# Patient Record
Sex: MALE | Race: BLACK OR AFRICAN AMERICAN | NOT HISPANIC OR LATINO | Employment: UNEMPLOYED | URBAN - METROPOLITAN AREA
[De-identification: names, ages, dates, MRNs, and addresses within clinical notes are randomized per-mention and may not be internally consistent; named-entity substitution may affect disease eponyms.]

---

## 2017-01-07 ENCOUNTER — HOSPITAL ENCOUNTER (EMERGENCY)
Age: 62
Discharge: HOME | End: 2017-01-07

## 2017-01-07 VITALS
TEMPERATURE: 97 F | DIASTOLIC BLOOD PRESSURE: 77 MMHG | OXYGEN SATURATION: 95 % | HEART RATE: 61 BPM | SYSTOLIC BLOOD PRESSURE: 144 MMHG | RESPIRATION RATE: 18 BRPM

## 2017-01-07 DIAGNOSIS — H61.21 IMPACTED CERUMEN OF RIGHT EAR: Primary | ICD-10-CM

## 2017-01-07 DIAGNOSIS — H92.01 RIGHT EAR PAIN: ICD-10-CM

## 2017-01-07 PROCEDURE — 99282 EMERGENCY DEPT VISIT SF MDM: CPT | Performed by: PHYSICIAN ASSISTANT

## 2017-01-07 PROCEDURE — 69210 REMOVE IMPACTED EAR WAX UNI: CPT | Performed by: PHYSICIAN ASSISTANT

## 2017-01-07 PROCEDURE — 99283 EMERGENCY DEPT VISIT LOW MDM: CPT

## 2017-01-07 PROCEDURE — 10004651 HB RX, NO CHARGE ITEM: Performed by: PHYSICIAN ASSISTANT

## 2017-01-07 PROCEDURE — 69209 REMOVE IMPACTED EAR WAX UNI: CPT

## 2017-01-07 RX ORDER — ACETAMINOPHEN 325 MG/1
650 TABLET ORAL ONCE
Status: COMPLETED | OUTPATIENT
Start: 2017-01-07 | End: 2017-01-07

## 2017-01-07 RX ADMIN — ACETAMINOPHEN 650 MG: 325 TABLET ORAL at 10:33

## 2017-01-07 ASSESSMENT — ENCOUNTER SYMPTOMS
SHORTNESS OF BREATH: 0
APPETITE CHANGE: 0
COLOR CHANGE: 0
FEVER: 0
DIARRHEA: 0
ACTIVITY CHANGE: 0
CHILLS: 0
VOMITING: 0
COUGH: 0
RHINORRHEA: 0

## 2017-01-07 ASSESSMENT — PAIN SCALES - GENERAL
PAINLEVEL_OUTOF10: 3
PAINLEVEL_OUTOF10: 6
PAIN_LEVEL_AT_REST: 3

## 2017-01-10 ENCOUNTER — OFFICE VISIT (OUTPATIENT)
Dept: OTOLARYNGOLOGY | Age: 62
End: 2017-01-10

## 2017-01-10 VITALS
SYSTOLIC BLOOD PRESSURE: 134 MMHG | HEART RATE: 62 BPM | DIASTOLIC BLOOD PRESSURE: 79 MMHG | RESPIRATION RATE: 16 BRPM | TEMPERATURE: 98.1 F

## 2017-01-10 DIAGNOSIS — H92.01 OTALGIA, RIGHT: Primary | ICD-10-CM

## 2017-01-10 DIAGNOSIS — H61.21 IMPACTED CERUMEN OF RIGHT EAR: ICD-10-CM

## 2017-01-10 PROCEDURE — G8419 CALC BMI OUT NRM PARAM NOF/U: HCPCS | Performed by: OTOLARYNGOLOGY

## 2017-01-10 PROCEDURE — 1036F TOBACCO NON-USER: CPT | Performed by: OTOLARYNGOLOGY

## 2017-01-10 PROCEDURE — 3017F COLORECTAL CA SCREEN DOC REV: CPT | Performed by: OTOLARYNGOLOGY

## 2017-01-10 PROCEDURE — G8732 NO DOC OF PAIN: HCPCS | Performed by: OTOLARYNGOLOGY

## 2017-01-10 PROCEDURE — G8427 DOCREV CUR MEDS BY ELIG CLIN: HCPCS | Performed by: OTOLARYNGOLOGY

## 2017-01-10 PROCEDURE — 69210 REMOVE IMPACTED EAR WAX UNI: CPT | Performed by: OTOLARYNGOLOGY

## 2017-01-10 PROCEDURE — 99201 OFFICE/OUTPT VISIT,NEW,LEVL I: CPT | Performed by: OTOLARYNGOLOGY

## 2017-03-17 ENCOUNTER — OFFICE VISIT (OUTPATIENT)
Dept: INTERNAL MEDICINE | Age: 62
End: 2017-03-17

## 2017-03-17 DIAGNOSIS — K21.9 GASTROESOPHAGEAL REFLUX DISEASE, ESOPHAGITIS PRESENCE NOT SPECIFIED: ICD-10-CM

## 2017-03-17 DIAGNOSIS — E66.9 OBESITY (BMI 30-39.9): Primary | ICD-10-CM

## 2017-03-17 PROCEDURE — 99213 OFFICE O/P EST LOW 20 MIN: CPT | Performed by: INTERNAL MEDICINE

## 2017-03-17 RX ORDER — ORLISTAT 120 MG/1
120 CAPSULE ORAL
Qty: 90 CAPSULE | Refills: 0 | Status: SHIPPED | OUTPATIENT
Start: 2017-03-17 | End: 2020-11-04 | Stop reason: ALTCHOICE

## 2017-03-17 RX ORDER — OMEPRAZOLE 40 MG/1
40 CAPSULE, DELAYED RELEASE ORAL DAILY
Qty: 30 CAPSULE | Refills: 6 | Status: SHIPPED | OUTPATIENT
Start: 2017-03-17 | End: 2018-03-02 | Stop reason: SDUPTHER

## 2017-03-17 ASSESSMENT — PAIN SCALES - GENERAL: PAINLEVEL: 0

## 2017-04-06 ENCOUNTER — TRANSCRIBE ORDERS (OUTPATIENT)
Dept: ADMINISTRATIVE | Facility: HOSPITAL | Age: 62
End: 2017-04-06

## 2017-04-06 ENCOUNTER — LAB (OUTPATIENT)
Dept: LAB | Facility: HOSPITAL | Age: 62
End: 2017-04-06

## 2017-04-06 DIAGNOSIS — R73.09 OTHER ABNORMAL GLUCOSE: ICD-10-CM

## 2017-04-06 DIAGNOSIS — R73.09 OTHER ABNORMAL GLUCOSE: Primary | ICD-10-CM

## 2017-04-06 LAB — HBA1C MFR BLD: 6.18 % (ref 4.8–5.6)

## 2017-04-06 PROCEDURE — 36415 COLL VENOUS BLD VENIPUNCTURE: CPT

## 2017-04-06 PROCEDURE — 83036 HEMOGLOBIN GLYCOSYLATED A1C: CPT | Performed by: INTERNAL MEDICINE

## 2017-05-04 ENCOUNTER — TELEPHONE (OUTPATIENT)
Dept: INTERNAL MEDICINE | Age: 62
End: 2017-05-04

## 2017-06-02 ENCOUNTER — OFFICE VISIT (OUTPATIENT)
Dept: INTERNAL MEDICINE | Age: 62
End: 2017-06-02

## 2017-06-02 VITALS
DIASTOLIC BLOOD PRESSURE: 80 MMHG | HEIGHT: 64 IN | SYSTOLIC BLOOD PRESSURE: 142 MMHG | BODY MASS INDEX: 37.63 KG/M2 | HEART RATE: 79 BPM | WEIGHT: 220.4 LBS

## 2017-06-02 DIAGNOSIS — G47.26 SHIFT WORK SLEEP DISORDER: ICD-10-CM

## 2017-06-02 DIAGNOSIS — R03.0 ELEVATED BLOOD PRESSURE READING: Primary | ICD-10-CM

## 2017-06-02 PROCEDURE — G8732 NO DOC OF PAIN: HCPCS | Performed by: INTERNAL MEDICINE

## 2017-06-02 PROCEDURE — G8427 DOCREV CUR MEDS BY ELIG CLIN: HCPCS | Performed by: INTERNAL MEDICINE

## 2017-06-02 PROCEDURE — 1036F TOBACCO NON-USER: CPT | Performed by: INTERNAL MEDICINE

## 2017-06-02 PROCEDURE — 3017F COLORECTAL CA SCREEN DOC REV: CPT | Performed by: INTERNAL MEDICINE

## 2017-06-02 PROCEDURE — 99213 OFFICE O/P EST LOW 20 MIN: CPT | Performed by: INTERNAL MEDICINE

## 2017-06-02 PROCEDURE — G8419 CALC BMI OUT NRM PARAM NOF/U: HCPCS | Performed by: INTERNAL MEDICINE

## 2017-06-02 ASSESSMENT — PATIENT HEALTH QUESTIONNAIRE - PHQ9
1. LITTLE INTEREST OR PLEASURE IN DOING THINGS: NO
2. FEELING DOWN, DEPRESSED OR HOPELESS: NO

## 2017-06-12 PROBLEM — R03.0 ELEVATED BLOOD PRESSURE READING: Status: ACTIVE | Noted: 2017-06-12

## 2017-06-12 PROBLEM — G47.26 SHIFT WORK SLEEP DISORDER: Status: ACTIVE | Noted: 2017-06-12

## 2017-06-12 PROBLEM — F51.01 PRIMARY INSOMNIA: Status: ACTIVE | Noted: 2017-06-12

## 2017-06-16 ENCOUNTER — OFFICE VISIT (OUTPATIENT)
Dept: INTERNAL MEDICINE | Age: 62
End: 2017-06-16

## 2017-06-16 VITALS — SYSTOLIC BLOOD PRESSURE: 142 MMHG | DIASTOLIC BLOOD PRESSURE: 75 MMHG

## 2017-09-28 ENCOUNTER — TRANSCRIBE ORDERS (OUTPATIENT)
Dept: ADMINISTRATIVE | Facility: HOSPITAL | Age: 62
End: 2017-09-28

## 2017-09-28 ENCOUNTER — LAB (OUTPATIENT)
Dept: LAB | Facility: HOSPITAL | Age: 62
End: 2017-09-28

## 2017-09-28 DIAGNOSIS — E78.5 HYPERLIPIDEMIA, UNSPECIFIED HYPERLIPIDEMIA TYPE: ICD-10-CM

## 2017-09-28 DIAGNOSIS — I10 ESSENTIAL HYPERTENSION, MALIGNANT: ICD-10-CM

## 2017-09-28 DIAGNOSIS — E34.9 TESTOSTERONE DEFICIENCY: ICD-10-CM

## 2017-09-28 DIAGNOSIS — Z00.00 ROUTINE GENERAL MEDICAL EXAMINATION AT A HEALTH CARE FACILITY: ICD-10-CM

## 2017-09-28 DIAGNOSIS — Z12.5 SPECIAL SCREENING FOR MALIGNANT NEOPLASM OF PROSTATE: ICD-10-CM

## 2017-09-28 DIAGNOSIS — Z00.00 ROUTINE GENERAL MEDICAL EXAMINATION AT A HEALTH CARE FACILITY: Primary | ICD-10-CM

## 2017-09-28 LAB
ALBUMIN SERPL-MCNC: 4.4 G/DL (ref 3.5–5.2)
ALBUMIN/GLOB SERPL: 1.5 G/DL
ALP SERPL-CCNC: 143 U/L (ref 39–117)
ALT SERPL W P-5'-P-CCNC: 35 U/L (ref 1–41)
ANION GAP SERPL CALCULATED.3IONS-SCNC: 15.7 MMOL/L
AST SERPL-CCNC: 20 U/L (ref 1–40)
BASOPHILS # BLD AUTO: 0.03 10*3/MM3 (ref 0–0.2)
BASOPHILS NFR BLD AUTO: 0.2 % (ref 0–1.5)
BILIRUB SERPL-MCNC: 0.3 MG/DL (ref 0.1–1.2)
BILIRUB UR QL STRIP: NEGATIVE
BUN BLD-MCNC: 17 MG/DL (ref 8–23)
BUN/CREAT SERPL: 17.7 (ref 7–25)
CALCIUM SPEC-SCNC: 9.3 MG/DL (ref 8.6–10.5)
CHLORIDE SERPL-SCNC: 104 MMOL/L (ref 98–107)
CHOLEST SERPL-MCNC: 182 MG/DL (ref 0–200)
CLARITY UR: CLEAR
CO2 SERPL-SCNC: 23.3 MMOL/L (ref 22–29)
COLOR UR: YELLOW
CREAT BLD-MCNC: 0.96 MG/DL (ref 0.76–1.27)
DEPRECATED RDW RBC AUTO: 48.5 FL (ref 37–54)
EOSINOPHIL # BLD AUTO: 0.17 10*3/MM3 (ref 0–0.7)
EOSINOPHIL NFR BLD AUTO: 1.2 % (ref 0.3–6.2)
ERYTHROCYTE [DISTWIDTH] IN BLOOD BY AUTOMATED COUNT: 14.3 % (ref 11.5–14.5)
FERRITIN SERPL-MCNC: 270.3 NG/ML (ref 30–400)
FOLATE SERPL-MCNC: 14.16 NG/ML (ref 4.78–24.2)
GFR SERPL CREATININE-BSD FRML MDRD: 79 ML/MIN/1.73
GLOBULIN UR ELPH-MCNC: 2.9 GM/DL
GLUCOSE BLD-MCNC: 103 MG/DL (ref 65–99)
GLUCOSE UR STRIP-MCNC: NEGATIVE MG/DL
HCT VFR BLD AUTO: 43 % (ref 40.4–52.2)
HDLC SERPL-MCNC: 51 MG/DL (ref 40–60)
HGB BLD-MCNC: 14.3 G/DL (ref 13.7–17.6)
HGB UR QL STRIP.AUTO: NEGATIVE
IMM GRANULOCYTES # BLD: 0.04 10*3/MM3 (ref 0–0.03)
IMM GRANULOCYTES NFR BLD: 0.3 % (ref 0–0.5)
IRON 24H UR-MRATE: 60 MCG/DL (ref 59–158)
KETONES UR QL STRIP: NEGATIVE
LDLC SERPL CALC-MCNC: 113 MG/DL (ref 0–100)
LDLC/HDLC SERPL: 2.22 {RATIO}
LEUKOCYTE ESTERASE UR QL STRIP.AUTO: NEGATIVE
LH SERPL-ACNC: 5.23 MIU/ML
LYMPHOCYTES # BLD AUTO: 4.46 10*3/MM3 (ref 0.9–4.8)
LYMPHOCYTES NFR BLD AUTO: 32.6 % (ref 19.6–45.3)
MCH RBC QN AUTO: 31.4 PG (ref 27–32.7)
MCHC RBC AUTO-ENTMCNC: 33.3 G/DL (ref 32.6–36.4)
MCV RBC AUTO: 94.5 FL (ref 79.8–96.2)
MONOCYTES # BLD AUTO: 0.79 10*3/MM3 (ref 0.2–1.2)
MONOCYTES NFR BLD AUTO: 5.8 % (ref 5–12)
NEUTROPHILS # BLD AUTO: 8.19 10*3/MM3 (ref 1.9–8.1)
NEUTROPHILS NFR BLD AUTO: 59.9 % (ref 42.7–76)
NITRITE UR QL STRIP: NEGATIVE
PH UR STRIP.AUTO: 7 [PH] (ref 5–8)
PLATELET # BLD AUTO: 324 10*3/MM3 (ref 140–500)
PMV BLD AUTO: 10.1 FL (ref 6–12)
POTASSIUM BLD-SCNC: 4.4 MMOL/L (ref 3.5–5.2)
PROT SERPL-MCNC: 7.3 G/DL (ref 6–8.5)
PROT UR QL STRIP: NEGATIVE
PSA SERPL-MCNC: 0.62 NG/ML (ref 0–4)
RBC # BLD AUTO: 4.55 10*6/MM3 (ref 4.6–6)
SODIUM BLD-SCNC: 143 MMOL/L (ref 136–145)
SP GR UR STRIP: 1.02 (ref 1–1.03)
T3 SERPL-MCNC: 105.8 NG/DL (ref 80–200)
T4 FREE SERPL-MCNC: 1.01 NG/DL (ref 0.93–1.7)
TESTOST SERPL-MCNC: 649 NG/DL (ref 193–740)
TRIGL SERPL-MCNC: 90 MG/DL (ref 0–150)
TSH SERPL DL<=0.05 MIU/L-ACNC: 0.64 MIU/ML (ref 0.27–4.2)
UROBILINOGEN UR QL STRIP: NORMAL
VIT B12 BLD-MCNC: 415 PG/ML (ref 211–946)
VLDLC SERPL-MCNC: 18 MG/DL (ref 5–40)
WBC NRBC COR # BLD: 13.68 10*3/MM3 (ref 4.5–10.7)

## 2017-09-28 PROCEDURE — 82746 ASSAY OF FOLIC ACID SERUM: CPT | Performed by: INTERNAL MEDICINE

## 2017-09-28 PROCEDURE — 84443 ASSAY THYROID STIM HORMONE: CPT | Performed by: INTERNAL MEDICINE

## 2017-09-28 PROCEDURE — 84403 ASSAY OF TOTAL TESTOSTERONE: CPT | Performed by: INTERNAL MEDICINE

## 2017-09-28 PROCEDURE — 81003 URINALYSIS AUTO W/O SCOPE: CPT | Performed by: INTERNAL MEDICINE

## 2017-09-28 PROCEDURE — 83540 ASSAY OF IRON: CPT | Performed by: INTERNAL MEDICINE

## 2017-09-28 PROCEDURE — 80053 COMPREHEN METABOLIC PANEL: CPT | Performed by: INTERNAL MEDICINE

## 2017-09-28 PROCEDURE — 36415 COLL VENOUS BLD VENIPUNCTURE: CPT

## 2017-09-28 PROCEDURE — 82626 DEHYDROEPIANDROSTERONE: CPT | Performed by: INTERNAL MEDICINE

## 2017-09-28 PROCEDURE — 84480 ASSAY TRIIODOTHYRONINE (T3): CPT | Performed by: INTERNAL MEDICINE

## 2017-09-28 PROCEDURE — 80061 LIPID PANEL: CPT | Performed by: INTERNAL MEDICINE

## 2017-09-28 PROCEDURE — 84439 ASSAY OF FREE THYROXINE: CPT | Performed by: INTERNAL MEDICINE

## 2017-09-28 PROCEDURE — 83002 ASSAY OF GONADOTROPIN (LH): CPT | Performed by: INTERNAL MEDICINE

## 2017-09-28 PROCEDURE — 85025 COMPLETE CBC W/AUTO DIFF WBC: CPT | Performed by: INTERNAL MEDICINE

## 2017-09-28 PROCEDURE — 82607 VITAMIN B-12: CPT | Performed by: INTERNAL MEDICINE

## 2017-09-28 PROCEDURE — 82728 ASSAY OF FERRITIN: CPT | Performed by: INTERNAL MEDICINE

## 2017-09-28 PROCEDURE — G0103 PSA SCREENING: HCPCS | Performed by: INTERNAL MEDICINE

## 2017-10-03 LAB — DHEA SERPL-MCNC: 207 NG/DL (ref 31–701)

## 2017-11-02 ENCOUNTER — OFFICE (AMBULATORY)
Dept: URBAN - METROPOLITAN AREA CLINIC 75 | Facility: CLINIC | Age: 62
End: 2017-11-02

## 2017-11-02 VITALS
HEIGHT: 69 IN | HEART RATE: 85 BPM | DIASTOLIC BLOOD PRESSURE: 78 MMHG | WEIGHT: 161 LBS | SYSTOLIC BLOOD PRESSURE: 140 MMHG

## 2017-11-02 DIAGNOSIS — R14.0 ABDOMINAL DISTENSION (GASEOUS): ICD-10-CM

## 2017-11-02 DIAGNOSIS — K52.9 NONINFECTIVE GASTROENTERITIS AND COLITIS, UNSPECIFIED: ICD-10-CM

## 2017-11-02 DIAGNOSIS — R11.0 NAUSEA: ICD-10-CM

## 2017-11-02 DIAGNOSIS — R10.84 GENERALIZED ABDOMINAL PAIN: ICD-10-CM

## 2017-11-02 DIAGNOSIS — R19.07 GENERALIZED INTRA-ABDOMINAL AND PELVIC SWELLING, MASS AND LU: ICD-10-CM

## 2017-11-02 PROCEDURE — 99204 OFFICE O/P NEW MOD 45 MIN: CPT | Performed by: INTERNAL MEDICINE

## 2017-11-02 NOTE — SERVICEHPINOTES
Thank you very much for allowing me to evaluate Mr. Cherry. As you know he is a pleasant 62-year-old gentleman who has a long-standing history of generalized crampy abdominal pain. He says the symptoms seem a little better on probiotics. He says that he avoids soda and coffee and that helps as well. He again reports bloating and cramping. He also says when he goes to the bathroom his stools are loose but he only goes every 3 or 4 days. He cannpot remember the last time he had a formed stool. He will also have urgency at times. He had a CT scan done in 2015 which showed diverticular disease. There is no blood in the bowels. There is no fevers or chills. He denies weight loss. There is no travel or well water use. Family history is negative for polyps, colitis or colon cancer. He has never had a colonoscopy. He apparently was supposed to have a colonoscopy 10 years ago but never got scheduled. He also reports a lot of anxiety.From an upper GI standpoint he has occasional nausea. He denies reflux. There is no history of peptic ulcer disease. There is no history of emesis. There is no melena or hematemesis. He's had no dysphagia, odynophagia or chest pain. Lab work was unremarkable other than a slight leukocytosis and slight elevation of alkaline phosphatase. TSH is normal. He does not smoke, he has not had anything to drink for over a year, he does attend AA. He does smoke a pack a day. He is in no distress, he does not look acutely ill.

## 2017-11-02 NOTE — SERVICENOTES
records reviewed.  As above CT scan from 8 2015 negative other than diverticular disease.  TSH within normal limits.  PSA 0.6 0.6.  Urinalysis negative.  B12 folate negative.  White count 13.68, remainder CBC within normal limits.  Alkaline phosphatase 143, remainder of hepatic panel normal.  DHEA 207.

## 2017-11-13 ENCOUNTER — HOSPITAL ENCOUNTER (EMERGENCY)
Age: 62
Discharge: HOME | End: 2017-11-13

## 2017-11-13 VITALS
TEMPERATURE: 97.7 F | WEIGHT: 201 LBS | BODY MASS INDEX: 34.31 KG/M2 | SYSTOLIC BLOOD PRESSURE: 127 MMHG | RESPIRATION RATE: 16 BRPM | HEART RATE: 68 BPM | OXYGEN SATURATION: 97 % | HEIGHT: 64 IN | DIASTOLIC BLOOD PRESSURE: 87 MMHG

## 2017-11-13 DIAGNOSIS — S60.559A FOREIGN BODY IN HAND, UNSPECIFIED LATERALITY, INITIAL ENCOUNTER: Primary | ICD-10-CM

## 2017-11-13 PROCEDURE — 99283 EMERGENCY DEPT VISIT LOW MDM: CPT

## 2017-11-13 PROCEDURE — 99282 EMERGENCY DEPT VISIT SF MDM: CPT | Performed by: EMERGENCY MEDICINE

## 2017-11-13 ASSESSMENT — PAIN SCALES - GENERAL: PAINLEVEL_OUTOF10: 6

## 2017-11-28 VITALS
DIASTOLIC BLOOD PRESSURE: 78 MMHG | SYSTOLIC BLOOD PRESSURE: 118 MMHG | DIASTOLIC BLOOD PRESSURE: 51 MMHG | SYSTOLIC BLOOD PRESSURE: 127 MMHG | SYSTOLIC BLOOD PRESSURE: 134 MMHG | RESPIRATION RATE: 15 BRPM | DIASTOLIC BLOOD PRESSURE: 79 MMHG | SYSTOLIC BLOOD PRESSURE: 133 MMHG | HEART RATE: 72 BPM | DIASTOLIC BLOOD PRESSURE: 52 MMHG | DIASTOLIC BLOOD PRESSURE: 81 MMHG | OXYGEN SATURATION: 99 % | DIASTOLIC BLOOD PRESSURE: 71 MMHG | SYSTOLIC BLOOD PRESSURE: 124 MMHG | DIASTOLIC BLOOD PRESSURE: 85 MMHG | DIASTOLIC BLOOD PRESSURE: 80 MMHG | DIASTOLIC BLOOD PRESSURE: 70 MMHG | RESPIRATION RATE: 17 BRPM | OXYGEN SATURATION: 100 % | SYSTOLIC BLOOD PRESSURE: 117 MMHG | RESPIRATION RATE: 16 BRPM | HEART RATE: 81 BPM | SYSTOLIC BLOOD PRESSURE: 136 MMHG | RESPIRATION RATE: 20 BRPM | HEART RATE: 83 BPM | DIASTOLIC BLOOD PRESSURE: 69 MMHG | TEMPERATURE: 98 F | SYSTOLIC BLOOD PRESSURE: 111 MMHG | SYSTOLIC BLOOD PRESSURE: 123 MMHG | HEART RATE: 79 BPM | TEMPERATURE: 97.7 F | HEIGHT: 69 IN | SYSTOLIC BLOOD PRESSURE: 108 MMHG | OXYGEN SATURATION: 98 % | RESPIRATION RATE: 18 BRPM | HEART RATE: 76 BPM | HEART RATE: 82 BPM | WEIGHT: 161 LBS | SYSTOLIC BLOOD PRESSURE: 119 MMHG | HEART RATE: 80 BPM | OXYGEN SATURATION: 92 %

## 2017-11-29 ENCOUNTER — OFFICE (AMBULATORY)
Dept: URBAN - METROPOLITAN AREA CLINIC 64 | Facility: CLINIC | Age: 62
End: 2017-11-29

## 2017-11-29 ENCOUNTER — AMBULATORY SURGICAL CENTER (AMBULATORY)
Dept: URBAN - METROPOLITAN AREA SURGERY 17 | Facility: SURGERY | Age: 62
End: 2017-11-29
Payer: COMMERCIAL

## 2017-11-29 DIAGNOSIS — D12.2 BENIGN NEOPLASM OF ASCENDING COLON: ICD-10-CM

## 2017-11-29 DIAGNOSIS — D12.3 BENIGN NEOPLASM OF TRANSVERSE COLON: ICD-10-CM

## 2017-11-29 DIAGNOSIS — D12.5 BENIGN NEOPLASM OF SIGMOID COLON: ICD-10-CM

## 2017-11-29 DIAGNOSIS — K63.5 POLYP OF COLON: ICD-10-CM

## 2017-11-29 DIAGNOSIS — K64.8 OTHER HEMORRHOIDS: ICD-10-CM

## 2017-11-29 DIAGNOSIS — K57.30 DIVERTICULOSIS OF LARGE INTESTINE WITHOUT PERFORATION OR ABS: ICD-10-CM

## 2017-11-29 DIAGNOSIS — Z12.11 ENCOUNTER FOR SCREENING FOR MALIGNANT NEOPLASM OF COLON: ICD-10-CM

## 2017-11-29 LAB
GI HISTOLOGY: A. UNSPECIFIED: (no result)
GI HISTOLOGY: B. UNSPECIFIED: (no result)
GI HISTOLOGY: C. UNSPECIFIED: (no result)
GI HISTOLOGY: D. SELECT: (no result)
GI HISTOLOGY: E. SELECT: (no result)
GI HISTOLOGY: F. UNSPECIFIED: (no result)
GI HISTOLOGY: PDF REPORT: (no result)

## 2017-11-29 PROCEDURE — 88305 TISSUE EXAM BY PATHOLOGIST: CPT | Performed by: INTERNAL MEDICINE

## 2017-11-29 PROCEDURE — 45385 COLONOSCOPY W/LESION REMOVAL: CPT | Mod: 33 | Performed by: INTERNAL MEDICINE

## 2017-11-29 PROCEDURE — 45380 COLONOSCOPY AND BIOPSY: CPT | Mod: 59,33 | Performed by: INTERNAL MEDICINE

## 2017-11-29 PROCEDURE — 45380 COLONOSCOPY AND BIOPSY: CPT | Mod: 33,59 | Performed by: INTERNAL MEDICINE

## 2017-11-29 RX ADMIN — PROPOFOL 100 MG: 10 INJECTION, EMULSION INTRAVENOUS at 13:47

## 2017-11-29 RX ADMIN — PROPOFOL 50 MG: 10 INJECTION, EMULSION INTRAVENOUS at 13:52

## 2017-11-29 RX ADMIN — PROPOFOL 50 MG: 10 INJECTION, EMULSION INTRAVENOUS at 14:02

## 2017-11-29 RX ADMIN — PROPOFOL 50 MG: 10 INJECTION, EMULSION INTRAVENOUS at 13:58

## 2017-11-29 RX ADMIN — PROPOFOL 50 MG: 10 INJECTION, EMULSION INTRAVENOUS at 13:49

## 2017-11-29 RX ADMIN — LIDOCAINE HYDROCHLORIDE 25 MG: 10 INJECTION, SOLUTION EPIDURAL; INFILTRATION; INTRACAUDAL; PERINEURAL at 13:47

## 2018-03-02 ENCOUNTER — OFFICE VISIT (OUTPATIENT)
Dept: INTERNAL MEDICINE | Age: 63
End: 2018-03-02

## 2018-03-02 VITALS
TEMPERATURE: 96.4 F | OXYGEN SATURATION: 98 % | HEART RATE: 89 BPM | DIASTOLIC BLOOD PRESSURE: 68 MMHG | BODY MASS INDEX: 37.61 KG/M2 | HEIGHT: 64 IN | SYSTOLIC BLOOD PRESSURE: 132 MMHG | WEIGHT: 220.3 LBS

## 2018-03-02 DIAGNOSIS — K21.9 GASTROESOPHAGEAL REFLUX DISEASE, ESOPHAGITIS PRESENCE NOT SPECIFIED: Primary | ICD-10-CM

## 2018-03-02 DIAGNOSIS — Z12.11 COLON CANCER SCREENING: ICD-10-CM

## 2018-03-02 PROCEDURE — 99213 OFFICE O/P EST LOW 20 MIN: CPT | Performed by: STUDENT IN AN ORGANIZED HEALTH CARE EDUCATION/TRAINING PROGRAM

## 2018-03-02 RX ORDER — OMEPRAZOLE 40 MG/1
40 CAPSULE, DELAYED RELEASE ORAL DAILY
Qty: 30 CAPSULE | Refills: 6 | Status: SHIPPED | OUTPATIENT
Start: 2018-03-02 | End: 2020-06-01 | Stop reason: ALTCHOICE

## 2018-03-02 ASSESSMENT — PATIENT HEALTH QUESTIONNAIRE - PHQ9
CLINICAL INTERPRETATION OF PHQ2 SCORE: 0
SUM OF ALL RESPONSES TO PHQ9 QUESTIONS 1 AND 2: 0

## 2018-03-06 ENCOUNTER — PREP FOR CASE (OUTPATIENT)
Dept: GASTROENTEROLOGY | Age: 63
End: 2018-03-06

## 2018-03-06 DIAGNOSIS — Z12.11 SPECIAL SCREENING FOR MALIGNANT NEOPLASMS, COLON: Primary | ICD-10-CM

## 2018-03-12 ENCOUNTER — HOSPITAL ENCOUNTER (OUTPATIENT)
Age: 63
End: 2018-03-12
Attending: INTERNAL MEDICINE | Admitting: INTERNAL MEDICINE

## 2018-03-12 RX ORDER — POLYETHYLENE GLYCOL 3350, SODIUM CHLORIDE, SODIUM BICARBONATE, POTASSIUM CHLORIDE 420; 11.2; 5.72; 1.48 G/4L; G/4L; G/4L; G/4L
4000 POWDER, FOR SOLUTION ORAL ONCE
Qty: 4000 ML | Refills: 0 | Status: SHIPPED | OUTPATIENT
Start: 2018-03-12 | End: 2018-04-03 | Stop reason: SDUPTHER

## 2018-03-29 ENCOUNTER — HOSPITAL ENCOUNTER (EMERGENCY)
Facility: HOSPITAL | Age: 63
End: 2018-03-29
Attending: EMERGENCY MEDICINE | Admitting: EMERGENCY MEDICINE

## 2018-03-29 ENCOUNTER — HOSPITAL ENCOUNTER (INPATIENT)
Facility: HOSPITAL | Age: 63
LOS: 7 days | Discharge: HOME | End: 2018-04-05
Attending: SPECIALIST | Admitting: SPECIALIST

## 2018-03-29 ENCOUNTER — APPOINTMENT (OUTPATIENT)
Dept: CT IMAGING | Facility: HOSPITAL | Age: 63
End: 2018-03-29

## 2018-03-29 VITALS
RESPIRATION RATE: 12 BRPM | TEMPERATURE: 97.5 F | SYSTOLIC BLOOD PRESSURE: 120 MMHG | OXYGEN SATURATION: 95 % | HEIGHT: 69 IN | BODY MASS INDEX: 22.96 KG/M2 | HEART RATE: 107 BPM | DIASTOLIC BLOOD PRESSURE: 69 MMHG | WEIGHT: 155 LBS

## 2018-03-29 DIAGNOSIS — F41.9 ANXIETY: Primary | ICD-10-CM

## 2018-03-29 PROBLEM — F29 PSYCHOSIS: Status: ACTIVE | Noted: 2018-03-29

## 2018-03-29 LAB
ALBUMIN SERPL-MCNC: 4.5 G/DL (ref 3.5–5.2)
ALBUMIN/GLOB SERPL: 1.5 G/DL
ALP SERPL-CCNC: 149 U/L (ref 39–117)
ALT SERPL W P-5'-P-CCNC: 27 U/L (ref 1–41)
AMPHET+METHAMPHET UR QL: NEGATIVE
ANION GAP SERPL CALCULATED.3IONS-SCNC: 17.3 MMOL/L
AST SERPL-CCNC: 18 U/L (ref 1–40)
BACTERIA UR QL AUTO: ABNORMAL /HPF
BARBITURATES UR QL SCN: NEGATIVE
BASOPHILS # BLD AUTO: 0.03 10*3/MM3 (ref 0–0.2)
BASOPHILS NFR BLD AUTO: 0.3 % (ref 0–1.5)
BENZODIAZ UR QL SCN: NEGATIVE
BILIRUB SERPL-MCNC: 0.7 MG/DL (ref 0.1–1.2)
BILIRUB UR QL STRIP: NEGATIVE
BUN BLD-MCNC: 20 MG/DL (ref 8–23)
BUN/CREAT SERPL: 18.9 (ref 7–25)
CALCIUM SPEC-SCNC: 9.8 MG/DL (ref 8.6–10.5)
CANNABINOIDS SERPL QL: NEGATIVE
CHLORIDE SERPL-SCNC: 102 MMOL/L (ref 98–107)
CLARITY UR: CLEAR
CO2 SERPL-SCNC: 20.7 MMOL/L (ref 22–29)
COCAINE UR QL: NEGATIVE
COLOR UR: ABNORMAL
CREAT BLD-MCNC: 1.06 MG/DL (ref 0.76–1.27)
DEPRECATED RDW RBC AUTO: 44.3 FL (ref 37–54)
EOSINOPHIL # BLD AUTO: 0.01 10*3/MM3 (ref 0–0.7)
EOSINOPHIL NFR BLD AUTO: 0.1 % (ref 0.3–6.2)
ERYTHROCYTE [DISTWIDTH] IN BLOOD BY AUTOMATED COUNT: 13.6 % (ref 11.5–14.5)
ETHANOL BLD-MCNC: <10 MG/DL (ref 0–10)
ETHANOL UR QL: <0.01 %
GFR SERPL CREATININE-BSD FRML MDRD: 71 ML/MIN/1.73
GLOBULIN UR ELPH-MCNC: 3.1 GM/DL
GLUCOSE BLD-MCNC: 130 MG/DL (ref 65–99)
GLUCOSE UR STRIP-MCNC: NEGATIVE MG/DL
HCT VFR BLD AUTO: 43.3 % (ref 40.4–52.2)
HGB BLD-MCNC: 15.1 G/DL (ref 13.7–17.6)
HGB UR QL STRIP.AUTO: NEGATIVE
HOLD SPECIMEN: NORMAL
HOLD SPECIMEN: NORMAL
HYALINE CASTS UR QL AUTO: ABNORMAL /LPF
IMM GRANULOCYTES # BLD: 0.03 10*3/MM3 (ref 0–0.03)
IMM GRANULOCYTES NFR BLD: 0.3 % (ref 0–0.5)
KETONES UR QL STRIP: ABNORMAL
LEUKOCYTE ESTERASE UR QL STRIP.AUTO: NEGATIVE
LYMPHOCYTES # BLD AUTO: 3.41 10*3/MM3 (ref 0.9–4.8)
LYMPHOCYTES NFR BLD AUTO: 30 % (ref 19.6–45.3)
MCH RBC QN AUTO: 31.3 PG (ref 27–32.7)
MCHC RBC AUTO-ENTMCNC: 34.9 G/DL (ref 32.6–36.4)
MCV RBC AUTO: 89.6 FL (ref 79.8–96.2)
METHADONE UR QL SCN: NEGATIVE
MONOCYTES # BLD AUTO: 0.89 10*3/MM3 (ref 0.2–1.2)
MONOCYTES NFR BLD AUTO: 7.8 % (ref 5–12)
NEUTROPHILS # BLD AUTO: 6.98 10*3/MM3 (ref 1.9–8.1)
NEUTROPHILS NFR BLD AUTO: 61.5 % (ref 42.7–76)
NITRITE UR QL STRIP: NEGATIVE
OPIATES UR QL: NEGATIVE
OXYCODONE UR QL SCN: NEGATIVE
PH UR STRIP.AUTO: 5.5 [PH] (ref 5–8)
PLATELET # BLD AUTO: 392 10*3/MM3 (ref 140–500)
PMV BLD AUTO: 9.6 FL (ref 6–12)
POTASSIUM BLD-SCNC: 3.7 MMOL/L (ref 3.5–5.2)
PROT SERPL-MCNC: 7.6 G/DL (ref 6–8.5)
PROT UR QL STRIP: ABNORMAL
RBC # BLD AUTO: 4.83 10*6/MM3 (ref 4.6–6)
RBC # UR: ABNORMAL /HPF
REF LAB TEST METHOD: ABNORMAL
SODIUM BLD-SCNC: 140 MMOL/L (ref 136–145)
SP GR UR STRIP: 1.03 (ref 1–1.03)
SQUAMOUS #/AREA URNS HPF: ABNORMAL /HPF
T4 FREE SERPL-MCNC: 1.6 NG/DL (ref 0.93–1.7)
TSH SERPL DL<=0.05 MIU/L-ACNC: 1.32 MIU/ML (ref 0.27–4.2)
UROBILINOGEN UR QL STRIP: ABNORMAL
WBC NRBC COR # BLD: 11.35 10*3/MM3 (ref 4.5–10.7)
WBC UR QL AUTO: ABNORMAL /HPF
WHOLE BLOOD HOLD SPECIMEN: NORMAL
WHOLE BLOOD HOLD SPECIMEN: NORMAL

## 2018-03-29 PROCEDURE — 80307 DRUG TEST PRSMV CHEM ANLYZR: CPT | Performed by: PHYSICIAN ASSISTANT

## 2018-03-29 PROCEDURE — 85025 COMPLETE CBC W/AUTO DIFF WBC: CPT | Performed by: PHYSICIAN ASSISTANT

## 2018-03-29 PROCEDURE — 90791 PSYCH DIAGNOSTIC EVALUATION: CPT | Performed by: SOCIAL WORKER

## 2018-03-29 PROCEDURE — 84443 ASSAY THYROID STIM HORMONE: CPT | Performed by: SPECIALIST

## 2018-03-29 PROCEDURE — 99285 EMERGENCY DEPT VISIT HI MDM: CPT

## 2018-03-29 PROCEDURE — 81001 URINALYSIS AUTO W/SCOPE: CPT | Performed by: PHYSICIAN ASSISTANT

## 2018-03-29 PROCEDURE — 84439 ASSAY OF FREE THYROXINE: CPT | Performed by: SPECIALIST

## 2018-03-29 PROCEDURE — 70450 CT HEAD/BRAIN W/O DYE: CPT

## 2018-03-29 PROCEDURE — 80053 COMPREHEN METABOLIC PANEL: CPT | Performed by: PHYSICIAN ASSISTANT

## 2018-03-29 RX ORDER — LOSARTAN POTASSIUM 50 MG/1
100 TABLET ORAL DAILY
Status: ON HOLD | COMMUNITY
End: 2018-03-30

## 2018-03-29 RX ORDER — SODIUM CHLORIDE 0.9 % (FLUSH) 0.9 %
10 SYRINGE (ML) INJECTION AS NEEDED
Status: DISCONTINUED | OUTPATIENT
Start: 2018-03-29 | End: 2018-03-29 | Stop reason: HOSPADM

## 2018-03-29 RX ORDER — ACETAMINOPHEN 325 MG/1
650 TABLET ORAL EVERY 4 HOURS PRN
Status: DISCONTINUED | OUTPATIENT
Start: 2018-03-29 | End: 2018-04-05 | Stop reason: HOSPADM

## 2018-03-29 RX ORDER — BUPROPION HYDROCHLORIDE 150 MG/1
300 TABLET ORAL NIGHTLY
Status: ON HOLD | COMMUNITY
End: 2019-01-11

## 2018-03-29 RX ORDER — ALUMINA, MAGNESIA, AND SIMETHICONE 2400; 2400; 240 MG/30ML; MG/30ML; MG/30ML
15 SUSPENSION ORAL EVERY 6 HOURS PRN
Status: DISCONTINUED | OUTPATIENT
Start: 2018-03-29 | End: 2018-04-05 | Stop reason: HOSPADM

## 2018-03-29 RX ORDER — OLANZAPINE 5 MG/1
10 TABLET, ORALLY DISINTEGRATING ORAL EVERY 8 HOURS PRN
Status: DISCONTINUED | OUTPATIENT
Start: 2018-03-29 | End: 2018-04-05 | Stop reason: HOSPADM

## 2018-03-29 RX ORDER — GABAPENTIN 300 MG/1
300 CAPSULE ORAL 3 TIMES DAILY
Status: ON HOLD | COMMUNITY
End: 2019-01-11

## 2018-03-29 RX ORDER — NICOTINE 21 MG/24HR
1 PATCH, TRANSDERMAL 24 HOURS TRANSDERMAL EVERY 24 HOURS
Status: DISCONTINUED | OUTPATIENT
Start: 2018-03-29 | End: 2018-04-05 | Stop reason: HOSPADM

## 2018-03-29 RX ORDER — BUSPIRONE HYDROCHLORIDE 15 MG/1
30 TABLET ORAL 2 TIMES DAILY
Status: ON HOLD | COMMUNITY
End: 2019-01-11

## 2018-03-29 RX ORDER — LOSARTAN POTASSIUM 100 MG/1
100 TABLET ORAL
Status: COMPLETED | OUTPATIENT
Start: 2018-03-29 | End: 2018-03-29

## 2018-03-29 RX ORDER — OLANZAPINE 5 MG/1
5 TABLET, ORALLY DISINTEGRATING ORAL ONCE
Status: COMPLETED | OUTPATIENT
Start: 2018-03-29 | End: 2018-03-29

## 2018-03-29 RX ORDER — DISULFIRAM 250 MG/1
500 TABLET ORAL DAILY
Status: ON HOLD | COMMUNITY
End: 2019-01-11

## 2018-03-29 RX ADMIN — LOSARTAN POTASSIUM 100 MG: 100 TABLET ORAL at 17:34

## 2018-03-29 RX ADMIN — OLANZAPINE 5 MG: 5 TABLET, ORALLY DISINTEGRATING ORAL at 15:00

## 2018-03-29 RX ADMIN — NICOTINE 1 PATCH: 21 PATCH, EXTENDED RELEASE TRANSDERMAL at 18:14

## 2018-03-30 PROBLEM — I10 HYPERTENSION: Status: ACTIVE | Noted: 2018-03-30

## 2018-03-30 PROBLEM — J44.9 COPD (CHRONIC OBSTRUCTIVE PULMONARY DISEASE): Status: ACTIVE | Noted: 2018-03-30

## 2018-03-30 LAB
CHOLEST SERPL-MCNC: 203 MG/DL (ref 0–200)
HDLC SERPL-MCNC: 39 MG/DL (ref 40–60)
LDLC SERPL CALC-MCNC: 148 MG/DL (ref 0–100)
LDLC/HDLC SERPL: 3.79 {RATIO}
TRIGL SERPL-MCNC: 80 MG/DL (ref 0–150)
VLDLC SERPL-MCNC: 16 MG/DL (ref 5–40)

## 2018-03-30 PROCEDURE — 80061 LIPID PANEL: CPT | Performed by: SPECIALIST

## 2018-03-30 RX ORDER — LOSARTAN POTASSIUM 100 MG/1
100 TABLET ORAL
Status: DISCONTINUED | OUTPATIENT
Start: 2018-03-30 | End: 2018-04-05 | Stop reason: HOSPADM

## 2018-03-30 RX ORDER — LOSARTAN POTASSIUM 100 MG/1
100 TABLET ORAL DAILY
Status: DISCONTINUED | OUTPATIENT
Start: 2018-03-30 | End: 2018-03-31

## 2018-03-30 RX ORDER — GABAPENTIN 300 MG/1
300 CAPSULE ORAL 3 TIMES DAILY
Status: DISCONTINUED | OUTPATIENT
Start: 2018-03-30 | End: 2018-04-05 | Stop reason: HOSPADM

## 2018-03-30 RX ORDER — LOSARTAN POTASSIUM 100 MG/1
100 TABLET ORAL DAILY
COMMUNITY
End: 2019-02-19

## 2018-03-30 RX ORDER — BUSPIRONE HYDROCHLORIDE 15 MG/1
30 TABLET ORAL 2 TIMES DAILY
Status: DISCONTINUED | OUTPATIENT
Start: 2018-03-30 | End: 2018-04-05 | Stop reason: HOSPADM

## 2018-03-30 RX ORDER — DISULFIRAM 250 MG/1
500 TABLET ORAL DAILY
Status: DISCONTINUED | OUTPATIENT
Start: 2018-03-30 | End: 2018-04-05 | Stop reason: HOSPADM

## 2018-03-30 RX ORDER — QUETIAPINE FUMARATE 50 MG/1
50 TABLET, FILM COATED ORAL NIGHTLY
Status: DISCONTINUED | OUTPATIENT
Start: 2018-03-30 | End: 2018-03-31

## 2018-03-30 RX ADMIN — LOSARTAN POTASSIUM 100 MG: 100 TABLET, FILM COATED ORAL at 11:28

## 2018-03-30 RX ADMIN — NICOTINE 1 PATCH: 21 PATCH, EXTENDED RELEASE TRANSDERMAL at 15:55

## 2018-03-30 RX ADMIN — DISULFIRAM 500 MG: 250 TABLET ORAL at 11:27

## 2018-03-30 RX ADMIN — GABAPENTIN 300 MG: 300 CAPSULE ORAL at 11:28

## 2018-03-30 RX ADMIN — BUPROPION HYDROCHLORIDE 450 MG: 300 TABLET, EXTENDED RELEASE ORAL at 11:28

## 2018-03-30 RX ADMIN — GABAPENTIN 300 MG: 300 CAPSULE ORAL at 15:52

## 2018-03-30 RX ADMIN — BUSPIRONE HYDROCHLORIDE 30 MG: 15 TABLET ORAL at 21:43

## 2018-03-30 RX ADMIN — QUETIAPINE FUMARATE 50 MG: 50 TABLET, FILM COATED ORAL at 21:43

## 2018-03-30 RX ADMIN — BUSPIRONE HYDROCHLORIDE 30 MG: 15 TABLET ORAL at 11:28

## 2018-03-30 RX ADMIN — GABAPENTIN 300 MG: 300 CAPSULE ORAL at 21:43

## 2018-03-31 RX ORDER — QUETIAPINE FUMARATE 25 MG/1
25 TABLET, FILM COATED ORAL NIGHTLY
Status: DISCONTINUED | OUTPATIENT
Start: 2018-03-31 | End: 2018-04-05 | Stop reason: HOSPADM

## 2018-03-31 RX ADMIN — BUPROPION HYDROCHLORIDE 450 MG: 300 TABLET, EXTENDED RELEASE ORAL at 09:30

## 2018-03-31 RX ADMIN — BUSPIRONE HYDROCHLORIDE 30 MG: 15 TABLET ORAL at 09:30

## 2018-03-31 RX ADMIN — DISULFIRAM 500 MG: 250 TABLET ORAL at 09:31

## 2018-03-31 RX ADMIN — GABAPENTIN 300 MG: 300 CAPSULE ORAL at 09:30

## 2018-03-31 RX ADMIN — LOSARTAN POTASSIUM 100 MG: 100 TABLET, FILM COATED ORAL at 09:30

## 2018-03-31 RX ADMIN — QUETIAPINE FUMARATE 25 MG: 50 TABLET, FILM COATED ORAL at 22:42

## 2018-03-31 RX ADMIN — GABAPENTIN 300 MG: 300 CAPSULE ORAL at 22:42

## 2018-03-31 RX ADMIN — BUSPIRONE HYDROCHLORIDE 30 MG: 15 TABLET ORAL at 22:43

## 2018-03-31 RX ADMIN — NICOTINE 1 PATCH: 21 PATCH, EXTENDED RELEASE TRANSDERMAL at 12:48

## 2018-03-31 RX ADMIN — GABAPENTIN 300 MG: 300 CAPSULE ORAL at 19:43

## 2018-04-01 RX ADMIN — DISULFIRAM 500 MG: 250 TABLET ORAL at 09:07

## 2018-04-01 RX ADMIN — BUSPIRONE HYDROCHLORIDE 30 MG: 15 TABLET ORAL at 09:07

## 2018-04-01 RX ADMIN — QUETIAPINE FUMARATE 25 MG: 50 TABLET, FILM COATED ORAL at 21:02

## 2018-04-01 RX ADMIN — LOSARTAN POTASSIUM 100 MG: 100 TABLET, FILM COATED ORAL at 09:07

## 2018-04-01 RX ADMIN — GABAPENTIN 300 MG: 300 CAPSULE ORAL at 09:06

## 2018-04-01 RX ADMIN — BUSPIRONE HYDROCHLORIDE 30 MG: 15 TABLET ORAL at 21:02

## 2018-04-01 RX ADMIN — GABAPENTIN 300 MG: 300 CAPSULE ORAL at 21:02

## 2018-04-01 RX ADMIN — NICOTINE 1 PATCH: 21 PATCH, EXTENDED RELEASE TRANSDERMAL at 16:24

## 2018-04-01 RX ADMIN — GABAPENTIN 300 MG: 300 CAPSULE ORAL at 16:24

## 2018-04-01 RX ADMIN — BUPROPION HYDROCHLORIDE 450 MG: 300 TABLET, EXTENDED RELEASE ORAL at 09:07

## 2018-04-01 NOTE — PLAN OF CARE
Problem: Patient Care Overview  Goal: Plan of Care Review  Outcome: Ongoing (interventions implemented as appropriate)   04/01/18 0014 04/01/18 0442   Coping/Psychosocial   Plan of Care Reviewed With patient --    Coping/Psychosocial   Patient Agreement with Plan of Care agrees --    Plan of Care Review   Progress --  improving   OTHER   Outcome Summary --  Pt has been compliant with medication this shift. He rrates anxiety 8/10,denies depression, pain, SI, HI and hallucinations. W   04/01/18 0014 04/01/18 0442   Coping/Psychosocial   Plan of Care Reviewed With patient --    Coping/Psychosocial   Patient Agreement with Plan of Care agrees --    Plan of Care Review   Progress --  improving   OTHER   Outcome Summary --  Pt has been compliant with medication this shift. He rrates anxiety 8/10,denies depression, pain, SI, HI and hallucinations. Will continue to monitor.   ill continue to monitor.       Problem: Overarching Goals (Adult)  Goal: Adheres to Safety Considerations for Self and Others  Outcome: Ongoing (interventions implemented as appropriate)    Goal: Optimized Coping Skills in Response to Life Stressors  Outcome: Ongoing (interventions implemented as appropriate)    Goal: Develops/Participates in Therapeutic [place] to Support Successful Transition  Outcome: Ongoing (interventions implemented as appropriate)      Problem: Sleep Impairment (Depressive Signs/Symptoms) (Adult)  Goal: Improved Sleep Hygiene (Depressive Signs/Symptoms)  Outcome: Ongoing (interventions implemented as appropriate)      Problem: Social/Occupational/Functional Impairment (Depressive Signs/Symptoms) (Adult)  Goal: Improved Social/Occupational/Functional Skills (Depressive Signs/Symptoms)  Outcome: Ongoing (interventions implemented as appropriate)      Problem: Impaired Control (Excessive Substance Use) (Adult)  Goal: Participates in Recovery Program (Excessive Substance Use)  Outcome: Ongoing (interventions implemented as  appropriate)      Problem: Social/Occupational/Functional Impairment (Excessive Substance Use) (Adult)  Goal: Improved Social/Occupational/Functional Skills (Excessive Substance Use)  Outcome: Ongoing (interventions implemented as appropriate)

## 2018-04-01 NOTE — PROGRESS NOTES
The patient remains somewhat difficult in his interactions with staff.  He continues to complain of his confinement to the [facility] and has been complaining about the care provided to another patient in a way which is ill informed at best.  He reports that he has had no visits from his wife and has not been in contact with her.  He was less sedated with reduced dose of Seroquel last evening.  We await results of neuropsychological evaluation at this point.

## 2018-04-02 RX ADMIN — NICOTINE 1 PATCH: 21 PATCH, EXTENDED RELEASE TRANSDERMAL at 17:08

## 2018-04-02 RX ADMIN — GABAPENTIN 300 MG: 300 CAPSULE ORAL at 08:31

## 2018-04-02 RX ADMIN — BUSPIRONE HYDROCHLORIDE 30 MG: 15 TABLET ORAL at 08:31

## 2018-04-02 RX ADMIN — BUPROPION HYDROCHLORIDE 450 MG: 300 TABLET, EXTENDED RELEASE ORAL at 08:31

## 2018-04-02 RX ADMIN — GABAPENTIN 300 MG: 300 CAPSULE ORAL at 21:40

## 2018-04-02 RX ADMIN — DISULFIRAM 500 MG: 250 TABLET ORAL at 09:55

## 2018-04-02 RX ADMIN — BUSPIRONE HYDROCHLORIDE 30 MG: 15 TABLET ORAL at 21:40

## 2018-04-02 RX ADMIN — LOSARTAN POTASSIUM 100 MG: 100 TABLET, FILM COATED ORAL at 08:31

## 2018-04-02 RX ADMIN — GABAPENTIN 300 MG: 300 CAPSULE ORAL at 17:07

## 2018-04-02 RX ADMIN — QUETIAPINE FUMARATE 25 MG: 50 TABLET, FILM COATED ORAL at 21:40

## 2018-04-02 NOTE — PROGRESS NOTES
Consult received this morning.  Chart reviewed.  Will contact unit to determine whether patient is agreeable to testing.

## 2018-04-02 NOTE — PLAN OF CARE
Problem: Patient Care Overview  Goal: Plan of Care Review  Outcome: Ongoing (interventions implemented as appropriate)   04/02/18 1455   Coping/Psychosocial   Plan of Care Reviewed With patient   Coping/Psychosocial   Patient Agreement with Plan of Care agrees   Plan of Care Review   Progress no change   OTHER   Outcome Summary Patient has been pleasent and cooperative with medication and programming. He rated his anxiety and depression a 4. He denied SI/HI and hallucinations and contracted to safety. He stated he slept well. He is forgetful and confused to the date. We are awaiting neuropsych eval. Will continue to monitor and provide support.      Goal: Individualization and Mutuality  Outcome: Ongoing (interventions implemented as appropriate)    Goal: Discharge Needs Assessment  Outcome: Ongoing (interventions implemented as appropriate)    Goal: Interprofessional Rounds/Family Conf  Outcome: Ongoing (interventions implemented as appropriate)      Problem: Overarching Goals (Adult)  Goal: Adheres to Safety Considerations for Self and Others  Outcome: Ongoing (interventions implemented as appropriate)   04/02/18 1455   Overarching Goals (Adult)   Adheres to Safety Considerations for Self and Others making progress toward outcome     Intervention: Develop and Maintain Individualized Safety Plan   03/30/18 0912 04/01/18 2345 04/02/18 0830   Violence Risk   Feels Like Hurting Others --  --  no  (contracts to safety )   Previous Attempt to Harm Others --  no --    Violence Threats in Past 6 Months (NONE) --  --    Current Violence Plan or Thoughts NONE --  --    C-SSRS (Recent)   Wish to be Dead --  --  no  (contracts to safety )   Suicidal Thoughts --  --  no   Suicide Behavior --  --  no   Develop and Maintain Individualized Safety Plan   Safety Measures --  safety rounds completed;suicide assessment completed --        Goal: Optimized Coping Skills in Response to Life Stressors  Outcome: Ongoing (interventions  implemented as appropriate)   04/02/18 1455   Overarching Goals (Adult)   Optimized Coping Skills in Response to Life Stressors making progress toward outcome     Intervention: Promote Effective Coping Strategies   04/02/18 0830   Coping/Psychosocial Interventions   Supportive Measures active listening utilized;positive reinforcement provided;verbalization of feelings encouraged       Goal: Develops/Participates in Therapeutic [place] to Support Successful Transition  Outcome: Ongoing (interventions implemented as appropriate)   04/02/18 1455   Overarching Goals (Adult)   Develops/Participates in Therapeutic [place] to Support Successful Transition making progress toward outcome     Intervention: Foster Therapeutic [place]   04/02/18 0830   Interventions   Trust Relationship/Rapport care explained;choices provided;emotional support provided;empathic listening provided;questions encouraged;questions answered;reassurance provided;thoughts/feelings acknowledged     Intervention: Mutually Develop Transition Plan   04/02/18 0830   Mutually Develop Transition Plan   Transition Support crisis management plan promoted         Problem: Mood Impairment (Depressive Signs/Symptoms) (Adult)  Intervention: Promote Mood Improvement   04/02/18 1455   Promote Mood Improvement   Mutually Determined Action Steps (Promote Mood Improvement) identifies personal treatment goal;verbalizes increased insight         Problem: Sleep Impairment (Depressive Signs/Symptoms) (Adult)  Intervention: Promote Healthy Sleep Hygiene   04/02/18 1455   Promote Healthy Sleep Hygiene   Mutually Determined Action Steps (Promote Healthy Sleep Hygiene) sleeps 4-6 hours at night;remains out of bed during day       Goal: Improved Sleep Hygiene (Depressive Signs/Symptoms)  Outcome: Ongoing (interventions implemented as appropriate)   04/02/18 1455   Improved Sleep Hygiene (Depressive Signs/Symptoms)   Improved Sleep Hygiene Action Step/Short Term Goal (STG)  Established 03/30/18   Improved Sleep Hygiene Time Frame for Action Step (STG) 4 days   Improved Sleep Hygiene Action Step (STG) Outcome making progress toward outcome       Problem: Social/Occupational/Functional Impairment (Depressive Signs/Symptoms) (Adult)  Intervention: Promote Social, Occupational and Functional Ability   04/02/18 0830 04/02/18 1455   Promote Social, Occupational and Functional Ability   Mutually Determined Action Steps (Promote Social/Occupational/Functional Ability) --  identifies support resources   Interventions   Trust Relationship/Rapport care explained;choices provided;emotional support provided;empathic listening provided;questions encouraged;questions answered;reassurance provided;thoughts/feelings acknowledged --        Goal: Improved Social/Occupational/Functional Skills (Depressive Signs/Symptoms)  Outcome: Ongoing (interventions implemented as appropriate)   04/02/18 1455   Improved Social/Occupational/Functional Skills (Depressive Signs/Symptoms)   Improved Social/Occupational/Functional Skills Action Step/Short Term Goal (STG) Established 03/29/18   Improved Social/Occupational/Functional Skills Time Frame for Action Step (STG) 4 days   Improved Social/Occupational/Functional Skills Action Step (STG) Outcome making progress toward outcome       Problem: Impaired Control (Excessive Substance Use) (Adult)  Intervention: Promote Optimal Psychological Functioning   04/02/18 0830 04/02/18 1455   Promote Optimal Psychological Functioning   Mutually Determined Action Steps (Promote Optimal Psychological Functioning) --  identifies support resources   Coping Strategies   Complementary Therapy --  other (see comments)  (multiple types of group therapy )   Interventions   Trust Relationship/Rapport care explained;choices provided;emotional support provided;empathic listening provided;questions encouraged;questions answered;reassurance provided;thoughts/feelings acknowledged --         Goal: Participates in Recovery Program (Excessive Substance Use)  Outcome: Ongoing (interventions implemented as appropriate)   03/30/18 0821 04/02/18 1455   Participates in Recovery Program (Excessive Substance Use)   Participates in Recovery Program Action Step/Short Term Goal (STG) Established --  03/29/18   Participates in Recovery Program Time Frame for Action Step (STG) 4 days --    Participates in Recovery Program Action Step (STG) Outcome --  making progress toward outcome       Problem: Social/Occupational/Functional Impairment (Excessive Substance Use) (Adult)  Intervention: Promote Social, Occupational and Functional Ability   04/02/18 0830 04/02/18 1455   Promote Social, Occupational and Functional Ability   Mutually Determined Action Steps (Promote Social/Occupational/Functional Ability) --  identifies support resources   Interventions   Trust Relationship/Rapport care explained;choices provided;emotional support provided;empathic listening provided;questions encouraged;questions answered;reassurance provided;thoughts/feelings acknowledged --        Goal: Improved Social/Occupational/Functional Skills (Excessive Substance Use)  Outcome: Ongoing (interventions implemented as appropriate)   04/02/18 1455   Improved Social/Occupational/Functional Skills (Excessive Substance Use)   Improved Social/Occupational/Functional Skills Action Step/Short Term Goal (STG) Established 03/29/18   Improved Social/Occupational/Functional Skills Time Frame for Action Step (STG) 4 days   Improved Social/Occupational/Functional Skills Action Step (STG) Outcome making progress toward outcome

## 2018-04-02 NOTE — PLAN OF CARE
Problem: Patient Care Overview  Goal: Plan of Care Review   04/02/18 1984   OTHER   Outcome Summary Pt has been compliant with mediciation this shift. He rates anxiety 3/10, denies depression, pain, SI, HI and hallaucinations. Will continue to monitor.

## 2018-04-02 NOTE — PROGRESS NOTES
"The patient remains irritable with multiple complaints about childers routine, staff etc.  He does report that he had a positive visit with his wife last evening however.  This morning, he is complaining that he is having \"tics\".  We await results of neuropsychological testing, which should take place in the next couple of days.  "

## 2018-04-02 NOTE — PROGRESS NOTES
Neuropsych testing completed, full report to follow.  Patient alert and oriented.  Presentation was rambling, impulsive and disinhibited, though patient was capable of being redirected.  Subject denied SI/HI and  AH/VH.  Reported level of depression at 6/10, primarily due to conflict with spouse and concern around episode of confusion.  Anxiety reported at 4/10.  Testing shows significant deficits in verbal and visual memory, as well as executive functions (confrontational naming, attention, verbal fluency, and processing speed).  Etiology is most likely sequelae of chronic alcohol abuse and warrants a diagnosis of Dementia, secondary to alcohol abuse.

## 2018-04-02 NOTE — PLAN OF CARE
Problem: Patient Care Overview  Goal: Plan of Care Review  Outcome: Ongoing (interventions implemented as appropriate)   04/01/18 2016   Coping/Psychosocial   Plan of Care Reviewed With patient   Coping/Psychosocial   Patient Agreement with Plan of Care agrees   Plan of Care Review   Progress improving   OTHER   Outcome Summary PT rates anxiety 0/10, depression 3/10. Denies SI/HI, hallucinations nor pain. Pt appears irritable and frequently complains about [facility] rules. Rude to staff members. Very focused on peer member Stu. PT was informed that Stu was not being neglected and  [facility] staff was in charge and responsible for Stu's care. Compliant with medications. Will continue to monitor behavior and provide support.      Goal: Individualization and Mutuality  Outcome: Ongoing (interventions implemented as appropriate)   04/01/18 2016   Personal Strengths/Vulnerabilities   Patient Personal Strengths expressive of needs;expressive of emotions;medication/treatment adherence     Goal: Discharge Needs Assessment  Outcome: Ongoing (interventions implemented as appropriate)    Goal: Interprofessional Rounds/Family Conf  Outcome: Ongoing (interventions implemented as appropriate)      Problem: Overarching Goals (Adult)  Goal: Adheres to Safety Considerations for Self and Others  Outcome: Ongoing (interventions implemented as appropriate)   04/01/18 2016   Overarching Goals (Adult)   Adheres to Safety Considerations for Self and Others making progress toward outcome     Goal: Optimized Coping Skills in Response to Life Stressors  Outcome: Ongoing (interventions implemented as appropriate)   04/01/18 2016   Overarching Goals (Adult)   Optimized Coping Skills in Response to Life Stressors making progress toward outcome     Intervention: Promote Effective Coping Strategies   04/01/18 1255   Coping/Psychosocial Interventions   Supportive Measures active listening utilized       Goal: Develops/Participates in Therapeutic [place]  to Support Successful Transition  Outcome: Ongoing (interventions implemented as appropriate)

## 2018-04-02 NOTE — PLAN OF CARE
Problem: Patient Care Overview  Goal: Plan of Care Review  Outcome: Ongoing (interventions implemented as appropriate)      Problem: Overarching Goals (Adult)  Goal: Adheres to Safety Considerations for Self and Others  Outcome: Ongoing (interventions implemented as appropriate)    Goal: Optimized Coping Skills in Response to Life Stressors  Outcome: Ongoing (interventions implemented as appropriate)    Goal: Develops/Participates in Therapeutic [place] to Support Successful Transition  Outcome: Ongoing (interventions implemented as appropriate)      Problem: Sleep Impairment (Depressive Signs/Symptoms) (Adult)  Goal: Improved Sleep Hygiene (Depressive Signs/Symptoms)  Outcome: Ongoing (interventions implemented as appropriate)      Problem: Social/Occupational/Functional Impairment (Depressive Signs/Symptoms) (Adult)  Goal: Improved Social/Occupational/Functional Skills (Depressive Signs/Symptoms)  Outcome: Ongoing (interventions implemented as appropriate)      Problem: Impaired Control (Excessive Substance Use) (Adult)  Goal: Participates in Recovery Program (Excessive Substance Use)  Outcome: Ongoing (interventions implemented as appropriate)      Problem: Social/Occupational/Functional Impairment (Excessive Substance Use) (Adult)  Goal: Improved Social/Occupational/Functional Skills (Excessive Substance Use)  Outcome: Ongoing (interventions implemented as appropriate)

## 2018-04-03 RX ORDER — POLYETHYLENE GLYCOL 3350, SODIUM CHLORIDE, SODIUM BICARBONATE, POTASSIUM CHLORIDE 420; 11.2; 5.72; 1.48 G/4L; G/4L; G/4L; G/4L
4000 POWDER, FOR SOLUTION ORAL ONCE
Qty: 4000 ML | Refills: 0 | Status: SHIPPED | OUTPATIENT
Start: 2018-04-03 | End: 2018-04-03

## 2018-04-03 RX ADMIN — NICOTINE 1 PATCH: 21 PATCH, EXTENDED RELEASE TRANSDERMAL at 17:18

## 2018-04-03 RX ADMIN — GABAPENTIN 300 MG: 300 CAPSULE ORAL at 16:28

## 2018-04-03 RX ADMIN — BUSPIRONE HYDROCHLORIDE 30 MG: 15 TABLET ORAL at 09:35

## 2018-04-03 RX ADMIN — BUPROPION HYDROCHLORIDE 450 MG: 300 TABLET, EXTENDED RELEASE ORAL at 09:36

## 2018-04-03 RX ADMIN — DISULFIRAM 500 MG: 250 TABLET ORAL at 09:35

## 2018-04-03 RX ADMIN — GABAPENTIN 300 MG: 300 CAPSULE ORAL at 20:31

## 2018-04-03 RX ADMIN — BUSPIRONE HYDROCHLORIDE 30 MG: 15 TABLET ORAL at 20:31

## 2018-04-03 RX ADMIN — GABAPENTIN 300 MG: 300 CAPSULE ORAL at 09:35

## 2018-04-03 RX ADMIN — LOSARTAN POTASSIUM 100 MG: 100 TABLET, FILM COATED ORAL at 09:36

## 2018-04-03 RX ADMIN — QUETIAPINE FUMARATE 25 MG: 50 TABLET, FILM COATED ORAL at 20:31

## 2018-04-03 NOTE — PLAN OF CARE
Problem: Patient Care Overview  Goal: Plan of Care Review  Outcome: Ongoing (interventions implemented as appropriate)      Problem: Overarching Goals (Adult)  Goal: Adheres to Safety Considerations for Self and Others  Outcome: Ongoing (interventions implemented as appropriate)   04/03/18 1722   Overarching Goals (Adult)   Adheres to Safety Considerations for Self and Others making progress toward outcome     Intervention: Develop and Maintain Individualized Safety Plan   04/03/18 0936 04/03/18 1722   Develop and Maintain Individualized Safety Plan   Safety Measures --  safety rounds completed   Violence Risk   Feels Like Hurting Others no --    Previous Attempt to Harm Others no --    C-SSRS (Recent)   Wish to be Dead no --    Suicidal Thoughts no --    Suicide Behavior no --        Goal: Optimized Coping Skills in Response to Life Stressors  Outcome: Ongoing (interventions implemented as appropriate)   04/03/18 1722   Overarching Goals (Adult)   Optimized Coping Skills in Response to Life Stressors making progress toward outcome     Intervention: Promote Effective Coping Strategies   04/03/18 0936   Coping/Psychosocial Interventions   Supportive Measures active listening utilized;verbalization of feelings encouraged       Goal: Develops/Participates in Therapeutic [place] to Support Successful Transition  Outcome: Ongoing (interventions implemented as appropriate)   04/03/18 1722   Overarching Goals (Adult)   Develops/Participates in Therapeutic [place] to Support Successful Transition making progress toward outcome     Intervention: Foster Therapeutic [place]   04/03/18 0936   Interventions   Trust Relationship/Rapport care explained;reassurance provided;thoughts/feelings acknowledged;choices provided;questions answered     Intervention: Mutually Develop Transition Plan   04/03/18 0936   Mutually Develop Transition Plan   Transition Support crisis management plan promoted         Problem: Sleep Impairment  (Depressive Signs/Symptoms) (Adult)  Goal: Improved Sleep Hygiene (Depressive Signs/Symptoms)  Outcome: Ongoing (interventions implemented as appropriate)   04/03/18 1109 04/03/18 1722   Improved Sleep Hygiene (Depressive Signs/Symptoms)   Improved Sleep Hygiene Action Step/Short Term Goal (STG) Established 04/03/18 --    Improved Sleep Hygiene Time Frame for Action Step (STG) --  4 days   Improved Sleep Hygiene Action Step (STG) Outcome --  making progress toward outcome       Problem: Social/Occupational/Functional Impairment (Depressive Signs/Symptoms) (Adult)  Intervention: Promote Social, Occupational and Functional Ability   04/03/18 0936   Interventions   Trust Relationship/Rapport care explained;reassurance provided;thoughts/feelings acknowledged;choices provided;questions answered       Goal: Improved Social/Occupational/Functional Skills (Depressive Signs/Symptoms)  Outcome: Ongoing (interventions implemented as appropriate)   04/03/18 1109 04/03/18 1722   Improved Social/Occupational/Functional Skills (Depressive Signs/Symptoms)   Improved Social/Occupational/Functional Skills Action Step/Short Term Goal (STG) Established 04/03/18 --    Improved Social/Occupational/Functional Skills Time Frame for Action Step (STG) --  4 days   Improved Social/Occupational/Functional Skills Action Step (STG) Outcome --  making progress toward outcome       Problem: Impaired Control (Excessive Substance Use) (Adult)  Intervention: Promote Optimal Psychological Functioning   04/03/18 0936   Interventions   Trust Relationship/Rapport care explained;reassurance provided;thoughts/feelings acknowledged;choices provided;questions answered       Goal: Participates in Recovery Program (Excessive Substance Use)  Outcome: Ongoing (interventions implemented as appropriate)   04/03/18 1109 04/03/18 1722   Participates in Recovery Program (Excessive Substance Use)   Participates in Recovery Program Action Step/Short Term Goal (STG)  Established 04/03/18 --    Participates in Recovery Program Time Frame for Action Step (STG) --  4 days   Participates in Recovery Program Action Step (STG) Outcome --  making progress toward outcome       Problem: Social/Occupational/Functional Impairment (Excessive Substance Use) (Adult)  Intervention: Promote Social, Occupational and Functional Ability   04/03/18 0936   Interventions   Trust Relationship/Rapport care explained;reassurance provided;thoughts/feelings acknowledged;choices provided;questions answered       Goal: Improved Social/Occupational/Functional Skills (Excessive Substance Use)  Outcome: Ongoing (interventions implemented as appropriate)   04/03/18 1109 04/03/18 1722   Improved Social/Occupational/Functional Skills (Excessive Substance Use)   Improved Social/Occupational/Functional Skills Action Step/Short Term Goal (STG) Established 04/03/18 --    Improved Social/Occupational/Functional Skills Time Frame for Action Step (STG) --  4 days   Improved Social/Occupational/Functional Skills Action Step (STG) Outcome --  making progress toward outcome       Problem: Fall Risk (Adult)  Intervention: Monitor/Assist with Self Care   04/03/18 0936   Activity   Activity Assistance Provided independent   Daily Care Interventions   Self-Care Promotion independence encouraged;BADL personal objects within reach     Intervention: Reduce Risk/Promote Restraint Free Environment   04/03/18 1722   Safety Management   Environmental Safety Modification assistive device/personal items within reach;clutter free environment maintained   Safety Promotion/Fall Prevention safety round/check completed     Intervention: Review Medications/Identify Contributors to Fall Risk   04/03/18 0936   Safety Management   Medication Review/Management medications reviewed

## 2018-04-03 NOTE — PROGRESS NOTES
Continued Stay Note  [facility]     Patient Name: Stanislav Choi  MRN: [id number]  Today's Date: 4/3/2018    Admit Date: 3/29/2018          Discharge Plan     Row Name 04/03/18 1101       Plan    Plan Comments RADHIKA spoke with pt's spouse, Elysia Choi [phone], to review discharge planning. Pt's spouse stated that she is waiting to hear the neuropsych testing results for the pt. RADHIKA explained that during treatment team today, SW will request for Dr. George to review results with her. She also inquired about whether dementia is irreversible, if pt is diagnosed with this disease. SW briefly explained that dementia is progressive and it is necessary for the pt to have a medication regime to maintain the pt's stability as best as possible. Pt's spouse stated that pt has effective outpatient providers for psych and therapy and also attends Gnosticism and AA meetings on a weekly basis, which has been successful in maintaining the pt's sobriety. SW also briefly reviewed pt's current medication regime with pt's spouse. SW and pt's spouse agreed that this current system will remain following pt's DC from [facility].               Discharge Codes    No documentation.           Gay Cantrell LCSW
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
## 2018-04-03 NOTE — PLAN OF CARE
Problem: Patient Care Overview  Goal: Plan of Care Review  Outcome: Ongoing (interventions implemented as appropriate)   04/03/18 0638   Coping/Psychosocial   Plan of Care Reviewed With patient   Coping/Psychosocial   Patient Agreement with Plan of Care agrees   Plan of Care Review   Progress improving   OTHER   Outcome Summary PT social in milieu. He is cooperative. He reported moderate anxiety, but did not rate. Reported a decrease after talking w/ family by phone. Denies SI, HI, hallucinations. Appeared to sleep/rest well.      Goal: Individualization and Mutuality  Outcome: Ongoing (interventions implemented as appropriate)   04/03/18 0638   Personal Strengths/Vulnerabilities   Patient Personal Strengths humor;family/social support;motivated for treatment;motivated for recovery;medication/treatment adherence       Problem: Overarching Goals (Adult)  Goal: Adheres to Safety Considerations for Self and Others  Outcome: Ongoing (interventions implemented as appropriate)   04/03/18 0638   Overarching Goals (Adult)   Adheres to Safety Considerations for Self and Others making progress toward outcome     Goal: Optimized Coping Skills in Response to Life Stressors  Outcome: Ongoing (interventions implemented as appropriate)   04/03/18 0638   Overarching Goals (Adult)   Optimized Coping Skills in Response to Life Stressors making progress toward outcome     Goal: Develops/Participates in Therapeutic [place] to Support Successful Transition  Outcome: Ongoing (interventions implemented as appropriate)      Problem: Sleep Impairment (Depressive Signs/Symptoms) (Adult)  Goal: Improved Sleep Hygiene (Depressive Signs/Symptoms)  Outcome: Ongoing (interventions implemented as appropriate)   04/03/18 0638   Improved Sleep Hygiene (Depressive Signs/Symptoms)   Improved Sleep Hygiene Time Frame for Action Step (STG) 4 days   Improved Sleep Hygiene Action Step (STG) Outcome making progress toward outcome       Problem:  Social/Occupational/Functional Impairment (Depressive Signs/Symptoms) (Adult)  Goal: Improved Social/Occupational/Functional Skills (Depressive Signs/Symptoms)  Outcome: Ongoing (interventions implemented as appropriate)   04/03/18 0638   Improved Social/Occupational/Functional Skills (Depressive Signs/Symptoms)   Improved Social/Occupational/Functional Skills Time Frame for Action Step (STG) 4 days   Improved Social/Occupational/Functional Skills Action Step (STG) Outcome making progress toward outcome       Problem: Impaired Control (Excessive Substance Use) (Adult)  Goal: Participates in Recovery Program (Excessive Substance Use)  Outcome: Ongoing (interventions implemented as appropriate)   04/03/18 0638   Participates in Recovery Program (Excessive Substance Use)   Participates in Recovery Program Time Frame for Action Step (STG) 4 days   Participates in Recovery Program Action Step (STG) Outcome making progress toward outcome       Problem: Social/Occupational/Functional Impairment (Excessive Substance Use) (Adult)  Goal: Improved Social/Occupational/Functional Skills (Excessive Substance Use)  Outcome: Ongoing (interventions implemented as appropriate)   04/03/18 0638   Improved Social/Occupational/Functional Skills (Excessive Substance Use)   Improved Social/Occupational/Functional Skills Time Frame for Action Step (STG) 4 days   Improved Social/Occupational/Functional Skills Action Step (STG) Outcome making progress toward outcome       Problem: Fall Risk (Adult)  Goal: Identify Related Risk Factors and Signs and Symptoms  Outcome: Outcome(s) achieved Date Met: 04/03/18    Goal: Absence of Fall  Outcome: Outcome(s) achieved Date Met: 04/03/18 04/03/18 0638   Fall Risk (Adult)   Absence of Fall achieves outcome

## 2018-04-03 NOTE — PLAN OF CARE
"Problem: Patient Care Overview  Goal: Plan of Care Review  Outcome: Ongoing (interventions implemented as appropriate)   04/03/18 0936 04/03/18 1722   Coping/Psychosocial   Plan of Care Reviewed With patient --    Coping/Psychosocial   Patient Agreement with Plan of Care agrees --    OTHER   Outcome Summary --  Pt alert/O x 3 voicing agitation this AM, stating \"never works out, always something wrong\" when asked how he was doing today. Pt rated anxiety, 7/10 and depression, 0/10. Denied SI/HI and A/V hallucinations. Pt cooperative, med compliant and sociable this shift with peers. Pt attended all group this shift. WIll continue to monitor and provide safe environment.          "

## 2018-04-04 RX ORDER — CHOLECALCIFEROL (VITAMIN D3) 1250 MCG
50000 CAPSULE ORAL
Status: DISCONTINUED | OUTPATIENT
Start: 2018-04-04 | End: 2018-04-05 | Stop reason: HOSPADM

## 2018-04-04 RX ADMIN — QUETIAPINE FUMARATE 25 MG: 50 TABLET, FILM COATED ORAL at 21:02

## 2018-04-04 RX ADMIN — BUPROPION HYDROCHLORIDE 450 MG: 300 TABLET, EXTENDED RELEASE ORAL at 08:37

## 2018-04-04 RX ADMIN — NICOTINE 1 PATCH: 21 PATCH, EXTENDED RELEASE TRANSDERMAL at 16:48

## 2018-04-04 RX ADMIN — GABAPENTIN 300 MG: 300 CAPSULE ORAL at 21:02

## 2018-04-04 RX ADMIN — BUSPIRONE HYDROCHLORIDE 30 MG: 15 TABLET ORAL at 21:02

## 2018-04-04 RX ADMIN — GABAPENTIN 300 MG: 300 CAPSULE ORAL at 08:37

## 2018-04-04 RX ADMIN — LOSARTAN POTASSIUM 100 MG: 100 TABLET, FILM COATED ORAL at 08:37

## 2018-04-04 RX ADMIN — GABAPENTIN 300 MG: 300 CAPSULE ORAL at 16:49

## 2018-04-04 RX ADMIN — BUSPIRONE HYDROCHLORIDE 30 MG: 15 TABLET ORAL at 08:37

## 2018-04-04 RX ADMIN — DISULFIRAM 500 MG: 250 TABLET ORAL at 08:36

## 2018-04-04 NOTE — PLAN OF CARE
Problem: Patient Care Overview  Goal: Plan of Care Review  Outcome: Ongoing (interventions implemented as appropriate)   04/03/18 2015 04/04/18 0512   Coping/Psychosocial   Plan of Care Reviewed With patient --    Coping/Psychosocial   Patient Agreement with Plan of Care agrees --    Plan of Care Review   Progress --  improving   OTHER   Outcome Summary --  Denied anxiety, depression, SI/HI, and hallucinations. Cooperative and med compliant. Will continue to monitor and assess.        Problem: Overarching Goals (Adult)  Goal: Adheres to Safety Considerations for Self and Others  Outcome: Ongoing (interventions implemented as appropriate)    Goal: Optimized Coping Skills in Response to Life Stressors  Outcome: Ongoing (interventions implemented as appropriate)    Goal: Develops/Participates in Therapeutic [place] to Support Successful Transition  Outcome: Ongoing (interventions implemented as appropriate)      Problem: Sleep Impairment (Depressive Signs/Symptoms) (Adult)  Goal: Improved Sleep Hygiene (Depressive Signs/Symptoms)  Outcome: Ongoing (interventions implemented as appropriate)      Problem: Social/Occupational/Functional Impairment (Depressive Signs/Symptoms) (Adult)  Goal: Improved Social/Occupational/Functional Skills (Depressive Signs/Symptoms)  Outcome: Ongoing (interventions implemented as appropriate)      Problem: Impaired Control (Excessive Substance Use) (Adult)  Goal: Participates in Recovery Program (Excessive Substance Use)  Outcome: Ongoing (interventions implemented as appropriate)      Problem: Social/Occupational/Functional Impairment (Excessive Substance Use) (Adult)  Goal: Improved Social/Occupational/Functional Skills (Excessive Substance Use)  Outcome: Ongoing (interventions implemented as appropriate)

## 2018-04-04 NOTE — PLAN OF CARE
Problem: Patient Care Overview  Goal: Plan of Care Review  Outcome: Ongoing (interventions implemented as appropriate)   04/04/18 0850 04/04/18 1837   Coping/Psychosocial   Plan of Care Reviewed With patient --    Coping/Psychosocial   Patient Agreement with Plan of Care agrees --    Plan of Care Review   Progress --  improving   OTHER   Outcome Summary --  Pt continues anxiety, 7/10. Denies depression, anxiety, and SI/HI. Pt cooperative and med compliant. No c/o at this time. Will continue to monitor and provide safe environment.        Problem: Overarching Goals (Adult)  Goal: Adheres to Safety Considerations for Self and Others  Outcome: Ongoing (interventions implemented as appropriate)   04/04/18 1837   Overarching Goals (Adult)   Adheres to Safety Considerations for Self and Others making progress toward outcome     Intervention: Develop and Maintain Individualized Safety Plan   04/04/18 0850 04/04/18 1600   Develop and Maintain Individualized Safety Plan   Safety Measures --  safety rounds completed   Violence Risk   Feels Like Hurting Others no --    Previous Attempt to Harm Others no --    C-SSRS (Recent)   Wish to be Dead no --    Suicidal Thoughts no --    Suicide Behavior no --        Goal: Optimized Coping Skills in Response to Life Stressors  Outcome: Ongoing (interventions implemented as appropriate)   04/04/18 1837   Overarching Goals (Adult)   Optimized Coping Skills in Response to Life Stressors making progress toward outcome     Intervention: Promote Effective Coping Strategies   04/04/18 0850   Coping/Psychosocial Interventions   Supportive Measures active listening utilized;verbalization of feelings encouraged       Goal: Develops/Participates in Therapeutic [place] to Support Successful Transition  Outcome: Ongoing (interventions implemented as appropriate)   04/04/18 1837   Overarching Goals (Adult)   Develops/Participates in Therapeutic [place] to Support Successful Transition making  progress toward outcome     Intervention: Foster Therapeutic [place]   04/04/18 0850   Interventions   Trust Relationship/Rapport care explained;choices provided;thoughts/feelings acknowledged;questions answered     Intervention: Mutually Develop Transition Plan   04/04/18 0850   Mutually Develop Transition Plan   Transition Support crisis management plan promoted         Problem: Sleep Impairment (Depressive Signs/Symptoms) (Adult)  Goal: Improved Sleep Hygiene (Depressive Signs/Symptoms)  Outcome: Ongoing (interventions implemented as appropriate)   04/03/18 1109 04/04/18 1837   Improved Sleep Hygiene (Depressive Signs/Symptoms)   Improved Sleep Hygiene Action Step/Short Term Goal (STG) Established 04/03/18 --    Improved Sleep Hygiene Time Frame for Action Step (STG) --  3 days   Improved Sleep Hygiene Action Step (STG) Outcome --  making progress toward outcome       Problem: Social/Occupational/Functional Impairment (Depressive Signs/Symptoms) (Adult)  Goal: Improved Social/Occupational/Functional Skills (Depressive Signs/Symptoms)  Outcome: Ongoing (interventions implemented as appropriate)   04/03/18 1109 04/04/18 1837   Improved Social/Occupational/Functional Skills (Depressive Signs/Symptoms)   Improved Social/Occupational/Functional Skills Action Step/Short Term Goal (STG) Established 04/03/18 --    Improved Social/Occupational/Functional Skills Time Frame for Action Step (STG) --  3 days   Improved Social/Occupational/Functional Skills Action Step (STG) Outcome --  making progress toward outcome       Problem: Impaired Control (Excessive Substance Use) (Adult)  Goal: Participates in Recovery Program (Excessive Substance Use)  Outcome: Ongoing (interventions implemented as appropriate)   04/03/18 1109 04/04/18 1837   Participates in Recovery Program (Excessive Substance Use)   Participates in Recovery Program Action Step/Short Term Goal (STG) Established 04/03/18 --    Participates in Recovery Program  Time Frame for Action Step (STG) --  3 days       Problem: Social/Occupational/Functional Impairment (Excessive Substance Use) (Adult)  Goal: Improved Social/Occupational/Functional Skills (Excessive Substance Use)  Outcome: Ongoing (interventions implemented as appropriate)   04/03/18 1109 04/04/18 1837   Improved Social/Occupational/Functional Skills (Excessive Substance Use)   Improved Social/Occupational/Functional Skills Action Step/Short Term Goal (STG) Established 04/03/18 --    Improved Social/Occupational/Functional Skills Time Frame for Action Step (STG) --  4 days   Improved Social/Occupational/Functional Skills Action Step (STG) Outcome --  making progress toward outcome

## 2018-04-04 NOTE — PROGRESS NOTES
Patient is seen, evaluated, and chart reviewed. Discussed with staff.  Patient is seen for Dr. Alvarado.    Staff reports that patient has been compliant with treatment plan. No behavioral problems.    On examination, patient is found in the millieu.  Patient slept poorly last night.  Appearance is appropriate.  He is alert and oriented x 4.   Mood is “better”.  Affect congruent.  No SI/HI/AVH. Thought processes are circumstantial.  Judgement and insight is okay.  Memory is poor.  Neuropsych testing and [facility] notes reviewed.  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  I suspect his most recent episode of acute confusion was due to a recent head injury.    No medication side effects.  Will resume B 12 and D 3. Patient is provided with supportive therapy.  Recommend family session with wife.  Continue current medications, therapy, and inpatient treatment plan for safety and stabilization.  Possible discharge Thur or Friday.

## 2018-04-05 VITALS
DIASTOLIC BLOOD PRESSURE: 81 MMHG | BODY MASS INDEX: 21.86 KG/M2 | OXYGEN SATURATION: 97 % | HEART RATE: 79 BPM | RESPIRATION RATE: 16 BRPM | TEMPERATURE: 98.1 F | WEIGHT: 148 LBS | SYSTOLIC BLOOD PRESSURE: 145 MMHG

## 2018-04-05 PROBLEM — F29 PSYCHOSIS (HCC): Status: RESOLVED | Noted: 2018-03-29 | Resolved: 2018-04-05

## 2018-04-05 RX ORDER — CHOLECALCIFEROL (VITAMIN D3) 1250 MCG
50000 CAPSULE ORAL
Qty: 4 CAPSULE | Refills: 2 | Status: ON HOLD | OUTPATIENT
Start: 2018-04-11 | End: 2019-01-11

## 2018-04-05 RX ORDER — NICOTINE 21 MG/24HR
1 PATCH, TRANSDERMAL 24 HOURS TRANSDERMAL EVERY 24 HOURS
Qty: 30 PATCH | Refills: 1 | Status: ON HOLD | OUTPATIENT
Start: 2018-04-05 | End: 2019-01-11

## 2018-04-05 RX ORDER — QUETIAPINE FUMARATE 25 MG/1
25 TABLET, FILM COATED ORAL NIGHTLY
Qty: 30 TABLET | Refills: 1 | Status: ON HOLD | OUTPATIENT
Start: 2018-04-05 | End: 2019-01-11

## 2018-04-05 RX ADMIN — NICOTINE 1 PATCH: 21 PATCH, EXTENDED RELEASE TRANSDERMAL at 16:52

## 2018-04-05 RX ADMIN — GABAPENTIN 300 MG: 300 CAPSULE ORAL at 16:50

## 2018-04-05 RX ADMIN — BUPROPION HYDROCHLORIDE 450 MG: 300 TABLET, EXTENDED RELEASE ORAL at 08:00

## 2018-04-05 RX ADMIN — BUSPIRONE HYDROCHLORIDE 30 MG: 15 TABLET ORAL at 08:00

## 2018-04-05 RX ADMIN — DISULFIRAM 500 MG: 250 TABLET ORAL at 08:01

## 2018-04-05 RX ADMIN — GABAPENTIN 300 MG: 300 CAPSULE ORAL at 08:01

## 2018-04-05 RX ADMIN — LOSARTAN POTASSIUM 100 MG: 100 TABLET, FILM COATED ORAL at 08:00

## 2018-04-05 NOTE — PLAN OF CARE
Problem: Patient Care Overview  Goal: Plan of Care Review  Outcome: Ongoing (interventions implemented as appropriate)      Problem: Overarching Goals (Adult)  Goal: Adheres to Safety Considerations for Self and Others  Outcome: Ongoing (interventions implemented as appropriate)   04/05/18 0408   Overarching Goals (Adult)   Adheres to Safety Considerations for Self and Others making progress toward outcome     Goal: Optimized Coping Skills in Response to Life Stressors  Outcome: Ongoing (interventions implemented as appropriate)   04/05/18 0408   Overarching Goals (Adult)   Optimized Coping Skills in Response to Life Stressors making progress toward outcome     Goal: Develops/Participates in Therapeutic [place] to Support Successful Transition  Outcome: Ongoing (interventions implemented as appropriate)   04/05/18 0408   Overarching Goals (Adult)   Develops/Participates in Therapeutic [place] to Support Successful Transition making progress toward outcome       Problem: Sleep Impairment (Depressive Signs/Symptoms) (Adult)  Goal: Improved Sleep Hygiene (Depressive Signs/Symptoms)  Outcome: Ongoing (interventions implemented as appropriate)   04/05/18 0408   Improved Sleep Hygiene (Depressive Signs/Symptoms)   Improved Sleep Hygiene Time Frame for Action Step (STG) 2 days   Improved Sleep Hygiene Action Step (STG) Outcome making progress toward outcome       Problem: Social/Occupational/Functional Impairment (Depressive Signs/Symptoms) (Adult)  Goal: Improved Social/Occupational/Functional Skills (Depressive Signs/Symptoms)  Outcome: Ongoing (interventions implemented as appropriate)   04/05/18 0408   Improved Social/Occupational/Functional Skills (Depressive Signs/Symptoms)   Improved Social/Occupational/Functional Skills Time Frame for Action Step (STG) 2 days   Improved Social/Occupational/Functional Skills Action Step (STG) Outcome making progress toward outcome       Problem: Impaired Control (Excessive  Substance Use) (Adult)  Goal: Participates in Recovery Program (Excessive Substance Use)  Outcome: Ongoing (interventions implemented as appropriate)   04/05/18 0408   Participates in Recovery Program (Excessive Substance Use)   Participates in Recovery Program Time Frame for Action Step (STG) 2 days   Participates in Recovery Program Action Step (STG) Outcome making progress toward outcome       Problem: Social/Occupational/Functional Impairment (Excessive Substance Use) (Adult)  Goal: Improved Social/Occupational/Functional Skills (Excessive Substance Use)  Outcome: Ongoing (interventions implemented as appropriate)   04/05/18 0408   Improved Social/Occupational/Functional Skills (Excessive Substance Use)   Improved Social/Occupational/Functional Skills Time Frame for Action Step (STG) 3 days   Improved Social/Occupational/Functional Skills Action Step (STG) Outcome making progress toward outcome

## 2018-04-05 NOTE — PROGRESS NOTES
Continued Stay Note  [facility]     Patient Name: Stanislav Choi  MRN: [id number]  Today's Date: 4/5/2018    Admit Date: 3/29/2018          Discharge Plan     Row Name 04/05/18 1246       Plan    Final Discharge Disposition Code 01 - home or self-care    Final Note Pt will be discharged today to home. Pt's spouse, Elysia Coon, will be providing transportation at discharge. SW met with pt and pt agreed to follow-up with current outpatient providers for mental health. Pt will meet with his outpatient psychiatrist, Dr. Chadwick George MD at [facility] [phone] on 04/11 at 11:45a. In addition, pt will be meeting with outpatient therapist, Brien Blankenship [phone] on 04/12 at 8:00a. Pt also stated that he will resume with attending weekly AA meetings every Thursday at 7:30p. Pt stated that he was excited about returning home.               Discharge Codes    No documentation.           Gay Cantrell LCSW

## 2018-04-05 NOTE — PROGRESS NOTES
Patient is seen, evaluated, and chart reviewed. Discussed with staff.  Patient is seen for Dr. Alvarado.    Staff reports that patient has been pleasant and appropriate.  He has had much less confusion.  I spoke with the patient's wife yesterday and reviewed neuropsychological testing as well as overall treatment plan.    On examination, patient is found in the milieu.  Gait is normal.  Patient slept fair last night.  He is alert and oriented ×4.  Mood is less depressed and anxious.  Affect congruent.  No SI/HI/AVH. Thought processes are circumstantial and somewhat slowed.  Judgement and insight is fair.  Memory is somewhat poor.    No medication side effects.  Patient is provided with supportive therapy.  She will discharge today and follow up with this physician at [facility] and his outpatient therapist.

## 2018-04-06 ENCOUNTER — TELEPHONE (OUTPATIENT)
Dept: PSYCHIATRY | Facility: HOSPITAL | Age: 63
End: 2018-04-06

## 2018-04-09 ENCOUNTER — SURGERY (OUTPATIENT)
Age: 63
End: 2018-04-09

## 2018-04-09 ENCOUNTER — OFF PREMISE CHARGES (OUTPATIENT)
Dept: GASTROENTEROLOGY | Age: 63
End: 2018-04-09

## 2018-04-09 ENCOUNTER — TELEPHONE (OUTPATIENT)
Dept: PSYCHIATRY | Facility: HOSPITAL | Age: 63
End: 2018-04-09

## 2018-04-09 ENCOUNTER — HOSPITAL ENCOUNTER (OUTPATIENT)
Age: 63
Discharge: HOME | End: 2018-04-09
Attending: INTERNAL MEDICINE | Admitting: INTERNAL MEDICINE

## 2018-04-09 VITALS
RESPIRATION RATE: 22 BRPM | DIASTOLIC BLOOD PRESSURE: 68 MMHG | HEART RATE: 72 BPM | HEIGHT: 63 IN | WEIGHT: 214.73 LBS | SYSTOLIC BLOOD PRESSURE: 128 MMHG | TEMPERATURE: 98.1 F | BODY MASS INDEX: 38.05 KG/M2 | OXYGEN SATURATION: 94 %

## 2018-04-09 DIAGNOSIS — D12.2 ADENOMATOUS POLYP OF ASCENDING COLON: ICD-10-CM

## 2018-04-09 DIAGNOSIS — Z12.11 COLON CANCER SCREENING: Primary | ICD-10-CM

## 2018-04-09 PROCEDURE — 10002800 HB RX 250 W HCPCS: Performed by: INTERNAL MEDICINE

## 2018-04-09 PROCEDURE — 10003428 HB COLONOSCOPY: Performed by: INTERNAL MEDICINE

## 2018-04-09 PROCEDURE — 10002807 HB RX 258: Performed by: INTERNAL MEDICINE

## 2018-04-09 PROCEDURE — 10004185 HB COUNTER-VISIT  CENSUS

## 2018-04-09 PROCEDURE — G0500 MOD SEDAT ENDO SERVICE >5YRS: HCPCS | Performed by: INTERNAL MEDICINE

## 2018-04-09 PROCEDURE — 88305 TISSUE EXAM BY PATHOLOGIST: CPT

## 2018-04-09 PROCEDURE — 45380 COLONOSCOPY AND BIOPSY: CPT | Performed by: INTERNAL MEDICINE

## 2018-04-09 RX ORDER — 0.9 % SODIUM CHLORIDE 0.9 %
2 VIAL (ML) INJECTION EVERY 12 HOURS SCHEDULED
Status: DISCONTINUED | OUTPATIENT
Start: 2018-04-09 | End: 2018-04-09 | Stop reason: HOSPADM

## 2018-04-09 RX ORDER — MIDAZOLAM HYDROCHLORIDE 1 MG/ML
INJECTION, SOLUTION INTRAMUSCULAR; INTRAVENOUS PRN
Status: DISCONTINUED | OUTPATIENT
Start: 2018-04-09 | End: 2018-04-09 | Stop reason: HOSPADM

## 2018-04-09 RX ORDER — 0.9 % SODIUM CHLORIDE 0.9 %
2 VIAL (ML) INJECTION PRN
Status: DISCONTINUED | OUTPATIENT
Start: 2018-04-09 | End: 2018-04-09 | Stop reason: HOSPADM

## 2018-04-09 RX ORDER — SODIUM CHLORIDE 9 MG/ML
INJECTION, SOLUTION INTRAVENOUS CONTINUOUS
Status: DISCONTINUED | OUTPATIENT
Start: 2018-04-09 | End: 2018-04-09 | Stop reason: HOSPADM

## 2018-04-09 RX ADMIN — MIDAZOLAM HYDROCHLORIDE 1 MG: 1 INJECTION, SOLUTION INTRAMUSCULAR; INTRAVENOUS at 11:28

## 2018-04-09 RX ADMIN — MIDAZOLAM HYDROCHLORIDE 2 MG: 1 INJECTION, SOLUTION INTRAMUSCULAR; INTRAVENOUS at 11:22

## 2018-04-09 RX ADMIN — FENTANYL CITRATE 25 MCG: 50 INJECTION INTRAMUSCULAR; INTRAVENOUS at 11:29

## 2018-04-09 RX ADMIN — SODIUM CHLORIDE: 9 INJECTION, SOLUTION INTRAVENOUS at 10:38

## 2018-04-09 RX ADMIN — FENTANYL CITRATE 50 MCG: 50 INJECTION INTRAMUSCULAR; INTRAVENOUS at 11:24

## 2018-04-09 RX ADMIN — MIDAZOLAM HYDROCHLORIDE 1 MG: 1 INJECTION, SOLUTION INTRAMUSCULAR; INTRAVENOUS at 11:27

## 2018-04-09 ASSESSMENT — ACTIVITIES OF DAILY LIVING (ADL)
ADL_SHORT_OF_BREATH: NO
NEEDS_ASSIST: NO
HISTORY OF FALLING IN THE LAST YEAR (PRIOR TO ADMISSION): NO
ADL_BEFORE_ADMISSION: INDEPENDENT
SENSORY_SUPPORT_DEVICES: EYEGLASSES;DENTURES
RECENT_DECLINE_ADL: NO
CHRONIC_PAIN_PRESENT: NO
ADL_SCORE: 12

## 2018-04-09 ASSESSMENT — PAIN SCALES - GENERAL
PAIN_LEVEL_AT_REST: 0

## 2018-04-09 ASSESSMENT — COGNITIVE AND FUNCTIONAL STATUS - GENERAL: ARE YOU DEAF OR DO YOU HAVE SERIOUS DIFFICULTY  HEARING: NO

## 2018-04-09 ASSESSMENT — LIFESTYLE VARIABLES: SMOKING_HISTORY: NO

## 2018-04-09 NOTE — TELEPHONE ENCOUNTER
"Patient states that he does not have his medications because the physician did not write a script for them.  This clinician instructed patient to phone the physician's office to have the medication phoned to his pharmacy.  Patient stated that he has already phoned Dr. George office and spoken to Antoinette.  This clinician encouraged patient to follow up with the office and patient stated, \"I might as well be pissing in the wind.\"  Clinician redirected patient and phone call ended.  "

## 2018-04-10 LAB — PATHOLOGIST NAME: NORMAL

## 2018-04-20 ENCOUNTER — TELEPHONE (OUTPATIENT)
Dept: GASTROENTEROLOGY | Age: 63
End: 2018-04-20

## 2018-09-18 ENCOUNTER — APPOINTMENT (OUTPATIENT)
Dept: GENERAL RADIOLOGY | Age: 63
End: 2018-09-18
Attending: PHYSICIAN ASSISTANT

## 2018-09-18 ENCOUNTER — HOSPITAL ENCOUNTER (EMERGENCY)
Age: 63
Discharge: HOME | End: 2018-09-18

## 2018-09-18 VITALS
HEART RATE: 76 BPM | SYSTOLIC BLOOD PRESSURE: 154 MMHG | TEMPERATURE: 98.5 F | WEIGHT: 213.85 LBS | DIASTOLIC BLOOD PRESSURE: 92 MMHG | HEIGHT: 63 IN | BODY MASS INDEX: 37.89 KG/M2 | OXYGEN SATURATION: 96 % | RESPIRATION RATE: 15 BRPM

## 2018-09-18 DIAGNOSIS — J18.9 COMMUNITY ACQUIRED PNEUMONIA OF RIGHT MIDDLE LOBE OF LUNG: Primary | ICD-10-CM

## 2018-09-18 LAB
ALBUMIN SERPL-MCNC: 3.6 G/DL (ref 3.6–5.1)
ALBUMIN/GLOB SERPL: 0.7 {RATIO} (ref 1–2.4)
ALP SERPL-CCNC: 106 UNITS/L (ref 45–117)
ALT SERPL-CCNC: 46 UNITS/L
ANION GAP SERPL CALC-SCNC: 14 MMOL/L (ref 10–20)
APPEARANCE UR: CLEAR
AST SERPL-CCNC: 35 UNITS/L
BASOPHILS # BLD AUTO: 0 K/MCL (ref 0–0.3)
BASOPHILS NFR BLD AUTO: 0 %
BILIRUB SERPL-MCNC: 0.9 MG/DL (ref 0.2–1)
BILIRUB UR QL STRIP: ABNORMAL
BUN SERPL-MCNC: 14 MG/DL (ref 6–20)
BUN/CREAT SERPL: 15 (ref 7–25)
CALCIUM SERPL-MCNC: 8.8 MG/DL (ref 8.4–10.2)
CHLORIDE SERPL-SCNC: 98 MMOL/L (ref 98–107)
CO2 SERPL-SCNC: 24 MMOL/L (ref 21–32)
COLOR UR: ABNORMAL
CREAT SERPL-MCNC: 0.93 MG/DL (ref 0.67–1.17)
DIFFERENTIAL METHOD BLD: ABNORMAL
EOSINOPHIL # BLD AUTO: 0 K/MCL (ref 0.1–0.5)
EOSINOPHIL NFR SPEC: 1 %
ERYTHROCYTE [DISTWIDTH] IN BLOOD: 12.4 % (ref 11–15)
GLOBULIN SER-MCNC: 4.9 G/DL (ref 2–4)
GLUCOSE SERPL-MCNC: 111 MG/DL (ref 65–99)
GLUCOSE UR STRIP-MCNC: NEGATIVE MG/DL
HCT VFR BLD CALC: 44.9 % (ref 39–51)
HGB BLD-MCNC: 16 G/DL (ref 13–17)
HGB UR QL STRIP: NEGATIVE
IMM GRANULOCYTES # BLD AUTO: ABNORMAL K/MCL (ref 0–0.2)
IMM GRANULOCYTES NFR BLD: ABNORMAL %
INTERNAL PROCEDURAL CONTROLS ACCEPTABLE: YES
KETONES UR STRIP-MCNC: ABNORMAL MG/DL
LEUKOCYTE ESTERASE UR QL STRIP: NEGATIVE
LYMPHOCYTES # BLD MANUAL: 1.9 K/MCL (ref 1–4)
LYMPHOCYTES NFR BLD MANUAL: 30 %
MCH RBC QN AUTO: 31 PG (ref 26–34)
MCHC RBC AUTO-ENTMCNC: 35.6 G/DL (ref 32–36.5)
MCV RBC AUTO: 87 FL (ref 78–100)
MONOCYTES # BLD MANUAL: 1.4 K/MCL (ref 0.3–0.9)
MONOCYTES NFR BLD MANUAL: 22 %
NEUTROPHILS # BLD: 3 K/MCL (ref 1.8–7.7)
NEUTROPHILS NFR BLD AUTO: 47 %
NITRITE UR QL STRIP: NEGATIVE
PH UR STRIP: 6 UNITS (ref 5–7)
PLATELET # BLD: 172 K/MCL (ref 140–450)
POTASSIUM SERPL-SCNC: 3.7 MMOL/L (ref 3.4–5.1)
PROT SERPL-MCNC: 8.5 G/DL (ref 6.4–8.2)
PROT UR STRIP-MCNC: 100 MG/DL
RBC # BLD: 5.16 MIL/MCL (ref 4.5–5.9)
S PYO AG THROAT QL IA.RAPID: NEGATIVE
SODIUM SERPL-SCNC: 132 MMOL/L (ref 135–145)
SP GR UR STRIP: >1.03 (ref 1–1.03)
UROBILINOGEN UR STRIP-MCNC: 2 MG/DL (ref 0–1)
WBC # BLD: 6.3 K/MCL (ref 4.2–11)

## 2018-09-18 PROCEDURE — 71046 X-RAY EXAM CHEST 2 VIEWS: CPT

## 2018-09-18 PROCEDURE — 36415 COLL VENOUS BLD VENIPUNCTURE: CPT

## 2018-09-18 PROCEDURE — 87880 STREP A ASSAY W/OPTIC: CPT | Performed by: PHYSICIAN ASSISTANT

## 2018-09-18 PROCEDURE — 99284 EMERGENCY DEPT VISIT MOD MDM: CPT

## 2018-09-18 PROCEDURE — 71046 X-RAY EXAM CHEST 2 VIEWS: CPT | Performed by: RADIOLOGY

## 2018-09-18 PROCEDURE — 81003 URINALYSIS AUTO W/O SCOPE: CPT

## 2018-09-18 PROCEDURE — 80053 COMPREHEN METABOLIC PANEL: CPT

## 2018-09-18 PROCEDURE — 10004651 HB RX, NO CHARGE ITEM: Performed by: PHYSICIAN ASSISTANT

## 2018-09-18 PROCEDURE — 85025 COMPLETE CBC W/AUTO DIFF WBC: CPT

## 2018-09-18 PROCEDURE — 99284 EMERGENCY DEPT VISIT MOD MDM: CPT | Performed by: PHYSICIAN ASSISTANT

## 2018-09-18 RX ORDER — DOXYCYCLINE 100 MG/1
100 TABLET ORAL 2 TIMES DAILY
Qty: 14 TABLET | Refills: 0 | Status: SHIPPED | OUTPATIENT
Start: 2018-09-18 | End: 2018-09-25

## 2018-09-18 RX ORDER — ACETAMINOPHEN 325 MG/1
650 TABLET ORAL ONCE
Status: COMPLETED | OUTPATIENT
Start: 2018-09-18 | End: 2018-09-18

## 2018-09-18 RX ADMIN — ACETAMINOPHEN 650 MG: 325 TABLET ORAL at 18:50

## 2018-09-18 ASSESSMENT — CURB65 SCORE
AGE IS GREATER THAN OR EQUAL TO 65: NO
RESPIRATORY RATE GREATER THAN OR EQUAL TO 30: NO
BUN IS GREATER THAN 19 MG/DL: NO
SBP LESS THAN 90 OR DBNP LESS THAN 60: NO
HAS CONFUSION: NO
CURB65 SCORE: 0

## 2018-09-18 ASSESSMENT — PAIN SCALES - GENERAL
PAINLEVEL_OUTOF10: 8
PAINLEVEL_OUTOF10: 8
PAINLEVEL_OUTOF10: 5

## 2018-09-28 ENCOUNTER — OFFICE (AMBULATORY)
Dept: URBAN - METROPOLITAN AREA CLINIC 75 | Facility: CLINIC | Age: 63
End: 2018-09-28
Payer: COMMERCIAL

## 2018-09-28 VITALS
SYSTOLIC BLOOD PRESSURE: 130 MMHG | WEIGHT: 160 LBS | HEART RATE: 84 BPM | DIASTOLIC BLOOD PRESSURE: 76 MMHG | HEIGHT: 69 IN

## 2018-09-28 DIAGNOSIS — R11.0 NAUSEA: ICD-10-CM

## 2018-09-28 DIAGNOSIS — R14.0 ABDOMINAL DISTENSION (GASEOUS): ICD-10-CM

## 2018-09-28 DIAGNOSIS — D12.5 BENIGN NEOPLASM OF SIGMOID COLON: ICD-10-CM

## 2018-09-28 DIAGNOSIS — R10.84 GENERALIZED ABDOMINAL PAIN: ICD-10-CM

## 2018-09-28 DIAGNOSIS — D12.2 BENIGN NEOPLASM OF ASCENDING COLON: ICD-10-CM

## 2018-09-28 DIAGNOSIS — R19.07 GENERALIZED INTRA-ABDOMINAL AND PELVIC SWELLING, MASS AND LU: ICD-10-CM

## 2018-09-28 DIAGNOSIS — K57.30 DIVERTICULOSIS OF LARGE INTESTINE WITHOUT PERFORATION OR ABS: ICD-10-CM

## 2018-09-28 DIAGNOSIS — K64.8 OTHER HEMORRHOIDS: ICD-10-CM

## 2018-09-28 DIAGNOSIS — Z12.11 ENCOUNTER FOR SCREENING FOR MALIGNANT NEOPLASM OF COLON: ICD-10-CM

## 2018-09-28 DIAGNOSIS — D12.3 BENIGN NEOPLASM OF TRANSVERSE COLON: ICD-10-CM

## 2018-09-28 DIAGNOSIS — K52.9 NONINFECTIVE GASTROENTERITIS AND COLITIS, UNSPECIFIED: ICD-10-CM

## 2018-09-28 PROCEDURE — 99214 OFFICE O/P EST MOD 30 MIN: CPT | Performed by: INTERNAL MEDICINE

## 2018-09-28 RX ORDER — METRONIDAZOLE 500 MG/1
1500 TABLET, FILM COATED ORAL
Qty: 42 | Refills: 1 | Status: ACTIVE
Start: 2018-09-28

## 2018-09-28 NOTE — SERVICEHPINOTES
Mr. Cherry continues to have diarrhea. He was worked in today. He says he can have up to 6 bowel movements a day. He also is frustrated because he says he has urgency and has to no were biopsied her Ms. He says Tuesday ate a salad and chart were thereafter went to the bathroom and he says when he ate was already coming through. There is no bleeding, in spite of his complaints he is gaining weight. There is no fevers or chills he rarely has nocturnal symptoms. He uses Imodium about every 3 days. He is also now on a probiotic. I did a colonoscopy and biopsy on him in November of last year. Biopsies were negative for colitis or Crohn's. He had hyperplastic polyps.From an upper GI standpoint he is not having any reflux, there is no dysphagia, odynophagia nausea or vomiting. He does however report bad breath. His wife says he has bad breath and he says he saw his dentist. His dentist told him it was "coming from his gut". Otherwise there is no change in his past medical or past surgical history. He is not a drinker, unfortunately he is a smoker. He is in no distress. He does not look acutely ill.

## 2018-09-28 NOTE — SERVICENOTES
sed rate CRP normal.  White count 12.6, CBC otherwise within normal limits.  Glucose 100.  Comprehensive panel otherwise within normal limits.  Stool studies pending.

## 2018-10-19 ENCOUNTER — OFFICE VISIT (OUTPATIENT)
Dept: INTERNAL MEDICINE | Age: 63
End: 2018-10-19

## 2018-10-19 ENCOUNTER — LAB SERVICES (OUTPATIENT)
Dept: LAB | Age: 63
End: 2018-10-19

## 2018-10-19 VITALS
WEIGHT: 220 LBS | TEMPERATURE: 97.6 F | DIASTOLIC BLOOD PRESSURE: 76 MMHG | HEART RATE: 72 BPM | HEIGHT: 63 IN | BODY MASS INDEX: 38.98 KG/M2 | SYSTOLIC BLOOD PRESSURE: 142 MMHG

## 2018-10-19 DIAGNOSIS — R76.8 HEPATITIS C ANTIBODY TEST POSITIVE: ICD-10-CM

## 2018-10-19 DIAGNOSIS — Z23 NEED FOR VACCINATION: ICD-10-CM

## 2018-10-19 DIAGNOSIS — K21.9 GASTROESOPHAGEAL REFLUX DISEASE, ESOPHAGITIS PRESENCE NOT SPECIFIED: ICD-10-CM

## 2018-10-19 DIAGNOSIS — I10 ESSENTIAL HYPERTENSION: Primary | ICD-10-CM

## 2018-10-19 PROCEDURE — G0008 ADMIN INFLUENZA VIRUS VAC: HCPCS | Performed by: STUDENT IN AN ORGANIZED HEALTH CARE EDUCATION/TRAINING PROGRAM

## 2018-10-19 PROCEDURE — 99213 OFFICE O/P EST LOW 20 MIN: CPT | Performed by: STUDENT IN AN ORGANIZED HEALTH CARE EDUCATION/TRAINING PROGRAM

## 2018-10-19 PROCEDURE — 36415 COLL VENOUS BLD VENIPUNCTURE: CPT | Performed by: INTERNAL MEDICINE

## 2018-10-19 PROCEDURE — 90686 IIV4 VACC NO PRSV 0.5 ML IM: CPT | Performed by: STUDENT IN AN ORGANIZED HEALTH CARE EDUCATION/TRAINING PROGRAM

## 2018-10-19 RX ORDER — CHLORTHALIDONE 25 MG/1
25 TABLET ORAL DAILY
Qty: 30 TABLET | Refills: 1 | Status: SHIPPED | OUTPATIENT
Start: 2018-10-19 | End: 2019-11-12 | Stop reason: ALTCHOICE

## 2018-10-19 RX ORDER — FAMOTIDINE 20 MG/1
20 TABLET, FILM COATED ORAL ONCE
Status: DISCONTINUED | OUTPATIENT
Start: 2018-10-19 | End: 2018-10-23

## 2018-10-19 ASSESSMENT — PATIENT HEALTH QUESTIONNAIRE - PHQ9
SUM OF ALL RESPONSES TO PHQ9 QUESTIONS 1 AND 2: 0
CLINICAL INTERPRETATION OF PHQ2 SCORE: 0

## 2018-10-22 LAB
HCV RNA SERPL NAA+PROBE-ACNC: NOT DETECTED IUNITS/ML
HCV RNA SERPL NAA+PROBE-LOG IU: NOT DETECTED IUNITS/ML

## 2018-10-23 ENCOUNTER — TELEPHONE (OUTPATIENT)
Dept: INTERNAL MEDICINE | Age: 63
End: 2018-10-23

## 2018-10-23 RX ORDER — FAMOTIDINE 20 MG/1
20 TABLET, FILM COATED ORAL 2 TIMES DAILY
Qty: 180 TABLET | Refills: 2 | Status: SHIPPED | OUTPATIENT
Start: 2018-10-23 | End: 2019-11-12 | Stop reason: SDUPTHER

## 2018-10-26 ENCOUNTER — OFFICE VISIT (OUTPATIENT)
Dept: INTERNAL MEDICINE | Age: 63
End: 2018-10-26

## 2018-10-26 VITALS — HEART RATE: 70 BPM | DIASTOLIC BLOOD PRESSURE: 75 MMHG | SYSTOLIC BLOOD PRESSURE: 142 MMHG

## 2018-10-26 DIAGNOSIS — Z01.30 BLOOD PRESSURE CHECK: Primary | ICD-10-CM

## 2018-10-29 ENCOUNTER — TELEPHONE (OUTPATIENT)
Dept: INTERNAL MEDICINE | Age: 63
End: 2018-10-29

## 2018-11-02 ENCOUNTER — OFFICE VISIT (OUTPATIENT)
Dept: INTERNAL MEDICINE | Age: 63
End: 2018-11-02

## 2018-11-02 VITALS — SYSTOLIC BLOOD PRESSURE: 128 MMHG | DIASTOLIC BLOOD PRESSURE: 77 MMHG

## 2018-11-02 DIAGNOSIS — Z01.30 BLOOD PRESSURE CHECK: Primary | ICD-10-CM

## 2018-12-13 ENCOUNTER — TRANSCRIBE ORDERS (OUTPATIENT)
Dept: ADMINISTRATIVE | Facility: HOSPITAL | Age: 63
End: 2018-12-13

## 2018-12-13 ENCOUNTER — LAB (OUTPATIENT)
Dept: LAB | Facility: HOSPITAL | Age: 63
End: 2018-12-13

## 2018-12-13 DIAGNOSIS — Z12.5 SPECIAL SCREENING FOR MALIGNANT NEOPLASM OF PROSTATE: ICD-10-CM

## 2018-12-13 DIAGNOSIS — Z00.00 ROUTINE GENERAL MEDICAL EXAMINATION AT A HEALTH CARE FACILITY: Primary | ICD-10-CM

## 2018-12-13 DIAGNOSIS — I10 ESSENTIAL HYPERTENSION, MALIGNANT: ICD-10-CM

## 2018-12-13 DIAGNOSIS — I51.9 MYXEDEMA HEART DISEASE: ICD-10-CM

## 2018-12-13 DIAGNOSIS — Z00.00 ROUTINE GENERAL MEDICAL EXAMINATION AT A HEALTH CARE FACILITY: ICD-10-CM

## 2018-12-13 DIAGNOSIS — E03.9 MYXEDEMA HEART DISEASE: ICD-10-CM

## 2018-12-13 DIAGNOSIS — E55.9 AVITAMINOSIS D: ICD-10-CM

## 2018-12-13 DIAGNOSIS — E29.1 3-OXO-5 ALPHA-STEROID DELTA 4-DEHYDROGENASE DEFICIENCY: ICD-10-CM

## 2018-12-13 LAB
25(OH)D3 SERPL-MCNC: 116.8 NG/ML (ref 30–100)
ALBUMIN SERPL-MCNC: 4 G/DL (ref 3.5–5.2)
ALBUMIN/GLOB SERPL: 1.4 G/DL
ALP SERPL-CCNC: 134 U/L (ref 39–117)
ALT SERPL W P-5'-P-CCNC: 17 U/L (ref 1–41)
ANION GAP SERPL CALCULATED.3IONS-SCNC: 8.9 MMOL/L
AST SERPL-CCNC: 11 U/L (ref 1–40)
BASOPHILS # BLD AUTO: 0.02 10*3/MM3 (ref 0–0.2)
BASOPHILS NFR BLD AUTO: 0.2 % (ref 0–1.5)
BILIRUB SERPL-MCNC: 0.2 MG/DL (ref 0.1–1.2)
BILIRUB UR QL STRIP: NEGATIVE
BUN BLD-MCNC: 17 MG/DL (ref 8–23)
BUN/CREAT SERPL: 13.4 (ref 7–25)
CALCIUM SPEC-SCNC: 9.1 MG/DL (ref 8.6–10.5)
CHLORIDE SERPL-SCNC: 104 MMOL/L (ref 98–107)
CHOLEST SERPL-MCNC: 189 MG/DL (ref 0–200)
CLARITY UR: CLEAR
CO2 SERPL-SCNC: 28.1 MMOL/L (ref 22–29)
COLOR UR: YELLOW
CREAT BLD-MCNC: 1.27 MG/DL (ref 0.76–1.27)
DEPRECATED RDW RBC AUTO: 49.2 FL (ref 37–54)
EOSINOPHIL # BLD AUTO: 0.07 10*3/MM3 (ref 0–0.7)
EOSINOPHIL NFR BLD AUTO: 0.6 % (ref 0.3–6.2)
ERYTHROCYTE [DISTWIDTH] IN BLOOD BY AUTOMATED COUNT: 14.2 % (ref 11.5–14.5)
FOLATE SERPL-MCNC: 11.33 NG/ML (ref 4.78–24.2)
GFR SERPL CREATININE-BSD FRML MDRD: 57 ML/MIN/1.73
GLOBULIN UR ELPH-MCNC: 2.8 GM/DL
GLUCOSE BLD-MCNC: 99 MG/DL (ref 65–99)
GLUCOSE UR STRIP-MCNC: NEGATIVE MG/DL
HBA1C MFR BLD: 5.93 % (ref 4.8–5.6)
HCT VFR BLD AUTO: 45.4 % (ref 40.4–52.2)
HDLC SERPL-MCNC: 44 MG/DL (ref 40–60)
HGB BLD-MCNC: 14.5 G/DL (ref 13.7–17.6)
HGB UR QL STRIP.AUTO: NEGATIVE
IMM GRANULOCYTES # BLD: 0.02 10*3/MM3 (ref 0–0.03)
IMM GRANULOCYTES NFR BLD: 0.2 % (ref 0–0.5)
KETONES UR QL STRIP: ABNORMAL
LDLC SERPL CALC-MCNC: 133 MG/DL (ref 0–100)
LDLC/HDLC SERPL: 3.01 {RATIO}
LEUKOCYTE ESTERASE UR QL STRIP.AUTO: NEGATIVE
LH SERPL-ACNC: 4.17 MIU/ML
LYMPHOCYTES # BLD AUTO: 4.11 10*3/MM3 (ref 0.9–4.8)
LYMPHOCYTES NFR BLD AUTO: 37.5 % (ref 19.6–45.3)
MCH RBC QN AUTO: 30.3 PG (ref 27–32.7)
MCHC RBC AUTO-ENTMCNC: 31.9 G/DL (ref 32.6–36.4)
MCV RBC AUTO: 95 FL (ref 79.8–96.2)
MONOCYTES # BLD AUTO: 0.7 10*3/MM3 (ref 0.2–1.2)
MONOCYTES NFR BLD AUTO: 6.4 % (ref 5–12)
NEUTROPHILS # BLD AUTO: 6.03 10*3/MM3 (ref 1.9–8.1)
NEUTROPHILS NFR BLD AUTO: 55.1 % (ref 42.7–76)
NITRITE UR QL STRIP: NEGATIVE
PH UR STRIP.AUTO: 6 [PH] (ref 5–8)
PLATELET # BLD AUTO: 317 10*3/MM3 (ref 140–500)
PMV BLD AUTO: 9.2 FL (ref 6–12)
POTASSIUM BLD-SCNC: 4.5 MMOL/L (ref 3.5–5.2)
PROT SERPL-MCNC: 6.8 G/DL (ref 6–8.5)
PROT UR QL STRIP: NEGATIVE
PSA SERPL-MCNC: 0.48 NG/ML (ref 0–4)
RBC # BLD AUTO: 4.78 10*6/MM3 (ref 4.6–6)
SODIUM BLD-SCNC: 141 MMOL/L (ref 136–145)
SP GR UR STRIP: 1.03 (ref 1–1.03)
T3 SERPL-MCNC: 98.3 NG/DL (ref 80–200)
T4 FREE SERPL-MCNC: 0.87 NG/DL (ref 0.93–1.7)
TRIGL SERPL-MCNC: 62 MG/DL (ref 0–150)
TSH SERPL DL<=0.05 MIU/L-ACNC: 0.82 MIU/ML (ref 0.27–4.2)
UROBILINOGEN UR QL STRIP: ABNORMAL
VIT B12 BLD-MCNC: 654 PG/ML (ref 211–946)
VLDLC SERPL-MCNC: 12.4 MG/DL (ref 5–40)
WBC NRBC COR # BLD: 10.95 10*3/MM3 (ref 4.5–10.7)

## 2018-12-13 PROCEDURE — 82306 VITAMIN D 25 HYDROXY: CPT | Performed by: INTERNAL MEDICINE

## 2018-12-13 PROCEDURE — 82607 VITAMIN B-12: CPT | Performed by: INTERNAL MEDICINE

## 2018-12-13 PROCEDURE — 80061 LIPID PANEL: CPT | Performed by: INTERNAL MEDICINE

## 2018-12-13 PROCEDURE — 83036 HEMOGLOBIN GLYCOSYLATED A1C: CPT | Performed by: INTERNAL MEDICINE

## 2018-12-13 PROCEDURE — 83002 ASSAY OF GONADOTROPIN (LH): CPT | Performed by: INTERNAL MEDICINE

## 2018-12-13 PROCEDURE — 84480 ASSAY TRIIODOTHYRONINE (T3): CPT | Performed by: INTERNAL MEDICINE

## 2018-12-13 PROCEDURE — 80053 COMPREHEN METABOLIC PANEL: CPT | Performed by: INTERNAL MEDICINE

## 2018-12-13 PROCEDURE — 81003 URINALYSIS AUTO W/O SCOPE: CPT | Performed by: INTERNAL MEDICINE

## 2018-12-13 PROCEDURE — 84402 ASSAY OF FREE TESTOSTERONE: CPT | Performed by: INTERNAL MEDICINE

## 2018-12-13 PROCEDURE — 36415 COLL VENOUS BLD VENIPUNCTURE: CPT

## 2018-12-13 PROCEDURE — G0103 PSA SCREENING: HCPCS | Performed by: INTERNAL MEDICINE

## 2018-12-13 PROCEDURE — 84443 ASSAY THYROID STIM HORMONE: CPT | Performed by: INTERNAL MEDICINE

## 2018-12-13 PROCEDURE — 82746 ASSAY OF FOLIC ACID SERUM: CPT | Performed by: INTERNAL MEDICINE

## 2018-12-13 PROCEDURE — 85025 COMPLETE CBC W/AUTO DIFF WBC: CPT | Performed by: INTERNAL MEDICINE

## 2018-12-13 PROCEDURE — 84403 ASSAY OF TOTAL TESTOSTERONE: CPT | Performed by: INTERNAL MEDICINE

## 2018-12-13 PROCEDURE — 84439 ASSAY OF FREE THYROXINE: CPT | Performed by: INTERNAL MEDICINE

## 2018-12-15 LAB
TESTOST FREE SERPL-MCNC: 9.1 PG/ML (ref 6.6–18.1)
TESTOST SERPL-MCNC: 538 NG/DL (ref 264–916)

## 2019-01-07 ENCOUNTER — LAB (OUTPATIENT)
Dept: LAB | Facility: HOSPITAL | Age: 64
End: 2019-01-07

## 2019-01-07 ENCOUNTER — TRANSCRIBE ORDERS (OUTPATIENT)
Dept: ADMINISTRATIVE | Facility: HOSPITAL | Age: 64
End: 2019-01-07

## 2019-01-07 DIAGNOSIS — R41.0 CONFUSION: ICD-10-CM

## 2019-01-07 DIAGNOSIS — R41.0 CONFUSION: Primary | ICD-10-CM

## 2019-01-07 LAB
ALBUMIN SERPL-MCNC: 3.7 G/DL (ref 3.5–5.2)
ALBUMIN/GLOB SERPL: 1.2 G/DL
ALP SERPL-CCNC: 80 U/L (ref 39–117)
ALT SERPL W P-5'-P-CCNC: 22 U/L (ref 1–41)
AMPHET+METHAMPHET UR QL: NEGATIVE
ANION GAP SERPL CALCULATED.3IONS-SCNC: 11.4 MMOL/L
AST SERPL-CCNC: 18 U/L (ref 1–40)
BARBITURATES UR QL SCN: NEGATIVE
BASOPHILS # BLD AUTO: 0.01 10*3/MM3 (ref 0–0.2)
BASOPHILS NFR BLD AUTO: 0.1 % (ref 0–1.5)
BENZODIAZ UR QL SCN: NEGATIVE
BILIRUB SERPL-MCNC: 0.3 MG/DL (ref 0.1–1.2)
BUN BLD-MCNC: 18 MG/DL (ref 8–23)
BUN/CREAT SERPL: 18.6 (ref 7–25)
CALCIUM SPEC-SCNC: 9.2 MG/DL (ref 8.6–10.5)
CANNABINOIDS SERPL QL: NEGATIVE
CHLORIDE SERPL-SCNC: 106 MMOL/L (ref 98–107)
CO2 SERPL-SCNC: 24.6 MMOL/L (ref 22–29)
COCAINE UR QL: NEGATIVE
CREAT BLD-MCNC: 0.97 MG/DL (ref 0.76–1.27)
DEPRECATED RDW RBC AUTO: 45.7 FL (ref 37–54)
EOSINOPHIL # BLD AUTO: 0.14 10*3/MM3 (ref 0–0.7)
EOSINOPHIL NFR BLD AUTO: 1.4 % (ref 0.3–6.2)
ERYTHROCYTE [DISTWIDTH] IN BLOOD BY AUTOMATED COUNT: 13.9 % (ref 11.5–14.5)
FOLATE SERPL-MCNC: 11.98 NG/ML (ref 4.78–24.2)
GFR SERPL CREATININE-BSD FRML MDRD: 78 ML/MIN/1.73
GLOBULIN UR ELPH-MCNC: 3.1 GM/DL
GLUCOSE BLD-MCNC: 112 MG/DL (ref 65–99)
HCT VFR BLD AUTO: 44.3 % (ref 40.4–52.2)
HGB BLD-MCNC: 14.9 G/DL (ref 13.7–17.6)
IMM GRANULOCYTES # BLD AUTO: 0.02 10*3/MM3 (ref 0–0.03)
IMM GRANULOCYTES NFR BLD AUTO: 0.2 % (ref 0–0.5)
LYMPHOCYTES # BLD AUTO: 3.85 10*3/MM3 (ref 0.9–4.8)
LYMPHOCYTES NFR BLD AUTO: 38.8 % (ref 19.6–45.3)
MCH RBC QN AUTO: 30.2 PG (ref 27–32.7)
MCHC RBC AUTO-ENTMCNC: 33.6 G/DL (ref 32.6–36.4)
MCV RBC AUTO: 89.7 FL (ref 79.8–96.2)
METHADONE UR QL SCN: NEGATIVE
MONOCYTES # BLD AUTO: 0.89 10*3/MM3 (ref 0.2–1.2)
MONOCYTES NFR BLD AUTO: 9 % (ref 5–12)
NEUTROPHILS # BLD AUTO: 5.03 10*3/MM3 (ref 1.9–8.1)
NEUTROPHILS NFR BLD AUTO: 50.7 % (ref 42.7–76)
OPIATES UR QL: NEGATIVE
OXYCODONE UR QL SCN: NEGATIVE
PLATELET # BLD AUTO: 302 10*3/MM3 (ref 140–500)
PMV BLD AUTO: 10.4 FL (ref 6–12)
POTASSIUM BLD-SCNC: 4.2 MMOL/L (ref 3.5–5.2)
PROT SERPL-MCNC: 6.8 G/DL (ref 6–8.5)
PTH-INTACT SERPL-MCNC: 32 PG/ML (ref 15–65)
RBC # BLD AUTO: 4.94 10*6/MM3 (ref 4.6–6)
SODIUM BLD-SCNC: 142 MMOL/L (ref 136–145)
T3 SERPL-MCNC: 95.2 NG/DL (ref 80–200)
T4 FREE SERPL-MCNC: 1.16 NG/DL (ref 0.93–1.7)
TSH SERPL DL<=0.05 MIU/L-ACNC: 0.76 MIU/ML (ref 0.27–4.2)
VIT B12 BLD-MCNC: 576 PG/ML (ref 211–946)
WBC NRBC COR # BLD: 9.92 10*3/MM3 (ref 4.5–10.7)

## 2019-01-07 PROCEDURE — 82607 VITAMIN B-12: CPT | Performed by: INTERNAL MEDICINE

## 2019-01-07 PROCEDURE — 80307 DRUG TEST PRSMV CHEM ANLYZR: CPT | Performed by: INTERNAL MEDICINE

## 2019-01-07 PROCEDURE — 85025 COMPLETE CBC W/AUTO DIFF WBC: CPT | Performed by: INTERNAL MEDICINE

## 2019-01-07 PROCEDURE — 84439 ASSAY OF FREE THYROXINE: CPT | Performed by: INTERNAL MEDICINE

## 2019-01-07 PROCEDURE — 83970 ASSAY OF PARATHORMONE: CPT | Performed by: INTERNAL MEDICINE

## 2019-01-07 PROCEDURE — 80053 COMPREHEN METABOLIC PANEL: CPT | Performed by: INTERNAL MEDICINE

## 2019-01-07 PROCEDURE — 84443 ASSAY THYROID STIM HORMONE: CPT | Performed by: INTERNAL MEDICINE

## 2019-01-07 PROCEDURE — 84480 ASSAY TRIIODOTHYRONINE (T3): CPT | Performed by: INTERNAL MEDICINE

## 2019-01-07 PROCEDURE — 82746 ASSAY OF FOLIC ACID SERUM: CPT | Performed by: INTERNAL MEDICINE

## 2019-01-07 PROCEDURE — 36415 COLL VENOUS BLD VENIPUNCTURE: CPT

## 2019-01-10 ENCOUNTER — HOSPITAL ENCOUNTER (INPATIENT)
Facility: HOSPITAL | Age: 64
LOS: 7 days | Discharge: HOME | End: 2019-01-17
Attending: SPECIALIST | Admitting: SPECIALIST

## 2019-01-10 ENCOUNTER — HOSPITAL ENCOUNTER (EMERGENCY)
Facility: HOSPITAL | Age: 64
End: 2019-01-10
Attending: EMERGENCY MEDICINE | Admitting: EMERGENCY MEDICINE

## 2019-01-10 VITALS
RESPIRATION RATE: 16 BRPM | HEIGHT: 69 IN | DIASTOLIC BLOOD PRESSURE: 101 MMHG | HEART RATE: 84 BPM | OXYGEN SATURATION: 96 % | WEIGHT: 149.1 LBS | SYSTOLIC BLOOD PRESSURE: 164 MMHG | BODY MASS INDEX: 22.08 KG/M2 | TEMPERATURE: 97.7 F

## 2019-01-10 DIAGNOSIS — F10.27 DEMENTIA ASSOCIATED WITH ALCOHOLISM WITH BEHAVIORAL DISTURBANCE (HCC): Primary | ICD-10-CM

## 2019-01-10 PROBLEM — G89.4 CHRONIC PAIN DISORDER: Status: ACTIVE | Noted: 2019-01-10

## 2019-01-10 PROBLEM — F41.9 ANXIETY: Status: ACTIVE | Noted: 2019-01-10

## 2019-01-10 LAB
ALBUMIN SERPL-MCNC: 3.8 G/DL (ref 3.5–5.2)
ALBUMIN/GLOB SERPL: 1.3 G/DL
ALP SERPL-CCNC: 87 U/L (ref 39–117)
ALT SERPL W P-5'-P-CCNC: 22 U/L (ref 1–41)
AMMONIA BLD-SCNC: 36 UMOL/L (ref 16–60)
AMPHET+METHAMPHET UR QL: NEGATIVE
ANION GAP SERPL CALCULATED.3IONS-SCNC: 9.1 MMOL/L
AST SERPL-CCNC: 14 U/L (ref 1–40)
BARBITURATES UR QL SCN: NEGATIVE
BASOPHILS # BLD AUTO: 0.03 10*3/MM3 (ref 0–0.2)
BASOPHILS NFR BLD AUTO: 0.3 % (ref 0–1.5)
BENZODIAZ UR QL SCN: NEGATIVE
BILIRUB SERPL-MCNC: 0.3 MG/DL (ref 0.1–1.2)
BUN BLD-MCNC: 19 MG/DL (ref 8–23)
BUN/CREAT SERPL: 19.2 (ref 7–25)
CALCIUM SPEC-SCNC: 9.2 MG/DL (ref 8.6–10.5)
CANNABINOIDS SERPL QL: NEGATIVE
CHLORIDE SERPL-SCNC: 106 MMOL/L (ref 98–107)
CO2 SERPL-SCNC: 26.9 MMOL/L (ref 22–29)
COCAINE UR QL: NEGATIVE
CREAT BLD-MCNC: 0.99 MG/DL (ref 0.76–1.27)
DEPRECATED RDW RBC AUTO: 44.4 FL (ref 37–54)
EOSINOPHIL # BLD AUTO: 0.02 10*3/MM3 (ref 0–0.7)
EOSINOPHIL NFR BLD AUTO: 0.2 % (ref 0.3–6.2)
ERYTHROCYTE [DISTWIDTH] IN BLOOD BY AUTOMATED COUNT: 13.5 % (ref 11.5–14.5)
ETHANOL BLD-MCNC: <10 MG/DL (ref 0–10)
ETHANOL UR QL: <0.01 %
GFR SERPL CREATININE-BSD FRML MDRD: 76 ML/MIN/1.73
GLOBULIN UR ELPH-MCNC: 3 GM/DL
GLUCOSE BLD-MCNC: 143 MG/DL (ref 65–99)
HCT VFR BLD AUTO: 44.1 % (ref 40.4–52.2)
HGB BLD-MCNC: 14.9 G/DL (ref 13.7–17.6)
IMM GRANULOCYTES # BLD AUTO: 0 10*3/MM3 (ref 0–0.03)
IMM GRANULOCYTES NFR BLD AUTO: 0 % (ref 0–0.5)
LYMPHOCYTES # BLD AUTO: 2.42 10*3/MM3 (ref 0.9–4.8)
LYMPHOCYTES NFR BLD AUTO: 25.8 % (ref 19.6–45.3)
MCH RBC QN AUTO: 30.9 PG (ref 27–32.7)
MCHC RBC AUTO-ENTMCNC: 33.8 G/DL (ref 32.6–36.4)
MCV RBC AUTO: 91.5 FL (ref 79.8–96.2)
METHADONE UR QL SCN: NEGATIVE
MONOCYTES # BLD AUTO: 0.69 10*3/MM3 (ref 0.2–1.2)
MONOCYTES NFR BLD AUTO: 7.4 % (ref 5–12)
NEUTROPHILS # BLD AUTO: 6.22 10*3/MM3 (ref 1.9–8.1)
NEUTROPHILS NFR BLD AUTO: 66.3 % (ref 42.7–76)
OPIATES UR QL: NEGATIVE
OXYCODONE UR QL SCN: NEGATIVE
PLATELET # BLD AUTO: 299 10*3/MM3 (ref 140–500)
PMV BLD AUTO: 9.2 FL (ref 6–12)
POTASSIUM BLD-SCNC: 4 MMOL/L (ref 3.5–5.2)
PROT SERPL-MCNC: 6.8 G/DL (ref 6–8.5)
RBC # BLD AUTO: 4.82 10*6/MM3 (ref 4.6–6)
SODIUM BLD-SCNC: 142 MMOL/L (ref 136–145)
T4 FREE SERPL-MCNC: 1.11 NG/DL (ref 0.93–1.7)
TSH SERPL DL<=0.05 MIU/L-ACNC: 0.94 MIU/ML (ref 0.27–4.2)
WBC NRBC COR # BLD: 9.38 10*3/MM3 (ref 4.5–10.7)

## 2019-01-10 PROCEDURE — 80307 DRUG TEST PRSMV CHEM ANLYZR: CPT | Performed by: EMERGENCY MEDICINE

## 2019-01-10 PROCEDURE — 82140 ASSAY OF AMMONIA: CPT | Performed by: EMERGENCY MEDICINE

## 2019-01-10 PROCEDURE — 99284 EMERGENCY DEPT VISIT MOD MDM: CPT

## 2019-01-10 PROCEDURE — 80053 COMPREHEN METABOLIC PANEL: CPT | Performed by: EMERGENCY MEDICINE

## 2019-01-10 PROCEDURE — 85025 COMPLETE CBC W/AUTO DIFF WBC: CPT | Performed by: EMERGENCY MEDICINE

## 2019-01-10 PROCEDURE — 84443 ASSAY THYROID STIM HORMONE: CPT | Performed by: SPECIALIST

## 2019-01-10 PROCEDURE — 36415 COLL VENOUS BLD VENIPUNCTURE: CPT | Performed by: EMERGENCY MEDICINE

## 2019-01-10 PROCEDURE — 84439 ASSAY OF FREE THYROXINE: CPT | Performed by: SPECIALIST

## 2019-01-10 RX ORDER — AMLODIPINE BESYLATE 5 MG/1
5 TABLET ORAL
Status: DISCONTINUED | OUTPATIENT
Start: 2019-01-10 | End: 2019-01-17 | Stop reason: HOSPADM

## 2019-01-10 RX ORDER — ALUMINA, MAGNESIA, AND SIMETHICONE 2400; 2400; 240 MG/30ML; MG/30ML; MG/30ML
15 SUSPENSION ORAL EVERY 6 HOURS PRN
Status: DISCONTINUED | OUTPATIENT
Start: 2019-01-10 | End: 2019-01-17 | Stop reason: HOSPADM

## 2019-01-10 RX ORDER — LOSARTAN POTASSIUM 50 MG/1
100 TABLET ORAL
Status: DISCONTINUED | OUTPATIENT
Start: 2019-01-10 | End: 2019-01-17 | Stop reason: HOSPADM

## 2019-01-10 RX ORDER — ATORVASTATIN CALCIUM 10 MG/1
10 TABLET, FILM COATED ORAL DAILY
COMMUNITY

## 2019-01-10 RX ORDER — CLONAZEPAM 0.5 MG/1
0.5 TABLET ORAL 2 TIMES DAILY PRN
Status: DISCONTINUED | OUTPATIENT
Start: 2019-01-10 | End: 2019-01-17 | Stop reason: HOSPADM

## 2019-01-10 RX ORDER — ATORVASTATIN CALCIUM 10 MG/1
10 TABLET, FILM COATED ORAL NIGHTLY
Status: DISCONTINUED | OUTPATIENT
Start: 2019-01-10 | End: 2019-01-11

## 2019-01-10 RX ORDER — NICOTINE 21 MG/24HR
1 PATCH, TRANSDERMAL 24 HOURS TRANSDERMAL EVERY 24 HOURS
Status: DISCONTINUED | OUTPATIENT
Start: 2019-01-10 | End: 2019-01-10

## 2019-01-10 RX ORDER — CLONAZEPAM 0.5 MG/1
0.5 TABLET ORAL 2 TIMES DAILY PRN
COMMUNITY

## 2019-01-10 RX ORDER — NICOTINE 21 MG/24HR
1 PATCH, TRANSDERMAL 24 HOURS TRANSDERMAL EVERY 24 HOURS
Status: DISCONTINUED | OUTPATIENT
Start: 2019-01-10 | End: 2019-01-17 | Stop reason: HOSPADM

## 2019-01-10 RX ORDER — ACETAMINOPHEN 325 MG/1
650 TABLET ORAL EVERY 4 HOURS PRN
Status: DISCONTINUED | OUTPATIENT
Start: 2019-01-10 | End: 2019-01-17 | Stop reason: HOSPADM

## 2019-01-10 RX ORDER — AMLODIPINE BESYLATE 5 MG/1
5 TABLET ORAL DAILY
COMMUNITY

## 2019-01-10 RX ORDER — ASCORBIC ACID 500 MG
1 TABLET ORAL DAILY
Status: ON HOLD | COMMUNITY
End: 2019-01-11

## 2019-01-10 RX ADMIN — LOSARTAN POTASSIUM 100 MG: 50 TABLET, FILM COATED ORAL at 21:16

## 2019-01-10 RX ADMIN — CLONAZEPAM 0.5 MG: 0.5 TABLET ORAL at 21:17

## 2019-01-10 RX ADMIN — AMLODIPINE BESYLATE 5 MG: 5 TABLET ORAL at 21:16

## 2019-01-10 RX ADMIN — ATORVASTATIN CALCIUM 10 MG: 10 TABLET, FILM COATED ORAL at 21:17

## 2019-01-10 NOTE — CONSULTS
CONSULT NOTE    INTERNAL MEDICINE   Psychiatric       Patient Identification:  Name: Stanislav Choi  Age: 63 y.o.  Sex: male  :  1955  MRN: [id number]             Date of Consultation:  1/10/2018        Primary Care Physician: Ten Coon MD                               Requesting Physician: Dr. Rell Wilde III  Reason for Consultation: Medical evaluation on the patient admitted to crisis management unit    Chief Complaint:  Came to the emergency room with his wife for progressive worsening of his anxiety confusion..    History of Present Illness:   Patient is a 60-year-old male who is past medical history as noted below has been battling with anxiety and confusion for some time.  He has seen his primary psychiatrist Dr. George and has recent adjustment of his medications.  Today patient came to the emergency room with his wife after being sent to the emergency room by a psychiatrist.  Apparently in the last couple weeks he is confused and acting differently and appears to be lost.  While taking shower history of turning the shower on he turned the sink on and acted as if his chair is a toilet and started using toilet paper on a chair.  Patient specifically is very calm and seems appropriate in conversation and denies any physical symptoms of chest pain shortness of breath nausea vomiting or diarrhea.  He admits that he doesn't remember a lot of stuff.      Past Medical History:  Past Medical History:   Diagnosis Date   • Anxiety    • Chronic pain disorder    • Depression    • Hypertension      Past Surgical History:  Past Surgical History:   Procedure Laterality Date   • JOINT REPLACEMENT        Home Meds:  Medications Prior to Admission   Medication Sig Dispense Refill Last Dose   • amLODIPine (NORVASC) 5 MG tablet Take 5 mg by mouth Daily.      • atorvastatin (LIPITOR) 10 MG tablet Take 10 mg by mouth Daily.      • clonazePAM (KlonoPIN) 0.5 MG tablet Take 0.5 mg by mouth 2  (Two) Times a Day As Needed for Seizures.      • losartan (COZAAR) 100 MG tablet Take 100 mg by mouth Daily.      • buPROPion XL (WELLBUTRIN XL) 150 MG 24 hr tablet Take 450 mg by mouth Daily.      • busPIRone (BUSPAR) 15 MG tablet Take 30 mg by mouth 2 (Two) Times a Day.      • Cholecalciferol (VITAMIN D3) 16579 units capsule Take 1 capsule by mouth Every 7 (Seven) Days. 4 capsule 2    • disulfiram (ANTABUSE) 250 MG tablet Take 500 mg by mouth Daily.      • gabapentin (NEURONTIN) 300 MG capsule Take 300 mg by mouth 3 (Three) Times a Day.   1/9/2019 at Unknown time   • Multiple Vitamin (MULTIVITAMIN) tablet tablet Take 1 tablet by mouth Daily.      • nicotine (NICODERM CQ) 21 MG/24HR patch Place 1 patch on the skin Daily. 30 patch 1    • Probiotic Product (PROBIOTIC DAILY PO) Take  by mouth.      • QUEtiapine (SEROquel) 25 MG tablet Take 1 tablet by mouth Every Night. 30 tablet 1      Current Meds:     Current Facility-Administered Medications:   •  acetaminophen (TYLENOL) tablet 650 mg, 650 mg, Oral, Q4H PRN, Rell Alvarado III, MD  •  aluminum-magnesium hydroxide-simethicone (MAALOX MAX) 400-400-40 MG/5ML suspension 15 mL, 15 mL, Oral, Q6H PRN, Rell Alvarado III, MD  •  amLODIPine (NORVASC) tablet 5 mg, 5 mg, Oral, Q24H, Rell Alvarado III, MD  •  atorvastatin (LIPITOR) tablet 10 mg, 10 mg, Oral, Nightly, Rell Alvarado III, MD  •  clonazePAM (KlonoPIN) tablet 0.5 mg, 0.5 mg, Oral, BID PRN, Rell Alvarado III, MD  •  losartan (COZAAR) tablet 100 mg, 100 mg, Oral, Q24H, Rell Alvarado III, MD  •  magnesium hydroxide (MILK OF MAGNESIA) suspension 2400 mg/10mL 10 mL, 10 mL, Oral, Daily PRN, Rell Alvarado III, MD  •  nicotine (NICODERM CQ) 21 MG/24HR patch 1 patch, 1 patch, Transdermal, Q24H, Rell Alvarado III, MD  Allergies:  Allergies   Allergen Reactions   • Iodinated Diagnostic Agents Shortness Of Breath and Swelling      SWELLING OF THROAT; DIFFICULTY BREATHING     Social History:   Social History     Tobacco Use   • Smoking status: Current Every Day Smoker     Packs/day: 0.50   • Smokeless tobacco: Never Used   Substance Use Topics   • Alcohol use: No     Frequency: Never      Family History:  History reviewed. No pertinent family history.       Review of Systems  See history of present illness and past medical history.  Constitutional: Negative for activity change, appetite change and fever.   HENT: Negative for congestion and sore throat.    Eyes: Negative.    Respiratory: Negative for cough and shortness of breath.    Cardiovascular: Negative for chest pain and leg swelling.   Gastrointestinal: Negative for abdominal pain, diarrhea and vomiting.   Endocrine: Negative.    Genitourinary: Negative for decreased urine volume and dysuria.   Musculoskeletal: Negative for neck pain.   Skin: Negative for rash and wound.   Allergic/Immunologic: Negative.    Neurological: Positive for weakness. Negative for numbness and headaches.   Hematological: Negative.    Psychiatric/Behavioral: Positive for confusion.   Remainder of ROS is negative.      Vitals:   /99 (BP Location: Right arm, Patient Position: Sitting)   Pulse 94   Temp 98 °F (36.7 °C) (Oral)   Resp 18   SpO2 97%   I/O: No intake or output data in the 24 hours ending 01/10/19 6681  Exam:  General Appearance:    Alert, cooperative, no distress, appears stated age   Head:    Normocephalic, without obvious abnormality, atraumatic   Eyes:    PERRL, conjunctiva/corneas clear, EOM's intact, both eyes   Ears:    Normal external ear canals, both ears   Nose:   Nares normal, septum midline, mucosa normal, no drainage    or sinus tenderness   Throat:   Lips, tongue, gums normal; oral mucosa pink and moist   Neck:   Supple, symmetrical, trachea midline, no adenopathy;     thyroid:  no enlargement/tenderness/nodules; no carotid    bruit or JVD   Back:     Symmetric, no curvature,  ROM normal, no CVA tenderness   Lungs:     Clear to auscultation bilaterally, respirations unlabored   Chest Wall:    No tenderness or deformity    Heart:    Regular rate and rhythm, S1 and S2 normal, no murmur, rub   or gallop   Abdomen:     Soft, non-tender, bowel sounds active all four quadrants,     no masses, no hepatomegaly, no splenomegaly   Extremities:   Extremities normal, atraumatic, no cyanosis or edema   Pulses:   Pulses palpable in all extremities; symmetric all extremities   Skin:   Skin color normal, Skin is warm and dry,  no rashes or palpable lesions   Neurologic:   CNII-XII intact, motor strength grossly intact, sensation grossly intact to light touch, no focal deficits noted       Data Review:      I reviewed the patient's new clinical results.  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    COPD-currently compensated continue with his home regimen and as needed nebulizer treatment.    Anxiety-continue his current psychiatric regimen as per  Dr. Danny Wilde.    Confusion in the context of previous alcohol use, history of dementia of premature type and psychiatric illness and medications-management per psychiatry service.    Hyperglycemia multifactorial and could be the reaction to ongoing use of psychiatric medications-check hemoglobin A1c and monitor.    Leela Carias MD   1/10/2019  6:34 PM  Much of this encounter note is an electronic transcription/translation of spoken language to printed text. The electronic translation of spoken language may permit erroneous, or at times, nonsensical words or phrases to be inadvertently transcribed; Although I have reviewed the note for such errors, some may still exist

## 2019-01-10 NOTE — ED TRIAGE NOTES
"Pt sent to ED by psychiatrist Dr. Sen, requesting admission to inpatient psych for confusion, pt's wife states \"he's confused, wandering around the house and unable to tie his shoes\" since 12/27. Pt denies SI/HI  "

## 2019-01-10 NOTE — NURSING NOTE
Pt admitted to [facility] at 1830 for confusion and bizarre behavior r/t alcoholic dementia. Access notified, VS taken, and pt educated about rules/expectations. Pt presents irritable, yet cooperative with care. Pt denied SI/HI at this time. Reports unbalanced gait d/t L knee replacement. Pt is seen by Dr. George outpt and is here for medication adjustments. Pt offered meal and has been sitting out in dining room since. No further issues reported. Will continue to monitor and provide safe environment.

## 2019-01-10 NOTE — ED PROVIDER NOTES
EMERGENCY DEPARTMENT ENCOUNTER    CHIEF COMPLAINT  Chief Complaint: confusion  History given by: patient and wife  History limited by: confusion  Room Number: 39/39  PMD: Ten Coon MD      HPI:  Pt is a 63 y.o. male who presents complaining of confusion that began on 12/27. The pt was sent to ED by psychiatrist Dr. Sen, requesting admission to [facility]. The pt states he has been doing a lot of stumbling. The pt's wife states she told him to take a shower and he went in and turned the sink on. The pt's wife states the pt put used toilet paper on a chair the other day.  The pt has neuropathy and had been using gabapentin but has since stopped.    Duration:  2.5 weeks   Onset: gradual  Timing: constant   Location: generalized   Radiation: not specified   Quality: confusion   Intensity/Severity: moderate   Progression: worsening   Associated Symptoms: confusion   Aggravating Factors: not specified   Alleviating Factors: not specified   Previous Episodes: multiple   Treatment before arrival: seen by psychiatrist Dr. 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 No pertinent family history.    SOCIAL HISTORY  Social History     Socioeconomic History   • Marital status:      Spouse name: Not on file   • Number of children: Not on file   • Years of education: Not on  file   • Highest education level: Not on file   Social Needs   • Financial resource strain: Not on file   • Food insecurity - worry: Not on file   • Food insecurity - inability: Not on file   • Transportation needs - medical: Not on file   • Transportation needs - non-medical: Not on file   Occupational History   • Not on file   Tobacco Use   • Smoking status: Current Every Day Smoker     Packs/day: 0.50   • Smokeless tobacco: Never Used   Substance and Sexual Activity   • Alcohol use: No     Frequency: Never   • Drug use: No   • Sexual activity: Not on file   Other Topics Concern   • Not on file   Social History Narrative   • Not on file       ALLERGIES  Iodinated diagnostic agents    REVIEW OF SYSTEMS  Review of Systems   Constitutional: Negative for activity change, appetite change and fever.   HENT: Negative for congestion and sore throat.    Eyes: Negative.    Respiratory: Negative for cough and shortness of breath.    Cardiovascular: Negative for chest pain and leg swelling.   Gastrointestinal: Negative for abdominal pain, diarrhea and vomiting.   Endocrine: Negative.    Genitourinary: Negative for decreased urine volume and dysuria.   Musculoskeletal: Negative for neck pain.   Skin: Negative for rash and wound.   Allergic/Immunologic: Negative.    Neurological: Positive for weakness. Negative for numbness and headaches.   Hematological: Negative.    Psychiatric/Behavioral: Positive for confusion.   All other systems reviewed and are negative.      PHYSICAL EXAM  ED Triage Vitals   Temp Heart Rate Resp BP SpO2   01/10/19 1322 01/10/19 1322 01/10/19 1322 01/10/19 1420 01/10/19 1322   97.7 °F (36.5 °C) (!) 122 16 139/88 100 %      Temp src Heart Rate Source Patient Position BP Location FiO2 (%)   01/10/19 1322 -- -- -- --   Tympanic           Physical Exam   Constitutional: He is oriented to person, place, and time. No distress.   HENT:   Head: Normocephalic and atraumatic.   Neck: Normal range of motion. Neck  supple.   Cardiovascular: Normal rate and regular rhythm.   Pulmonary/Chest: Effort normal and breath sounds normal.   Abdominal: Soft. Bowel sounds are normal.   Musculoskeletal: Normal range of motion.   Neurological: He is alert and oriented to person, place, and time.   Has memory problems       LAB RESULTS  Lab Results (last 24 hours)     Procedure Component Value Units Date/Time    Urine Drug Screen - Urine, Clean Catch [[id number]]  (Normal) Collected:  01/10/19 1421    Specimen:  Urine, Clean Catch Updated:  01/10/19 1503     Amphet/Methamphet, Screen Negative     Barbiturates Screen, Urine Negative     Benzodiazepine Screen, Urine Negative     Cocaine Screen, Urine Negative     Opiate Screen Negative     THC, Screen, Urine Negative     Methadone Screen, Urine Negative     Oxycodone Screen, Urine Negative    Narrative:       Negative Thresholds For Drugs Screened:     Amphetamines               500 ng/ml   Barbiturates               200 ng/ml   Benzodiazepines            100 ng/ml   Cocaine                    300 ng/ml   Methadone                  300 ng/ml   Opiates                    300 ng/ml   Oxycodone                  100 ng/ml   THC                        50 ng/ml    The Normal Value for all drugs tested is negative. This report includes final unconfirmed screening results to be used for medical treatment purposes only. Unconfirmed results must not be used for non-medical purposes such as employment or legal testing. Clinical consideration should be applied to any drug of abuse test, particulary when unconfirmed results are used.    CBC & Differential [[id number]] Collected:  01/10/19 1445    Specimen:  Blood Updated:  01/10/19 1508    Narrative:       The following orders were created for panel order CBC & Differential.  Procedure                               Abnormality         Status                     ---------                               -----------         ------                     CBC Auto  Differential[[id number]]        Abnormal            Final result                 Please view results for these tests on the individual orders.    Comprehensive Metabolic Panel [[id number]]  (Abnormal) Collected:  01/10/19 1445    Specimen:  Blood Updated:  01/10/19 1523     Glucose 143 mg/dL      BUN 19 mg/dL      Creatinine 0.99 mg/dL      Sodium 142 mmol/L      Potassium 4.0 mmol/L      Chloride 106 mmol/L      CO2 26.9 mmol/L      Calcium 9.2 mg/dL      Total Protein 6.8 g/dL      Albumin 3.80 g/dL      ALT (SGPT) 22 U/L      AST (SGOT) 14 U/L      Alkaline Phosphatase 87 U/L      Total Bilirubin 0.3 mg/dL      eGFR Non African Amer 76 mL/min/1.73      Globulin 3.0 gm/dL      A/G Ratio 1.3 g/dL      BUN/Creatinine Ratio 19.2     Anion Gap 9.1 mmol/L     Ethanol [[id number]] Collected:  01/10/19 1445    Specimen:  Blood Updated:  01/10/19 1523     Ethanol <10 mg/dL      Ethanol % <0.010 %     CBC Auto Differential [[id number]]  (Abnormal) Collected:  01/10/19 1445    Specimen:  Blood Updated:  01/10/19 1508     WBC 9.38 10*3/mm3      RBC 4.82 10*6/mm3      Hemoglobin 14.9 g/dL      Hematocrit 44.1 %      MCV 91.5 fL      MCH 30.9 pg      MCHC 33.8 g/dL      RDW 13.5 %      RDW-SD 44.4 fl      MPV 9.2 fL      Platelets 299 10*3/mm3      Neutrophil % 66.3 %      Lymphocyte % 25.8 %      Monocyte % 7.4 %      Eosinophil % 0.2 %      Basophil % 0.3 %      Immature Grans % 0.0 %      Neutrophils, Absolute 6.22 10*3/mm3      Lymphocytes, Absolute 2.42 10*3/mm3      Monocytes, Absolute 0.69 10*3/mm3      Eosinophils, Absolute 0.02 10*3/mm3      Basophils, Absolute 0.03 10*3/mm3      Immature Grans, Absolute 0.00 10*3/mm3     TSH [[id number]]  (Normal) Collected:  01/10/19 1445    Specimen:  Blood Updated:  01/10/19 1906     TSH 0.943 mIU/mL     T4, Free [[id number]]  (Normal) Collected:  01/10/19 1445    Specimen:  Blood Updated:  01/10/19 1906     Free T4 1.11 ng/dL     Ammonia [[id number]]  (Normal) Collected:  01/10/19 1601     Specimen:  Blood Updated:  01/10/19 1632     Ammonia 36 umol/L     Lipid Panel [[id number]]  (Abnormal) Collected:  01/11/19 0618    Specimen:  Blood Updated:  01/11/19 0719     Total Cholesterol 128 mg/dL      Triglycerides 86 mg/dL      HDL Cholesterol 37 mg/dL      LDL Cholesterol  74 mg/dL      VLDL Cholesterol 17.2 mg/dL      LDL/HDL Ratio 1.99    Narrative:       Cholesterol Reference Ranges  (U.S. Department of Health and Human Services ATP III Classifications)    Desirable          <200 mg/dL  Borderline High    200-239 mg/dL  High Risk          >240 mg/dL      Triglyceride Reference Ranges  (U.S. Department of Health and Human Services ATP III Classifications)    Normal           <150 mg/dL  Borderline High  150-199 mg/dL  High             200-499 mg/dL  Very High        >500 mg/dL    HDL Reference Ranges  (U.S. Department of Health and Human Services ATP III Classifcations)    Low     <40 mg/dl (major risk factor for CHD)  High    >60 mg/dl ('negative' risk factor for CHD)        LDL Reference Ranges  (U.S. Department of Health and Human Services ATP III Classifcations)    Optimal          <100 mg/dL  Near Optimal     100-129 mg/dL  Borderline High  130-159 mg/dL  High             160-189 mg/dL  Very High        >189 mg/dL          I ordered the above labs and reviewed the results    RADIOLOGY  No orders to display        I ordered the above noted radiological studies. Interpreted by radiologist. Reviewed by me in PACS.       PROCEDURES  Procedures      PROGRESS AND CONSULTS           MEDICAL DECISION MAKING  Results were reviewed/discussed with the patient and they were also made aware of online access. Pt also made aware that some labs, such as cultures, will not be resulted during ER visit and follow up with PMD is necessary.     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  Pt/family voiced understanding of the plan for admission for further testing/treatment as needed.         Latest Documented Vital Signs:  As of 12:34 PM  BP- (!) 164/101 HR- 84 Temp- 97.7 °F (36.5 °C) (Tympanic) O2 sat- 96%    --  Documentation assistance provided by brandon Fallon for Dr. Griggs.  Information recorded by the scribe was done at my direction and has been verified and validated by me.       Kirk Fallon  01/10/19 1517       Kirk Fallon  01/10/19 1642       Theo Griggs MD  01/11/19 1234

## 2019-01-10 NOTE — PLAN OF CARE
Problem: Mood Impairment (Depressive Signs/Symptoms) (Adult)  Goal: Improved Mood Symptoms (Depressive Signs/Symptoms)  Outcome: Ongoing (interventions implemented as appropriate)   01/10/19 1735   Improved Mood Symptoms (Depressive Signs/Symptoms)   Improved Mood Symptoms Action Step/Short Term Goal (STG) Established 01/10/19   Improved Mood Symptoms Time Frame for Action Step (STG) 4 days   Improved Mood Symptoms Action Step (STG) Outcome making progress toward outcome       Problem: Decreased Participation/Engagement (Depressive Signs/Symptoms) (Adult)  Goal: Increased Participation/Engagement (Depressive Signs/Symptoms)  Outcome: Ongoing (interventions implemented as appropriate)   01/10/19 1735   Increased Participation/Engagement (Depressive Signs/Symptoms)   Increased Participation/Engagement Action Step/Short Term Goal (STG) Established 01/10/19   Increased Participation/Engagement Time Frame for Action Step (STG) 4 days   Increased Participation/Engagement Action Step (STG) Outcome making progress toward outcome       Problem: Sleep Impairment (Depressive Signs/Symptoms) (Adult)  Goal: Improved Sleep Hygiene (Depressive Signs/Symptoms)  Outcome: Ongoing (interventions implemented as appropriate)   01/10/19 1735   Improved Sleep Hygiene (Depressive Signs/Symptoms)   Improved Sleep Hygiene Action Step/Short Term Goal (STG) Established 01/10/19   Improved Sleep Hygiene Time Frame for Action Step (STG) 4 days   Improved Sleep Hygiene Action Step (STG) Outcome making progress toward outcome       Problem: Weight/Appetite Change (Depressive Signs/Symptoms) (Adult)  Goal: Nutrition/Weight Optimized (Depressive Signs/Symptoms)  Outcome: Ongoing (interventions implemented as appropriate)   01/10/19 1735   Nutrition/Weight Optimized (Depressive Signs/Symptoms)   Optimized Nutrition/Weight Action Step/Short Term Goal (STG) Established 01/10/19   Optimized Nutrition/Weight Time Frame for Action Step (STG) 4 days    Optimized Nutrition/Weight Action Step (STG) Outcome making progress toward outcome       Problem: Social/Occupational/Functional Impairment (Depressive Signs/Symptoms) (Adult)  Goal: Improved Social/Occupational/Functional Skills (Depressive Signs/Symptoms)  Outcome: Ongoing (interventions implemented as appropriate)   01/10/19 1735   Improved Social/Occupational/Functional Skills (Depressive Signs/Symptoms)   Improved Social/Occupational/Functional Skills Action Step/Short Term Goal (STG) Established 01/10/19   Improved Social/Occupational/Functional Skills Time Frame for Action Step (STG) 4 days   Improved Social/Occupational/Functional Skills Action Step (STG) Outcome making progress toward outcome       Problem: Cognitive Impairment (Dementia Signs/Symptoms) (Adult)  Goal: Optimized Cognitive Function (Dementia Signs/Symptoms)  Outcome: Ongoing (interventions implemented as appropriate)   01/10/19 1735   Optimized Cognitive Function (Dementia Signs/Symptoms)   Optimized Cognitive Ability Action Step/Short Term Goal (STG) Established 01/10/19   Optimized Cognitive Ability Time Frame for Action Step (STG) 4 days   Optimized Cognitive Ability Action Step (STG) Outcome making progress toward outcome       Problem: Behavioral Impairment (Dementia Signs/Symptoms) (Adult)  Goal: Improved Behavioral Control (Dementia Signs/Symptoms)  Outcome: Ongoing (interventions implemented as appropriate)   01/10/19 1735   Improved Behavioral Control (Dementia Signs/Symptoms)   Improved Behavior Control Action Step/Short Term Goal (STG) Established 01/10/19   Improved Behavioral Control Time Frame for Action Step (STG) 4 days   Improved Behavioral Control Action Step (STG) Outcome making progress toward outcome       Problem: Psychological Impairment (Dementia Signs/Symptoms) (Adult)  Goal: Improved Psychological Symptoms (Dementia Signs/Symptoms)  Outcome: Ongoing (interventions implemented as appropriate)   01/10/19 7675    Improved Psychological Symptoms (Dementia Signs/Symptoms)   Improved Psychological Symptoms Action Step/Short Term Goal (STG) Established 01/10/19   Improved Psychologic Symptoms Time Frame for Action Step (STG) 4 days   Improved Psychological Symptoms Action Step (STG) Outcome making progress toward outcome
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
## 2019-01-11 ENCOUNTER — APPOINTMENT (OUTPATIENT)
Dept: CT IMAGING | Facility: HOSPITAL | Age: 64
End: 2019-01-11

## 2019-01-11 LAB
CHOLEST SERPL-MCNC: 128 MG/DL (ref 0–200)
HDLC SERPL-MCNC: 37 MG/DL (ref 40–60)
LDLC SERPL CALC-MCNC: 74 MG/DL (ref 0–100)
LDLC/HDLC SERPL: 1.99 {RATIO}
TRIGL SERPL-MCNC: 86 MG/DL (ref 0–150)
VLDLC SERPL-MCNC: 17.2 MG/DL (ref 5–40)

## 2019-01-11 PROCEDURE — 70450 CT HEAD/BRAIN W/O DYE: CPT

## 2019-01-11 PROCEDURE — 80061 LIPID PANEL: CPT | Performed by: SPECIALIST

## 2019-01-11 RX ORDER — BUPROPION HYDROCHLORIDE 150 MG/1
150 TABLET ORAL DAILY
Status: ON HOLD | COMMUNITY
End: 2019-01-11

## 2019-01-11 RX ORDER — ATORVASTATIN CALCIUM 10 MG/1
10 TABLET, FILM COATED ORAL DAILY
Status: DISCONTINUED | OUTPATIENT
Start: 2019-01-11 | End: 2019-01-17 | Stop reason: HOSPADM

## 2019-01-11 RX ORDER — BUSPIRONE HYDROCHLORIDE 30 MG/1
30 TABLET ORAL NIGHTLY
Status: ON HOLD | COMMUNITY
End: 2019-01-11

## 2019-01-11 RX ORDER — ERGOCALCIFEROL 1.25 MG/1
50000 CAPSULE ORAL
Status: DISCONTINUED | OUTPATIENT
Start: 2019-01-11 | End: 2019-01-17 | Stop reason: HOSPADM

## 2019-01-11 RX ORDER — DIPHENOXYLATE HYDROCHLORIDE AND ATROPINE SULFATE 2.5; .025 MG/1; MG/1
1 TABLET ORAL DAILY
Status: DISCONTINUED | OUTPATIENT
Start: 2019-01-11 | End: 2019-01-17 | Stop reason: HOSPADM

## 2019-01-11 RX ORDER — PREGABALIN 75 MG/1
75 CAPSULE ORAL 2 TIMES DAILY
COMMUNITY
End: 2019-01-17 | Stop reason: HOSPADM

## 2019-01-11 RX ORDER — CHOLECALCIFEROL (VITAMIN D3) 1250 MCG
50000 CAPSULE ORAL
Status: DISCONTINUED | OUTPATIENT
Start: 2019-01-11 | End: 2019-01-11

## 2019-01-11 RX ORDER — LOSARTAN POTASSIUM 100 MG/1
100 TABLET ORAL DAILY
Status: DISCONTINUED | OUTPATIENT
Start: 2019-01-11 | End: 2019-01-11

## 2019-01-11 RX ORDER — AMLODIPINE BESYLATE 5 MG/1
5 TABLET ORAL DAILY
Status: DISCONTINUED | OUTPATIENT
Start: 2019-01-11 | End: 2019-01-11

## 2019-01-11 RX ORDER — NICOTINE 21 MG/24HR
1 PATCH, TRANSDERMAL 24 HOURS TRANSDERMAL EVERY 24 HOURS
Status: DISCONTINUED | OUTPATIENT
Start: 2019-01-11 | End: 2019-01-11

## 2019-01-11 RX ADMIN — AMLODIPINE BESYLATE 5 MG: 5 TABLET ORAL at 08:50

## 2019-01-11 RX ADMIN — ERGOCALCIFEROL 50000 UNITS: 1.25 CAPSULE ORAL at 22:35

## 2019-01-11 RX ADMIN — CLONAZEPAM 0.5 MG: 0.5 TABLET ORAL at 23:38

## 2019-01-11 RX ADMIN — LOSARTAN POTASSIUM 100 MG: 50 TABLET, FILM COATED ORAL at 08:50

## 2019-01-11 RX ADMIN — NICOTINE 1 PATCH: 21 PATCH, EXTENDED RELEASE TRANSDERMAL at 08:50

## 2019-01-11 NOTE — PROGRESS NOTES
Continued Stay Note  [facility]     Patient Name: Stanislav Choi  MRN: [id number]  Today's Date: 1/11/2019    Admit Date: 1/10/2019    Discharge Plan     Row Name 01/11/19 3960       Plan    Plan Comments  SW called and spoke w/pt's spouse, Elysia Choi, ph. [phone] concerning tx & d/c planning.  Pt's spouse inquired about the fax number so that the pt's employer could send [employer] papers.  SW advised that pt would receive a neuropsych test while inpt.  Pt's spouse inquired about an MRI of the brain due to pt's behaviors.  SW advised that she would mention this to the Doctor.  SW advised that she would contact pt's spouse with updates regarding d/c planning.    Row Name 01/11/19 7142       Plan    Plan  Pt will return home.  Pt will receive neuropsych testing.  SW will explore outpt providers for continuity of care.    Patient/Family in Agreement with Plan  yes    Plan Comments  SW met w/pt to discuss treatment & d/c planning.  Pt was able to verify demographic info.  SW discussed employment status w/pt; pt stated that he was unsure if he was still employed and to speak w/his spouse.  SW discussed outpt providers w/pt.  Pt confirmed that he sees Dr. George, Psychiatrist for medication management and Brien Diaz, Therapist for mental health therapy.        Discharge Codes    No documentation.             MILADIS Chan
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
## 2019-01-11 NOTE — SIGNIFICANT NOTE
Pt stayed after mindfulness group and talked with therapist. Pt shared of not knowing what he was supposed to do and feeling confused as to why he was here. Pt reported a decrease in motivation and ability to be able to do things at home. Therapist explored if this might be depression. Pt reported that it was possible but stated he wasn't sure how he felt. Pt talked about family and how much he enjoyed spending time with his grandchildren. Pt reported that he wanted to get back to he and wife having more time together, but also feeling like he was not ready to retire yet. Therapist used active listening and offered support.. Therapist encouraged pt to focus on self right now and getting medication straightened out. Therapist also encouraged pt to talk with wife when she comes to visit and Dr. George in the morning about plan. Therapist inquired if pt needed anything and pt requested water. Pt was calm and expressed appreciation in talking. Therapist encouraged pt to seek support from treatment team whenever needed.

## 2019-01-11 NOTE — PLAN OF CARE
Problem: Patient Care Overview  Goal: Plan of Care Review  Outcome: Ongoing (interventions implemented as appropriate)      Problem: Overarching Goals (Adult)  Goal: Adheres to Safety Considerations for Self and Others  Outcome: Ongoing (interventions implemented as appropriate)    Goal: Optimized Coping Skills in Response to Life Stressors  Outcome: Ongoing (interventions implemented as appropriate)    Goal: Develops/Participates in Therapeutic [place] to Support Successful Transition  Outcome: Ongoing (interventions implemented as appropriate)      Problem: Mood Impairment (Depressive Signs/Symptoms) (Adult)  Goal: Improved Mood Symptoms (Depressive Signs/Symptoms)  Outcome: Ongoing (interventions implemented as appropriate)      Problem: Decreased Participation/Engagement (Depressive Signs/Symptoms) (Adult)  Goal: Increased Participation/Engagement (Depressive Signs/Symptoms)  Outcome: Ongoing (interventions implemented as appropriate)      Problem: Sleep Impairment (Depressive Signs/Symptoms) (Adult)  Goal: Improved Sleep Hygiene (Depressive Signs/Symptoms)  Outcome: Ongoing (interventions implemented as appropriate)      Problem: Weight/Appetite Change (Depressive Signs/Symptoms) (Adult)  Goal: Nutrition/Weight Optimized (Depressive Signs/Symptoms)  Outcome: Ongoing (interventions implemented as appropriate)      Problem: Social/Occupational/Functional Impairment (Depressive Signs/Symptoms) (Adult)  Goal: Improved Social/Occupational/Functional Skills (Depressive Signs/Symptoms)  Outcome: Ongoing (interventions implemented as appropriate)      Problem: Cognitive Impairment (Dementia Signs/Symptoms) (Adult)  Goal: Optimized Cognitive Function (Dementia Signs/Symptoms)  Outcome: Ongoing (interventions implemented as appropriate)      Problem: Behavioral Impairment (Dementia Signs/Symptoms) (Adult)  Goal: Improved Behavioral Control (Dementia Signs/Symptoms)  Outcome: Ongoing (interventions implemented as  appropriate)      Problem: Psychological Impairment (Dementia Signs/Symptoms) (Adult)  Goal: Improved Psychological Symptoms (Dementia Signs/Symptoms)  Outcome: Ongoing (interventions implemented as appropriate)

## 2019-01-11 NOTE — PROGRESS NOTES
Continued Stay Note  [facility]     Patient Name: Stanislav Choi  MRN: [id number]  Today's Date: 1/11/2019    Admit Date: 1/10/2019    Discharge Plan     Row Name 01/11/19 1147       Plan    Plan  Pt will return home.  Pt will receive neuropsych testing.  SW will explore outpt providers for continuity of care.    Patient/Family in Agreement with Plan  yes    Plan Comments  SW met w/pt to discuss treatment & d/c planning.  Pt was able to verify demographic info.  SW discussed employment status w/pt; pt stated that he was unsure if he was still employed and to speak w/his spouse.  SW discussed outpt providers w/pt.  Pt confirmed that he sees Dr. George, Psychiatrist for medication management and Brien Diaz, Therapist for mental health therapy.        Discharge Codes    No documentation.             MILADIS Chan

## 2019-01-11 NOTE — PLAN OF CARE
Problem: Patient Care Overview  Goal: Individualization and Mutuality  Outcome: Ongoing (interventions implemented as appropriate)    Goal: Interprofessional Rounds/Family Conf  Outcome: Ongoing (interventions implemented as appropriate)   01/11/19 1012   Interdisciplinary Rounds/Family Conf   Summary Treatment team met to discuss pt's plan of care. Pt will receive neuropysch testing. SW will explore outpt providers w/pt. Pt's progress & svcs needed will be reviewed ongoing.   Interdisciplinary Rounds/Family Conf   Participants art therapy;;nursing;psychiatrist;pharmacy;social work     Patient/Guardian Signature: __________________________________            Psychiatrist Signature: ______________________________________             Therapist Signature: ________________________________________         Nurse Signature: ___________________________________________          Staff Signature: ____________________________________________            Staff Signature: ____________________________________________          Staff Signature: ____________________________________________          Staff Signature:                                                                                                      Problem: Mood Impairment (Depressive Signs/Symptoms) (Adult)  Goal: Improved Mood Symptoms (Depressive Signs/Symptoms)  Outcome: Ongoing (interventions implemented as appropriate)   01/11/19 1012   Improved Mood Symptoms (Depressive Signs/Symptoms)   Improved Mood Symptoms Action Step/Short Term Goal (STG) Established 01/11/19   Improved Mood Symptoms Time Frame for Action Step (STG) 4 days   Improved Mood Symptoms Action Step (STG) Outcome making progress toward outcome       Problem: Decreased Participation/Engagement (Depressive Signs/Symptoms) (Adult)  Goal: Increased Participation/Engagement (Depressive Signs/Symptoms)  Outcome: Ongoing (interventions implemented as appropriate)   01/11/19 1012   Increased  Participation/Engagement (Depressive Signs/Symptoms)   Increased Participation/Engagement Action Step/Short Term Goal (STG) Established 01/11/19   Increased Participation/Engagement Time Frame for Action Step (STG) 4 days   Increased Participation/Engagement Action Step (STG) Outcome making progress toward outcome       Problem: Sleep Impairment (Depressive Signs/Symptoms) (Adult)  Goal: Improved Sleep Hygiene (Depressive Signs/Symptoms)  Outcome: Ongoing (interventions implemented as appropriate)   01/11/19 1012   Improved Sleep Hygiene (Depressive Signs/Symptoms)   Improved Sleep Hygiene Action Step/Short Term Goal (STG) Established 01/11/19   Improved Sleep Hygiene Time Frame for Action Step (STG) 4 days   Improved Sleep Hygiene Action Step (STG) Outcome making progress toward outcome       Problem: Weight/Appetite Change (Depressive Signs/Symptoms) (Adult)  Goal: Nutrition/Weight Optimized (Depressive Signs/Symptoms)  Outcome: Ongoing (interventions implemented as appropriate)   01/11/19 1012   Nutrition/Weight Optimized (Depressive Signs/Symptoms)   Optimized Nutrition/Weight Action Step/Short Term Goal (STG) Established 01/11/19   Optimized Nutrition/Weight Time Frame for Action Step (STG) 4 days   Optimized Nutrition/Weight Action Step (STG) Outcome making progress toward outcome       Problem: Social/Occupational/Functional Impairment (Depressive Signs/Symptoms) (Adult)  Goal: Improved Social/Occupational/Functional Skills (Depressive Signs/Symptoms)  Outcome: Ongoing (interventions implemented as appropriate)   01/11/19 1012   Improved Social/Occupational/Functional Skills (Depressive Signs/Symptoms)   Improved Social/Occupational/Functional Skills Action Step/Short Term Goal (STG) Established 01/04/19   Improved Social/Occupational/Functional Skills Time Frame for Action Step (STG) 4 days   Improved Social/Occupational/Functional Skills Action Step (STG) Outcome making progress toward outcome        Problem: Cognitive Impairment (Dementia Signs/Symptoms) (Adult)  Goal: Optimized Cognitive Function (Dementia Signs/Symptoms)  Outcome: Ongoing (interventions implemented as appropriate)   01/11/19 1012   Optimized Cognitive Function (Dementia Signs/Symptoms)   Optimized Cognitive Ability Action Step/Short Term Goal (STG) Established 01/11/19   Optimized Cognitive Ability Time Frame for Action Step (STG) 4 days   Optimized Cognitive Ability Action Step (STG) Outcome making progress toward outcome       Problem: Behavioral Impairment (Dementia Signs/Symptoms) (Adult)  Goal: Improved Behavioral Control (Dementia Signs/Symptoms)  Outcome: Ongoing (interventions implemented as appropriate)   01/11/19 1012   Improved Behavioral Control (Dementia Signs/Symptoms)   Improved Behavior Control Action Step/Short Term Goal (STG) Established 01/11/19   Improved Behavioral Control Time Frame for Action Step (STG) 4 days   Improved Behavioral Control Action Step (STG) Outcome making progress toward outcome       Problem: Psychological Impairment (Dementia Signs/Symptoms) (Adult)  Goal: Improved Psychological Symptoms (Dementia Signs/Symptoms)  Outcome: Ongoing (interventions implemented as appropriate)   01/11/19 1012   Improved Psychological Symptoms (Dementia Signs/Symptoms)   Improved Psychological Symptoms Action Step/Short Term Goal (STG) Established 01/11/19   Improved Psychologic Symptoms Time Frame for Action Step (STG) 4 days   Improved Psychological Symptoms Action Step (STG) Outcome making progress toward outcome

## 2019-01-11 NOTE — PROGRESS NOTES
Clinical Pharmacy Services: Medication History    Stanislav Choi is a 63 y.o. male presenting to [facility] for Dementia associated with alcoholism with behavioral disturbance (CMS/HCC) [F10.27]    He  has a past medical history of Anxiety, Chronic pain disorder, Depression, and Hypertension.    Allergies as of 01/10/2019 - Reviewed 01/10/2019   Allergen Reaction Noted   • Iodinated diagnostic agents Shortness Of Breath and Swelling 01/17/2013       Medication information was obtained from: pharmacy, [facility]   [address] - [address] [facility] [phone]  - [phone] [address] [address]   Phone: [phone] Fax: [phone]   Not a 24 hour pharmacy; exact hours not known       Prior to Admission Medications     Prescriptions Last Dose Informant Patient Reported? Taking?    amLODIPine (NORVASC) 5 MG tablet  Pharmacy Yes Yes    Take 5 mg by mouth Daily.    atorvastatin (LIPITOR) 10 MG tablet  Pharmacy Yes Yes    Take 10 mg by mouth Daily.    clonazePAM (KlonoPIN) 0.5 MG tablet  Pharmacy Yes Yes    Take 0.5 mg by mouth 2 (Two) Times a Day As Needed for Seizures.    losartan (COZAAR) 100 MG tablet  Pharmacy Yes Yes    Take 100 mg by mouth Daily.    pregabalin (LYRICA) 75 MG capsule  Pharmacy Yes Yes    Take 75 mg by mouth 2 (Two) Times a Day.        Medication notes: Medication list obtained from patient's pharmacy ([facility] pharmacy, phone: [phone]).  Per [facility] pharmacy, patient/family reported that he no longer takes viibryd 10 mg or gabapentin 300 mg.  Gabapentin was replaced with a 14 day trial of lyrica 75 mg daily, which was picked up 1/7/19. [facility] filled buspar and seroquel in Oct 2018 and patient refused to pick the Nov and Dec 2018 fills of these medications.  Per SHEILA Yen's note on 1/10, wife states she was weaning him off gabapentin, viibryd,  wellbutrin.    Patient is confused and unable to confirm medication list.  He is a patient of Dr. George so I contacted [facility]. Per Antoinette, patient was seen there on 1/10 and Dr. George has instructed the patient to continue clonazepam and hold disulfiram.  At this visit, patient/family reported that he had stopped his vitamin D supplement, stopped viibryd on 1/4/19, stopped wellbutrin on 1/8/19, stopped gabapentin on 1/7/19 and switched to lyrica.    Removed the following medications from list: bupropion 150 mg daily, buspar 30 mg nightly, vitamin D3 47251 units, disulfiram 500 mg daily, gabapentin 300 mg tid, nicotine 21 mg patch, MVI, probiotic, and seroquel 25 mg nightly.      This medication list is complete to the best of my knowledge as of 1/11/2019    Please call if questions.    Kervin Pendleton, PharmRAMIN  1/11/2019 1:10 PM

## 2019-01-11 NOTE — PLAN OF CARE
Problem: Patient Care Overview  Goal: Discharge Needs Assessment  Outcome: Ongoing (interventions implemented as appropriate)   01/11/19 1143 01/11/19 1150   Discharge Needs Assessment   Concerns to be Addressed mental health;cognitive/perceptual;decision making;coping/stress --    Patient/Family Anticipates Transition to home with family --    Patient/Family Anticipated Services at Transition mental health services;outpatient care --    Transportation Concerns car, none --    Transportation Anticipated family or friend will provide --    Discharge Coordination/Progress --  Pt will return home. Pt will receive neuropsych testing. SW will explore outpt providers for continuity of care.

## 2019-01-11 NOTE — PLAN OF CARE
Problem: Patient Care Overview  Goal: Plan of Care Review  Outcome: Ongoing (interventions implemented as appropriate)   01/11/19 1512   Plan of Care Review   Progress no change   OTHER   Outcome Summary Pt restless. Confused. Unsure why he is here. Not attending program. Denies SI . Seems irritable at times and frustrated. Stated he was anxious this am but refused take his klonopin   Coping/Psychosocial   Plan of Care Reviewed With patient   Coping/Psychosocial   Patient Agreement with Plan of Care agrees       Problem: Overarching Goals (Adult)  Goal: Adheres to Safety Considerations for Self and Others  Outcome: Ongoing (interventions implemented as appropriate)   01/11/19 1512   Overarching Goals (Adult)   Adheres to Safety Considerations for Self and Others making progress toward outcome     Goal: Optimized Coping Skills in Response to Life Stressors  Outcome: Ongoing (interventions implemented as appropriate)   01/11/19 1512   Overarching Goals (Adult)   Optimized Coping Skills in Response to Life Stressors making progress toward outcome     Goal: Develops/Participates in Therapeutic [place] to Support Successful Transition  Outcome: Ongoing (interventions implemented as appropriate)   01/11/19 1512   Overarching Goals (Adult)   Develops/Participates in Therapeutic [place] to Support Successful Transition making progress toward outcome       Problem: Mood Impairment (Depressive Signs/Symptoms) (Adult)  Goal: Improved Mood Symptoms (Depressive Signs/Symptoms)  Outcome: Ongoing (interventions implemented as appropriate)   01/11/19 1512   Improved Mood Symptoms (Depressive Signs/Symptoms)   Improved Mood Symptoms Action Step/Short Term Goal (STG) Established 01/11/19   Improved Mood Symptoms Time Frame for Action Step (STG) 4 days   Improved Mood Symptoms Action Step (STG) Outcome making progress toward outcome       Problem: Decreased Participation/Engagement (Depressive Signs/Symptoms) (Adult)  Goal:  Increased Participation/Engagement (Depressive Signs/Symptoms)  Outcome: Ongoing (interventions implemented as appropriate)   01/11/19 1512   Increased Participation/Engagement (Depressive Signs/Symptoms)   Increased Participation/Engagement Action Step/Short Term Goal (STG) Established 01/11/19   Increased Participation/Engagement Time Frame for Action Step (STG) 4 days   Increased Participation/Engagement Action Step (STG) Outcome making progress toward outcome       Problem: Sleep Impairment (Depressive Signs/Symptoms) (Adult)  Goal: Improved Sleep Hygiene (Depressive Signs/Symptoms)  Outcome: Ongoing (interventions implemented as appropriate)   01/11/19 1512   Improved Sleep Hygiene (Depressive Signs/Symptoms)   Improved Sleep Hygiene Action Step/Short Term Goal (STG) Established 01/11/19   Improved Sleep Hygiene Time Frame for Action Step (STG) 4 days   Improved Sleep Hygiene Action Step (STG) Outcome making progress toward outcome       Problem: Weight/Appetite Change (Depressive Signs/Symptoms) (Adult)  Goal: Nutrition/Weight Optimized (Depressive Signs/Symptoms)  Outcome: Ongoing (interventions implemented as appropriate)   01/11/19 1512   Nutrition/Weight Optimized (Depressive Signs/Symptoms)   Optimized Nutrition/Weight Action Step/Short Term Goal (STG) Established 01/11/19   Optimized Nutrition/Weight Time Frame for Action Step (STG) 4 days   Optimized Nutrition/Weight Action Step (STG) Outcome making progress toward outcome       Problem: Social/Occupational/Functional Impairment (Depressive Signs/Symptoms) (Adult)  Goal: Improved Social/Occupational/Functional Skills (Depressive Signs/Symptoms)  Outcome: Ongoing (interventions implemented as appropriate)   01/11/19 1512   Improved Social/Occupational/Functional Skills (Depressive Signs/Symptoms)   Improved Social/Occupational/Functional Skills Action Step/Short Term Goal (STG) Established 01/11/19   Improved Social/Occupational/Functional Skills Time  Frame for Action Step (STG) 4 days   Improved Social/Occupational/Functional Skills Action Step (STG) Outcome making progress toward outcome       Problem: Cognitive Impairment (Dementia Signs/Symptoms) (Adult)  Goal: Optimized Cognitive Function (Dementia Signs/Symptoms)  Outcome: Ongoing (interventions implemented as appropriate)   01/11/19 1512   Optimized Cognitive Function (Dementia Signs/Symptoms)   Optimized Cognitive Ability Action Step/Short Term Goal (STG) Established 01/11/19   Optimized Cognitive Ability Time Frame for Action Step (STG) 4 days   Optimized Cognitive Ability Action Step (STG) Outcome making progress toward outcome       Problem: Behavioral Impairment (Dementia Signs/Symptoms) (Adult)  Goal: Improved Behavioral Control (Dementia Signs/Symptoms)  Outcome: Ongoing (interventions implemented as appropriate)   01/11/19 1512   Improved Behavioral Control (Dementia Signs/Symptoms)   Improved Behavior Control Action Step/Short Term Goal (STG) Established 01/11/19   Improved Behavioral Control Time Frame for Action Step (STG) 4 days   Improved Behavioral Control Action Step (STG) Outcome making progress toward outcome       Problem: Psychological Impairment (Dementia Signs/Symptoms) (Adult)  Goal: Improved Psychological Symptoms (Dementia Signs/Symptoms)  Outcome: Ongoing (interventions implemented as appropriate)   01/11/19 1512   Improved Psychological Symptoms (Dementia Signs/Symptoms)   Improved Psychological Symptoms Action Step/Short Term Goal (STG) Established 01/11/19   Improved Psychologic Symptoms Time Frame for Action Step (STG) 4 days   Improved Psychological Symptoms Action Step (STG) Outcome making progress toward outcome

## 2019-01-11 NOTE — PLAN OF CARE
Problem: Patient Care Overview  Goal: Plan of Care Review   01/11/19 5988   OTHER   Outcome Summary Pt cooperative and compliant with medication. Rates anxiety 7/10, depression 7/10, denies SI, HI, hallucinations and pain. Pt seems confused at times and unsure of why he is here. Will continue to monitor mood and behavior and provide a safe environment.

## 2019-01-12 ENCOUNTER — APPOINTMENT (OUTPATIENT)
Dept: MRI IMAGING | Facility: HOSPITAL | Age: 64
End: 2019-01-12

## 2019-01-12 LAB
FOLATE SERPL-MCNC: 9.4 NG/ML (ref 4.78–24.2)
VIT B12 BLD-MCNC: 597 PG/ML (ref 211–946)

## 2019-01-12 PROCEDURE — 82746 ASSAY OF FOLIC ACID SERUM: CPT | Performed by: SPECIALIST

## 2019-01-12 PROCEDURE — 36415 COLL VENOUS BLD VENIPUNCTURE: CPT | Performed by: SPECIALIST

## 2019-01-12 PROCEDURE — 82607 VITAMIN B-12: CPT | Performed by: SPECIALIST

## 2019-01-12 PROCEDURE — 70551 MRI BRAIN STEM W/O DYE: CPT

## 2019-01-12 RX ADMIN — NICOTINE 1 PATCH: 21 PATCH, EXTENDED RELEASE TRANSDERMAL at 09:06

## 2019-01-12 RX ADMIN — LOSARTAN POTASSIUM 100 MG: 50 TABLET, FILM COATED ORAL at 09:08

## 2019-01-12 RX ADMIN — CLONAZEPAM 0.5 MG: 0.5 TABLET ORAL at 14:54

## 2019-01-12 RX ADMIN — AMLODIPINE BESYLATE 5 MG: 5 TABLET ORAL at 09:08

## 2019-01-12 RX ADMIN — CLONAZEPAM 0.5 MG: 0.5 TABLET ORAL at 21:09

## 2019-01-12 RX ADMIN — Medication 1 TABLET: at 09:08

## 2019-01-12 RX ADMIN — ATORVASTATIN CALCIUM 10 MG: 10 TABLET, FILM COATED ORAL at 21:07

## 2019-01-12 NOTE — PROGRESS NOTES
[facility]     LOS: 2 days     Subjective:    CC:No chief complaint on file.    DIET:  Diet Order   Procedures   • Diet Regular   poor memory    Some night sweats    Poor appetite      Objective:    Vital Signs:  Temp:  [97.8 °F (36.6 °C)] 97.8 °F (36.6 °C)  Heart Rate:  [86-93] 86  Resp:  [18] 18  BP: (141-167)/() 141/96    SpO2:  [93 %] 93 %  on   ;   Device (Oxygen Therapy): room air  Body mass index is 22.01 kg/m².    Physical Exam   Constitutional: He appears well-developed and well-nourished.   HENT:   Head: Normocephalic and atraumatic.   Cardiovascular: Exam reveals no friction rub.   No murmur heard.  Pulmonary/Chest: Effort normal and breath sounds normal.   Abdominal: Soft. Bowel sounds are normal. He exhibits no distension. There is no tenderness.   Neurological: He is alert.   Skin: Skin is warm and dry.   Psychiatric:   Normal conversation but asked the same questions a couple times       Results Review:    Glucose   Date Value Ref Range Status   01/10/2019 143 (H) 65 - 99 mg/dL Final     Results from last 7 days   Lab Units  01/10/19   1445   WBC 10*3/mm3  9.38   HEMOGLOBIN g/dL  14.9   HEMATOCRIT %  44.1   PLATELETS 10*3/mm3  299     Results from last 7 days   Lab Units  01/10/19   1445   SODIUM mmol/L  142   POTASSIUM mmol/L  4.0   CHLORIDE mmol/L  106   CO2 mmol/L  26.9   BUN mg/dL  19   CREATININE mg/dL  0.99   CALCIUM mg/dL  9.2   BILIRUBIN mg/dL  0.3   ALK PHOS U/L  87   ALT (SGPT) U/L  22   AST (SGOT) U/L  14   GLUCOSE mg/dL  143*                 Cultures:       I have reviewed daily medications and changes in CPOE    Scheduled meds    amLODIPine 5 mg Oral Q24H   atorvastatin 10 mg Oral Daily   losartan 100 mg Oral Q24H   multivitamin 1 tablet Oral Daily   nicotine 1 patch Transdermal Q24H   vitamin D 50,000 Units Oral Q7 Days          PRN meds  •  acetaminophen  •  aluminum-magnesium hydroxide-simethicone  •  clonazePAM  •  magnesium hydroxide         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

## 2019-01-12 NOTE — PLAN OF CARE
Problem: Patient Care Overview  Goal: Plan of Care Review  Outcome: Ongoing (interventions implemented as appropriate)   01/12/19 0908 01/12/19 1655   Plan of Care Review   Progress --  no change   OTHER   Outcome Summary --  Pt cooperative with care this shift. Pt denied SI/HI, hallucinations, and pain. Pt oriented to self and place. Pt had some confusion as to why he was in hospital, and stated that the year was 2010. Pt rated anxiety 6/10 and depression 7/10. Pt stated that he was unable to verbalize why he thought he was anxious or depressed. Pt cooperative with MRI in afternoon. Pt given PRN klonopin at 1454 prior to MRI. Pt compliant with medications and attended some group therapies. Will continue to monitor and provide support.   Coping/Psychosocial   Plan of Care Reviewed With patient --    Coping/Psychosocial   Patient Agreement with Plan of Care agrees --        Problem: Overarching Goals (Adult)  Goal: Adheres to Safety Considerations for Self and Others  Outcome: Ongoing (interventions implemented as appropriate)   01/12/19 1655   Overarching Goals (Adult)   Adheres to Safety Considerations for Self and Others making progress toward outcome     Intervention: Develop and Maintain Individualized Safety Plan   01/12/19 0908 01/12/19 1600   Violence Risk   Feels Like Hurting Others no --    Previous Attempt to Harm Others no --    C-SSRS (Recent)   Wish to be Dead no --    Suicidal Thoughts no --    Suicide Behavior no --    Develop and Maintain Individualized Safety Plan   Safety Measures --  safety rounds completed       Goal: Optimized Coping Skills in Response to Life Stressors  Outcome: Ongoing (interventions implemented as appropriate)   01/12/19 1655   Overarching Goals (Adult)   Optimized Coping Skills in Response to Life Stressors making progress toward outcome     Intervention: Promote Effective Coping Strategies   01/12/19 0908   Coping/Psychosocial Interventions   Supportive Measures active  listening utilized;positive reinforcement provided;self-care encouraged;verbalization of feelings encouraged       Goal: Develops/Participates in Therapeutic [place] to Support Successful Transition  Outcome: Ongoing (interventions implemented as appropriate)   01/12/19 1655   Overarching Goals (Adult)   Develops/Participates in Therapeutic [place] to Support Successful Transition making progress toward outcome     Intervention: Foster Therapeutic [place]   01/12/19 0908   Interventions   Trust Relationship/Rapport care explained;choices provided;emotional support provided;empathic listening provided;questions answered;questions encouraged;reassurance provided;thoughts/feelings acknowledged     Intervention: Mutually Develop Transition Plan   01/12/19 0908   Mutually Develop Transition Plan   Transition Support crisis management plan promoted         Problem: Mood Impairment (Depressive Signs/Symptoms) (Adult)  Intervention: Promote Mood Improvement   01/12/19 1655   Promote Mood Improvement   Mutually Determined Action Steps (Promote Mood Improvement) acknowledges progress       Goal: Improved Mood Symptoms (Depressive Signs/Symptoms)  Outcome: Ongoing (interventions implemented as appropriate)   01/11/19 1512 01/12/19 1655   Improved Mood Symptoms (Depressive Signs/Symptoms)   Improved Mood Symptoms Action Step/Short Term Goal (STG) Established 01/11/19 --    Improved Mood Symptoms Time Frame for Action Step (STG) --  3 days   Improved Mood Symptoms Action Step (STG) Outcome --  making progress toward outcome       Problem: Decreased Participation/Engagement (Depressive Signs/Symptoms) (Adult)  Intervention: Facilitate Participation and Engagement   01/12/19 0908   Activity   Activity up ad codie       Goal: Increased Participation/Engagement (Depressive Signs/Symptoms)  Outcome: Ongoing (interventions implemented as appropriate)   01/11/19 1512 01/12/19 1655   Increased Participation/Engagement (Depressive  Signs/Symptoms)   Increased Participation/Engagement Action Step/Short Term Goal (STG) Established 01/11/19 --    Increased Participation/Engagement Time Frame for Action Step (STG) --  3 days   Increased Participation/Engagement Action Step (STG) Outcome --  making progress toward outcome       Problem: Sleep Impairment (Depressive Signs/Symptoms) (Adult)  Goal: Improved Sleep Hygiene (Depressive Signs/Symptoms)  Outcome: Ongoing (interventions implemented as appropriate)   01/11/19 1512 01/12/19 1655   Improved Sleep Hygiene (Depressive Signs/Symptoms)   Improved Sleep Hygiene Action Step/Short Term Goal (STG) Established 01/11/19 --    Improved Sleep Hygiene Time Frame for Action Step (STG) --  3 days   Improved Sleep Hygiene Action Step (STG) Outcome --  making progress toward outcome       Problem: Weight/Appetite Change (Depressive Signs/Symptoms) (Adult)  Goal: Nutrition/Weight Optimized (Depressive Signs/Symptoms)  Outcome: Ongoing (interventions implemented as appropriate)   01/11/19 1512 01/12/19 1655   Nutrition/Weight Optimized (Depressive Signs/Symptoms)   Optimized Nutrition/Weight Action Step/Short Term Goal (STG) Established 01/11/19 --    Optimized Nutrition/Weight Time Frame for Action Step (STG) --  3 days   Optimized Nutrition/Weight Action Step (STG) Outcome --  making progress toward outcome       Problem: Social/Occupational/Functional Impairment (Depressive Signs/Symptoms) (Adult)  Intervention: Promote Social, Occupational and Functional Ability   01/12/19 0908   Interventions   Trust Relationship/Rapport care explained;choices provided;emotional support provided;empathic listening provided;questions answered;questions encouraged;reassurance provided;thoughts/feelings acknowledged       Goal: Improved Social/Occupational/Functional Skills (Depressive Signs/Symptoms)  Outcome: Ongoing (interventions implemented as appropriate)   01/11/19 1512 01/12/19 1655   Improved  Social/Occupational/Functional Skills (Depressive Signs/Symptoms)   Improved Social/Occupational/Functional Skills Action Step/Short Term Goal (STG) Established 01/11/19 --    Improved Social/Occupational/Functional Skills Time Frame for Action Step (STG) --  3 days   Improved Social/Occupational/Functional Skills Action Step (STG) Outcome --  making progress toward outcome       Problem: Cognitive Impairment (Dementia Signs/Symptoms) (Adult)  Intervention: Support and Promote Cognitive Ability   01/12/19 1655   Support and Promote Cognitive Ability   Mutually Determined Action Steps (Support/Promote Cognitive Ability) participates in cognition assessment       Goal: Optimized Cognitive Function (Dementia Signs/Symptoms)  Outcome: Ongoing (interventions implemented as appropriate)   01/11/19 1512 01/12/19 1655   Optimized Cognitive Function (Dementia Signs/Symptoms)   Optimized Cognitive Ability Action Step/Short Term Goal (STG) Established 01/11/19 --    Optimized Cognitive Ability Time Frame for Action Step (STG) --  3 days   Optimized Cognitive Ability Action Step (STG) Outcome --  making progress toward outcome       Problem: Behavioral Impairment (Dementia Signs/Symptoms) (Adult)  Intervention: Manage Behavior and Mood   01/12/19 0908   Facilitate Re-Engagement and Participation   Diversional Activity reading       Goal: Improved Behavioral Control (Dementia Signs/Symptoms)  Outcome: Ongoing (interventions implemented as appropriate)   01/11/19 1512 01/12/19 1655   Improved Behavioral Control (Dementia Signs/Symptoms)   Improved Behavior Control Action Step/Short Term Goal (STG) Established 01/11/19 --    Improved Behavioral Control Time Frame for Action Step (STG) --  3 days   Improved Behavioral Control Action Step (STG) Outcome --  making progress toward outcome       Problem: Psychological Impairment (Dementia Signs/Symptoms) (Adult)  Intervention: Manage Mood and Emotion   01/12/19 1655   Manage Mood and  Emotion   Mutually Determined Action Steps (Manage Mood/Emotion) engages in recreational/leisure activities       Goal: Improved Psychological Symptoms (Dementia Signs/Symptoms)  Outcome: Ongoing (interventions implemented as appropriate)   01/11/19 1512 01/12/19 1655   Improved Psychological Symptoms (Dementia Signs/Symptoms)   Improved Psychological Symptoms Action Step/Short Term Goal (STG) Established 01/11/19 --    Improved Psychologic Symptoms Time Frame for Action Step (STG) --  3 days   Improved Psychological Symptoms Action Step (STG) Outcome --  making progress toward outcome

## 2019-01-12 NOTE — PLAN OF CARE
Problem: Patient Care Overview  Goal: Plan of Care Review  Outcome: Ongoing (interventions implemented as appropriate)   01/11/19 1512 01/12/19 0239   Plan of Care Review   Progress --  no change   OTHER   Outcome Summary --  Pt continues to be restless and confused. Pt anxious but did not rate, however he did take prn klonopin.Pt was cooperaative with care. Will continue to monitor mood and behavior and provide safe environment.   Coping/Psychosocial   Plan of Care Reviewed With patient --    Coping/Psychosocial   Patient Agreement with Plan of Care agrees --        Problem: Overarching Goals (Adult)  Goal: Adheres to Safety Considerations for Self and Others  Outcome: Ongoing (interventions implemented as appropriate)    Goal: Optimized Coping Skills in Response to Life Stressors  Outcome: Ongoing (interventions implemented as appropriate)    Goal: Develops/Participates in Therapeutic [place] to Support Successful Transition  Outcome: Ongoing (interventions implemented as appropriate)      Problem: Mood Impairment (Depressive Signs/Symptoms) (Adult)  Goal: Improved Mood Symptoms (Depressive Signs/Symptoms)  Outcome: Ongoing (interventions implemented as appropriate)      Problem: Decreased Participation/Engagement (Depressive Signs/Symptoms) (Adult)  Goal: Increased Participation/Engagement (Depressive Signs/Symptoms)  Outcome: Ongoing (interventions implemented as appropriate)      Problem: Sleep Impairment (Depressive Signs/Symptoms) (Adult)  Goal: Improved Sleep Hygiene (Depressive Signs/Symptoms)  Outcome: Ongoing (interventions implemented as appropriate)      Problem: Weight/Appetite Change (Depressive Signs/Symptoms) (Adult)  Goal: Nutrition/Weight Optimized (Depressive Signs/Symptoms)  Outcome: Ongoing (interventions implemented as appropriate)      Problem: Social/Occupational/Functional Impairment (Depressive Signs/Symptoms) (Adult)  Goal: Improved Social/Occupational/Functional Skills (Depressive  Signs/Symptoms)  Outcome: Ongoing (interventions implemented as appropriate)      Problem: Cognitive Impairment (Dementia Signs/Symptoms) (Adult)  Goal: Optimized Cognitive Function (Dementia Signs/Symptoms)  Outcome: Ongoing (interventions implemented as appropriate)      Problem: Behavioral Impairment (Dementia Signs/Symptoms) (Adult)  Goal: Improved Behavioral Control (Dementia Signs/Symptoms)  Outcome: Ongoing (interventions implemented as appropriate)      Problem: Psychological Impairment (Dementia Signs/Symptoms) (Adult)  Goal: Improved Psychological Symptoms (Dementia Signs/Symptoms)  Outcome: Ongoing (interventions implemented as appropriate)

## 2019-01-12 NOTE — PROGRESS NOTES
Patient is seen, evaluated, and chart reviewed. Discussed with staff.  Patient is seen for Dr. Alvarado.  Patient is well-known to this physician as he was referred for inpatient treatment on January 10, 2019 when seen at [facility].    Staff reports that patient continues to be restless and confused.  CT of head is normal.  Recent labs including B12 and folate are within normal limits.    On examination, patient is found in the milieu.  Patient slept somewhat poorly last night.  He is alert and oriented ×3.  Speech is somewhat slowed.  Mood is depressed.  Affect flat, congruent.  No SI/HI/AVH.  Thought processes are tangential, slowed.  Judgement and insight is poor.    No medication side effects.  Patient is provided with supportive therapy.  Repeat neuropsych testing is pending.  Patient continues to be confused, albeit a bit less than prior to admission.  We'll obtain MRI of brain as this was plan of his outpatient physician.  Continue current medications, therapy, and inpatient treatment plan for safety and stabilization.

## 2019-01-13 RX ADMIN — ATORVASTATIN CALCIUM 10 MG: 10 TABLET, FILM COATED ORAL at 23:37

## 2019-01-13 RX ADMIN — NICOTINE 1 PATCH: 21 PATCH, EXTENDED RELEASE TRANSDERMAL at 08:53

## 2019-01-13 RX ADMIN — CLONAZEPAM 0.5 MG: 0.5 TABLET ORAL at 23:38

## 2019-01-13 RX ADMIN — AMLODIPINE BESYLATE 5 MG: 5 TABLET ORAL at 08:54

## 2019-01-13 RX ADMIN — LOSARTAN POTASSIUM 100 MG: 50 TABLET, FILM COATED ORAL at 08:54

## 2019-01-13 RX ADMIN — Medication 1 TABLET: at 08:54

## 2019-01-13 NOTE — PROGRESS NOTES
Patient is seen, evaluated, and chart reviewed. Discussed with staff.  Patient is seen for Dr. Alvarado.    Staff reports that patient has been compliant medications and cooperative with treatment.  He has had ongoing confusion.  MRI of brain essentially within normal limits.    On examination, patient is found in his room.  Patient slept okay last night.  He is alert and oriented ×3.  Mood is anxious.  Affect congruent.  No SI/HI/AVH.  Thought processes are circumstantial.  Judgement and insight is poor.    No medication side effects.  Patient is provided with supportive therapy.  The search for the cause of this patient's confusion remains elusive.  Repeat neuropsych testing is pending.  Patient denies overt depression however patient may be experiencing some pseudodementia related to depression.  Continue current medications, therapy, and inpatient treatment plan for safety and stabilization.  Dr. Alvarado to resume care tomorrow.

## 2019-01-13 NOTE — NURSING NOTE
Dr. Lima from [facility] completed neuropsych testing with pt in afternoon. Official report will follow in the next few days.

## 2019-01-13 NOTE — PLAN OF CARE
Problem: Patient Care Overview  Goal: Plan of Care Review  Outcome: Ongoing (interventions implemented as appropriate)   01/12/19 2300 01/13/19 0531   Plan of Care Review   Progress --  no change   OTHER   Outcome Summary --  Anxiety rated 8/10. Denied depression, SI/HI, and hallucinations. Cooperative and med compliant. Will continue to monitor and assess.    Coping/Psychosocial   Plan of Care Reviewed With patient --    Coping/Psychosocial   Patient Agreement with Plan of Care agrees --         Problem: Overarching Goals (Adult)  Goal: Adheres to Safety Considerations for Self and Others  Outcome: Ongoing (interventions implemented as appropriate)    Goal: Optimized Coping Skills in Response to Life Stressors  Outcome: Ongoing (interventions implemented as appropriate)    Goal: Develops/Participates in Therapeutic [place] to Support Successful Transition  Outcome: Ongoing (interventions implemented as appropriate)      Problem: Mood Impairment (Depressive Signs/Symptoms) (Adult)  Goal: Improved Mood Symptoms (Depressive Signs/Symptoms)  Outcome: Ongoing (interventions implemented as appropriate)      Problem: Social/Occupational/Functional Impairment (Depressive Signs/Symptoms) (Adult)  Goal: Improved Social/Occupational/Functional Skills (Depressive Signs/Symptoms)  Outcome: Ongoing (interventions implemented as appropriate)      Problem: Psychological Impairment (Dementia Signs/Symptoms) (Adult)  Goal: Improved Psychological Symptoms (Dementia Signs/Symptoms)  Outcome: Ongoing (interventions implemented as appropriate)

## 2019-01-13 NOTE — PLAN OF CARE
"Problem: Patient Care Overview  Goal: Plan of Care Review  Outcome: Ongoing (interventions implemented as appropriate)   01/13/19 0854 01/13/19 1613   Plan of Care Review   Progress --  no change   OTHER   Outcome Summary --  Pt cooperative with care this shift. Pt denies SI/HI, hallucinations, and pain. Pt voiced \"a little\" anxiety and depression. Pt oriented to self and place. Pt stated that the year was 2012. Pt struggling to remember the month and year and wanted to write it down to tell it to the Doctor. Pt compliant with neuropsych testing and attending group therapy. In afternoon pt stated that he felt sad d/t a desire to go home and confusion surrounding his hospital stay. Support provided and questions answered. Pt compliant with medication. Will continue to monitor and provide support.   Coping/Psychosocial   Plan of Care Reviewed With patient --    Coping/Psychosocial   Patient Agreement with Plan of Care agrees --        Problem: Overarching Goals (Adult)  Goal: Adheres to Safety Considerations for Self and Others  Outcome: Ongoing (interventions implemented as appropriate)   01/13/19 1613   Overarching Goals (Adult)   Adheres to Safety Considerations for Self and Others making progress toward outcome     Intervention: Develop and Maintain Individualized Safety Plan   01/13/19 0854 01/13/19 1600   Violence Risk   Feels Like Hurting Others no --    Previous Attempt to Harm Others no --    C-SSRS (Recent)   Wish to be Dead no --    Suicidal Thoughts no --    Suicide Behavior no --    Develop and Maintain Individualized Safety Plan   Safety Measures --  safety rounds completed       Goal: Optimized Coping Skills in Response to Life Stressors  Outcome: Ongoing (interventions implemented as appropriate)   01/13/19 1613   Overarching Goals (Adult)   Optimized Coping Skills in Response to Life Stressors making progress toward outcome     Intervention: Promote Effective Coping Strategies   01/13/19 0854 "   Coping/Psychosocial Interventions   Supportive Measures active listening utilized;positive reinforcement provided;self-care encouraged;verbalization of feelings encouraged       Goal: Develops/Participates in Therapeutic [place] to Support Successful Transition  Outcome: Ongoing (interventions implemented as appropriate)   01/13/19 1613   Overarching Goals (Adult)   Develops/Participates in Therapeutic [place] to Support Successful Transition making progress toward outcome     Intervention: Foster Therapeutic [place]   01/13/19 0854   Interventions   Trust Relationship/Rapport care explained;choices provided;emotional support provided;empathic listening provided;questions answered;questions encouraged;reassurance provided;thoughts/feelings acknowledged     Intervention: Mutually Develop Transition Plan   01/13/19 0854   Mutually Develop Transition Plan   Transition Support crisis management plan promoted         Problem: Mood Impairment (Depressive Signs/Symptoms) (Adult)  Intervention: Promote Mood Improvement   01/12/19 1655   Promote Mood Improvement   Mutually Determined Action Steps (Promote Mood Improvement) acknowledges progress       Goal: Improved Mood Symptoms (Depressive Signs/Symptoms)  Outcome: Ongoing (interventions implemented as appropriate)   01/11/19 1512 01/13/19 1613   Improved Mood Symptoms (Depressive Signs/Symptoms)   Improved Mood Symptoms Action Step/Short Term Goal (STG) Established 01/11/19 --    Improved Mood Symptoms Time Frame for Action Step (STG) --  2 days   Improved Mood Symptoms Action Step (STG) Outcome --  making progress toward outcome       Problem: Decreased Participation/Engagement (Depressive Signs/Symptoms) (Adult)  Intervention: Facilitate Participation and Engagement   01/13/19 0854   Activity   Activity up ad codie       Goal: Increased Participation/Engagement (Depressive Signs/Symptoms)  Outcome: Ongoing (interventions implemented as appropriate)   01/11/19 1512  01/13/19 1613   Increased Participation/Engagement (Depressive Signs/Symptoms)   Increased Participation/Engagement Action Step/Short Term Goal (STG) Established 01/11/19 --    Increased Participation/Engagement Time Frame for Action Step (STG) --  2 days   Increased Participation/Engagement Action Step (STG) Outcome --  making progress toward outcome       Problem: Sleep Impairment (Depressive Signs/Symptoms) (Adult)  Goal: Improved Sleep Hygiene (Depressive Signs/Symptoms)  Outcome: Ongoing (interventions implemented as appropriate)   01/11/19 1512 01/13/19 1613   Improved Sleep Hygiene (Depressive Signs/Symptoms)   Improved Sleep Hygiene Action Step/Short Term Goal (STG) Established 01/11/19 --    Improved Sleep Hygiene Time Frame for Action Step (STG) --  2 days   Improved Sleep Hygiene Action Step (STG) Outcome --  making progress toward outcome       Problem: Weight/Appetite Change (Depressive Signs/Symptoms) (Adult)  Goal: Nutrition/Weight Optimized (Depressive Signs/Symptoms)  Outcome: Ongoing (interventions implemented as appropriate)   01/11/19 1512 01/13/19 1613   Nutrition/Weight Optimized (Depressive Signs/Symptoms)   Optimized Nutrition/Weight Action Step/Short Term Goal (STG) Established 01/11/19 --    Optimized Nutrition/Weight Time Frame for Action Step (STG) --  2 days   Optimized Nutrition/Weight Action Step (STG) Outcome --  making progress toward outcome       Problem: Social/Occupational/Functional Impairment (Depressive Signs/Symptoms) (Adult)  Intervention: Promote Social, Occupational and Functional Ability   01/13/19 0854   Interventions   Trust Relationship/Rapport care explained;choices provided;emotional support provided;empathic listening provided;questions answered;questions encouraged;reassurance provided;thoughts/feelings acknowledged       Goal: Improved Social/Occupational/Functional Skills (Depressive Signs/Symptoms)  Outcome: Ongoing (interventions implemented as appropriate)    01/11/19 1512 01/13/19 1613   Improved Social/Occupational/Functional Skills (Depressive Signs/Symptoms)   Improved Social/Occupational/Functional Skills Action Step/Short Term Goal (STG) Established 01/11/19 --    Improved Social/Occupational/Functional Skills Time Frame for Action Step (STG) --  2 days   Improved Social/Occupational/Functional Skills Action Step (STG) Outcome --  making progress toward outcome       Problem: Cognitive Impairment (Dementia Signs/Symptoms) (Adult)  Intervention: Support and Promote Cognitive Ability   01/12/19 1655   Support and Promote Cognitive Ability   Mutually Determined Action Steps (Support/Promote Cognitive Ability) participates in cognition assessment       Goal: Optimized Cognitive Function (Dementia Signs/Symptoms)  Outcome: Ongoing (interventions implemented as appropriate)   01/11/19 1512 01/13/19 1613   Optimized Cognitive Function (Dementia Signs/Symptoms)   Optimized Cognitive Ability Action Step/Short Term Goal (STG) Established 01/11/19 --    Optimized Cognitive Ability Time Frame for Action Step (STG) --  2 days   Optimized Cognitive Ability Action Step (STG) Outcome --  making progress toward outcome       Problem: Behavioral Impairment (Dementia Signs/Symptoms) (Adult)  Intervention: Manage Behavior and Mood   01/13/19 0854   Facilitate Re-Engagement and Participation   Diversional Activity reading       Goal: Improved Behavioral Control (Dementia Signs/Symptoms)  Outcome: Ongoing (interventions implemented as appropriate)   01/11/19 1512 01/13/19 1613   Improved Behavioral Control (Dementia Signs/Symptoms)   Improved Behavior Control Action Step/Short Term Goal (STG) Established 01/11/19 --    Improved Behavioral Control Time Frame for Action Step (STG) --  3 days   Improved Behavioral Control Action Step (STG) Outcome --  making progress toward outcome       Problem: Psychological Impairment (Dementia Signs/Symptoms) (Adult)  Intervention: Manage Mood and  Emotion   01/12/19 1655   Manage Mood and Emotion   Mutually Determined Action Steps (Manage Mood/Emotion) engages in recreational/leisure activities       Goal: Improved Psychological Symptoms (Dementia Signs/Symptoms)  Outcome: Ongoing (interventions implemented as appropriate)   01/11/19 1512 01/13/19 1613   Improved Psychological Symptoms (Dementia Signs/Symptoms)   Improved Psychological Symptoms Action Step/Short Term Goal (STG) Established 01/11/19 --    Improved Psychologic Symptoms Time Frame for Action Step (STG) --  3 days   Improved Psychological Symptoms Action Step (STG) Outcome --  making progress toward outcome

## 2019-01-13 NOTE — PROGRESS NOTES
[facility]     LOS: 3 days     Subjective:    CC:No chief complaint on file.    DIET:  Diet Order   Procedures   • Diet Regular   poor memory    Some night sweats    Poor appetite      Objective:    Vital Signs:  Temp:  [97.7 °F (36.5 °C)] 97.7 °F (36.5 °C)  Heart Rate:  [90] 90  Resp:  [16] 16  BP: (131)/(83) 131/83    SpO2:  [97 %] 97 %  on   ;   Device (Oxygen Therapy): room air  Body mass index is 22.01 kg/m².    Physical Exam   Constitutional: He appears well-developed and well-nourished.   HENT:   Head: Normocephalic and atraumatic.   Cardiovascular: Exam reveals no friction rub.   No murmur heard.  Pulmonary/Chest: Effort normal and breath sounds normal.   Abdominal: Soft. Bowel sounds are normal. He exhibits no distension. There is no tenderness.   Neurological: He is alert.   Skin: Skin is warm and dry.   Psychiatric:   Normal conversation but asked the same questions a couple times       Results Review:    No results found for: GLUCOSE  Results from last 7 days   Lab Units  01/10/19   1445   WBC 10*3/mm3  9.38   HEMOGLOBIN g/dL  14.9   HEMATOCRIT %  44.1   PLATELETS 10*3/mm3  299     Results from last 7 days   Lab Units  01/10/19   1445   SODIUM mmol/L  142   POTASSIUM mmol/L  4.0   CHLORIDE mmol/L  106   CO2 mmol/L  26.9   BUN mg/dL  19   CREATININE mg/dL  0.99   CALCIUM mg/dL  9.2   BILIRUBIN mg/dL  0.3   ALK PHOS U/L  87   ALT (SGPT) U/L  22   AST (SGOT) U/L  14   GLUCOSE mg/dL  143*                 Cultures:       I have reviewed daily medications and changes in CPOE    Scheduled meds    amLODIPine 5 mg Oral Q24H   atorvastatin 10 mg Oral Daily   losartan 100 mg Oral Q24H   multivitamin 1 tablet Oral Daily   nicotine 1 patch Transdermal Q24H   vitamin D 50,000 Units Oral Q7 Days          PRN meds  •  acetaminophen  •  aluminum-magnesium hydroxide-simethicone  •  clonazePAM  •  magnesium hydroxide        Dementia associated with alcoholism with behavioral disturbance  (CMS/HCC)    Hypertension    COPD (chronic obstructive pulmonary disease) (CMS/HCC)    Anxiety    Chronic pain disorder        Assessment/Plan:      Dementia associated with alcoholism with behavioral disturbance (CMS/HCC)    Hypertension    COPD (chronic obstructive pulmonary disease) (CMS/HCC)    Anxiety    Chronic pain disorder  Weight loss  Confusion  Poor energy  Smoker  Family h/o lung cancer    Plan:  Mri is ok  -CT of the chest, abd and pelvis        Orville Greenberg MD  01/13/19  3:27 PM

## 2019-01-14 ENCOUNTER — APPOINTMENT (OUTPATIENT)
Dept: CT IMAGING | Facility: HOSPITAL | Age: 64
End: 2019-01-14

## 2019-01-14 PROCEDURE — 71250 CT THORAX DX C-: CPT

## 2019-01-14 PROCEDURE — 74176 CT ABD & PELVIS W/O CONTRAST: CPT

## 2019-01-14 RX ADMIN — ATORVASTATIN CALCIUM 10 MG: 10 TABLET, FILM COATED ORAL at 21:51

## 2019-01-14 RX ADMIN — NICOTINE 1 PATCH: 21 PATCH, EXTENDED RELEASE TRANSDERMAL at 08:54

## 2019-01-14 RX ADMIN — Medication 1 TABLET: at 08:52

## 2019-01-14 RX ADMIN — AMLODIPINE BESYLATE 5 MG: 5 TABLET ORAL at 08:52

## 2019-01-14 RX ADMIN — LOSARTAN POTASSIUM 100 MG: 50 TABLET, FILM COATED ORAL at 08:52

## 2019-01-14 NOTE — PROGRESS NOTES
Results of MRI are noted.  The patient reports no recollection of yesterday's neuropsychological evaluation.  He is oriented ×3 today but continues to exhibit some confusion and is noted previously cannot recall yesterday's lengthy neuropsychological panel with testing.  We have rate results of that testing, particularly with regards to post discharge needs for care.

## 2019-01-14 NOTE — PLAN OF CARE
Problem: Patient Care Overview  Goal: Plan of Care Review  Outcome: Ongoing (interventions implemented as appropriate)   01/14/19 1614   Plan of Care Review   Progress no change   OTHER   Outcome Summary Pt has sad and c/o physical symptoms, upset stomach . Has been isolative to room. Did not attend all programming. Sleeping alot today   Coping/Psychosocial   Plan of Care Reviewed With patient   Coping/Psychosocial   Patient Agreement with Plan of Care agrees       Problem: Overarching Goals (Adult)  Goal: Adheres to Safety Considerations for Self and Others  Outcome: Ongoing (interventions implemented as appropriate)   01/14/19 1614   Overarching Goals (Adult)   Adheres to Safety Considerations for Self and Others making progress toward outcome     Goal: Optimized Coping Skills in Response to Life Stressors  Outcome: Ongoing (interventions implemented as appropriate)   01/14/19 1614   Overarching Goals (Adult)   Optimized Coping Skills in Response to Life Stressors making progress toward outcome     Goal: Develops/Participates in Therapeutic [place] to Support Successful Transition  Outcome: Ongoing (interventions implemented as appropriate)   01/14/19 1614   Overarching Goals (Adult)   Develops/Participates in Therapeutic [place] to Support Successful Transition making progress toward outcome       Problem: Mood Impairment (Depressive Signs/Symptoms) (Adult)  Goal: Improved Mood Symptoms (Depressive Signs/Symptoms)  Outcome: Ongoing (interventions implemented as appropriate)   01/14/19 1614   Improved Mood Symptoms (Depressive Signs/Symptoms)   Improved Mood Symptoms Action Step/Short Term Goal (STG) Established 01/14/19   Improved Mood Symptoms Time Frame for Action Step (STG) 4 days   Improved Mood Symptoms Action Step (STG) Outcome making progress toward outcome       Problem: Decreased Participation/Engagement (Depressive Signs/Symptoms) (Adult)  Goal: Increased Participation/Engagement (Depressive  Signs/Symptoms)  Outcome: Ongoing (interventions implemented as appropriate)   01/14/19 1614   Increased Participation/Engagement (Depressive Signs/Symptoms)   Increased Participation/Engagement Action Step/Short Term Goal (STG) Established 01/14/19   Increased Participation/Engagement Time Frame for Action Step (STG) 4 days   Increased Participation/Engagement Action Step (STG) Outcome making progress toward outcome       Problem: Sleep Impairment (Depressive Signs/Symptoms) (Adult)  Goal: Improved Sleep Hygiene (Depressive Signs/Symptoms)  Outcome: Ongoing (interventions implemented as appropriate)   01/14/19 1614   Improved Sleep Hygiene (Depressive Signs/Symptoms)   Improved Sleep Hygiene Action Step/Short Term Goal (STG) Established 01/14/19   Improved Sleep Hygiene Time Frame for Action Step (STG) 4 days   Improved Sleep Hygiene Action Step (STG) Outcome making progress toward outcome       Problem: Weight/Appetite Change (Depressive Signs/Symptoms) (Adult)  Goal: Nutrition/Weight Optimized (Depressive Signs/Symptoms)  Outcome: Ongoing (interventions implemented as appropriate)   01/14/19 1614   Nutrition/Weight Optimized (Depressive Signs/Symptoms)   Optimized Nutrition/Weight Action Step/Short Term Goal (STG) Established 01/14/19   Optimized Nutrition/Weight Time Frame for Action Step (STG) 4 days   Optimized Nutrition/Weight Action Step (STG) Outcome making progress toward outcome       Problem: Social/Occupational/Functional Impairment (Depressive Signs/Symptoms) (Adult)  Goal: Improved Social/Occupational/Functional Skills (Depressive Signs/Symptoms)  Outcome: Ongoing (interventions implemented as appropriate)   01/14/19 1614   Improved Social/Occupational/Functional Skills (Depressive Signs/Symptoms)   Improved Social/Occupational/Functional Skills Action Step/Short Term Goal (STG) Established 01/14/19   Improved Social/Occupational/Functional Skills Time Frame for Action Step (STG) 4 days   Improved  Social/Occupational/Functional Skills Action Step (STG) Outcome making progress toward outcome       Problem: Cognitive Impairment (Dementia Signs/Symptoms) (Adult)  Goal: Optimized Cognitive Function (Dementia Signs/Symptoms)  Outcome: Ongoing (interventions implemented as appropriate)   01/14/19 1614   Optimized Cognitive Function (Dementia Signs/Symptoms)   Optimized Cognitive Ability Action Step/Short Term Goal (STG) Established 01/14/19   Optimized Cognitive Ability Time Frame for Action Step (STG) 4 days   Optimized Cognitive Ability Action Step (STG) Outcome making progress toward outcome       Problem: Behavioral Impairment (Dementia Signs/Symptoms) (Adult)  Goal: Improved Behavioral Control (Dementia Signs/Symptoms)  Outcome: Ongoing (interventions implemented as appropriate)      Problem: Psychological Impairment (Dementia Signs/Symptoms) (Adult)  Goal: Improved Psychological Symptoms (Dementia Signs/Symptoms)  Outcome: Ongoing (interventions implemented as appropriate)   01/14/19 1614   Improved Psychological Symptoms (Dementia Signs/Symptoms)   Improved Psychological Symptoms Action Step/Short Term Goal (STG) Established 01/14/19   Improved Psychologic Symptoms Time Frame for Action Step (STG) 4 days   Improved Psychological Symptoms Action Step (STG) Outcome making progress toward outcome

## 2019-01-14 NOTE — PLAN OF CARE
Problem: Patient Care Overview  Goal: Plan of Care Review  Outcome: Ongoing (interventions implemented as appropriate)   01/14/19 0330 01/14/19 0503   Plan of Care Review   Progress --  no change   OTHER   Outcome Summary --  Disoriented to time and situation. Denied anxiety, depression, SI/HI, and hallucinations. Cooperative and med compliant. Will continue to monitor and assess.    Coping/Psychosocial   Plan of Care Reviewed With patient --    Coping/Psychosocial   Patient Agreement with Plan of Care agrees --        Problem: Overarching Goals (Adult)  Goal: Adheres to Safety Considerations for Self and Others  Outcome: Ongoing (interventions implemented as appropriate)    Goal: Optimized Coping Skills in Response to Life Stressors  Outcome: Ongoing (interventions implemented as appropriate)    Goal: Develops/Participates in Therapeutic [place] to Support Successful Transition  Outcome: Ongoing (interventions implemented as appropriate)      Problem: Mood Impairment (Depressive Signs/Symptoms) (Adult)  Goal: Improved Mood Symptoms (Depressive Signs/Symptoms)  Outcome: Ongoing (interventions implemented as appropriate)      Problem: Social/Occupational/Functional Impairment (Depressive Signs/Symptoms) (Adult)  Goal: Improved Social/Occupational/Functional Skills (Depressive Signs/Symptoms)  Outcome: Ongoing (interventions implemented as appropriate)      Problem: Cognitive Impairment (Dementia Signs/Symptoms) (Adult)  Goal: Optimized Cognitive Function (Dementia Signs/Symptoms)  Outcome: Ongoing (interventions implemented as appropriate)      Problem: Psychological Impairment (Dementia Signs/Symptoms) (Adult)  Goal: Improved Psychological Symptoms (Dementia Signs/Symptoms)  Outcome: Ongoing (interventions implemented as appropriate)

## 2019-01-15 RX ADMIN — LOSARTAN POTASSIUM 100 MG: 50 TABLET, FILM COATED ORAL at 10:19

## 2019-01-15 RX ADMIN — Medication 1 TABLET: at 10:19

## 2019-01-15 RX ADMIN — CLONAZEPAM 0.5 MG: 0.5 TABLET ORAL at 23:34

## 2019-01-15 RX ADMIN — ATORVASTATIN CALCIUM 10 MG: 10 TABLET, FILM COATED ORAL at 21:23

## 2019-01-15 RX ADMIN — AMLODIPINE BESYLATE 5 MG: 5 TABLET ORAL at 10:19

## 2019-01-15 NOTE — PLAN OF CARE
Problem: Patient Care Overview  Goal: Plan of Care Review  Outcome: Ongoing (interventions implemented as appropriate)   01/14/19 2200 01/15/19 0335   Plan of Care Review   Progress --  no change   OTHER   Outcome Summary --  Pt has been compliant with medication and cooperative with care. He has been sleeping most of shift, came out of room once to watch TV, but saw the time and went back to room. Will continue to monitor pt for safety and any change in condition.   Coping/Psychosocial   Plan of Care Reviewed With patient --    Coping/Psychosocial   Patient Agreement with Plan of Care agrees --        Problem: Overarching Goals (Adult)  Goal: Adheres to Safety Considerations for Self and Others  Outcome: Ongoing (interventions implemented as appropriate)   01/15/19 0335   Overarching Goals (Adult)   Adheres to Safety Considerations for Self and Others making progress toward outcome     Goal: Optimized Coping Skills in Response to Life Stressors  Outcome: Ongoing (interventions implemented as appropriate)   01/15/19 0335   Overarching Goals (Adult)   Optimized Coping Skills in Response to Life Stressors making progress toward outcome     Intervention: Promote Effective Coping Strategies   01/14/19 2200   Coping/Psychosocial Interventions   Supportive Measures active listening utilized;verbalization of feelings encouraged;self-care encouraged       Goal: Develops/Participates in Therapeutic [place] to Support Successful Transition  Outcome: Ongoing (interventions implemented as appropriate)   01/15/19 0335   Overarching Goals (Adult)   Develops/Participates in Therapeutic [place] to Support Successful Transition making progress toward outcome     Intervention: Foster Therapeutic [place]   01/14/19 2200   Interventions   Trust Relationship/Rapport care explained;choices provided;thoughts/feelings acknowledged;questions encouraged     Intervention: Mutually Develop Transition Plan   01/15/19 0335   Mutually Develop  Transition Plan   Transition Support crisis management plan promoted         Problem: Decreased Participation/Engagement (Depressive Signs/Symptoms) (Adult)  Goal: Increased Participation/Engagement (Depressive Signs/Symptoms)  Outcome: Ongoing (interventions implemented as appropriate)   01/14/19 1614 01/15/19 0335   Increased Participation/Engagement (Depressive Signs/Symptoms)   Increased Participation/Engagement Action Step/Short Term Goal (STG) Established 01/14/19 --    Increased Participation/Engagement Time Frame for Action Step (STG) --  3 days   Increased Participation/Engagement Action Step (STG) Outcome --  making progress toward outcome       Problem: Sleep Impairment (Depressive Signs/Symptoms) (Adult)  Goal: Improved Sleep Hygiene (Depressive Signs/Symptoms)  Outcome: Ongoing (interventions implemented as appropriate)   01/14/19 1614 01/15/19 0335   Improved Sleep Hygiene (Depressive Signs/Symptoms)   Improved Sleep Hygiene Action Step/Short Term Goal (STG) Established 01/14/19 --    Improved Sleep Hygiene Time Frame for Action Step (STG) --  3 days   Improved Sleep Hygiene Action Step (STG) Outcome --  making progress toward outcome       Problem: Weight/Appetite Change (Depressive Signs/Symptoms) (Adult)  Goal: Nutrition/Weight Optimized (Depressive Signs/Symptoms)  Outcome: Ongoing (interventions implemented as appropriate)   01/14/19 1614 01/15/19 0335   Nutrition/Weight Optimized (Depressive Signs/Symptoms)   Optimized Nutrition/Weight Action Step/Short Term Goal (STG) Established 01/14/19 --    Optimized Nutrition/Weight Time Frame for Action Step (STG) --  3 days   Optimized Nutrition/Weight Action Step (STG) Outcome --  making progress toward outcome       Problem: Social/Occupational/Functional Impairment (Depressive Signs/Symptoms) (Adult)  Goal: Improved Social/Occupational/Functional Skills (Depressive Signs/Symptoms)  Outcome: Ongoing (interventions implemented as appropriate)   01/14/19  1614 01/15/19 0335   Improved Social/Occupational/Functional Skills (Depressive Signs/Symptoms)   Improved Social/Occupational/Functional Skills Action Step/Short Term Goal (STG) Established 01/14/19 --    Improved Social/Occupational/Functional Skills Time Frame for Action Step (STG) --  3 days   Improved Social/Occupational/Functional Skills Action Step (STG) Outcome --  making progress toward outcome       Problem: Cognitive Impairment (Dementia Signs/Symptoms) (Adult)  Goal: Optimized Cognitive Function (Dementia Signs/Symptoms)  Outcome: Ongoing (interventions implemented as appropriate)   01/14/19 1614 01/15/19 0335   Optimized Cognitive Function (Dementia Signs/Symptoms)   Optimized Cognitive Ability Action Step/Short Term Goal (STG) Established 01/14/19 --    Optimized Cognitive Ability Time Frame for Action Step (STG) --  3 days   Optimized Cognitive Ability Action Step (STG) Outcome --  making progress toward outcome       Problem: Behavioral Impairment (Dementia Signs/Symptoms) (Adult)  Goal: Improved Behavioral Control (Dementia Signs/Symptoms)  Outcome: Ongoing (interventions implemented as appropriate)   01/14/19 1614 01/15/19 0335   Improved Behavioral Control (Dementia Signs/Symptoms)   Improved Behavior Control Action Step/Short Term Goal (STG) Established 01/14/19 --    Improved Behavioral Control Time Frame for Action Step (STG) --  3 days   Improved Behavioral Control Action Step (STG) Outcome --  making progress toward outcome       Problem: Psychological Impairment (Dementia Signs/Symptoms) (Adult)  Goal: Improved Psychological Symptoms (Dementia Signs/Symptoms)  Outcome: Ongoing (interventions implemented as appropriate)   01/14/19 1614 01/15/19 0335   Improved Psychological Symptoms (Dementia Signs/Symptoms)   Improved Psychological Symptoms Action Step/Short Term Goal (STG) Established 01/14/19 --    Improved Psychologic Symptoms Time Frame for Action Step (STG) --  3 days   Improved  Psychological Symptoms Action Step (STG) Outcome --  making progress toward outcome

## 2019-01-15 NOTE — PROGRESS NOTES
The patient continues to push for discharge.  We continue to await word regarding his neuropsychological evaluation especially as it relates to his ability continue work, operate an automobile etc.

## 2019-01-15 NOTE — PROGRESS NOTES
[facility]     LOS: 5 days     Subjective:    CC:No chief complaint on file.    DIET:  Diet Order   Procedures   • Diet Regular   poor memory    Some night sweats    Poor appetite      Objective:    Vital Signs:  Temp:  [97.4 °F (36.3 °C)] 97.4 °F (36.3 °C)  Heart Rate:  [88] 88  Resp:  [18] 18  BP: (132)/(92) 132/92    SpO2:  [98 %] 98 %  on   ;   Device (Oxygen Therapy): room air  Body mass index is 22.01 kg/m².    Physical Exam   Constitutional: He appears well-developed and well-nourished.   HENT:   Head: Normocephalic and atraumatic.   Cardiovascular: Exam reveals no friction rub.   No murmur heard.  Pulmonary/Chest: Effort normal and breath sounds normal.   Abdominal: Soft. Bowel sounds are normal. He exhibits no distension. There is no tenderness.   Neurological: He is alert.   Skin: Skin is warm and dry.   Psychiatric:   Normal conversation but asked the same questions a couple times       Results Review:    No results found for: GLUCOSE  Results from last 7 days   Lab Units  01/10/19   1445   WBC 10*3/mm3  9.38   HEMOGLOBIN g/dL  14.9   HEMATOCRIT %  44.1   PLATELETS 10*3/mm3  299     Results from last 7 days   Lab Units  01/10/19   1445   SODIUM mmol/L  142   POTASSIUM mmol/L  4.0   CHLORIDE mmol/L  106   CO2 mmol/L  26.9   BUN mg/dL  19   CREATININE mg/dL  0.99   CALCIUM mg/dL  9.2   BILIRUBIN mg/dL  0.3   ALK PHOS U/L  87   ALT (SGPT) U/L  22   AST (SGOT) U/L  14   GLUCOSE mg/dL  143*                 Cultures:       I have reviewed daily medications and changes in CPOE    Scheduled meds    amLODIPine 5 mg Oral Q24H   atorvastatin 10 mg Oral Daily   losartan 100 mg Oral Q24H   multivitamin 1 tablet Oral Daily   nicotine 1 patch Transdermal Q24H   vitamin D 50,000 Units Oral Q7 Days          PRN meds  •  acetaminophen  •  aluminum-magnesium hydroxide-simethicone  •  clonazePAM  •  magnesium hydroxide        Dementia associated with alcoholism with behavioral disturbance  (CMS/HCC)    Hypertension    COPD (chronic obstructive pulmonary disease) (CMS/HCC)    Anxiety    Chronic pain disorder        Assessment/Plan:      Dementia associated with alcoholism with behavioral disturbance (CMS/HCC)    Hypertension    COPD (chronic obstructive pulmonary disease) (CMS/HCC)    Anxiety    Chronic pain disorder  Weight loss  Confusion  Poor energy  Smoker  Family h/o lung cancer    Plan:  Mri is ok  -CT of the chest, abd and pelvis which is quite unremarkable but they do recommend clinical correlation for possible perinephritic stranding so will check a urinanalysis        Orville Greenberg MD  01/15/19  6:24 PM

## 2019-01-15 NOTE — PLAN OF CARE
Problem: Patient Care Overview  Goal: Plan of Care Review  Outcome: Ongoing (interventions implemented as appropriate)   01/15/19 1400 01/15/19 1658   Plan of Care Review   Progress --  no change   OTHER   Outcome Summary --  Pt presents flat, yet cooperative with care and compliant with meds. Voiced depression and anxiety 5/10. Denied pain, SI/HI and A/VH. Awaiting neuropsych results. Pt A&O x 3. Pt inappropriately focused on d/c. No further C/O or issues reported. Will continue to monitor and provide safe environment.    Coping/Psychosocial   Plan of Care Reviewed With patient --    Coping/Psychosocial   Patient Agreement with Plan of Care agrees --        Problem: Overarching Goals (Adult)  Goal: Adheres to Safety Considerations for Self and Others  Outcome: Ongoing (interventions implemented as appropriate)   01/15/19 1658   Overarching Goals (Adult)   Adheres to Safety Considerations for Self and Others making progress toward outcome     Intervention: Develop and Maintain Individualized Safety Plan   01/15/19 1400   Violence Risk   Feels Like Hurting Others no   Previous Attempt to Harm Others no   C-SSRS (Recent)   Wish to be Dead no   Suicidal Thoughts no   Suicide Behavior no   Develop and Maintain Individualized Safety Plan   Safety Measures safety rounds completed       Goal: Optimized Coping Skills in Response to Life Stressors  Outcome: Ongoing (interventions implemented as appropriate)   01/15/19 1658   Overarching Goals (Adult)   Optimized Coping Skills in Response to Life Stressors making progress toward outcome     Intervention: Promote Effective Coping Strategies   01/15/19 1400   Coping/Psychosocial Interventions   Supportive Measures active listening utilized;relaxation techniques promoted;self-care encouraged;verbalization of feelings encouraged;goal setting facilitated       Goal: Develops/Participates in Therapeutic [place] to Support Successful Transition  Outcome: Ongoing (interventions  implemented as appropriate)   01/15/19 1658   Overarching Goals (Adult)   Develops/Participates in Therapeutic [place] to Support Successful Transition making progress toward outcome     Intervention: Foster Therapeutic [place]   01/15/19 1400   Interventions   Trust Relationship/Rapport care explained;choices provided;thoughts/feelings acknowledged;reassurance provided;questions answered     Intervention: Mutually Develop Transition Plan   01/15/19 1400   Mutually Develop Transition Plan   Transition Support crisis management plan promoted         Problem: Mood Impairment (Depressive Signs/Symptoms) (Adult)  Goal: Improved Mood Symptoms (Depressive Signs/Symptoms)  Outcome: Ongoing (interventions implemented as appropriate)   01/15/19 1016 01/15/19 1658   Improved Mood Symptoms (Depressive Signs/Symptoms)   Improved Mood Symptoms Action Step/Short Term Goal (STG) Established 01/15/19 --    Improved Mood Symptoms Time Frame for Action Step (STG) --  4 days   Improved Mood Symptoms Action Step (STG) Outcome --  making progress toward outcome       Problem: Decreased Participation/Engagement (Depressive Signs/Symptoms) (Adult)  Goal: Increased Participation/Engagement (Depressive Signs/Symptoms)  Outcome: Ongoing (interventions implemented as appropriate)   01/15/19 1016 01/15/19 1658   Increased Participation/Engagement (Depressive Signs/Symptoms)   Increased Participation/Engagement Action Step/Short Term Goal (STG) Established 01/15/19 --    Increased Participation/Engagement Time Frame for Action Step (STG) --  4 days   Increased Participation/Engagement Action Step (STG) Outcome --  making progress toward outcome       Problem: Sleep Impairment (Depressive Signs/Symptoms) (Adult)  Goal: Improved Sleep Hygiene (Depressive Signs/Symptoms)  Outcome: Ongoing (interventions implemented as appropriate)   01/15/19 1016 01/15/19 1658   Improved Sleep Hygiene (Depressive Signs/Symptoms)   Improved Sleep Hygiene Action  Step/Short Term Goal (STG) Established 01/15/19 --    Improved Sleep Hygiene Time Frame for Action Step (STG) --  4 days   Improved Sleep Hygiene Action Step (STG) Outcome --  making progress toward outcome       Problem: Weight/Appetite Change (Depressive Signs/Symptoms) (Adult)  Goal: Nutrition/Weight Optimized (Depressive Signs/Symptoms)  Outcome: Ongoing (interventions implemented as appropriate)   01/15/19 1016 01/15/19 1658   Nutrition/Weight Optimized (Depressive Signs/Symptoms)   Optimized Nutrition/Weight Action Step/Short Term Goal (STG) Established 01/15/19 --    Optimized Nutrition/Weight Time Frame for Action Step (STG) --  4 days   Optimized Nutrition/Weight Action Step (STG) Outcome --  making progress toward outcome       Problem: Social/Occupational/Functional Impairment (Depressive Signs/Symptoms) (Adult)  Goal: Improved Social/Occupational/Functional Skills (Depressive Signs/Symptoms)  Outcome: Ongoing (interventions implemented as appropriate)   01/15/19 1016 01/15/19 1658   Improved Social/Occupational/Functional Skills (Depressive Signs/Symptoms)   Improved Social/Occupational/Functional Skills Action Step/Short Term Goal (STG) Established 01/15/19 --    Improved Social/Occupational/Functional Skills Time Frame for Action Step (STG) --  4 days   Improved Social/Occupational/Functional Skills Action Step (STG) Outcome --  making progress toward outcome       Problem: Cognitive Impairment (Dementia Signs/Symptoms) (Adult)  Goal: Optimized Cognitive Function (Dementia Signs/Symptoms)  Outcome: Ongoing (interventions implemented as appropriate)   01/15/19 1016 01/15/19 1658   Optimized Cognitive Function (Dementia Signs/Symptoms)   Optimized Cognitive Ability Action Step/Short Term Goal (STG) Established 01/15/19 --    Optimized Cognitive Ability Time Frame for Action Step (STG) --  4 days   Optimized Cognitive Ability Action Step (STG) Outcome --  making progress toward outcome       Problem:  Behavioral Impairment (Dementia Signs/Symptoms) (Adult)  Goal: Improved Behavioral Control (Dementia Signs/Symptoms)  Outcome: Ongoing (interventions implemented as appropriate)   01/15/19 1016 01/15/19 1658   Improved Behavioral Control (Dementia Signs/Symptoms)   Improved Behavior Control Action Step/Short Term Goal (STG) Established 01/15/19 --    Improved Behavioral Control Time Frame for Action Step (STG) --  4 days   Improved Behavioral Control Action Step (STG) Outcome --  making progress toward outcome       Problem: Psychological Impairment (Dementia Signs/Symptoms) (Adult)  Goal: Improved Psychological Symptoms (Dementia Signs/Symptoms)   01/15/19 1016 01/15/19 1658   Improved Psychological Symptoms (Dementia Signs/Symptoms)   Improved Psychological Symptoms Action Step/Short Term Goal (STG) Established 01/15/19 --    Improved Psychologic Symptoms Time Frame for Action Step (STG) --  4 days   Improved Psychological Symptoms Action Step (STG) Outcome --  making progress toward outcome

## 2019-01-15 NOTE — PLAN OF CARE
Problem: Patient Care Overview  Goal: Individualization and Mutuality  Outcome: Ongoing (interventions implemented as appropriate)    Goal: Interprofessional Rounds/Family Conf  Outcome: Ongoing (interventions implemented as appropriate)   01/15/19 1016   Interdisciplinary Rounds/Family Conf   Summary Treatment team met to review pt's plan of care. We are awaiting the results of the neuropsych testing. Pt's progress & svcs needed will be reviewed ongoing.   Interdisciplinary Rounds/Family Conf   Participants art therapy;;nursing;psychiatrist;pharmacy;social work     Patient/Guardian Signature: __________________________________            Psychiatrist Signature: ______________________________________             Therapist Signature: ________________________________________         Nurse Signature: ___________________________________________          Staff Signature: ____________________________________________            Staff Signature: ____________________________________________          Staff Signature: ____________________________________________          Staff Signature:                                                                                                      Problem: Mood Impairment (Depressive Signs/Symptoms) (Adult)  Goal: Improved Mood Symptoms (Depressive Signs/Symptoms)  Outcome: Ongoing (interventions implemented as appropriate)   01/15/19 1016   Improved Mood Symptoms (Depressive Signs/Symptoms)   Improved Mood Symptoms Action Step/Short Term Goal (STG) Established 01/15/19   Improved Mood Symptoms Time Frame for Action Step (STG) 4 days   Improved Mood Symptoms Action Step (STG) Outcome making progress toward outcome       Problem: Decreased Participation/Engagement (Depressive Signs/Symptoms) (Adult)  Goal: Increased Participation/Engagement (Depressive Signs/Symptoms)  Outcome: Ongoing (interventions implemented as appropriate)   01/15/19 1016   Increased  Participation/Engagement (Depressive Signs/Symptoms)   Increased Participation/Engagement Action Step/Short Term Goal (STG) Established 01/15/19   Increased Participation/Engagement Time Frame for Action Step (STG) 4 days   Increased Participation/Engagement Action Step (STG) Outcome making progress toward outcome       Problem: Sleep Impairment (Depressive Signs/Symptoms) (Adult)  Goal: Improved Sleep Hygiene (Depressive Signs/Symptoms)  Outcome: Ongoing (interventions implemented as appropriate)   01/15/19 1016   Improved Sleep Hygiene (Depressive Signs/Symptoms)   Improved Sleep Hygiene Action Step/Short Term Goal (STG) Established 01/15/19   Improved Sleep Hygiene Time Frame for Action Step (STG) 4 days   Improved Sleep Hygiene Action Step (STG) Outcome making progress toward outcome       Problem: Weight/Appetite Change (Depressive Signs/Symptoms) (Adult)  Goal: Nutrition/Weight Optimized (Depressive Signs/Symptoms)  Outcome: Ongoing (interventions implemented as appropriate)   01/15/19 1016   Nutrition/Weight Optimized (Depressive Signs/Symptoms)   Optimized Nutrition/Weight Action Step/Short Term Goal (STG) Established 01/15/19   Optimized Nutrition/Weight Time Frame for Action Step (STG) 4 days   Optimized Nutrition/Weight Action Step (STG) Outcome making progress toward outcome       Problem: Social/Occupational/Functional Impairment (Depressive Signs/Symptoms) (Adult)  Goal: Improved Social/Occupational/Functional Skills (Depressive Signs/Symptoms)  Outcome: Ongoing (interventions implemented as appropriate)   01/15/19 1016   Improved Social/Occupational/Functional Skills (Depressive Signs/Symptoms)   Improved Social/Occupational/Functional Skills Action Step/Short Term Goal (STG) Established 01/15/19   Improved Social/Occupational/Functional Skills Time Frame for Action Step (STG) 4 days   Improved Social/Occupational/Functional Skills Action Step (STG) Outcome making progress toward outcome        Problem: Cognitive Impairment (Dementia Signs/Symptoms) (Adult)  Goal: Optimized Cognitive Function (Dementia Signs/Symptoms)  Outcome: Ongoing (interventions implemented as appropriate)   01/15/19 1016   Optimized Cognitive Function (Dementia Signs/Symptoms)   Optimized Cognitive Ability Action Step/Short Term Goal (STG) Established 01/15/19   Optimized Cognitive Ability Time Frame for Action Step (STG) 4 days   Optimized Cognitive Ability Action Step (STG) Outcome making progress toward outcome       Problem: Behavioral Impairment (Dementia Signs/Symptoms) (Adult)  Goal: Improved Behavioral Control (Dementia Signs/Symptoms)  Outcome: Ongoing (interventions implemented as appropriate)   01/15/19 1016   Improved Behavioral Control (Dementia Signs/Symptoms)   Improved Behavior Control Action Step/Short Term Goal (STG) Established 01/15/19   Improved Behavioral Control Time Frame for Action Step (STG) 4 days   Improved Behavioral Control Action Step (STG) Outcome making progress toward outcome       Problem: Psychological Impairment (Dementia Signs/Symptoms) (Adult)  Goal: Improved Psychological Symptoms (Dementia Signs/Symptoms)  Outcome: Ongoing (interventions implemented as appropriate)   01/15/19 1016   Improved Psychological Symptoms (Dementia Signs/Symptoms)   Improved Psychological Symptoms Action Step/Short Term Goal (STG) Established 01/15/19   Improved Psychologic Symptoms Time Frame for Action Step (STG) 4 days   Improved Psychological Symptoms Action Step (STG) Outcome making progress toward outcome

## 2019-01-16 RX ADMIN — AMLODIPINE BESYLATE 5 MG: 5 TABLET ORAL at 10:12

## 2019-01-16 RX ADMIN — Medication 1 TABLET: at 10:11

## 2019-01-16 RX ADMIN — CLONAZEPAM 0.5 MG: 0.5 TABLET ORAL at 22:17

## 2019-01-16 RX ADMIN — LOSARTAN POTASSIUM 100 MG: 50 TABLET, FILM COATED ORAL at 10:11

## 2019-01-16 RX ADMIN — NICOTINE 1 PATCH: 21 PATCH, EXTENDED RELEASE TRANSDERMAL at 12:40

## 2019-01-16 RX ADMIN — ATORVASTATIN CALCIUM 10 MG: 10 TABLET, FILM COATED ORAL at 22:17

## 2019-01-16 NOTE — PROGRESS NOTES
The patient remains pleasant cooperative and denies suicidal ideation.  We await results of neuropsychological evaluation as the results of this testing will be vital to determination as to whether the patient could should continue working, operating an automobile as to whether or not he may be a need of 24 7 care.  The patient is understanding of this.

## 2019-01-16 NOTE — PLAN OF CARE
Problem: Patient Care Overview  Goal: Plan of Care Review  Outcome: Ongoing (interventions implemented as appropriate)   01/16/19 1600 01/16/19 1659   Plan of Care Review   Progress --  no change   OTHER   Outcome Summary --  Pt pleasant, cooperative with care, and med compliant this shift. Pt remains confused about situation. Voiced depression and anxiety 5/10. C/O burning feet, no overt sx noted. Denied SI/HI and A/VH. Neuropsych results available and to be reviewed. No further issues reported at this time. Will continue to monitor and provide safe environment.    Coping/Psychosocial   Plan of Care Reviewed With patient --    Coping/Psychosocial   Patient Agreement with Plan of Care agrees --        Problem: Overarching Goals (Adult)  Goal: Adheres to Safety Considerations for Self and Others  Outcome: Ongoing (interventions implemented as appropriate)   01/16/19 1659   Overarching Goals (Adult)   Adheres to Safety Considerations for Self and Others making progress toward outcome     Intervention: Develop and Maintain Individualized Safety Plan   01/16/19 1600   Violence Risk   Feels Like Hurting Others no   Previous Attempt to Harm Others no   C-SSRS (Recent)   Wish to be Dead no   Suicidal Thoughts no   Suicide Behavior no   Develop and Maintain Individualized Safety Plan   Safety Measures safety rounds completed       Goal: Optimized Coping Skills in Response to Life Stressors  Outcome: Ongoing (interventions implemented as appropriate)   01/16/19 1659   Overarching Goals (Adult)   Optimized Coping Skills in Response to Life Stressors making progress toward outcome     Intervention: Promote Effective Coping Strategies   01/16/19 1600   Coping/Psychosocial Interventions   Supportive Measures active listening utilized;verbalization of feelings encouraged;self-care encouraged;relaxation techniques promoted       Goal: Develops/Participates in Therapeutic [place] to Support Successful Transition  Outcome: Ongoing  (interventions implemented as appropriate)   01/16/19 1659   Overarching Goals (Adult)   Develops/Participates in Therapeutic [place] to Support Successful Transition making progress toward outcome     Intervention: Foster Therapeutic [place]   01/16/19 1600   Interventions   Trust Relationship/Rapport care explained;choices provided;thoughts/feelings acknowledged;reassurance provided;questions answered     Intervention: Mutually Develop Transition Plan   01/16/19 1600   Mutually Develop Transition Plan   Transition Support crisis management plan promoted         Problem: Mood Impairment (Depressive Signs/Symptoms) (Adult)  Goal: Improved Mood Symptoms (Depressive Signs/Symptoms)  Outcome: Ongoing (interventions implemented as appropriate)   01/15/19 1016 01/16/19 1659   Improved Mood Symptoms (Depressive Signs/Symptoms)   Improved Mood Symptoms Action Step/Short Term Goal (STG) Established 01/15/19 --    Improved Mood Symptoms Time Frame for Action Step (STG) --  3 days   Improved Mood Symptoms Action Step (STG) Outcome --  making progress toward outcome       Problem: Decreased Participation/Engagement (Depressive Signs/Symptoms) (Adult)  Goal: Increased Participation/Engagement (Depressive Signs/Symptoms)  Outcome: Ongoing (interventions implemented as appropriate)   01/15/19 1016 01/16/19 1659   Increased Participation/Engagement (Depressive Signs/Symptoms)   Increased Participation/Engagement Action Step/Short Term Goal (STG) Established 01/15/19 --    Increased Participation/Engagement Time Frame for Action Step (STG) --  3 days   Increased Participation/Engagement Action Step (STG) Outcome --  making progress toward outcome       Problem: Sleep Impairment (Depressive Signs/Symptoms) (Adult)  Goal: Improved Sleep Hygiene (Depressive Signs/Symptoms)  Outcome: Ongoing (interventions implemented as appropriate)   01/15/19 1016 01/16/19 1659   Improved Sleep Hygiene (Depressive Signs/Symptoms)   Improved Sleep  Hygiene Action Step/Short Term Goal (STG) Established 01/15/19 --    Improved Sleep Hygiene Time Frame for Action Step (STG) --  3 days   Improved Sleep Hygiene Action Step (STG) Outcome --  making progress toward outcome       Problem: Weight/Appetite Change (Depressive Signs/Symptoms) (Adult)  Goal: Nutrition/Weight Optimized (Depressive Signs/Symptoms)  Outcome: Ongoing (interventions implemented as appropriate)   01/15/19 1016 01/16/19 1659   Nutrition/Weight Optimized (Depressive Signs/Symptoms)   Optimized Nutrition/Weight Action Step/Short Term Goal (STG) Established 01/15/19 --    Optimized Nutrition/Weight Time Frame for Action Step (STG) --  3 days   Optimized Nutrition/Weight Action Step (STG) Outcome --  making progress toward outcome       Problem: Social/Occupational/Functional Impairment (Depressive Signs/Symptoms) (Adult)  Goal: Improved Social/Occupational/Functional Skills (Depressive Signs/Symptoms)  Outcome: Ongoing (interventions implemented as appropriate)   01/15/19 1016 01/16/19 1659   Improved Social/Occupational/Functional Skills (Depressive Signs/Symptoms)   Improved Social/Occupational/Functional Skills Action Step/Short Term Goal (STG) Established 01/15/19 --    Improved Social/Occupational/Functional Skills Time Frame for Action Step (STG) --  3 days   Improved Social/Occupational/Functional Skills Action Step (STG) Outcome --  making progress toward outcome       Problem: Cognitive Impairment (Dementia Signs/Symptoms) (Adult)  Goal: Optimized Cognitive Function (Dementia Signs/Symptoms)  Outcome: Ongoing (interventions implemented as appropriate)   01/15/19 1016 01/16/19 1659   Optimized Cognitive Function (Dementia Signs/Symptoms)   Optimized Cognitive Ability Action Step/Short Term Goal (STG) Established 01/15/19 --    Optimized Cognitive Ability Time Frame for Action Step (STG) --  3 days   Optimized Cognitive Ability Action Step (STG) Outcome --  making progress toward outcome        Problem: Behavioral Impairment (Dementia Signs/Symptoms) (Adult)  Goal: Improved Behavioral Control (Dementia Signs/Symptoms)  Outcome: Ongoing (interventions implemented as appropriate)   01/15/19 1016 01/16/19 1659   Improved Behavioral Control (Dementia Signs/Symptoms)   Improved Behavior Control Action Step/Short Term Goal (STG) Established 01/15/19 --    Improved Behavioral Control Time Frame for Action Step (STG) --  3 days   Improved Behavioral Control Action Step (STG) Outcome --  making progress toward outcome       Problem: Psychological Impairment (Dementia Signs/Symptoms) (Adult)  Goal: Improved Psychological Symptoms (Dementia Signs/Symptoms)  Outcome: Ongoing (interventions implemented as appropriate)   01/15/19 1016 01/16/19 1659   Improved Psychological Symptoms (Dementia Signs/Symptoms)   Improved Psychological Symptoms Action Step/Short Term Goal (STG) Established 01/15/19 --    Improved Psychologic Symptoms Time Frame for Action Step (STG) --  3 days   Improved Psychological Symptoms Action Step (STG) Outcome --  making progress toward outcome

## 2019-01-16 NOTE — PROGRESS NOTES
[facility]     LOS: 6 days     Subjective:    CC:No chief complaint on file.    DIET:  Diet Order   Procedures   • Diet Regular   poor memory  Poor appetite      Objective:    Vital Signs:  Temp:  [97.5 °F (36.4 °C)] 97.5 °F (36.4 °C)  Heart Rate:  [88] 88  Resp:  [16] 16  BP: (120)/(77) 120/77    SpO2:  [99 %] 99 %  on   ;   Device (Oxygen Therapy): room air  Body mass index is 22.01 kg/m².    Physical Exam   Constitutional: He appears well-developed and well-nourished.   HENT:   Head: Normocephalic and atraumatic.   Cardiovascular: Exam reveals no friction rub.   No murmur heard.  Pulmonary/Chest: Effort normal and breath sounds normal.   Abdominal: Soft. Bowel sounds are normal. He exhibits no distension. There is no tenderness.   Neurological: He is alert.   Skin: Skin is warm and dry.   Psychiatric:   Normal conversation but asked the same questions a couple times       Results Review:    No results found for: GLUCOSE  Results from last 7 days   Lab Units 01/10/19  1445   WBC 10*3/mm3 9.38   HEMOGLOBIN g/dL 14.9   HEMATOCRIT % 44.1   PLATELETS 10*3/mm3 299     Results from last 7 days   Lab Units 01/10/19  1445   SODIUM mmol/L 142   POTASSIUM mmol/L 4.0   CHLORIDE mmol/L 106   CO2 mmol/L 26.9   BUN mg/dL 19   CREATININE mg/dL 0.99   CALCIUM mg/dL 9.2   BILIRUBIN mg/dL 0.3   ALK PHOS U/L 87   ALT (SGPT) U/L 22   AST (SGOT) U/L 14   GLUCOSE mg/dL 143*                 Cultures:       I have reviewed daily medications and changes in CPOE    Scheduled meds    amLODIPine 5 mg Oral Q24H   atorvastatin 10 mg Oral Daily   losartan 100 mg Oral Q24H   multivitamin 1 tablet Oral Daily   nicotine 1 patch Transdermal Q24H   vitamin D 50,000 Units Oral Q7 Days          PRN meds  •  acetaminophen  •  aluminum-magnesium hydroxide-simethicone  •  clonazePAM  •  magnesium hydroxide        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

## 2019-01-16 NOTE — PROGRESS NOTES
Continued Stay Note  [facility]     Patient Name: Stanislav Choi  MRN: [id number]  Today's Date: 1/16/2019    Admit Date: 1/10/2019    Discharge Plan     Row Name 01/16/19 1541       Plan    Plan Comments  SW called and spoke w/pt's spouse, Elysia, ph. [phone] to inform her that pt's disability papers were sent in.  SW also advised that pt's neuropsych testing was back and that she may get a call from the Doctor tomorrow.        Discharge Codes    No documentation.             MILADIS Chan

## 2019-01-17 VITALS
BODY MASS INDEX: 22.08 KG/M2 | DIASTOLIC BLOOD PRESSURE: 80 MMHG | HEART RATE: 93 BPM | WEIGHT: 149.1 LBS | SYSTOLIC BLOOD PRESSURE: 116 MMHG | RESPIRATION RATE: 16 BRPM | TEMPERATURE: 98 F | HEIGHT: 69 IN | OXYGEN SATURATION: 99 %

## 2019-01-17 RX ORDER — NICOTINE 21 MG/24HR
1 PATCH, TRANSDERMAL 24 HOURS TRANSDERMAL EVERY 24 HOURS
Qty: 7 PATCH | Refills: 0 | Status: SHIPPED | OUTPATIENT
Start: 2019-01-18 | End: 2019-02-19

## 2019-01-17 RX ADMIN — NICOTINE 1 PATCH: 21 PATCH, EXTENDED RELEASE TRANSDERMAL at 08:25

## 2019-01-17 RX ADMIN — AMLODIPINE BESYLATE 5 MG: 5 TABLET ORAL at 08:26

## 2019-01-17 RX ADMIN — Medication 1 TABLET: at 08:26

## 2019-01-17 RX ADMIN — LOSARTAN POTASSIUM 100 MG: 50 TABLET, FILM COATED ORAL at 08:26

## 2019-01-17 NOTE — PROGRESS NOTES
Continued Stay Note  [facility]     Patient Name: Stanislav Choi  MRN: [id number]  Today's Date: 1/17/2019    Admit Date: 1/10/2019    Discharge Plan     Row Name 01/17/19 1247       Plan    Plan Comments  SW met w/pt to discuss d/c.  Pt was concerned about the results of his neuropsych test and verbalized to SW how he felt.  SW advised that pt meet w/his Psychiatrist to further discuss the results.  Pt mentioned getting a 2nd opinion because he wasn't ready to retire.  Pt stated that he was confused when he came in but feels much better.      Row Name 01/17/19 1241       Plan    Plan Comments  SW called and spoke w/pt's spouse, Elysia to discuss info she rec'd from the Doctor concerning pt's neuropsych testing & d/c.  Mrs. Choi was teary throughout the conversation.  SW advised that she would place a copy of the neuropsych test results in the pt's d/c paperwork as well as faxed a copy to [facility], Dr. George.  Pt's spouse stated that the results were not what she expected and they will consider a 2nd opinion.  SW advised that they meet w/pt's Psychiatristi to further discuss.  SW advised that she would schedule an appt for pt.        Discharge Codes    No documentation.       Expected Discharge Date and Time     Expected Discharge Date Expected Discharge Time    Jan 17, 2019             MILADIS Chan

## 2019-01-17 NOTE — DISCHARGE SUMMARY
DATES OF ADMISSION: 1/10/2018-1/17/2019    REASON FOR ADMISSION: The patient is a 63-year-old white male admitted with increasing confusion and mental status changes.    LABS:  See chart    HOSPITAL COURSE:  The patient was admitted to the crisis management unit.  Neuropsychological evaluation was ordered as well as a laboratory workup for dementia.  The results of neuropsychological testing did confirm the presence of an alcoholic dementia.  It was his recommendation of the neuropsychologist that the patient not operate an automobile and it is obvious that he is not suitable to continue working as a .  This information was conveyed to the patient's wife in the patient's both of whom were quite upset to learn of the patient's diagnosis, prognosis and recommendations of neuropsychologist.  It was explained to the patient's wife and the patient that this physician was simply conveying the results of neuropsychological testing in the hopes of assuring the safety of the patient as well as the safety of coworkers, other drivers etc.  The patient's wife complained that she had not been informed the patient's diagnoses during his previous admission to this facility, but this physician was unable to comment on this as he was on vacation at the time of the patient's last discharge.  The patient's , Ms. tricia, did report that she had discussed the patient's diagnosis with both the patient and his wife at the time of his last discharge from this facility however.  The patient denied suicidal ideation and had been generally pleasant cooperative in his interactions with peers and staff.  Discharge was ordered.    FINAL DIAGNOSIS:  Alcoholic dementia with behavioral disturbance    DISPOSITION ON DISCHARGE:  A full listing of the patient's medications is provided below.  Follow-up will take place to the auspices of [facility].       Discharge Medications      New  Medications      Instructions Start Date   nicotine 21 MG/24HR patch  Commonly known as:  NICODERM CQ   1 patch, Transdermal, Every 24 Hours         Continue These Medications      Instructions Start Date   amLODIPine 5 MG tablet  Commonly known as:  NORVASC   5 mg, Oral, Daily      clonazePAM 0.5 MG tablet  Commonly known as:  KlonoPIN   0.5 mg, Oral, 2 Times Daily PRN      LIPITOR 10 MG tablet  Generic drug:  atorvastatin   10 mg, Oral, Daily      losartan 100 MG tablet  Commonly known as:  COZAAR   100 mg, Oral, Daily         Stop These Medications    pregabalin 75 MG capsule  Commonly known as:  LYRICA              PROGNOSIS: []

## 2019-01-17 NOTE — PROGRESS NOTES
Continued Stay Note  [facility]     Patient Name: Stanislav Choi  MRN: [id number]  Today's Date: 1/17/2019    Admit Date: 1/10/2019    Discharge Plan     Row Name 01/17/19 1241       Plan    Plan Comments  SW called and spoke w/pt's spouse, Elysia to discuss info she rec'd from the Doctor concerning pt's neuropsych testing & d/c.  Mrs. Choi was teary throughout the conversation.  RADHIKA advised that she would place a copy of the neuropsych test results in the pt's d/c paperwork as well as faxed a copy to [facility], Dr. George.  Pt's spouse stated that the results were not what she expected and they will consider a 2nd opinion.  RADHIKA advised that they meet w/pt's Psychiatristi to further discuss.  RADHIKA advised that she would schedule an appt for pt.        Discharge Codes    No documentation.       Expected Discharge Date and Time     Expected Discharge Date Expected Discharge Time    Jan 17, 2019             MILADIS Chan

## 2019-01-17 NOTE — PROGRESS NOTES
Continued Stay Note  [facility]     Patient Name: Stanislav Choi  MRN: [id number]  Today's Date: 1/17/2019    Admit Date: 1/10/2019    Discharge Plan     Row Name 01/17/19 1440       Plan    Final Discharge Disposition Code  01 - home or self-care    Final Note  Pt was discharged per Dr. Alvarado's orders. SW met w/pt and talked w/pt's spouse about d/c.  Pt will follow up with svcs at [facility], Dr. Chadwick [facility] medication [facility] Pt was transported home by his son.     Row Name 01/17/19 1247       Plan    Plan Comments  SW met w/pt to discuss d/c.  Pt was concerned about the results of his neuropsych test and verbalized to SW how he felt.  SW advised that pt meet w/his Psychiatrist to further discuss the results.  Pt mentioned getting a 2nd opinion because he wasn't ready to retire.  Pt stated that he was confused when he came in but feels much better.      Row Name 01/17/19 1241       Plan    Plan Comments  SW called and spoke w/pt's spouse, Elysia to discuss info she rec'd from the Doctor concerning pt's neuropsych testing & d/c.  Mrs. Choi was teary throughout the conversation.  SW advised that she would place a copy of the neuropsych test results in the pt's d/c paperwork as well as faxed a copy to [facility], Dr. George.  Pt's spouse stated that the results were not what she expected and they will consider a 2nd opinion.  SW advised that they meet w/pt's Psychiatristi to further discuss.  SW advised that she would schedule an appt for pt.        Discharge Codes    No documentation.       Expected Discharge Date and Time     Expected Discharge Date Expected Discharge Time    Jan 17, 2019             MILADIS Chan

## 2019-01-17 NOTE — PLAN OF CARE
Problem: Patient Care Overview  Goal: Plan of Care Review  Outcome: Ongoing (interventions implemented as appropriate)   01/17/19 0215 01/17/19 0457   Plan of Care Review   Progress --  no change   OTHER   Outcome Summary --  Anxiety and depression rated 4/10, and no SI/HI. Pt stayed in bed throughout shift. Cooperative and med compliant. Will continue to monitor and assess.    Coping/Psychosocial   Plan of Care Reviewed With patient --    Coping/Psychosocial   Patient Agreement with Plan of Care agrees --        Problem: Overarching Goals (Adult)  Goal: Adheres to Safety Considerations for Self and Others  Outcome: Ongoing (interventions implemented as appropriate)    Goal: Optimized Coping Skills in Response to Life Stressors  Outcome: Ongoing (interventions implemented as appropriate)    Goal: Develops/Participates in Therapeutic [place] to Support Successful Transition  Outcome: Ongoing (interventions implemented as appropriate)      Problem: Mood Impairment (Depressive Signs/Symptoms) (Adult)  Goal: Improved Mood Symptoms (Depressive Signs/Symptoms)  Outcome: Ongoing (interventions implemented as appropriate)      Problem: Cognitive Impairment (Dementia Signs/Symptoms) (Adult)  Goal: Optimized Cognitive Function (Dementia Signs/Symptoms)  Outcome: Ongoing (interventions implemented as appropriate)      Problem: Psychological Impairment (Dementia Signs/Symptoms) (Adult)  Goal: Improved Psychological Symptoms (Dementia Signs/Symptoms)  Outcome: Ongoing (interventions implemented as appropriate)

## 2019-01-21 ENCOUNTER — TELEPHONE (OUTPATIENT)
Dept: PSYCHIATRY | Facility: HOSPITAL | Age: 64
End: 2019-01-21

## 2019-01-22 ENCOUNTER — TELEPHONE (OUTPATIENT)
Dept: PSYCHIATRY | Facility: HOSPITAL | Age: 64
End: 2019-01-22

## 2019-01-22 NOTE — TELEPHONE ENCOUNTER
"Patient reports he is doing \"okay.\"  He states he has all his medications and understands his discharge instructions.  No further assistance requested at this time.  "

## 2019-02-19 ENCOUNTER — OFFICE VISIT (OUTPATIENT)
Dept: NEUROLOGY | Facility: CLINIC | Age: 64
End: 2019-02-19

## 2019-02-19 VITALS
HEART RATE: 72 BPM | HEIGHT: 69 IN | SYSTOLIC BLOOD PRESSURE: 98 MMHG | BODY MASS INDEX: 21.33 KG/M2 | OXYGEN SATURATION: 99 % | WEIGHT: 144 LBS | DIASTOLIC BLOOD PRESSURE: 60 MMHG

## 2019-02-19 DIAGNOSIS — F44.89 CONFUSION STATE: ICD-10-CM

## 2019-02-19 DIAGNOSIS — G62.2 PERIPHERAL NEUROPATHY DUE TO TOXIN (HCC): ICD-10-CM

## 2019-02-19 DIAGNOSIS — F10.27 DEMENTIA ASSOCIATED WITH ALCOHOLISM WITH BEHAVIORAL DISTURBANCE (HCC): Primary | ICD-10-CM

## 2019-02-19 PROCEDURE — 84166 PROTEIN E-PHORESIS/URINE/CSF: CPT | Performed by: PSYCHIATRY & NEUROLOGY

## 2019-02-19 PROCEDURE — 84156 ASSAY OF PROTEIN URINE: CPT | Performed by: PSYCHIATRY & NEUROLOGY

## 2019-02-19 PROCEDURE — 99205 OFFICE O/P NEW HI 60 MIN: CPT | Performed by: PSYCHIATRY & NEUROLOGY

## 2019-02-19 NOTE — PATIENT INSTRUCTIONS
Peripheral Neuropathy  Peripheral neuropathy is a type of nerve damage. It affects nerves that carry signals between the spinal cord and other parts of the body. These are called peripheral nerves. With peripheral neuropathy, one nerve or a group of nerves may be damaged.  What are the causes?  Many things can damage peripheral nerves. For some people with peripheral neuropathy, the cause is unknown. Some causes include:  · Diabetes. This is the most common cause of peripheral neuropathy.  · Injury to a nerve.  · Pressure or stress on a nerve that lasts a long time.  · Too little vitamin B. Alcoholism can lead to this.  · Infections.  · Autoimmune diseases, such as multiple sclerosis and systemic lupus erythematosus.  · Inherited nerve diseases.  · Some medicines, such as cancer drugs.  · Toxic substances, such as lead and mercury.  · Too little blood flowing to the legs.  · Kidney disease.  · Thyroid disease.    What are the signs or symptoms?  Different people have different symptoms. The symptoms you have will depend on which of your nerves is damaged. Common symptoms include:  · Loss of feeling (numbness) in the feet and hands.  · Tingling in the feet and hands.  · Pain that burns.  · Very sensitive skin.  · Weakness.  · Not being able to move a part of the body (paralysis).  · Muscle twitching.  · Clumsiness or poor coordination.  · Loss of balance.  · Not being able to control your bladder.  · Feeling dizzy.  · Sexual problems.    How is this diagnosed?  Peripheral neuropathy is a symptom, not a disease. Finding the cause of peripheral neuropathy can be hard. To figure that out, your health care provider will take a medical history and do a physical exam. A neurological exam will also be done. This involves checking things affected by your brain, spinal cord, and nerves (nervous system). For example, your health care provider will check your reflexes, how you move, and what you can feel.  Other types of tests  may also be ordered, such as:  · Blood tests.  · A test of the fluid in your spinal cord.  · Imaging tests, such as CT scans or an MRI.  · Electromyography (EMG). This test checks the nerves that control muscles.  · Nerve conduction velocity tests. These tests check how fast messages pass through your nerves.  · Nerve biopsy. A small piece of nerve is removed. It is then checked under a microscope.    How is this treated?  · Medicine is often used to treat peripheral neuropathy. Medicines may include:  ? Pain-relieving medicines. Prescription or over-the-counter medicine may be suggested.  ? Antiseizure medicine. This may be used for pain.  ? Antidepressants. These also may help ease pain from neuropathy.  ? Lidocaine. This is a numbing medicine. You might wear a patch or be given a shot.  ? Mexiletine. This medicine is typically used to help control irregular heart rhythms.  · Surgery. Surgery may be needed to relieve pressure on a nerve or to destroy a nerve that is causing pain.  · Physical therapy to help movement.  · Assistive devices to help movement.  Follow these instructions at home:  · Only take over-the-counter or prescription medicines as directed by your health care provider. Follow the instructions carefully for any given medicines. Do not take any other medicines without first getting approval from your health care provider.  · If you have diabetes, work closely with your health care provider to keep your blood sugar under control.  · If you have numbness in your feet:  ? Check every day for signs of injury or infection. Watch for redness, warmth, and swelling.  ? Wear padded socks and comfortable shoes. These help protect your feet.  · Do not do things that put pressure on your damaged nerve.  · Do not smoke. Smoking keeps blood from getting to damaged nerves.  · Avoid or limit alcohol. Too much alcohol can cause a lack of B vitamins. These vitamins are needed for healthy nerves.  · Develop a good  support system. Coping with peripheral neuropathy can be stressful. Talk to a mental health specialist or join a support group if you are struggling.  · Follow up with your health care provider as directed.  Contact a health care provider if:  · You have new signs or symptoms of peripheral neuropathy.  · You are struggling emotionally from dealing with peripheral neuropathy.  · You have a fever.  Get help right away if:  · You have an injury or infection that is not healing.  · You feel very dizzy or begin vomiting.  · You have chest pain.  · You have trouble breathing.  This information is not intended to replace advice given to you by your health care provider. Make sure you discuss any questions you have with your health care provider.  Document Released: 12/08/2003 Document Revised: 05/25/2017 Document Reviewed: 08/25/2014  [employer] Interactive Patient Education © 2017 [employer]

## 2019-02-19 NOTE — PROGRESS NOTES
"Subjective:     Patient ID: Stanislav Choi is a 63 y.o. male.    History of Present Illness  The following portions of the patient's history were reviewed and updated as appropriate: allergies, current medications, past family history, past medical history, past social history, past surgical history and problem list.    This is a 63-year-old gentleman evaluated today for \"confusion\" with onset 12/27/18.  Instead of turning on the shower he had turned on the sink.  He put toilet paper on a chair.  There is a history of neuropathy treated with gabapentin.  His regular psychiatrist was Dr. George.    My record review notes that on January 10, 2019 he was hospitalized under the care of of Dr. 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  Driving was suspended including his job as a .  He was taking GBP, vibryd, clonazepam, wellbutin at that time, and tells me that he was off of alcohol for 2 yrs.  He tapered off all of those these, and got much better.[ Last med added was wellbutrin.]    [There was a previous admission during which this diagnosis had been discussed in March 2018.  He had similar bizarre behavior at that time..Records from previous admission of March 29 2018 through April 5, 2018 for depression and alcoholic dementia was when he had the neuropsychology test.  He was on Wellbutrin BuSpar Antabuse and Seroquel at that time.  ]  Other medical history-anxiety, chronic pain disorder, depression, hypertension with normal TSH and blood ammonia level in January the brain MRI from January 12 revealed no acute infarction with minimal to chronic small vessel disease.    Neuropsychology test of January 13, 2019-was a follow-up assessment and compared to 2009 at which time he had VÍCTOR, alcohol abuse, ADHD diagnoses.  The second test from 2019 revealed increasing dysfunction but not indicative " of Alzheimer's due to the time situation and the rapid worsening over a very short period.  The confusion at that 2019 visit included stepping into a scalding shower, wandering from home, significant depression, staying in bed up to 22 hours per day with significant nightmares.       He had a past history of associate's degree in [employer] service, 3.9 GPA in college, employed at [employer].        TODAY-says he is doing OK. Has PN, chronic. No medications for this currently. Was on GBP in the past.       Dr Sen psychiatry- has been following OK.  Patient says the 3/2019 admit was d/t excess meds, as listed above.  Dx of pseudo depression was considered. The length of this illness: PRobably began 12/27, admit for a week, then, home and did well. THe alcohol related sx were not that form of confusion, or memory loss.    PN- N/T 3 yrs in feet, and it affects his sleep.  No EMG in the past.  Normal B12 folate TSH and ammonia levels reviewed from earlier this year.  No family history of neuropathy.  No diabetes.  The feet are not particular painful.  It's more of a tingling sensation worse at night.  He does not want to resume pills for this problem area he has tried capsaicin in the past.    RTW WORK- in late March, he wants to RTW. Not driving at this time.  Work decision to be made by Dr. Sen    N-Psych- to be repeated by [facility] office in late March.      [facility]- a few yrs ago a heavy can hit head shortly before the first in patient admit.      ,Review of Systems   Constitutional: Positive for activity change. Negative for appetite change and fatigue.   HENT: Positive for postnasal drip. Negative for ear pain, facial swelling and trouble swallowing.    Eyes: Positive for photophobia. Negative for pain and visual disturbance.   Respiratory: Positive for cough. Negative for choking, chest tightness and shortness of breath.    Cardiovascular: Positive for leg swelling. Negative for chest pain and palpitations.    Gastrointestinal: Negative for abdominal pain, constipation and nausea.   Endocrine: Positive for cold intolerance. Negative for polydipsia, polyphagia and polyuria.   Genitourinary: Negative for difficulty urinating, frequency and urgency.   Musculoskeletal: Negative for back pain, gait problem and neck pain.   Skin: Negative for color change, rash and wound.   Allergic/Immunologic: Negative for environmental allergies, food allergies and immunocompromised state.   Neurological: Positive for numbness. Negative for dizziness, tremors, seizures, syncope, facial asymmetry, speech difficulty, weakness, light-headedness and headaches.   Hematological: Negative for adenopathy. Does not bruise/bleed easily.   Psychiatric/Behavioral: Positive for confusion and sleep disturbance. Negative for agitation, behavioral problems, decreased concentration, dysphoric mood, hallucinations, self-injury and suicidal ideas. The patient is nervous/anxious. The patient is not hyperactive.       Review of systems discussed with medical assistant and reviewed with physician and patient.  Objective:    Neurologic Exam  This patient was well-developed, well-nourished and in no acute distress.      GAIT:  Gait, station, heel, toe   walk were normal.  He had difficulty with tandem  There was no drift of the arms.  Romberg’s sign was not present.      VASCULAR: The carotid arteries had normal upstroke to palpation without bruits.  The vertebral arteries were without bruits.  The radial pulses were equal and without delay.  Pedal pulses were normal.  Upper extremity pulses were symmetric and equal The extremities were without cyanosis, clubbing or edema.    MENTAL STATUS: This patient was alert and oriented to person, place, time and situation.  For memory testing was deferred to the neuropsychologist     CRANIAL NERVES:  Olfaction-not tested.  Visual fields full in all quadrants to confrontation.  The pupils were equally round and reactive to  light at 3 mm.  There was no evidence of a Yao Lb pupil.  Funduscopic exam revealed no papilledema, hemorrhage or exudate.  The gaze was conjugate. The vertical and horizontal eye movements were full without nystagmus.  There was no facial weakness.  There was no facial sensory loss to pinprick bilaterally.  Hearing was normal for light finger rub and casual speech.  Speech is normal without dysarthria.  The neck is supple and strength is normal in rotation, flexion and extension.  Tongue and palate movements are normal.    MOTOR UPPER EXTREMTIES:   Bilaterally the deltoid, biceps, triceps, wrist extensors, intrinsic hand muscles, and  were grade 5 and normal.  Bulk, tone and strength of these muscles were normal without abnormal movements.    MOTOR LOWER EXTREMITIES: Bilaterally the hip flexors, hip abductors, knee flexors and extensors, ankle plantar flexors and dorsiflexors were grade 5 with normal. Bulk, tone and strength of these muscles were normal without abnormal movements.    DEEP TENDON REFLEXES:  Bilateral biceps, triceps, and brachioradialis reflexes were grade 1 symmetric.  Bilateral knee, hamstrings   reflexes were grade 2 and symmetric.  Lateral ankle reflexes were absent The plantar responses were downgoing.  Ankle clonus was not present.    CEREBELLAR:  Finger to nose, rapid finger opposition, and heel-to-shin tests were performed normally, bilaterally.    MUSCULOSKELETAL:  Normal range of motion of the neck is noted.  There was no back pain with straight leg raise.  Internal and external rotation of the hips was normal.     SENSORY: Upper extremities =There was normal upper and lower extremity sensation to vibration, proprioception, and pinprick.  Lower extremities= vibration absent at the toes but present at the ankles and knees.  Proprioception normal at the toes.  Pinprick reduced distal to the knees but not anesthetic.      HIGHER CORTICAL FUNCTION: There were no aphasic or apraxic  errors.  Visual fields were intact to confrontation.      Physical Exam   Constitutional: He appears well-developed and well-nourished.   Neck: Normal range of motion. Neck supple.   Cardiovascular: Normal rate.   Pulmonary/Chest: Effort normal.   Psychiatric:   Mood was good.  He was still upset over not being able to drive or return to work but behavior otherwise appeared normal.   Nursing note and vitals reviewed.      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  The confusion and his opinion was of unclear cause but his family believes it was from excess psychiatric medications which are now been withdrawn.    He has no findings of stroke on MRI and I reviewed the images with him.  There are no carotid bruits or heart murmurs.  Confusion could be due to seizures.  I've ordered a video EEG study.    The neuropathy was assessed by a podiatrist and presumably thought to be due to alcohol related problems.  There are no vitamin deficiencies.  He needs serum protein electrophoresis and urine immunofixation studies to rule out gammopathy and these also been ordered.  EMG should also be obtained.  The neuropathy appears to be a small fiber sensory neuropathy, length-dependent.    No medicines were prescribed at the patient's request.  I also recommended that he consider undergoing a driving evaluation at [facility].  They will call in for the results of the tests.

## 2019-02-20 ENCOUNTER — APPOINTMENT (OUTPATIENT)
Dept: LAB | Facility: HOSPITAL | Age: 64
End: 2019-02-20

## 2019-02-20 ENCOUNTER — TRANSCRIBE ORDERS (OUTPATIENT)
Dept: ADMINISTRATIVE | Facility: HOSPITAL | Age: 64
End: 2019-02-20

## 2019-02-20 PROCEDURE — 84165 PROTEIN E-PHORESIS SERUM: CPT | Performed by: PSYCHIATRY & NEUROLOGY

## 2019-02-20 PROCEDURE — 36415 COLL VENOUS BLD VENIPUNCTURE: CPT | Performed by: PSYCHIATRY & NEUROLOGY

## 2019-02-20 PROCEDURE — 84155 ASSAY OF PROTEIN SERUM: CPT | Performed by: PSYCHIATRY & NEUROLOGY

## 2019-02-22 LAB
ALBUMIN SERPL-MCNC: 4.3 G/DL (ref 2.9–4.4)
ALBUMIN/GLOB SERPL: 1.9 {RATIO} (ref 0.7–1.7)
ALPHA1 GLOB FLD ELPH-MCNC: 0.2 G/DL (ref 0–0.4)
ALPHA2 GLOB SERPL ELPH-MCNC: 0.7 G/DL (ref 0.4–1)
B-GLOBULIN SERPL ELPH-MCNC: 0.8 G/DL (ref 0.7–1.3)
GAMMA GLOB SERPL ELPH-MCNC: 0.6 G/DL (ref 0.4–1.8)
GLOBULIN SER CALC-MCNC: 2.3 G/DL (ref 2.2–3.9)
Lab: ABNORMAL
M-SPIKE: ABNORMAL G/DL
PROT SERPL-MCNC: 6.6 G/DL (ref 6–8.5)

## 2019-02-25 LAB
ALBUMIN 24H MFR UR ELPH: 29.3 %
ALPHA1 GLOB 24H MFR UR ELPH: 1.6 %
ALPHA2 GLOB 24H MFR UR ELPH: 9.5 %
B-GLOBULIN MFR UR ELPH: 39.4 %
GAMMA GLOB 24H MFR UR ELPH: 20.2 %
Lab: NORMAL
M PROTEIN MFR UR ELPH: NORMAL %
PROT 24H UR-MRATE: 77 MG/24 HR (ref 30–150)
PROT UR-MCNC: 6.4 MG/DL

## 2019-02-28 ENCOUNTER — HOSPITAL ENCOUNTER (OUTPATIENT)
Dept: NEUROLOGY | Facility: HOSPITAL | Age: 64
Discharge: HOME | End: 2019-02-28
Admitting: PSYCHIATRY & NEUROLOGY

## 2019-02-28 DIAGNOSIS — F10.27 DEMENTIA ASSOCIATED WITH ALCOHOLISM WITH BEHAVIORAL DISTURBANCE (HCC): ICD-10-CM

## 2019-02-28 PROCEDURE — 95813 EEG EXTND MNTR 61-119 MIN: CPT | Performed by: PSYCHIATRY & NEUROLOGY

## 2019-02-28 PROCEDURE — 95813 EEG EXTND MNTR 61-119 MIN: CPT

## 2019-03-01 ENCOUNTER — APPOINTMENT (OUTPATIENT)
Dept: INFUSION THERAPY | Facility: HOSPITAL | Age: 64
End: 2019-03-01

## 2019-03-14 ENCOUNTER — HOSPITAL ENCOUNTER (OUTPATIENT)
Dept: INFUSION THERAPY | Facility: HOSPITAL | Age: 64
Discharge: HOME | End: 2019-03-14
Admitting: PSYCHIATRY & NEUROLOGY

## 2019-03-14 DIAGNOSIS — G62.2 PERIPHERAL NEUROPATHY DUE TO TOXIN (HCC): ICD-10-CM

## 2019-03-14 PROCEDURE — 95886 MUSC TEST DONE W/N TEST COMP: CPT

## 2019-03-14 PROCEDURE — 95886 MUSC TEST DONE W/N TEST COMP: CPT | Performed by: PSYCHIATRY & NEUROLOGY

## 2019-03-14 PROCEDURE — 95908 NRV CNDJ TST 3-4 STUDIES: CPT

## 2019-03-14 PROCEDURE — 95908 NRV CNDJ TST 3-4 STUDIES: CPT | Performed by: PSYCHIATRY & NEUROLOGY

## 2019-03-14 NOTE — PROCEDURES
EMG REPORT    Indication: Numbness and weakness of the lower extremities    Findings: Bilateral peroneal motor studies showed no reliable response with either proximal, tibial, or distal stimulation.  Bilateral tibial motor study showed no response with distal stimulation.    Concentric needle EMG of bilateral vastus lateralis, vastus medialis, anterior tibialis, peroneus and gastrocnemius muscles showed no abnormality.  The scattered positive sharp waves were noted.    Impression: Studies show absence of motor responses in the lower extremities.  This is most consistent with a severe diffuse polyneuropathy.  Needle EMG failed to show evidence of superimposed lumbosacral radiculopathy or myopathic findings.       Dru Lainez M.D.

## 2019-03-18 ENCOUNTER — TELEPHONE (OUTPATIENT)
Dept: NEUROLOGY | Facility: CLINIC | Age: 64
End: 2019-03-18

## 2019-03-18 NOTE — TELEPHONE ENCOUNTER
----- Message from Kristal Varner MA sent at 3/13/2019  2:22 PM EDT -----  Regarding: call back  Brittany from [facility] called asking to speak to abby/ doctor duffy [phone] about patient admit date 2/28/19

## 2019-03-19 NOTE — TELEPHONE ENCOUNTER
Please call back for me.  I just saw him a couple weeks ago and know about his history.      I do not see any new hospital stays since I last saw him on my review of [facility] record

## 2019-03-19 NOTE — TELEPHONE ENCOUNTER
I called and spoke to Brittany, She said you had requested a EEG for that day and it still shows it is in process. Did you read it ? Or was it done? Did you order it by mistake?

## 2019-03-22 ENCOUNTER — TELEPHONE (OUTPATIENT)
Dept: NEUROLOGY | Facility: CLINIC | Age: 64
End: 2019-03-22

## 2019-03-22 NOTE — TELEPHONE ENCOUNTER
I called and spoke to Mrs. Moreland she is waiting for EEG Results.And would like them to be sent to the other Doctors they have seen.  They have seen Dr. Tamir Olivia Ph [phone] Fax [phone] and Dr. Chadwick George Ph [phone]   And they also would like  A copy of the EEG and EMG fax to them I am doing this now.       ----- Message from Kristal Varner MA sent at 3/14/2019  1:50 PM EDT -----  Regarding: call back   Contact: [phone]  Stanislav moreland wife called regarding test results and wanted copy sent to another [facility]

## 2019-03-25 ENCOUNTER — HOSPITAL ENCOUNTER (EMERGENCY)
Age: 64
Discharge: HOME | End: 2019-03-25

## 2019-03-25 VITALS
BODY MASS INDEX: 37.56 KG/M2 | TEMPERATURE: 97.5 F | OXYGEN SATURATION: 97 % | HEART RATE: 86 BPM | DIASTOLIC BLOOD PRESSURE: 90 MMHG | WEIGHT: 220 LBS | SYSTOLIC BLOOD PRESSURE: 160 MMHG | RESPIRATION RATE: 18 BRPM | HEIGHT: 64 IN

## 2019-03-25 DIAGNOSIS — H61.21 IMPACTED CERUMEN OF RIGHT EAR: Primary | ICD-10-CM

## 2019-03-25 PROCEDURE — 99282 EMERGENCY DEPT VISIT SF MDM: CPT

## 2019-03-25 PROCEDURE — 69209 REMOVE IMPACTED EAR WAX UNI: CPT

## 2019-03-25 PROCEDURE — 99283 EMERGENCY DEPT VISIT LOW MDM: CPT | Performed by: PHYSICIAN ASSISTANT

## 2019-03-25 PROCEDURE — 69209 REMOVE IMPACTED EAR WAX UNI: CPT | Performed by: PHYSICIAN ASSISTANT

## 2019-03-25 ASSESSMENT — PAIN SCALES - GENERAL: PAINLEVEL_OUTOF10: 7

## 2019-03-26 ENCOUNTER — CONSULT (OUTPATIENT)
Dept: ORTHOPEDIC SURGERY | Facility: CLINIC | Age: 64
End: 2019-03-26

## 2019-03-26 VITALS — HEIGHT: 69 IN | TEMPERATURE: 99.3 F | WEIGHT: 144.6 LBS | BODY MASS INDEX: 21.42 KG/M2

## 2019-03-26 DIAGNOSIS — G89.29 CHRONIC PAIN OF LEFT KNEE: ICD-10-CM

## 2019-03-26 DIAGNOSIS — M25.562 CHRONIC PAIN OF LEFT KNEE: ICD-10-CM

## 2019-03-26 DIAGNOSIS — Z96.652 STATUS POST LEFT KNEE REPLACEMENT: ICD-10-CM

## 2019-03-26 DIAGNOSIS — Z96.652 HISTORY OF TOTAL KNEE ARTHROPLASTY, LEFT: Primary | ICD-10-CM

## 2019-03-26 PROCEDURE — 99212 OFFICE O/P EST SF 10 MIN: CPT | Performed by: ORTHOPAEDIC SURGERY

## 2019-03-26 NOTE — PROGRESS NOTES
"Patient: Stanislav hCoi  YOB: 1955 63 y.o. male  Medical Record Number: [id number]    Chief Complaints:   Chief Complaint   Patient presents with   • Left Knee - Establish Care       History of Present Illness:Stanislav Choi is a 63 y.o. male who presents for follow-up of left knee pain.  Had it replaced a few years back still having some intermittent soreness has a history of some neuropathy through both legs.  He states that CBD oil does help his knee pain.  Describes as a moderate ache around 3 out of 10 stable over the last year or 2    Allergies:   Allergies   Allergen Reactions   • Iodinated Diagnostic Agents Shortness Of Breath and Swelling     SWELLING OF THROAT; DIFFICULTY BREATHING       Medications:   Current Outpatient Medications   Medication Sig Dispense Refill   • amLODIPine (NORVASC) 5 MG tablet Take 5 mg by mouth Daily.     • atorvastatin (LIPITOR) 10 MG tablet Take 10 mg by mouth Daily.     • CBD (cannabidiol) oral oil Take  by mouth. 125mg AM 75pm     • clonazePAM (KlonoPIN) 0.5 MG tablet Take 0.5 mg by mouth 2 (Two) Times a Day As Needed for Seizures.       No current facility-administered medications for this visit.          The following portions of the patient's history were reviewed and updated as appropriate: allergies, current medications, past family history, past medical history, past social history, past surgical history and problem list.    Review of Systems:   A 14 point review of systems was performed. All systems negative except pertinent positives/negative listed in HPI above    Physical Exam:   Vitals:    03/26/19 1548   Temp: 99.3 °F (37.4 °C)   TempSrc: Temporal   Weight: 65.6 kg (144 lb 9.6 oz)   Height: 175.3 cm (69\")       General: A and O x 3, ASA, NAD    SCLERA:    Normal    DENTITION:   Normal  Knee:  left    ALIGNMENT:     Neutral  ,   Patella  tracks  Midline without crepitance    GAIT:    Nonantalgic    SKIN:    Well healed midline incision, no erythema or " fluctuance    RANGE OF MOTION:   0  -  120   DEG    STRENGTH:   5  / 5    LIGAMENTS:    Very mild varus / valgus instability.   No  Flexion   instability.       DISTAL PULSES:    Paplable    DISTAL SENSATION :   Intact    LYMPHATICS:     No   lymphadenopathy    OTHER:     No   Effusion      Calf soft / nontender ,   Negative Jacqueline's sign            Radiology:  Xrays 3views left  (ap,lateral, sunrise) were ordered and reviewed for evaluation of knee pain demonstratinga well positioned knee replacement without evidence of wear, loosening or osteolysis  todays xrays were compared to previous xrays and demonstrate no change    Assessment/Plan:  Left knee diffuse pain nondescript he rates it as a 3 or 4 out of 10.  This may be related to his neuropathy he overall has a mechanically stable knee and the x-rays look fine and unchanged in comparison to previous films.  I recommended he continue to keep his quad muscles as strong as possible through exercise of some sort.  He asked about CBD oil which I think is reasonable to use in this situation I be happy to see him back at any point in the future

## 2019-06-27 ENCOUNTER — TRANSCRIBE ORDERS (OUTPATIENT)
Dept: ADMINISTRATIVE | Facility: HOSPITAL | Age: 64
End: 2019-06-27

## 2019-06-27 ENCOUNTER — LAB (OUTPATIENT)
Dept: LAB | Facility: HOSPITAL | Age: 64
End: 2019-06-27

## 2019-06-27 DIAGNOSIS — E11.9 DIABETES MELLITUS WITHOUT COMPLICATION (HCC): ICD-10-CM

## 2019-06-27 DIAGNOSIS — E78.5 HYPERLIPIDEMIA, UNSPECIFIED HYPERLIPIDEMIA TYPE: ICD-10-CM

## 2019-06-27 DIAGNOSIS — I10 ESSENTIAL HYPERTENSION, MALIGNANT: ICD-10-CM

## 2019-06-27 DIAGNOSIS — E55.9 AVITAMINOSIS D: ICD-10-CM

## 2019-06-27 DIAGNOSIS — I10 ESSENTIAL HYPERTENSION, MALIGNANT: Primary | ICD-10-CM

## 2019-06-27 LAB
25(OH)D3 SERPL-MCNC: 77 NG/ML (ref 30–100)
ALBUMIN SERPL-MCNC: 4.1 G/DL (ref 3.5–5.2)
ALBUMIN/GLOB SERPL: 1.5 G/DL
ALP SERPL-CCNC: 129 U/L (ref 39–117)
ALT SERPL W P-5'-P-CCNC: 64 U/L (ref 1–41)
ANION GAP SERPL CALCULATED.3IONS-SCNC: 8.5 MMOL/L (ref 5–15)
AST SERPL-CCNC: 45 U/L (ref 1–40)
BASOPHILS # BLD AUTO: 0.05 10*3/MM3 (ref 0–0.2)
BASOPHILS NFR BLD AUTO: 0.4 % (ref 0–1.5)
BILIRUB SERPL-MCNC: 0.2 MG/DL (ref 0.2–1.2)
BUN BLD-MCNC: 18 MG/DL (ref 8–23)
BUN/CREAT SERPL: 17.1 (ref 7–25)
CALCIUM SPEC-SCNC: 9.2 MG/DL (ref 8.6–10.5)
CHLORIDE SERPL-SCNC: 109 MMOL/L (ref 98–107)
CHOLEST SERPL-MCNC: 120 MG/DL (ref 0–200)
CO2 SERPL-SCNC: 26.5 MMOL/L (ref 22–29)
CREAT BLD-MCNC: 1.05 MG/DL (ref 0.76–1.27)
DEPRECATED RDW RBC AUTO: 45.4 FL (ref 37–54)
EOSINOPHIL # BLD AUTO: 0.12 10*3/MM3 (ref 0–0.4)
EOSINOPHIL NFR BLD AUTO: 1 % (ref 0.3–6.2)
ERYTHROCYTE [DISTWIDTH] IN BLOOD BY AUTOMATED COUNT: 13.4 % (ref 12.3–15.4)
FOLATE SERPL-MCNC: >20 NG/ML (ref 4.78–24.2)
GFR SERPL CREATININE-BSD FRML MDRD: 71 ML/MIN/1.73
GLOBULIN UR ELPH-MCNC: 2.7 GM/DL
GLUCOSE BLD-MCNC: 118 MG/DL (ref 65–99)
HBA1C MFR BLD: 6.04 % (ref 4.8–5.6)
HCT VFR BLD AUTO: 45.7 % (ref 37.5–51)
HDLC SERPL-MCNC: 46 MG/DL (ref 40–60)
HGB BLD-MCNC: 15.3 G/DL (ref 13–17.7)
IMM GRANULOCYTES # BLD AUTO: 0.03 10*3/MM3 (ref 0–0.05)
IMM GRANULOCYTES NFR BLD AUTO: 0.2 % (ref 0–0.5)
LDLC SERPL CALC-MCNC: 67 MG/DL (ref 0–100)
LDLC/HDLC SERPL: 1.46 {RATIO}
LYMPHOCYTES # BLD AUTO: 4.74 10*3/MM3 (ref 0.7–3.1)
LYMPHOCYTES NFR BLD AUTO: 37.6 % (ref 19.6–45.3)
MCH RBC QN AUTO: 31.2 PG (ref 26.6–33)
MCHC RBC AUTO-ENTMCNC: 33.5 G/DL (ref 31.5–35.7)
MCV RBC AUTO: 93.1 FL (ref 79–97)
MONOCYTES # BLD AUTO: 0.87 10*3/MM3 (ref 0.1–0.9)
MONOCYTES NFR BLD AUTO: 6.9 % (ref 5–12)
NEUTROPHILS # BLD AUTO: 6.82 10*3/MM3 (ref 1.7–7)
NEUTROPHILS NFR BLD AUTO: 54.1 % (ref 42.7–76)
PLATELET # BLD AUTO: 294 10*3/MM3 (ref 140–450)
PMV BLD AUTO: 9.7 FL (ref 6–12)
POTASSIUM BLD-SCNC: 4.8 MMOL/L (ref 3.5–5.2)
PROT SERPL-MCNC: 6.8 G/DL (ref 6–8.5)
RBC # BLD AUTO: 4.91 10*6/MM3 (ref 4.14–5.8)
SODIUM BLD-SCNC: 144 MMOL/L (ref 136–145)
T3 SERPL-MCNC: 81.2 NG/DL (ref 80–200)
T4 FREE SERPL-MCNC: 0.8 NG/DL (ref 0.93–1.7)
TRIGL SERPL-MCNC: 34 MG/DL (ref 0–150)
TSH SERPL DL<=0.05 MIU/L-ACNC: 1.17 MIU/ML (ref 0.27–4.2)
VIT B12 BLD-MCNC: 655 PG/ML (ref 211–946)
VLDLC SERPL-MCNC: 6.8 MG/DL (ref 5–40)
WBC NRBC COR # BLD: 12.6 10*3/MM3 (ref 3.4–10.8)

## 2019-06-27 PROCEDURE — 80053 COMPREHEN METABOLIC PANEL: CPT | Performed by: INTERNAL MEDICINE

## 2019-06-27 PROCEDURE — 85025 COMPLETE CBC W/AUTO DIFF WBC: CPT | Performed by: INTERNAL MEDICINE

## 2019-06-27 PROCEDURE — 84443 ASSAY THYROID STIM HORMONE: CPT | Performed by: INTERNAL MEDICINE

## 2019-06-27 PROCEDURE — 83036 HEMOGLOBIN GLYCOSYLATED A1C: CPT | Performed by: INTERNAL MEDICINE

## 2019-06-27 PROCEDURE — 82746 ASSAY OF FOLIC ACID SERUM: CPT | Performed by: INTERNAL MEDICINE

## 2019-06-27 PROCEDURE — 82306 VITAMIN D 25 HYDROXY: CPT | Performed by: INTERNAL MEDICINE

## 2019-06-27 PROCEDURE — 84439 ASSAY OF FREE THYROXINE: CPT | Performed by: INTERNAL MEDICINE

## 2019-06-27 PROCEDURE — 82607 VITAMIN B-12: CPT | Performed by: INTERNAL MEDICINE

## 2019-06-27 PROCEDURE — 84480 ASSAY TRIIODOTHYRONINE (T3): CPT | Performed by: INTERNAL MEDICINE

## 2019-06-27 PROCEDURE — 80061 LIPID PANEL: CPT | Performed by: INTERNAL MEDICINE

## 2019-06-27 PROCEDURE — 36415 COLL VENOUS BLD VENIPUNCTURE: CPT

## 2019-07-16 ENCOUNTER — OFFICE VISIT (OUTPATIENT)
Dept: INTERNAL MEDICINE | Age: 64
End: 2019-07-16

## 2019-07-16 VITALS
DIASTOLIC BLOOD PRESSURE: 78 MMHG | OXYGEN SATURATION: 97 % | BODY MASS INDEX: 36.7 KG/M2 | WEIGHT: 215 LBS | HEART RATE: 72 BPM | HEIGHT: 64 IN | TEMPERATURE: 98.4 F | SYSTOLIC BLOOD PRESSURE: 147 MMHG

## 2019-07-16 DIAGNOSIS — I10 BENIGN ESSENTIAL HYPERTENSION: Primary | ICD-10-CM

## 2019-07-16 DIAGNOSIS — K21.9 GASTROESOPHAGEAL REFLUX DISEASE, ESOPHAGITIS PRESENCE NOT SPECIFIED: ICD-10-CM

## 2019-07-16 PROCEDURE — 99213 OFFICE O/P EST LOW 20 MIN: CPT | Performed by: NURSE PRACTITIONER

## 2019-07-16 RX ORDER — HYDROCHLOROTHIAZIDE 12.5 MG/1
12.5 TABLET ORAL DAILY
Qty: 30 TABLET | Refills: 11 | Status: SHIPPED | OUTPATIENT
Start: 2019-07-16 | End: 2019-11-12 | Stop reason: DRUGHIGH

## 2019-07-16 ASSESSMENT — PATIENT HEALTH QUESTIONNAIRE - PHQ9
SUM OF ALL RESPONSES TO PHQ9 QUESTIONS 1 AND 2: 0
2. FEELING DOWN, DEPRESSED OR HOPELESS: NOT AT ALL
1. LITTLE INTEREST OR PLEASURE IN DOING THINGS: NOT AT ALL
SUM OF ALL RESPONSES TO PHQ9 QUESTIONS 1 AND 2: 0

## 2019-07-23 ENCOUNTER — NURSE ONLY (OUTPATIENT)
Dept: INTERNAL MEDICINE | Age: 64
End: 2019-07-23

## 2019-07-23 VITALS — SYSTOLIC BLOOD PRESSURE: 133 MMHG | HEART RATE: 73 BPM | DIASTOLIC BLOOD PRESSURE: 79 MMHG

## 2019-07-23 DIAGNOSIS — Z01.30 BLOOD PRESSURE CHECK: Primary | ICD-10-CM

## 2019-10-31 ENCOUNTER — TRANSCRIBE ORDERS (OUTPATIENT)
Dept: ADMINISTRATIVE | Facility: HOSPITAL | Age: 64
End: 2019-10-31

## 2019-10-31 ENCOUNTER — LAB (OUTPATIENT)
Dept: LAB | Facility: HOSPITAL | Age: 64
End: 2019-10-31

## 2019-10-31 DIAGNOSIS — E78.5 HYPERLIPIDEMIA, UNSPECIFIED HYPERLIPIDEMIA TYPE: Primary | ICD-10-CM

## 2019-10-31 DIAGNOSIS — E78.5 HYPERLIPIDEMIA, UNSPECIFIED HYPERLIPIDEMIA TYPE: ICD-10-CM

## 2019-10-31 DIAGNOSIS — R53.83 TIREDNESS: ICD-10-CM

## 2019-10-31 DIAGNOSIS — I10 ESSENTIAL HYPERTENSION, MALIGNANT: ICD-10-CM

## 2019-10-31 LAB
ALBUMIN SERPL-MCNC: 4.6 G/DL (ref 3.5–5.2)
ALBUMIN/GLOB SERPL: 1.8 G/DL
ALP SERPL-CCNC: 106 U/L (ref 39–117)
ALT SERPL W P-5'-P-CCNC: 14 U/L (ref 1–41)
ANION GAP SERPL CALCULATED.3IONS-SCNC: 10.4 MMOL/L (ref 5–15)
AST SERPL-CCNC: 14 U/L (ref 1–40)
BACTERIA UR QL AUTO: ABNORMAL /HPF
BASOPHILS # BLD AUTO: 0.03 10*3/MM3 (ref 0–0.2)
BASOPHILS NFR BLD AUTO: 0.3 % (ref 0–1.5)
BILIRUB SERPL-MCNC: 0.5 MG/DL (ref 0.2–1.2)
BILIRUB UR QL STRIP: NEGATIVE
BUN BLD-MCNC: 9 MG/DL (ref 8–23)
BUN/CREAT SERPL: 7.8 (ref 7–25)
CALCIUM SPEC-SCNC: 9.1 MG/DL (ref 8.6–10.5)
CHLORIDE SERPL-SCNC: 107 MMOL/L (ref 98–107)
CHOLEST SERPL-MCNC: 119 MG/DL (ref 0–200)
CLARITY UR: CLEAR
CO2 SERPL-SCNC: 26.6 MMOL/L (ref 22–29)
COLOR UR: YELLOW
CREAT BLD-MCNC: 1.16 MG/DL (ref 0.76–1.27)
DEPRECATED RDW RBC AUTO: 42.9 FL (ref 37–54)
EOSINOPHIL # BLD AUTO: 0.04 10*3/MM3 (ref 0–0.4)
EOSINOPHIL NFR BLD AUTO: 0.3 % (ref 0.3–6.2)
ERYTHROCYTE [DISTWIDTH] IN BLOOD BY AUTOMATED COUNT: 12.6 % (ref 12.3–15.4)
FOLATE SERPL-MCNC: >20 NG/ML (ref 4.78–24.2)
GFR SERPL CREATININE-BSD FRML MDRD: 63 ML/MIN/1.73
GLOBULIN UR ELPH-MCNC: 2.5 GM/DL
GLUCOSE BLD-MCNC: 107 MG/DL (ref 65–99)
GLUCOSE UR STRIP-MCNC: NEGATIVE MG/DL
HCT VFR BLD AUTO: 43 % (ref 37.5–51)
HDLC SERPL-MCNC: 44 MG/DL (ref 40–60)
HGB BLD-MCNC: 14.5 G/DL (ref 13–17.7)
HGB UR QL STRIP.AUTO: ABNORMAL
HYALINE CASTS UR QL AUTO: ABNORMAL /LPF
IMM GRANULOCYTES # BLD AUTO: 0.04 10*3/MM3 (ref 0–0.05)
IMM GRANULOCYTES NFR BLD AUTO: 0.3 % (ref 0–0.5)
KETONES UR QL STRIP: ABNORMAL
LDLC SERPL CALC-MCNC: 63 MG/DL (ref 0–100)
LDLC/HDLC SERPL: 1.44 {RATIO}
LEUKOCYTE ESTERASE UR QL STRIP.AUTO: NEGATIVE
LYMPHOCYTES # BLD AUTO: 3.85 10*3/MM3 (ref 0.7–3.1)
LYMPHOCYTES NFR BLD AUTO: 33.2 % (ref 19.6–45.3)
MCH RBC QN AUTO: 30.9 PG (ref 26.6–33)
MCHC RBC AUTO-ENTMCNC: 33.7 G/DL (ref 31.5–35.7)
MCV RBC AUTO: 91.7 FL (ref 79–97)
MONOCYTES # BLD AUTO: 0.59 10*3/MM3 (ref 0.1–0.9)
MONOCYTES NFR BLD AUTO: 5.1 % (ref 5–12)
NEUTROPHILS # BLD AUTO: 7.04 10*3/MM3 (ref 1.7–7)
NEUTROPHILS NFR BLD AUTO: 60.8 % (ref 42.7–76)
NITRITE UR QL STRIP: NEGATIVE
NRBC BLD AUTO-RTO: 0 /100 WBC (ref 0–0.2)
PH UR STRIP.AUTO: 5.5 [PH] (ref 5–8)
PLATELET # BLD AUTO: 309 10*3/MM3 (ref 140–450)
PMV BLD AUTO: 9.5 FL (ref 6–12)
POTASSIUM BLD-SCNC: 4.2 MMOL/L (ref 3.5–5.2)
PROT SERPL-MCNC: 7.1 G/DL (ref 6–8.5)
PROT UR QL STRIP: NEGATIVE
RBC # BLD AUTO: 4.69 10*6/MM3 (ref 4.14–5.8)
RBC # UR: ABNORMAL /HPF
REF LAB TEST METHOD: ABNORMAL
SODIUM BLD-SCNC: 144 MMOL/L (ref 136–145)
SP GR UR STRIP: 1.02 (ref 1–1.03)
SQUAMOUS #/AREA URNS HPF: ABNORMAL /HPF
T3 SERPL-MCNC: 88.2 NG/DL (ref 80–200)
T4 FREE SERPL-MCNC: 1.39 NG/DL (ref 0.93–1.7)
TRIGL SERPL-MCNC: 58 MG/DL (ref 0–150)
TSH SERPL DL<=0.05 MIU/L-ACNC: 0.45 UIU/ML (ref 0.27–4.2)
UROBILINOGEN UR QL STRIP: ABNORMAL
VIT B12 BLD-MCNC: 642 PG/ML (ref 211–946)
VLDLC SERPL-MCNC: 11.6 MG/DL (ref 5–40)
WBC NRBC COR # BLD: 11.59 10*3/MM3 (ref 3.4–10.8)
WBC UR QL AUTO: ABNORMAL /HPF

## 2019-10-31 PROCEDURE — 82746 ASSAY OF FOLIC ACID SERUM: CPT | Performed by: INTERNAL MEDICINE

## 2019-10-31 PROCEDURE — 82607 VITAMIN B-12: CPT | Performed by: INTERNAL MEDICINE

## 2019-10-31 PROCEDURE — 81001 URINALYSIS AUTO W/SCOPE: CPT | Performed by: INTERNAL MEDICINE

## 2019-10-31 PROCEDURE — 84480 ASSAY TRIIODOTHYRONINE (T3): CPT | Performed by: INTERNAL MEDICINE

## 2019-10-31 PROCEDURE — 85025 COMPLETE CBC W/AUTO DIFF WBC: CPT | Performed by: INTERNAL MEDICINE

## 2019-10-31 PROCEDURE — 80061 LIPID PANEL: CPT | Performed by: INTERNAL MEDICINE

## 2019-10-31 PROCEDURE — 84443 ASSAY THYROID STIM HORMONE: CPT | Performed by: INTERNAL MEDICINE

## 2019-10-31 PROCEDURE — 80053 COMPREHEN METABOLIC PANEL: CPT | Performed by: INTERNAL MEDICINE

## 2019-10-31 PROCEDURE — 84439 ASSAY OF FREE THYROXINE: CPT | Performed by: INTERNAL MEDICINE

## 2019-10-31 PROCEDURE — 36415 COLL VENOUS BLD VENIPUNCTURE: CPT

## 2019-11-12 ENCOUNTER — LAB SERVICES (OUTPATIENT)
Dept: LAB | Age: 64
End: 2019-11-12

## 2019-11-12 ENCOUNTER — OFFICE VISIT (OUTPATIENT)
Dept: INTERNAL MEDICINE | Age: 64
End: 2019-11-12

## 2019-11-12 VITALS
WEIGHT: 222 LBS | SYSTOLIC BLOOD PRESSURE: 149 MMHG | HEART RATE: 62 BPM | DIASTOLIC BLOOD PRESSURE: 84 MMHG | OXYGEN SATURATION: 97 % | TEMPERATURE: 97.1 F | BODY MASS INDEX: 38.11 KG/M2

## 2019-11-12 DIAGNOSIS — Z00.00 ENCOUNTER FOR MEDICARE ANNUAL WELLNESS EXAM: Primary | ICD-10-CM

## 2019-11-12 DIAGNOSIS — I10 ESSENTIAL HYPERTENSION: ICD-10-CM

## 2019-11-12 DIAGNOSIS — K21.9 GASTROESOPHAGEAL REFLUX DISEASE, ESOPHAGITIS PRESENCE NOT SPECIFIED: ICD-10-CM

## 2019-11-12 DIAGNOSIS — Z23 IMMUNIZATION DUE: ICD-10-CM

## 2019-11-12 DIAGNOSIS — I10 BENIGN ESSENTIAL HYPERTENSION: ICD-10-CM

## 2019-11-12 LAB
ALBUMIN SERPL-MCNC: 4.3 G/DL (ref 3.6–5.1)
ALBUMIN/GLOB SERPL: 1.1 {RATIO} (ref 1–2.4)
ALP SERPL-CCNC: 104 UNITS/L (ref 45–117)
ALT SERPL-CCNC: 51 UNITS/L
ANION GAP SERPL CALC-SCNC: 11 MMOL/L (ref 10–20)
AST SERPL-CCNC: 28 UNITS/L
BILIRUB SERPL-MCNC: 0.8 MG/DL (ref 0.2–1)
BUN SERPL-MCNC: 11 MG/DL (ref 6–20)
BUN/CREAT SERPL: 12 (ref 7–25)
CALCIUM SERPL-MCNC: 9.2 MG/DL (ref 8.4–10.2)
CHLORIDE SERPL-SCNC: 108 MMOL/L (ref 98–107)
CHOLEST SERPL-MCNC: 268 MG/DL
CHOLEST/HDLC SERPL: 4.5 {RATIO}
CO2 SERPL-SCNC: 23 MMOL/L (ref 21–32)
CREAT SERPL-MCNC: 0.93 MG/DL (ref 0.67–1.17)
FASTING STATUS PATIENT QL REPORTED: ABNORMAL HRS
GLOBULIN SER-MCNC: 3.8 G/DL (ref 2–4)
GLUCOSE SERPL-MCNC: 92 MG/DL (ref 65–99)
HDLC SERPL-MCNC: 60 MG/DL
LDLC SERPL-MCNC: 155 MG/DL
LENGTH OF FAST TIME PATIENT: ABNORMAL HRS
NONHDLC SERPL-MCNC: 208 MG/DL
POTASSIUM SERPL-SCNC: 4.2 MMOL/L (ref 3.4–5.1)
PROT SERPL-MCNC: 8.1 G/DL (ref 6.4–8.2)
SODIUM SERPL-SCNC: 138 MMOL/L (ref 135–145)
TRIGL SERPL-MCNC: 265 MG/DL

## 2019-11-12 PROCEDURE — 90688 IIV4 VACCINE SPLT 0.5 ML IM: CPT | Performed by: NURSE PRACTITIONER

## 2019-11-12 PROCEDURE — 80061 LIPID PANEL: CPT | Performed by: INTERNAL MEDICINE

## 2019-11-12 PROCEDURE — 90471 IMMUNIZATION ADMIN: CPT | Performed by: NURSE PRACTITIONER

## 2019-11-12 PROCEDURE — G0439 PPPS, SUBSEQ VISIT: HCPCS | Performed by: NURSE PRACTITIONER

## 2019-11-12 PROCEDURE — 80053 COMPREHEN METABOLIC PANEL: CPT | Performed by: INTERNAL MEDICINE

## 2019-11-12 PROCEDURE — 36415 COLL VENOUS BLD VENIPUNCTURE: CPT | Performed by: NURSE PRACTITIONER

## 2019-11-12 RX ORDER — HYDROCHLOROTHIAZIDE 25 MG/1
25 TABLET ORAL DAILY
Qty: 90 TABLET | Refills: 1 | Status: SHIPPED | OUTPATIENT
Start: 2019-11-12 | End: 2020-05-04

## 2019-11-12 RX ORDER — FAMOTIDINE 20 MG/1
20 TABLET, FILM COATED ORAL 2 TIMES DAILY
Qty: 180 TABLET | Refills: 1 | Status: SHIPPED | OUTPATIENT
Start: 2019-11-12 | End: 2020-06-01 | Stop reason: SDUPTHER

## 2019-11-12 ASSESSMENT — PATIENT HEALTH QUESTIONNAIRE - PHQ9
SUM OF ALL RESPONSES TO PHQ9 QUESTIONS 1 AND 2: 0
2. FEELING DOWN, DEPRESSED OR HOPELESS: NOT AT ALL
2. FEELING DOWN, DEPRESSED OR HOPELESS: NOT AT ALL
1. LITTLE INTEREST OR PLEASURE IN DOING THINGS: NOT AT ALL
SUM OF ALL RESPONSES TO PHQ9 QUESTIONS 1 AND 2: 0
SUM OF ALL RESPONSES TO PHQ9 QUESTIONS 1 AND 2: 0
1. LITTLE INTEREST OR PLEASURE IN DOING THINGS: NOT AT ALL

## 2019-11-13 ENCOUNTER — TELEPHONE (OUTPATIENT)
Dept: INTERNAL MEDICINE | Age: 64
End: 2019-11-13

## 2019-11-13 DIAGNOSIS — E78.5 DYSLIPIDEMIA: Primary | ICD-10-CM

## 2019-11-13 RX ORDER — ASPIRIN 81 MG/1
81 TABLET ORAL DAILY
Qty: 90 TABLET | Refills: 1 | Status: SHIPPED | OUTPATIENT
Start: 2019-11-13 | End: 2020-06-01 | Stop reason: SDUPTHER

## 2019-11-13 RX ORDER — SIMVASTATIN 40 MG
40 TABLET ORAL NIGHTLY
Qty: 90 TABLET | Refills: 1 | Status: SHIPPED | OUTPATIENT
Start: 2019-11-13 | End: 2020-06-01 | Stop reason: SDUPTHER

## 2019-12-05 ENCOUNTER — OFFICE VISIT (OUTPATIENT)
Dept: OPHTHALMOLOGY | Age: 64
End: 2019-12-05

## 2019-12-05 DIAGNOSIS — H52.223 REGULAR ASTIGMATISM OF BOTH EYES: ICD-10-CM

## 2019-12-05 DIAGNOSIS — H40.003 GLAUCOMA SUSPECT OF BOTH EYES: Primary | ICD-10-CM

## 2019-12-05 PROCEDURE — 92015 DETERMINE REFRACTIVE STATE: CPT | Performed by: OPTOMETRIST

## 2019-12-05 PROCEDURE — 92004 COMPRE OPH EXAM NEW PT 1/>: CPT | Performed by: OPTOMETRIST

## 2019-12-05 ASSESSMENT — VISUAL ACUITY
OS_SC: 20/40
METHOD: SNELLEN - LINEAR
OS_PH_SC: 20/25
OD_PH_SC: 20/25
OD_SC: 20/50
OS_PH_SC+: -2
OD_SC: 20/40
OS_SC: 20/70
OD_PH_SC+: -2

## 2019-12-05 ASSESSMENT — TONOMETRY
OD_IOP_MMHG: 17
OS_IOP_MMHG: 19
IOP_METHOD: TONOPEN

## 2019-12-05 ASSESSMENT — CUP TO DISC RATIO
OS_RATIO: 0.5
OD_RATIO: 0.5

## 2019-12-05 ASSESSMENT — EXTERNAL EXAM - LEFT EYE: OS_EXAM: NORMAL

## 2019-12-05 ASSESSMENT — SLIT LAMP EXAM - LIDS
COMMENTS: NORMAL
COMMENTS: NORMAL

## 2019-12-05 ASSESSMENT — CONF VISUAL FIELD
OS_NORMAL: 1
OD_NORMAL: 1

## 2019-12-05 ASSESSMENT — EXTERNAL EXAM - RIGHT EYE: OD_EXAM: NORMAL

## 2020-01-20 ENCOUNTER — OFFICE VISIT (OUTPATIENT)
Dept: OPHTHALMOLOGY | Age: 65
End: 2020-01-20

## 2020-01-20 DIAGNOSIS — H40.003 GLAUCOMA SUSPECT OF BOTH EYES: Primary | ICD-10-CM

## 2020-01-20 PROCEDURE — 92083 EXTENDED VISUAL FIELD XM: CPT | Performed by: OPTOMETRIST

## 2020-01-20 PROCEDURE — 92133 CPTRZD OPH DX IMG PST SGM ON: CPT | Performed by: OPTOMETRIST

## 2020-01-20 PROCEDURE — 76514 ECHO EXAM OF EYE THICKNESS: CPT | Performed by: OPTOMETRIST

## 2020-01-20 ASSESSMENT — VISUAL ACUITY
OS_SC: 20/25-2
METHOD: SNELLEN - LINEAR
OD_SC: 20/40-2

## 2020-01-20 ASSESSMENT — PACHYMETRY
OD_CT(UM): 560(-1)
OS_CT(UM): 560(-1)

## 2020-01-20 ASSESSMENT — EXTERNAL EXAM - LEFT EYE: OS_EXAM: NORMAL

## 2020-01-20 ASSESSMENT — TONOMETRY
OD_IOP_MMHG: 20
IOP_METHOD: TONOPEN
OS_IOP_MMHG: 20

## 2020-01-20 ASSESSMENT — EXTERNAL EXAM - RIGHT EYE: OD_EXAM: NORMAL

## 2020-01-20 ASSESSMENT — CUP TO DISC RATIO
OS_RATIO: 0.5
OD_RATIO: 0.5

## 2020-01-20 ASSESSMENT — SLIT LAMP EXAM - LIDS
COMMENTS: NORMAL
COMMENTS: NORMAL

## 2020-03-04 ENCOUNTER — LAB (OUTPATIENT)
Dept: LAB | Facility: HOSPITAL | Age: 65
End: 2020-03-04

## 2020-03-04 ENCOUNTER — TRANSCRIBE ORDERS (OUTPATIENT)
Dept: ADMINISTRATIVE | Facility: HOSPITAL | Age: 65
End: 2020-03-04

## 2020-03-04 DIAGNOSIS — E78.5 HYPERLIPIDEMIA, UNSPECIFIED HYPERLIPIDEMIA TYPE: ICD-10-CM

## 2020-03-04 DIAGNOSIS — Z00.00 ROUTINE GENERAL MEDICAL EXAMINATION AT A HEALTH CARE FACILITY: ICD-10-CM

## 2020-03-04 DIAGNOSIS — Z12.5 SPECIAL SCREENING FOR MALIGNANT NEOPLASM OF PROSTATE: ICD-10-CM

## 2020-03-04 DIAGNOSIS — E55.9 VITAMIN D DEFICIENCY DISEASE: ICD-10-CM

## 2020-03-04 DIAGNOSIS — Z00.00 ROUTINE GENERAL MEDICAL EXAMINATION AT A HEALTH CARE FACILITY: Primary | ICD-10-CM

## 2020-03-04 LAB
25(OH)D3 SERPL-MCNC: 41.2 NG/ML (ref 30–100)
ALBUMIN SERPL-MCNC: 4.1 G/DL (ref 3.5–5.2)
ALBUMIN/GLOB SERPL: 1.6 G/DL
ALP SERPL-CCNC: 85 U/L (ref 39–117)
ALT SERPL W P-5'-P-CCNC: 8 U/L (ref 1–41)
ANION GAP SERPL CALCULATED.3IONS-SCNC: 10.8 MMOL/L (ref 5–15)
AST SERPL-CCNC: 18 U/L (ref 1–40)
BACTERIA UR QL AUTO: ABNORMAL /HPF
BASOPHILS # BLD AUTO: 0.04 10*3/MM3 (ref 0–0.2)
BASOPHILS NFR BLD AUTO: 0.5 % (ref 0–1.5)
BILIRUB SERPL-MCNC: 0.6 MG/DL (ref 0.2–1.2)
BILIRUB UR QL STRIP: NEGATIVE
BUN BLD-MCNC: 13 MG/DL (ref 8–23)
BUN/CREAT SERPL: 13.1 (ref 7–25)
CALCIUM SPEC-SCNC: 9.2 MG/DL (ref 8.6–10.5)
CHLORIDE SERPL-SCNC: 102 MMOL/L (ref 98–107)
CHOLEST SERPL-MCNC: 133 MG/DL (ref 0–200)
CLARITY UR: CLEAR
CO2 SERPL-SCNC: 24.2 MMOL/L (ref 22–29)
COLOR UR: YELLOW
CREAT BLD-MCNC: 0.99 MG/DL (ref 0.76–1.27)
DEPRECATED RDW RBC AUTO: 41 FL (ref 37–54)
EOSINOPHIL # BLD AUTO: 0.05 10*3/MM3 (ref 0–0.4)
EOSINOPHIL NFR BLD AUTO: 0.6 % (ref 0.3–6.2)
ERYTHROCYTE [DISTWIDTH] IN BLOOD BY AUTOMATED COUNT: 12.5 % (ref 12.3–15.4)
FOLATE SERPL-MCNC: >20 NG/ML (ref 4.78–24.2)
GFR SERPL CREATININE-BSD FRML MDRD: 76 ML/MIN/1.73
GLOBULIN UR ELPH-MCNC: 2.6 GM/DL
GLUCOSE BLD-MCNC: 108 MG/DL (ref 65–99)
GLUCOSE UR STRIP-MCNC: NEGATIVE MG/DL
HCT VFR BLD AUTO: 45.2 % (ref 37.5–51)
HDLC SERPL-MCNC: 62 MG/DL (ref 40–60)
HGB BLD-MCNC: 15.5 G/DL (ref 13–17.7)
HGB UR QL STRIP.AUTO: ABNORMAL
HYALINE CASTS UR QL AUTO: ABNORMAL /LPF
IMM GRANULOCYTES # BLD AUTO: 0.03 10*3/MM3 (ref 0–0.05)
IMM GRANULOCYTES NFR BLD AUTO: 0.4 % (ref 0–0.5)
KETONES UR QL STRIP: ABNORMAL
LDLC SERPL CALC-MCNC: 61 MG/DL (ref 0–100)
LDLC/HDLC SERPL: 0.99 {RATIO}
LEUKOCYTE ESTERASE UR QL STRIP.AUTO: NEGATIVE
LYMPHOCYTES # BLD AUTO: 2.82 10*3/MM3 (ref 0.7–3.1)
LYMPHOCYTES NFR BLD AUTO: 34.6 % (ref 19.6–45.3)
MCH RBC QN AUTO: 30.9 PG (ref 26.6–33)
MCHC RBC AUTO-ENTMCNC: 34.3 G/DL (ref 31.5–35.7)
MCV RBC AUTO: 90 FL (ref 79–97)
MONOCYTES # BLD AUTO: 0.64 10*3/MM3 (ref 0.1–0.9)
MONOCYTES NFR BLD AUTO: 7.9 % (ref 5–12)
NEUTROPHILS # BLD AUTO: 4.57 10*3/MM3 (ref 1.7–7)
NEUTROPHILS NFR BLD AUTO: 56 % (ref 42.7–76)
NITRITE UR QL STRIP: NEGATIVE
NRBC BLD AUTO-RTO: 0 /100 WBC (ref 0–0.2)
PH UR STRIP.AUTO: 7.5 [PH] (ref 5–8)
PLATELET # BLD AUTO: 276 10*3/MM3 (ref 140–450)
PMV BLD AUTO: 9.9 FL (ref 6–12)
POTASSIUM BLD-SCNC: 4.4 MMOL/L (ref 3.5–5.2)
PROT SERPL-MCNC: 6.7 G/DL (ref 6–8.5)
PROT UR QL STRIP: NEGATIVE
PSA SERPL-MCNC: 0.82 NG/ML (ref 0–4)
RBC # BLD AUTO: 5.02 10*6/MM3 (ref 4.14–5.8)
RBC # UR: ABNORMAL /HPF
REF LAB TEST METHOD: ABNORMAL
SODIUM BLD-SCNC: 137 MMOL/L (ref 136–145)
SP GR UR STRIP: 1.02 (ref 1–1.03)
SQUAMOUS #/AREA URNS HPF: ABNORMAL /HPF
T3 SERPL-MCNC: 99.1 NG/DL (ref 80–200)
T4 FREE SERPL-MCNC: 1.69 NG/DL (ref 0.93–1.7)
TRIGL SERPL-MCNC: 49 MG/DL (ref 0–150)
TSH SERPL DL<=0.05 MIU/L-ACNC: 0.92 UIU/ML (ref 0.27–4.2)
UROBILINOGEN UR QL STRIP: ABNORMAL
VIT B12 BLD-MCNC: 445 PG/ML (ref 211–946)
VLDLC SERPL-MCNC: 9.8 MG/DL (ref 5–40)
WBC NRBC COR # BLD: 8.15 10*3/MM3 (ref 3.4–10.8)
WBC UR QL AUTO: ABNORMAL /HPF

## 2020-03-04 PROCEDURE — 36415 COLL VENOUS BLD VENIPUNCTURE: CPT

## 2020-03-04 PROCEDURE — 85025 COMPLETE CBC W/AUTO DIFF WBC: CPT | Performed by: INTERNAL MEDICINE

## 2020-03-04 PROCEDURE — 80053 COMPREHEN METABOLIC PANEL: CPT | Performed by: INTERNAL MEDICINE

## 2020-03-04 PROCEDURE — 82746 ASSAY OF FOLIC ACID SERUM: CPT | Performed by: INTERNAL MEDICINE

## 2020-03-04 PROCEDURE — 84439 ASSAY OF FREE THYROXINE: CPT | Performed by: INTERNAL MEDICINE

## 2020-03-04 PROCEDURE — 80061 LIPID PANEL: CPT | Performed by: INTERNAL MEDICINE

## 2020-03-04 PROCEDURE — 82607 VITAMIN B-12: CPT | Performed by: INTERNAL MEDICINE

## 2020-03-04 PROCEDURE — 84480 ASSAY TRIIODOTHYRONINE (T3): CPT | Performed by: INTERNAL MEDICINE

## 2020-03-04 PROCEDURE — 81001 URINALYSIS AUTO W/SCOPE: CPT | Performed by: INTERNAL MEDICINE

## 2020-03-04 PROCEDURE — 84402 ASSAY OF FREE TESTOSTERONE: CPT | Performed by: INTERNAL MEDICINE

## 2020-03-04 PROCEDURE — 84403 ASSAY OF TOTAL TESTOSTERONE: CPT | Performed by: INTERNAL MEDICINE

## 2020-03-04 PROCEDURE — 84443 ASSAY THYROID STIM HORMONE: CPT | Performed by: INTERNAL MEDICINE

## 2020-03-04 PROCEDURE — 82306 VITAMIN D 25 HYDROXY: CPT | Performed by: INTERNAL MEDICINE

## 2020-03-04 PROCEDURE — G0103 PSA SCREENING: HCPCS | Performed by: INTERNAL MEDICINE

## 2020-03-07 LAB
TESTOST FREE SERPL-MCNC: 13.5 PG/ML (ref 6.6–18.1)
TESTOST SERPL-MCNC: 676 NG/DL (ref 264–916)

## 2020-05-04 DIAGNOSIS — I10 ESSENTIAL HYPERTENSION: ICD-10-CM

## 2020-05-04 RX ORDER — HYDROCHLOROTHIAZIDE 25 MG/1
25 TABLET ORAL DAILY
Qty: 90 TABLET | Refills: 0 | Status: SHIPPED | OUTPATIENT
Start: 2020-05-04 | End: 2020-06-01 | Stop reason: SDUPTHER

## 2020-06-01 ENCOUNTER — APPOINTMENT (OUTPATIENT)
Dept: LAB | Age: 65
End: 2020-06-01

## 2020-06-01 ENCOUNTER — APPOINTMENT (OUTPATIENT)
Dept: FAMILY MEDICINE | Age: 65
End: 2020-06-01

## 2020-06-01 ENCOUNTER — OFFICE VISIT (OUTPATIENT)
Dept: INTERNAL MEDICINE | Age: 65
End: 2020-06-01

## 2020-06-01 VITALS
TEMPERATURE: 97.6 F | HEIGHT: 64 IN | SYSTOLIC BLOOD PRESSURE: 136 MMHG | BODY MASS INDEX: 37.22 KG/M2 | OXYGEN SATURATION: 98 % | WEIGHT: 218 LBS | HEART RATE: 72 BPM | DIASTOLIC BLOOD PRESSURE: 73 MMHG

## 2020-06-01 DIAGNOSIS — H61.23 BILATERAL IMPACTED CERUMEN: ICD-10-CM

## 2020-06-01 DIAGNOSIS — E78.5 DYSLIPIDEMIA: ICD-10-CM

## 2020-06-01 DIAGNOSIS — I10 ESSENTIAL HYPERTENSION: Primary | ICD-10-CM

## 2020-06-01 DIAGNOSIS — K21.9 GASTROESOPHAGEAL REFLUX DISEASE, ESOPHAGITIS PRESENCE NOT SPECIFIED: ICD-10-CM

## 2020-06-01 PROCEDURE — 36415 COLL VENOUS BLD VENIPUNCTURE: CPT | Performed by: INTERNAL MEDICINE

## 2020-06-01 PROCEDURE — 99214 OFFICE O/P EST MOD 30 MIN: CPT | Performed by: NURSE PRACTITIONER

## 2020-06-01 PROCEDURE — 83036 HEMOGLOBIN GLYCOSYLATED A1C: CPT | Performed by: INTERNAL MEDICINE

## 2020-06-01 RX ORDER — ASPIRIN 81 MG/1
81 TABLET ORAL DAILY
Qty: 90 TABLET | Refills: 1 | Status: SHIPPED | OUTPATIENT
Start: 2020-06-01 | End: 2020-11-04 | Stop reason: SDUPTHER

## 2020-06-01 RX ORDER — HYDROCHLOROTHIAZIDE 25 MG/1
25 TABLET ORAL DAILY
Qty: 90 TABLET | Refills: 1 | Status: SHIPPED | OUTPATIENT
Start: 2020-06-01 | End: 2020-11-04 | Stop reason: SDUPTHER

## 2020-06-01 RX ORDER — PANTOPRAZOLE SODIUM 20 MG/1
20 TABLET, DELAYED RELEASE ORAL
Qty: 90 TABLET | Refills: 1 | Status: SHIPPED | OUTPATIENT
Start: 2020-06-01 | End: 2020-11-04 | Stop reason: SDUPTHER

## 2020-06-01 RX ORDER — SIMVASTATIN 40 MG
40 TABLET ORAL NIGHTLY
Qty: 90 TABLET | Refills: 1 | Status: SHIPPED | OUTPATIENT
Start: 2020-06-01 | End: 2020-11-04 | Stop reason: SDUPTHER

## 2020-06-01 RX ORDER — FAMOTIDINE 20 MG/1
20 TABLET, FILM COATED ORAL 2 TIMES DAILY PRN
Qty: 180 TABLET | Refills: 1 | Status: SHIPPED | OUTPATIENT
Start: 2020-06-01 | End: 2020-11-04 | Stop reason: ALTCHOICE

## 2020-06-01 ASSESSMENT — PATIENT HEALTH QUESTIONNAIRE - PHQ9
CLINICAL INTERPRETATION OF PHQ9 SCORE: NO FURTHER SCREENING NEEDED
CLINICAL INTERPRETATION OF PHQ2 SCORE: NO FURTHER SCREENING NEEDED
1. LITTLE INTEREST OR PLEASURE IN DOING THINGS: NOT AT ALL
SUM OF ALL RESPONSES TO PHQ9 QUESTIONS 1 AND 2: 0
2. FEELING DOWN, DEPRESSED OR HOPELESS: NOT AT ALL
SUM OF ALL RESPONSES TO PHQ9 QUESTIONS 1 AND 2: 0

## 2020-06-02 ENCOUNTER — TELEPHONE (OUTPATIENT)
Dept: INTERNAL MEDICINE | Age: 65
End: 2020-06-02

## 2020-06-02 LAB — HBA1C MFR BLD: 5.6 % (ref 4.5–5.6)

## 2020-06-05 ENCOUNTER — NURSE ONLY (OUTPATIENT)
Dept: INTERNAL MEDICINE | Age: 65
End: 2020-06-05

## 2020-06-05 DIAGNOSIS — H93.8X3 SENSATION OF FULLNESS IN BOTH EARS: Primary | ICD-10-CM

## 2020-09-04 ENCOUNTER — LAB (OUTPATIENT)
Dept: LAB | Facility: HOSPITAL | Age: 65
End: 2020-09-04

## 2020-09-04 ENCOUNTER — TRANSCRIBE ORDERS (OUTPATIENT)
Dept: ADMINISTRATIVE | Facility: HOSPITAL | Age: 65
End: 2020-09-04

## 2020-09-04 DIAGNOSIS — E78.5 HYPERLIPIDEMIA, UNSPECIFIED HYPERLIPIDEMIA TYPE: Primary | ICD-10-CM

## 2020-09-04 DIAGNOSIS — E78.5 HYPERLIPIDEMIA, UNSPECIFIED HYPERLIPIDEMIA TYPE: ICD-10-CM

## 2020-09-04 LAB
ALBUMIN SERPL-MCNC: 4 G/DL (ref 3.5–5.2)
ALBUMIN/GLOB SERPL: 1.3 G/DL
ALP SERPL-CCNC: 117 U/L (ref 39–117)
ALT SERPL W P-5'-P-CCNC: 15 U/L (ref 1–41)
ANION GAP SERPL CALCULATED.3IONS-SCNC: 11.2 MMOL/L (ref 5–15)
AST SERPL-CCNC: 20 U/L (ref 1–40)
BILIRUB SERPL-MCNC: 0.4 MG/DL (ref 0–1.2)
BUN SERPL-MCNC: 13 MG/DL (ref 8–23)
BUN/CREAT SERPL: 14.1 (ref 7–25)
CALCIUM SPEC-SCNC: 9.3 MG/DL (ref 8.6–10.5)
CHLORIDE SERPL-SCNC: 100 MMOL/L (ref 98–107)
CHOLEST SERPL-MCNC: 126 MG/DL (ref 0–200)
CO2 SERPL-SCNC: 24.8 MMOL/L (ref 22–29)
CREAT SERPL-MCNC: 0.92 MG/DL (ref 0.76–1.27)
GFR SERPL CREATININE-BSD FRML MDRD: 83 ML/MIN/1.73
GLOBULIN UR ELPH-MCNC: 3.1 GM/DL
GLUCOSE SERPL-MCNC: 84 MG/DL (ref 65–99)
HDLC SERPL-MCNC: 45 MG/DL (ref 40–60)
LDLC SERPL CALC-MCNC: 67 MG/DL (ref 0–100)
LDLC/HDLC SERPL: 1.48 {RATIO}
POTASSIUM SERPL-SCNC: 3.9 MMOL/L (ref 3.5–5.2)
PROT SERPL-MCNC: 7.1 G/DL (ref 6–8.5)
SODIUM SERPL-SCNC: 136 MMOL/L (ref 136–145)
TRIGL SERPL-MCNC: 71 MG/DL (ref 0–150)
VLDLC SERPL-MCNC: 14.2 MG/DL (ref 5–40)

## 2020-09-04 PROCEDURE — 80053 COMPREHEN METABOLIC PANEL: CPT | Performed by: INTERNAL MEDICINE

## 2020-09-04 PROCEDURE — 36415 COLL VENOUS BLD VENIPUNCTURE: CPT

## 2020-09-04 PROCEDURE — 80061 LIPID PANEL: CPT | Performed by: INTERNAL MEDICINE

## 2020-09-11 ENCOUNTER — NURSE ONLY (OUTPATIENT)
Dept: INTERNAL MEDICINE | Age: 65
End: 2020-09-11
Attending: NURSE PRACTITIONER

## 2020-09-11 VITALS — TEMPERATURE: 97.7 F

## 2020-09-11 DIAGNOSIS — Z23 NEED FOR INFLUENZA VACCINATION: Primary | ICD-10-CM

## 2020-09-11 PROCEDURE — 99211 OFF/OP EST MAY X REQ PHY/QHP: CPT

## 2020-09-11 PROCEDURE — 10002800 HB RX 250 W HCPCS: Performed by: NURSE PRACTITIONER

## 2020-09-11 PROCEDURE — 90686 IIV4 VACC NO PRSV 0.5 ML IM: CPT | Performed by: NURSE PRACTITIONER

## 2020-09-11 PROCEDURE — 90471 IMMUNIZATION ADMIN: CPT | Performed by: NURSE PRACTITIONER

## 2020-09-11 RX ADMIN — INFLUENZA A VIRUS A/GUANGDONG-MAONAN/SWL1536/2019 CNIC-1909 (H1N1) ANTIGEN (FORMALDEHYDE INACTIVATED), INFLUENZA A VIRUS A/HONG KONG/2671/2019 (H3N2) ANTIGEN (FORMALDEHYDE INACTIVATED), INFLUENZA B VIRUS B/PHUKET/3073/2013 ANTIGEN (FORMALDEHYDE INACTIVATED), AND INFLUENZA B VIRUS B/WASHINGTON/02/2019 ANTIGEN (FORMALDEHYDE INACTIVATED) 0.5 ML: 15; 15; 15; 15 INJECTION, SUSPENSION INTRAMUSCULAR at 09:12

## 2020-11-04 ENCOUNTER — OFFICE VISIT (OUTPATIENT)
Dept: INTERNAL MEDICINE | Age: 65
End: 2020-11-04
Attending: NURSE PRACTITIONER

## 2020-11-04 ENCOUNTER — APPOINTMENT (OUTPATIENT)
Dept: LAB | Age: 65
End: 2020-11-04
Attending: NURSE PRACTITIONER

## 2020-11-04 VITALS
DIASTOLIC BLOOD PRESSURE: 78 MMHG | HEIGHT: 64 IN | HEART RATE: 67 BPM | BODY MASS INDEX: 38.35 KG/M2 | TEMPERATURE: 95.8 F | WEIGHT: 224.6 LBS | SYSTOLIC BLOOD PRESSURE: 139 MMHG

## 2020-11-04 DIAGNOSIS — K21.9 GASTROESOPHAGEAL REFLUX DISEASE, UNSPECIFIED WHETHER ESOPHAGITIS PRESENT: ICD-10-CM

## 2020-11-04 DIAGNOSIS — S39.012A BACK STRAIN, INITIAL ENCOUNTER: ICD-10-CM

## 2020-11-04 DIAGNOSIS — I10 ESSENTIAL HYPERTENSION: ICD-10-CM

## 2020-11-04 DIAGNOSIS — Z00.00 MEDICARE ANNUAL WELLNESS VISIT, SUBSEQUENT: Primary | ICD-10-CM

## 2020-11-04 DIAGNOSIS — E78.5 DYSLIPIDEMIA: ICD-10-CM

## 2020-11-04 DIAGNOSIS — Z23 IMMUNIZATION DUE: ICD-10-CM

## 2020-11-04 LAB
ALBUMIN SERPL-MCNC: 4.3 G/DL (ref 3.6–5.1)
ALBUMIN/GLOB SERPL: 1.1 {RATIO} (ref 1–2.4)
ALP SERPL-CCNC: 84 UNITS/L (ref 45–117)
ALT SERPL-CCNC: 31 UNITS/L
ANION GAP SERPL CALC-SCNC: 10 MMOL/L (ref 10–20)
AST SERPL-CCNC: 20 UNITS/L
BILIRUB SERPL-MCNC: 0.8 MG/DL (ref 0.2–1)
BUN SERPL-MCNC: 10 MG/DL (ref 6–20)
BUN/CREAT SERPL: 11 (ref 7–25)
CALCIUM SERPL-MCNC: 9.6 MG/DL (ref 8.4–10.2)
CHLORIDE SERPL-SCNC: 104 MMOL/L (ref 98–107)
CHOLEST SERPL-MCNC: 192 MG/DL
CHOLEST/HDLC SERPL: 3.3 {RATIO}
CO2 SERPL-SCNC: 28 MMOL/L (ref 21–32)
CREAT SERPL-MCNC: 0.9 MG/DL (ref 0.67–1.17)
FASTING DURATION TIME PATIENT: ABNORMAL H
FASTING DURATION TIME PATIENT: NORMAL H
GFR SERPLBLD BASED ON 1.73 SQ M-ARVRAT: >90 ML/MIN/1.73M2
GLOBULIN SER-MCNC: 3.9 G/DL (ref 2–4)
GLUCOSE SERPL-MCNC: 90 MG/DL (ref 65–99)
HDLC SERPL-MCNC: 59 MG/DL
LDLC SERPL CALC-MCNC: 97 MG/DL
NONHDLC SERPL-MCNC: 133 MG/DL
POTASSIUM SERPL-SCNC: 3.9 MMOL/L (ref 3.4–5.1)
PROT SERPL-MCNC: 8.2 G/DL (ref 6.4–8.2)
SODIUM SERPL-SCNC: 138 MMOL/L (ref 135–145)
TRIGL SERPL-MCNC: 182 MG/DL

## 2020-11-04 PROCEDURE — 82043 UR ALBUMIN QUANTITATIVE: CPT | Performed by: NURSE PRACTITIONER

## 2020-11-04 PROCEDURE — G0439 PPPS, SUBSEQ VISIT: HCPCS | Performed by: NURSE PRACTITIONER

## 2020-11-04 PROCEDURE — 80061 LIPID PANEL: CPT | Performed by: NURSE PRACTITIONER

## 2020-11-04 PROCEDURE — 90471 IMMUNIZATION ADMIN: CPT | Performed by: NURSE PRACTITIONER

## 2020-11-04 PROCEDURE — 36415 COLL VENOUS BLD VENIPUNCTURE: CPT | Performed by: NURSE PRACTITIONER

## 2020-11-04 PROCEDURE — 80053 COMPREHEN METABOLIC PANEL: CPT | Performed by: NURSE PRACTITIONER

## 2020-11-04 PROCEDURE — 10002800 HB RX 250 W HCPCS: Performed by: NURSE PRACTITIONER

## 2020-11-04 PROCEDURE — 90732 PPSV23 VACC 2 YRS+ SUBQ/IM: CPT | Performed by: NURSE PRACTITIONER

## 2020-11-04 PROCEDURE — 99214 OFFICE O/P EST MOD 30 MIN: CPT | Performed by: NURSE PRACTITIONER

## 2020-11-04 RX ORDER — HYDROCHLOROTHIAZIDE 25 MG/1
25 TABLET ORAL DAILY
Qty: 90 TABLET | Refills: 3 | Status: SHIPPED | OUTPATIENT
Start: 2020-11-04 | End: 2021-05-20 | Stop reason: SDUPTHER

## 2020-11-04 RX ORDER — SIMVASTATIN 40 MG
40 TABLET ORAL NIGHTLY
Qty: 90 TABLET | Refills: 3 | Status: SHIPPED | OUTPATIENT
Start: 2020-11-04 | End: 2021-05-20 | Stop reason: SDUPTHER

## 2020-11-04 RX ORDER — ASPIRIN 81 MG/1
81 TABLET ORAL DAILY
Qty: 90 TABLET | Refills: 3 | Status: SHIPPED | OUTPATIENT
Start: 2020-11-04 | End: 2021-05-20 | Stop reason: SDUPTHER

## 2020-11-04 RX ORDER — PANTOPRAZOLE SODIUM 20 MG/1
20 TABLET, DELAYED RELEASE ORAL
Qty: 90 TABLET | Refills: 3 | Status: SHIPPED | OUTPATIENT
Start: 2020-11-04 | End: 2021-05-20 | Stop reason: SDUPTHER

## 2020-11-04 RX ADMIN — PNEUMOCOCCAL VACCINE POLYVALENT 0.5 ML
25; 25; 25; 25; 25; 25; 25; 25; 25; 25; 25; 25; 25; 25; 25; 25; 25; 25; 25; 25; 25; 25; 25 INJECTION, SOLUTION INTRAMUSCULAR; SUBCUTANEOUS at 15:46

## 2020-11-04 ASSESSMENT — PATIENT HEALTH QUESTIONNAIRE - PHQ9
CLINICAL INTERPRETATION OF PHQ2 SCORE: NO FURTHER SCREENING NEEDED
SUM OF ALL RESPONSES TO PHQ9 QUESTIONS 1 AND 2: 0
CLINICAL INTERPRETATION OF PHQ9 SCORE: NO FURTHER SCREENING NEEDED
1. LITTLE INTEREST OR PLEASURE IN DOING THINGS: NOT AT ALL
SUM OF ALL RESPONSES TO PHQ9 QUESTIONS 1 AND 2: 0
2. FEELING DOWN, DEPRESSED OR HOPELESS: NOT AT ALL

## 2020-11-05 ENCOUNTER — TELEPHONE (OUTPATIENT)
Dept: INTERNAL MEDICINE | Age: 65
End: 2020-11-05

## 2020-11-05 LAB
CREAT UR-MCNC: 170 MG/DL
MICROALBUMIN UR-MCNC: 1.74 MG/DL
MICROALBUMIN/CREAT UR: 10.2 MG/G

## 2021-02-16 ENCOUNTER — IMMUNIZATION (OUTPATIENT)
Dept: LAB | Age: 66
End: 2021-02-16

## 2021-02-16 DIAGNOSIS — Z23 NEED FOR VACCINATION: Primary | ICD-10-CM

## 2021-02-16 PROCEDURE — 0011A COVID-19 MODERNA VACCINE: CPT | Performed by: INTERNAL MEDICINE

## 2021-02-16 PROCEDURE — 91301 COVID-19 MODERNA VACCINE: CPT | Performed by: INTERNAL MEDICINE

## 2021-03-16 ENCOUNTER — IMMUNIZATION (OUTPATIENT)
Dept: LAB | Age: 66
End: 2021-03-16
Attending: PREVENTIVE MEDICINE

## 2021-03-16 DIAGNOSIS — Z23 NEED FOR VACCINATION: Primary | ICD-10-CM

## 2021-03-16 PROCEDURE — 0012A COVID-19 MODERNA VACCINE: CPT | Performed by: INTERNAL MEDICINE

## 2021-03-16 PROCEDURE — 91301 COVID-19 MODERNA VACCINE: CPT | Performed by: INTERNAL MEDICINE

## 2021-03-17 ENCOUNTER — LAB (OUTPATIENT)
Dept: LAB | Facility: HOSPITAL | Age: 66
End: 2021-03-17

## 2021-03-17 ENCOUNTER — TRANSCRIBE ORDERS (OUTPATIENT)
Dept: ADMINISTRATIVE | Facility: HOSPITAL | Age: 66
End: 2021-03-17

## 2021-03-17 DIAGNOSIS — Z12.5 SPECIAL SCREENING FOR MALIGNANT NEOPLASM OF PROSTATE: ICD-10-CM

## 2021-03-17 DIAGNOSIS — E78.5 HYPERLIPIDEMIA, UNSPECIFIED HYPERLIPIDEMIA TYPE: ICD-10-CM

## 2021-03-17 DIAGNOSIS — Z00.00 ROUTINE GENERAL MEDICAL EXAMINATION AT A HEALTH CARE FACILITY: ICD-10-CM

## 2021-03-17 DIAGNOSIS — Z00.00 ROUTINE GENERAL MEDICAL EXAMINATION AT A HEALTH CARE FACILITY: Primary | ICD-10-CM

## 2021-03-17 DIAGNOSIS — I10 HYPERTENSION, UNSPECIFIED TYPE: ICD-10-CM

## 2021-03-17 LAB
ABO GROUP BLD: NORMAL
ALBUMIN SERPL-MCNC: 4 G/DL (ref 3.5–5.2)
ALBUMIN/GLOB SERPL: 1.7 G/DL
ALP SERPL-CCNC: 128 U/L (ref 39–117)
ALT SERPL W P-5'-P-CCNC: 15 U/L (ref 1–41)
ANION GAP SERPL CALCULATED.3IONS-SCNC: 6.9 MMOL/L (ref 5–15)
AST SERPL-CCNC: 20 U/L (ref 1–40)
BACTERIA UR QL AUTO: ABNORMAL /HPF
BASOPHILS # BLD AUTO: 0.05 10*3/MM3 (ref 0–0.2)
BASOPHILS NFR BLD AUTO: 0.5 % (ref 0–1.5)
BILIRUB SERPL-MCNC: 0.6 MG/DL (ref 0–1.2)
BILIRUB UR QL STRIP: NEGATIVE
BUN SERPL-MCNC: 11 MG/DL (ref 8–23)
BUN/CREAT SERPL: 20.8 (ref 7–25)
CALCIUM SPEC-SCNC: 8.8 MG/DL (ref 8.6–10.5)
CHLORIDE SERPL-SCNC: 107 MMOL/L (ref 98–107)
CHOLEST SERPL-MCNC: 116 MG/DL (ref 0–200)
CLARITY UR: CLEAR
CO2 SERPL-SCNC: 28.1 MMOL/L (ref 22–29)
COLOR UR: YELLOW
CREAT SERPL-MCNC: 0.53 MG/DL (ref 0.76–1.27)
DEPRECATED RDW RBC AUTO: 38.8 FL (ref 37–54)
EOSINOPHIL # BLD AUTO: 0.18 10*3/MM3 (ref 0–0.4)
EOSINOPHIL NFR BLD AUTO: 1.9 % (ref 0.3–6.2)
ERYTHROCYTE [DISTWIDTH] IN BLOOD BY AUTOMATED COUNT: 11.8 % (ref 12.3–15.4)
GFR SERPL CREATININE-BSD FRML MDRD: >150 ML/MIN/1.73
GLOBULIN UR ELPH-MCNC: 2.4 GM/DL
GLUCOSE SERPL-MCNC: 111 MG/DL (ref 65–99)
GLUCOSE UR STRIP-MCNC: NEGATIVE MG/DL
HCT VFR BLD AUTO: 45.4 % (ref 37.5–51)
HDLC SERPL-MCNC: 42 MG/DL (ref 40–60)
HGB BLD-MCNC: 15.5 G/DL (ref 13–17.7)
HGB UR QL STRIP.AUTO: ABNORMAL
HYALINE CASTS UR QL AUTO: ABNORMAL /LPF
IMM GRANULOCYTES # BLD AUTO: 0.02 10*3/MM3 (ref 0–0.05)
IMM GRANULOCYTES NFR BLD AUTO: 0.2 % (ref 0–0.5)
KETONES UR QL STRIP: NEGATIVE
LDLC SERPL CALC-MCNC: 59 MG/DL (ref 0–100)
LDLC/HDLC SERPL: 1.42 {RATIO}
LEUKOCYTE ESTERASE UR QL STRIP.AUTO: NEGATIVE
LYMPHOCYTES # BLD AUTO: 3.38 10*3/MM3 (ref 0.7–3.1)
LYMPHOCYTES NFR BLD AUTO: 35.5 % (ref 19.6–45.3)
MCH RBC QN AUTO: 30.5 PG (ref 26.6–33)
MCHC RBC AUTO-ENTMCNC: 34.1 G/DL (ref 31.5–35.7)
MCV RBC AUTO: 89.4 FL (ref 79–97)
MONOCYTES # BLD AUTO: 0.62 10*3/MM3 (ref 0.1–0.9)
MONOCYTES NFR BLD AUTO: 6.5 % (ref 5–12)
NEUTROPHILS NFR BLD AUTO: 5.28 10*3/MM3 (ref 1.7–7)
NEUTROPHILS NFR BLD AUTO: 55.4 % (ref 42.7–76)
NITRITE UR QL STRIP: NEGATIVE
NRBC BLD AUTO-RTO: 0 /100 WBC (ref 0–0.2)
PH UR STRIP.AUTO: 5.5 [PH] (ref 5–8)
PLATELET # BLD AUTO: 326 10*3/MM3 (ref 140–450)
PMV BLD AUTO: 9.7 FL (ref 6–12)
POTASSIUM SERPL-SCNC: 4.3 MMOL/L (ref 3.5–5.2)
PROT SERPL-MCNC: 6.4 G/DL (ref 6–8.5)
PROT UR QL STRIP: NEGATIVE
PSA SERPL-MCNC: 0.53 NG/ML (ref 0–4)
RBC # BLD AUTO: 5.08 10*6/MM3 (ref 4.14–5.8)
RBC # UR: ABNORMAL /HPF
REF LAB TEST METHOD: ABNORMAL
RH BLD: POSITIVE
SODIUM SERPL-SCNC: 142 MMOL/L (ref 136–145)
SP GR UR STRIP: 1.02 (ref 1–1.03)
SQUAMOUS #/AREA URNS HPF: ABNORMAL /HPF
TRIGL SERPL-MCNC: 71 MG/DL (ref 0–150)
UROBILINOGEN UR QL STRIP: ABNORMAL
VLDLC SERPL-MCNC: 15 MG/DL (ref 5–40)
WBC # BLD AUTO: 9.53 10*3/MM3 (ref 3.4–10.8)
WBC UR QL AUTO: ABNORMAL /HPF

## 2021-03-17 PROCEDURE — 86901 BLOOD TYPING SEROLOGIC RH(D): CPT

## 2021-03-17 PROCEDURE — G0103 PSA SCREENING: HCPCS

## 2021-03-17 PROCEDURE — 81001 URINALYSIS AUTO W/SCOPE: CPT

## 2021-03-17 PROCEDURE — 85025 COMPLETE CBC W/AUTO DIFF WBC: CPT

## 2021-03-17 PROCEDURE — 80061 LIPID PANEL: CPT

## 2021-03-17 PROCEDURE — 80053 COMPREHEN METABOLIC PANEL: CPT

## 2021-03-17 PROCEDURE — 86900 BLOOD TYPING SEROLOGIC ABO: CPT

## 2021-03-17 PROCEDURE — 36415 COLL VENOUS BLD VENIPUNCTURE: CPT

## 2021-03-19 ENCOUNTER — BULK ORDERING (OUTPATIENT)
Dept: CASE MANAGEMENT | Facility: OTHER | Age: 66
End: 2021-03-19

## 2021-03-19 DIAGNOSIS — Z23 IMMUNIZATION DUE: ICD-10-CM

## 2021-04-25 DIAGNOSIS — K21.9 GASTROESOPHAGEAL REFLUX DISEASE: ICD-10-CM

## 2021-04-26 RX ORDER — FAMOTIDINE 20 MG/1
TABLET, FILM COATED ORAL
Qty: 180 TABLET | Refills: 0 | OUTPATIENT
Start: 2021-04-26

## 2021-05-20 ENCOUNTER — HOSPITAL ENCOUNTER (OUTPATIENT)
Dept: GENERAL RADIOLOGY | Age: 66
Discharge: HOME | End: 2021-05-20
Attending: NURSE PRACTITIONER

## 2021-05-20 ENCOUNTER — OFFICE VISIT (OUTPATIENT)
Dept: INTERNAL MEDICINE | Age: 66
End: 2021-05-20
Attending: NURSE PRACTITIONER

## 2021-05-20 VITALS
RESPIRATION RATE: 14 BRPM | BODY MASS INDEX: 36.89 KG/M2 | WEIGHT: 214.9 LBS | HEART RATE: 74 BPM | SYSTOLIC BLOOD PRESSURE: 118 MMHG | OXYGEN SATURATION: 95 % | TEMPERATURE: 98.2 F | DIASTOLIC BLOOD PRESSURE: 78 MMHG

## 2021-05-20 DIAGNOSIS — E78.5 DYSLIPIDEMIA: ICD-10-CM

## 2021-05-20 DIAGNOSIS — K21.9 GASTROESOPHAGEAL REFLUX DISEASE, UNSPECIFIED WHETHER ESOPHAGITIS PRESENT: ICD-10-CM

## 2021-05-20 DIAGNOSIS — I10 ESSENTIAL HYPERTENSION: Primary | ICD-10-CM

## 2021-05-20 DIAGNOSIS — G89.29 CHRONIC PAIN OF LEFT KNEE: ICD-10-CM

## 2021-05-20 DIAGNOSIS — M25.562 CHRONIC PAIN OF LEFT KNEE: ICD-10-CM

## 2021-05-20 DIAGNOSIS — M79.641 BILATERAL HAND PAIN: ICD-10-CM

## 2021-05-20 DIAGNOSIS — M79.642 BILATERAL HAND PAIN: ICD-10-CM

## 2021-05-20 PROCEDURE — 73562 X-RAY EXAM OF KNEE 3: CPT

## 2021-05-20 PROCEDURE — 73562 X-RAY EXAM OF KNEE 3: CPT | Performed by: RADIOLOGY

## 2021-05-20 PROCEDURE — 99214 OFFICE O/P EST MOD 30 MIN: CPT | Performed by: NURSE PRACTITIONER

## 2021-05-20 RX ORDER — ASPIRIN 81 MG/1
81 TABLET ORAL DAILY
Qty: 90 TABLET | Refills: 3 | Status: SHIPPED | OUTPATIENT
Start: 2021-05-20 | End: 2021-12-28 | Stop reason: SDUPTHER

## 2021-05-20 RX ORDER — HYDROCHLOROTHIAZIDE 25 MG/1
25 TABLET ORAL DAILY
Qty: 90 TABLET | Refills: 3 | Status: SHIPPED | OUTPATIENT
Start: 2021-05-20 | End: 2021-12-28

## 2021-05-20 RX ORDER — PANTOPRAZOLE SODIUM 20 MG/1
20 TABLET, DELAYED RELEASE ORAL
Qty: 90 TABLET | Refills: 3 | Status: SHIPPED | OUTPATIENT
Start: 2021-05-20 | End: 2021-10-25 | Stop reason: SDUPTHER

## 2021-05-20 RX ORDER — LIDOCAINE 50 MG/G
1 PATCH TOPICAL
Qty: 90 PATCH | Refills: 1 | Status: SHIPPED | OUTPATIENT
Start: 2021-05-20 | End: 2023-08-29 | Stop reason: ALTCHOICE

## 2021-05-20 RX ORDER — SIMVASTATIN 40 MG
40 TABLET ORAL NIGHTLY
Qty: 90 TABLET | Refills: 3 | Status: SHIPPED | OUTPATIENT
Start: 2021-05-20 | End: 2021-12-28 | Stop reason: SDUPTHER

## 2021-05-21 ENCOUNTER — TELEPHONE (OUTPATIENT)
Dept: INTERNAL MEDICINE | Age: 66
End: 2021-05-21

## 2021-05-24 VITALS
BODY MASS INDEX: 38.67 KG/M2 | DIASTOLIC BLOOD PRESSURE: 72 MMHG | HEART RATE: 76 BPM | WEIGHT: 225.3 LBS | SYSTOLIC BLOOD PRESSURE: 132 MMHG

## 2021-09-23 ENCOUNTER — LAB (OUTPATIENT)
Dept: LAB | Facility: HOSPITAL | Age: 66
End: 2021-09-23

## 2021-09-23 ENCOUNTER — TRANSCRIBE ORDERS (OUTPATIENT)
Dept: ADMINISTRATIVE | Facility: HOSPITAL | Age: 66
End: 2021-09-23

## 2021-09-23 DIAGNOSIS — I10 ESSENTIAL HYPERTENSION, MALIGNANT: ICD-10-CM

## 2021-09-23 DIAGNOSIS — E78.5 HYPERLIPIDEMIA, UNSPECIFIED HYPERLIPIDEMIA TYPE: ICD-10-CM

## 2021-09-23 DIAGNOSIS — E78.5 HYPERLIPIDEMIA, UNSPECIFIED HYPERLIPIDEMIA TYPE: Primary | ICD-10-CM

## 2021-09-23 LAB
ALBUMIN SERPL-MCNC: 4.4 G/DL (ref 3.5–5.2)
ALBUMIN/GLOB SERPL: 1.8 G/DL
ALP SERPL-CCNC: 123 U/L (ref 39–117)
ALT SERPL W P-5'-P-CCNC: 15 U/L (ref 1–41)
ANION GAP SERPL CALCULATED.3IONS-SCNC: 11.8 MMOL/L (ref 5–15)
AST SERPL-CCNC: 17 U/L (ref 1–40)
BILIRUB SERPL-MCNC: 0.7 MG/DL (ref 0–1.2)
BUN SERPL-MCNC: 9 MG/DL (ref 8–23)
BUN/CREAT SERPL: 9.5 (ref 7–25)
CALCIUM SPEC-SCNC: 9.5 MG/DL (ref 8.6–10.5)
CHLORIDE SERPL-SCNC: 103 MMOL/L (ref 98–107)
CHOLEST SERPL-MCNC: 110 MG/DL (ref 0–200)
CO2 SERPL-SCNC: 24.2 MMOL/L (ref 22–29)
CREAT SERPL-MCNC: 0.95 MG/DL (ref 0.76–1.27)
GFR SERPL CREATININE-BSD FRML MDRD: 79 ML/MIN/1.73
GLOBULIN UR ELPH-MCNC: 2.5 GM/DL
GLUCOSE SERPL-MCNC: 112 MG/DL (ref 65–99)
HDLC SERPL-MCNC: 42 MG/DL (ref 40–60)
LDLC SERPL CALC-MCNC: 56 MG/DL (ref 0–100)
LDLC/HDLC SERPL: 1.4 {RATIO}
POTASSIUM SERPL-SCNC: 4.5 MMOL/L (ref 3.5–5.2)
PROT SERPL-MCNC: 6.9 G/DL (ref 6–8.5)
SODIUM SERPL-SCNC: 139 MMOL/L (ref 136–145)
TRIGL SERPL-MCNC: 47 MG/DL (ref 0–150)
VLDLC SERPL-MCNC: 12 MG/DL (ref 5–40)

## 2021-09-23 PROCEDURE — 36415 COLL VENOUS BLD VENIPUNCTURE: CPT

## 2021-09-23 PROCEDURE — 80061 LIPID PANEL: CPT

## 2021-09-23 PROCEDURE — 80053 COMPREHEN METABOLIC PANEL: CPT

## 2021-09-27 ENCOUNTER — TELEPHONE (OUTPATIENT)
Dept: INTERNAL MEDICINE | Age: 66
End: 2021-09-27

## 2021-10-04 ENCOUNTER — NURSE ONLY (OUTPATIENT)
Dept: INTERNAL MEDICINE | Age: 66
End: 2021-10-04
Attending: NURSE PRACTITIONER

## 2021-10-04 DIAGNOSIS — Z23 FLU VACCINE NEED: Primary | ICD-10-CM

## 2021-10-04 PROCEDURE — 90471 IMMUNIZATION ADMIN: CPT | Performed by: NURSE PRACTITIONER

## 2021-10-04 PROCEDURE — 10002800 HB RX 250 W HCPCS: Performed by: NURSE PRACTITIONER

## 2021-10-04 PROCEDURE — 90662 IIV NO PRSV INCREASED AG IM: CPT | Performed by: NURSE PRACTITIONER

## 2021-10-04 RX ADMIN — INFLUENZA A VIRUS A/VICTORIA/2570/2019 IVR-215 (H1N1) ANTIGEN (FORMALDEHYDE INACTIVATED), INFLUENZA A VIRUS A/TASMANIA/503/2020 IVR-221 (H3N2) ANTIGEN (FORMALDEHYDE INACTIVATED), INFLUENZA B VIRUS B/PHUKET/3073/2013 ANTIGEN (FORMALDEHYDE INACTIVATED), AND INFLUENZA B VIRUS B/WASHINGTON/02/2019 ANTIGEN (FORMALDEHYDE INACTIVATED) 0.7 ML: 60; 60; 60; 60 INJECTION, SUSPENSION INTRAMUSCULAR at 11:22

## 2021-10-25 ENCOUNTER — TELEPHONE (OUTPATIENT)
Dept: INTERNAL MEDICINE | Age: 66
End: 2021-10-25

## 2021-10-25 DIAGNOSIS — K21.9 GASTROESOPHAGEAL REFLUX DISEASE, UNSPECIFIED WHETHER ESOPHAGITIS PRESENT: ICD-10-CM

## 2021-10-25 RX ORDER — PANTOPRAZOLE SODIUM 20 MG/1
20 TABLET, DELAYED RELEASE ORAL
Qty: 90 TABLET | Refills: 3 | Status: SHIPPED | OUTPATIENT
Start: 2021-10-25 | End: 2021-12-28 | Stop reason: SDUPTHER

## 2021-12-27 DIAGNOSIS — I10 ESSENTIAL HYPERTENSION: ICD-10-CM

## 2021-12-28 ENCOUNTER — OFFICE VISIT (OUTPATIENT)
Dept: INTERNAL MEDICINE | Age: 66
End: 2021-12-28
Attending: NURSE PRACTITIONER

## 2021-12-28 ENCOUNTER — APPOINTMENT (OUTPATIENT)
Dept: LAB | Age: 66
End: 2021-12-28
Attending: NURSE PRACTITIONER

## 2021-12-28 VITALS
SYSTOLIC BLOOD PRESSURE: 139 MMHG | HEART RATE: 67 BPM | DIASTOLIC BLOOD PRESSURE: 79 MMHG | BODY MASS INDEX: 36.91 KG/M2 | TEMPERATURE: 97.3 F | OXYGEN SATURATION: 97 % | HEIGHT: 64 IN | WEIGHT: 216.2 LBS

## 2021-12-28 DIAGNOSIS — I10 ESSENTIAL HYPERTENSION: ICD-10-CM

## 2021-12-28 DIAGNOSIS — Z00.00 MEDICARE ANNUAL WELLNESS VISIT, SUBSEQUENT: Primary | ICD-10-CM

## 2021-12-28 DIAGNOSIS — K21.9 GASTROESOPHAGEAL REFLUX DISEASE, UNSPECIFIED WHETHER ESOPHAGITIS PRESENT: ICD-10-CM

## 2021-12-28 DIAGNOSIS — Z23 IMMUNIZATION DUE: ICD-10-CM

## 2021-12-28 DIAGNOSIS — Z91.89 AT RISK FOR SIDE EFFECT OF MEDICATION: ICD-10-CM

## 2021-12-28 DIAGNOSIS — E78.5 DYSLIPIDEMIA: ICD-10-CM

## 2021-12-28 PROCEDURE — G0439 PPPS, SUBSEQ VISIT: HCPCS | Performed by: NURSE PRACTITIONER

## 2021-12-28 PROCEDURE — 99214 OFFICE O/P EST MOD 30 MIN: CPT | Performed by: NURSE PRACTITIONER

## 2021-12-28 PROCEDURE — 85027 COMPLETE CBC AUTOMATED: CPT | Performed by: NURSE PRACTITIONER

## 2021-12-28 PROCEDURE — 82607 VITAMIN B-12: CPT | Performed by: NURSE PRACTITIONER

## 2021-12-28 PROCEDURE — 80061 LIPID PANEL: CPT | Performed by: NURSE PRACTITIONER

## 2021-12-28 PROCEDURE — 36415 COLL VENOUS BLD VENIPUNCTURE: CPT | Performed by: NURSE PRACTITIONER

## 2021-12-28 PROCEDURE — 83735 ASSAY OF MAGNESIUM: CPT | Performed by: NURSE PRACTITIONER

## 2021-12-28 PROCEDURE — 10002800 HB RX 250 W HCPCS: Performed by: NURSE PRACTITIONER

## 2021-12-28 PROCEDURE — 99211 OFF/OP EST MAY X REQ PHY/QHP: CPT

## 2021-12-28 PROCEDURE — 90750 HZV VACC RECOMBINANT IM: CPT | Performed by: NURSE PRACTITIONER

## 2021-12-28 PROCEDURE — 80053 COMPREHEN METABOLIC PANEL: CPT | Performed by: NURSE PRACTITIONER

## 2021-12-28 PROCEDURE — 90471 IMMUNIZATION ADMIN: CPT | Performed by: NURSE PRACTITIONER

## 2021-12-28 RX ORDER — HYDROCHLOROTHIAZIDE 25 MG/1
TABLET ORAL
Qty: 90 TABLET | Refills: 0 | Status: SHIPPED | OUTPATIENT
Start: 2021-12-28 | End: 2021-12-28 | Stop reason: SDUPTHER

## 2021-12-28 RX ORDER — SIMVASTATIN 40 MG
40 TABLET ORAL NIGHTLY
Qty: 90 TABLET | Refills: 3 | Status: SHIPPED | OUTPATIENT
Start: 2021-12-28 | End: 2022-06-28

## 2021-12-28 RX ORDER — HYDROCHLOROTHIAZIDE 25 MG/1
25 TABLET ORAL DAILY
Qty: 90 TABLET | Refills: 3 | Status: SHIPPED | OUTPATIENT
Start: 2021-12-28 | End: 2022-10-26 | Stop reason: SDUPTHER

## 2021-12-28 RX ORDER — ASPIRIN 81 MG/1
81 TABLET ORAL DAILY
Qty: 90 TABLET | Refills: 3 | Status: SHIPPED | OUTPATIENT
Start: 2021-12-28

## 2021-12-28 RX ORDER — PANTOPRAZOLE SODIUM 20 MG/1
20 TABLET, DELAYED RELEASE ORAL
Qty: 90 TABLET | Refills: 3 | Status: SHIPPED | OUTPATIENT
Start: 2021-12-28 | End: 2022-10-26 | Stop reason: SDUPTHER

## 2021-12-28 RX ADMIN — ZOSTER VACCINE RECOMBINANT, ADJUVANTED 0.5 ML: KIT at 11:37

## 2021-12-28 ASSESSMENT — PATIENT HEALTH QUESTIONNAIRE - PHQ9
1. LITTLE INTEREST OR PLEASURE IN DOING THINGS: NOT AT ALL
CLINICAL INTERPRETATION OF PHQ2 SCORE: NO FURTHER SCREENING NEEDED
SUM OF ALL RESPONSES TO PHQ9 QUESTIONS 1 AND 2: 0
2. FEELING DOWN, DEPRESSED OR HOPELESS: NOT AT ALL
SUM OF ALL RESPONSES TO PHQ9 QUESTIONS 1 AND 2: 0

## 2021-12-29 ENCOUNTER — APPOINTMENT (OUTPATIENT)
Dept: LAB | Age: 66
End: 2021-12-29
Attending: NURSE PRACTITIONER

## 2021-12-29 ENCOUNTER — TELEPHONE (OUTPATIENT)
Dept: INTERNAL MEDICINE | Age: 66
End: 2021-12-29

## 2021-12-29 LAB
ALBUMIN SERPL-MCNC: 4 G/DL (ref 3.6–5.1)
ALBUMIN/GLOB SERPL: 1.1 {RATIO} (ref 1–2.4)
ALP SERPL-CCNC: 91 UNITS/L (ref 45–117)
ALT SERPL-CCNC: 35 UNITS/L
ANION GAP SERPL CALC-SCNC: 11 MMOL/L (ref 10–20)
APPEARANCE UR: NORMAL
AST SERPL-CCNC: 19 UNITS/L
BILIRUB SERPL-MCNC: 0.6 MG/DL (ref 0.2–1)
BILIRUB UR QL STRIP: NEGATIVE
BUN SERPL-MCNC: 9 MG/DL (ref 6–20)
BUN/CREAT SERPL: 10 (ref 7–25)
CALCIUM SERPL-MCNC: 9.3 MG/DL (ref 8.4–10.2)
CHLORIDE SERPL-SCNC: 108 MMOL/L (ref 98–107)
CHOLEST SERPL-MCNC: 204 MG/DL
CHOLEST/HDLC SERPL: 3.6 {RATIO}
CO2 SERPL-SCNC: 26 MMOL/L (ref 21–32)
COLOR UR: YELLOW
CREAT SERPL-MCNC: 0.92 MG/DL (ref 0.67–1.17)
CREAT UR-MCNC: 193 MG/DL
DEPRECATED RDW RBC: 39.8 FL (ref 39–50)
ERYTHROCYTE [DISTWIDTH] IN BLOOD: 12.1 % (ref 11–15)
FASTING DURATION TIME PATIENT: ABNORMAL H
FASTING DURATION TIME PATIENT: ABNORMAL H
FOLATE SERPL-MCNC: 12.6 NG/ML
GFR SERPLBLD BASED ON 1.73 SQ M-ARVRAT: >90 ML/MIN
GLOBULIN SER-MCNC: 3.8 G/DL (ref 2–4)
GLUCOSE SERPL-MCNC: 93 MG/DL (ref 70–99)
GLUCOSE UR STRIP-MCNC: NEGATIVE MG/DL
HCT VFR BLD CALC: 46.3 % (ref 39–51)
HDLC SERPL-MCNC: 56 MG/DL
HGB BLD-MCNC: 15.5 G/DL (ref 13–17)
HGB UR QL STRIP: NEGATIVE
KETONES UR STRIP-MCNC: NEGATIVE MG/DL
LDLC SERPL CALC-MCNC: 111 MG/DL
LEUKOCYTE ESTERASE UR QL STRIP: NEGATIVE
MAGNESIUM SERPL-MCNC: 2.4 MG/DL (ref 1.7–2.4)
MCH RBC QN AUTO: 30 PG (ref 26–34)
MCHC RBC AUTO-ENTMCNC: 33.5 G/DL (ref 32–36.5)
MCV RBC AUTO: 89.7 FL (ref 78–100)
MICROALBUMIN UR-MCNC: 0.83 MG/DL
MICROALBUMIN/CREAT UR: 4.3 MG/G
NITRITE UR QL STRIP: NEGATIVE
NONHDLC SERPL-MCNC: 148 MG/DL
NRBC BLD MANUAL-RTO: 0 /100 WBC
PH UR STRIP: 6 [PH] (ref 5–7)
PLATELET # BLD AUTO: 172 K/MCL (ref 140–450)
POTASSIUM SERPL-SCNC: 4.6 MMOL/L (ref 3.4–5.1)
PROT SERPL-MCNC: 7.8 G/DL (ref 6.4–8.2)
PROT UR STRIP-MCNC: NEGATIVE MG/DL
RBC # BLD: 5.16 MIL/MCL (ref 4.5–5.9)
SODIUM SERPL-SCNC: 140 MMOL/L (ref 135–145)
SP GR UR STRIP: 1.02 (ref 1–1.03)
TRIGL SERPL-MCNC: 183 MG/DL
UROBILINOGEN UR STRIP-MCNC: 0.2 MG/DL
VIT B12 SERPL-MCNC: 670 PG/ML (ref 211–911)
WBC # BLD: 5.5 K/MCL (ref 4.2–11)

## 2021-12-29 PROCEDURE — 82570 ASSAY OF URINE CREATININE: CPT | Performed by: NURSE PRACTITIONER

## 2021-12-29 PROCEDURE — 81003 URINALYSIS AUTO W/O SCOPE: CPT | Performed by: NURSE PRACTITIONER

## 2022-04-07 ENCOUNTER — TELEPHONE (OUTPATIENT)
Dept: INTERNAL MEDICINE | Age: 67
End: 2022-04-07

## 2022-04-11 ENCOUNTER — TRANSCRIBE ORDERS (OUTPATIENT)
Dept: ADMINISTRATIVE | Facility: HOSPITAL | Age: 67
End: 2022-04-11

## 2022-04-11 ENCOUNTER — LAB (OUTPATIENT)
Dept: LAB | Facility: HOSPITAL | Age: 67
End: 2022-04-11

## 2022-04-11 DIAGNOSIS — Z00.00 ROUTINE GENERAL MEDICAL EXAMINATION AT A HEALTH CARE FACILITY: ICD-10-CM

## 2022-04-11 DIAGNOSIS — Z12.5 SPECIAL SCREENING FOR MALIGNANT NEOPLASM OF PROSTATE: ICD-10-CM

## 2022-04-11 DIAGNOSIS — Z00.00 ROUTINE GENERAL MEDICAL EXAMINATION AT A HEALTH CARE FACILITY: Primary | ICD-10-CM

## 2022-04-11 LAB
ALBUMIN SERPL-MCNC: 4.1 G/DL (ref 3.5–5.2)
ALBUMIN/GLOB SERPL: 1.8 G/DL
ALP SERPL-CCNC: 116 U/L (ref 39–117)
ALT SERPL W P-5'-P-CCNC: 11 U/L (ref 1–41)
ANION GAP SERPL CALCULATED.3IONS-SCNC: 9 MMOL/L (ref 5–15)
AST SERPL-CCNC: 14 U/L (ref 1–40)
BASOPHILS # BLD AUTO: 0.05 10*3/MM3 (ref 0–0.2)
BASOPHILS NFR BLD AUTO: 0.5 % (ref 0–1.5)
BILIRUB SERPL-MCNC: 0.5 MG/DL (ref 0–1.2)
BUN SERPL-MCNC: 10 MG/DL (ref 8–23)
BUN/CREAT SERPL: 9.1 (ref 7–25)
CALCIUM SPEC-SCNC: 8.8 MG/DL (ref 8.6–10.5)
CHLORIDE SERPL-SCNC: 107 MMOL/L (ref 98–107)
CHOLEST SERPL-MCNC: 130 MG/DL (ref 0–200)
CO2 SERPL-SCNC: 26 MMOL/L (ref 22–29)
CREAT SERPL-MCNC: 1.1 MG/DL (ref 0.76–1.27)
DEPRECATED RDW RBC AUTO: 40.1 FL (ref 37–54)
EGFRCR SERPLBLD CKD-EPI 2021: 74 ML/MIN/1.73
EOSINOPHIL # BLD AUTO: 0.21 10*3/MM3 (ref 0–0.4)
EOSINOPHIL NFR BLD AUTO: 1.9 % (ref 0.3–6.2)
ERYTHROCYTE [DISTWIDTH] IN BLOOD BY AUTOMATED COUNT: 12.7 % (ref 12.3–15.4)
GLOBULIN UR ELPH-MCNC: 2.3 GM/DL
GLUCOSE SERPL-MCNC: 113 MG/DL (ref 65–99)
HCT VFR BLD AUTO: 44.2 % (ref 37.5–51)
HDLC SERPL-MCNC: 42 MG/DL (ref 40–60)
HGB BLD-MCNC: 15.6 G/DL (ref 13–17.7)
IMM GRANULOCYTES # BLD AUTO: 0.04 10*3/MM3 (ref 0–0.05)
IMM GRANULOCYTES NFR BLD AUTO: 0.4 % (ref 0–0.5)
LDLC SERPL CALC-MCNC: 72 MG/DL (ref 0–100)
LDLC/HDLC SERPL: 1.72 {RATIO}
LYMPHOCYTES # BLD AUTO: 3.24 10*3/MM3 (ref 0.7–3.1)
LYMPHOCYTES NFR BLD AUTO: 29.9 % (ref 19.6–45.3)
MCH RBC QN AUTO: 31 PG (ref 26.6–33)
MCHC RBC AUTO-ENTMCNC: 35.3 G/DL (ref 31.5–35.7)
MCV RBC AUTO: 87.7 FL (ref 79–97)
MONOCYTES # BLD AUTO: 0.71 10*3/MM3 (ref 0.1–0.9)
MONOCYTES NFR BLD AUTO: 6.6 % (ref 5–12)
NEUTROPHILS NFR BLD AUTO: 6.58 10*3/MM3 (ref 1.7–7)
NEUTROPHILS NFR BLD AUTO: 60.7 % (ref 42.7–76)
NRBC BLD AUTO-RTO: 0 /100 WBC (ref 0–0.2)
PLATELET # BLD AUTO: 323 10*3/MM3 (ref 140–450)
PMV BLD AUTO: 9.4 FL (ref 6–12)
POTASSIUM SERPL-SCNC: 4.1 MMOL/L (ref 3.5–5.2)
PROT SERPL-MCNC: 6.4 G/DL (ref 6–8.5)
PSA SERPL-MCNC: 0.61 NG/ML (ref 0–4)
RBC # BLD AUTO: 5.04 10*6/MM3 (ref 4.14–5.8)
SODIUM SERPL-SCNC: 142 MMOL/L (ref 136–145)
TRIGL SERPL-MCNC: 78 MG/DL (ref 0–150)
VLDLC SERPL-MCNC: 16 MG/DL (ref 5–40)
WBC NRBC COR # BLD: 10.83 10*3/MM3 (ref 3.4–10.8)

## 2022-04-11 PROCEDURE — 85025 COMPLETE CBC W/AUTO DIFF WBC: CPT

## 2022-04-11 PROCEDURE — G0103 PSA SCREENING: HCPCS

## 2022-04-11 PROCEDURE — 36415 COLL VENOUS BLD VENIPUNCTURE: CPT

## 2022-04-11 PROCEDURE — 80061 LIPID PANEL: CPT

## 2022-04-11 PROCEDURE — 80053 COMPREHEN METABOLIC PANEL: CPT

## 2022-04-21 ENCOUNTER — LAB (OUTPATIENT)
Dept: LAB | Facility: HOSPITAL | Age: 67
End: 2022-04-21

## 2022-04-21 ENCOUNTER — TRANSCRIBE ORDERS (OUTPATIENT)
Dept: ADMINISTRATIVE | Facility: HOSPITAL | Age: 67
End: 2022-04-21

## 2022-04-21 DIAGNOSIS — E29.1 3-OXO-5 ALPHA-STEROID DELTA 4-DEHYDROGENASE DEFICIENCY: ICD-10-CM

## 2022-04-21 DIAGNOSIS — E55.9 VITAMIN D DEFICIENCY: ICD-10-CM

## 2022-04-21 DIAGNOSIS — E29.1 3-OXO-5 ALPHA-STEROID DELTA 4-DEHYDROGENASE DEFICIENCY: Primary | ICD-10-CM

## 2022-04-21 DIAGNOSIS — R53.83 TIREDNESS: ICD-10-CM

## 2022-04-21 LAB
25(OH)D3 SERPL-MCNC: 32.7 NG/ML (ref 30–100)
FOLATE SERPL-MCNC: >20 NG/ML (ref 4.78–24.2)
T4 FREE SERPL-MCNC: 1.08 NG/DL (ref 0.93–1.7)
TESTOST SERPL-MCNC: 510 NG/DL (ref 193–740)
TSH SERPL DL<=0.05 MIU/L-ACNC: 1.49 UIU/ML (ref 0.27–4.2)
VIT B12 BLD-MCNC: 358 PG/ML (ref 211–946)

## 2022-04-21 PROCEDURE — 84439 ASSAY OF FREE THYROXINE: CPT

## 2022-04-21 PROCEDURE — 82607 VITAMIN B-12: CPT

## 2022-04-21 PROCEDURE — 84443 ASSAY THYROID STIM HORMONE: CPT

## 2022-04-21 PROCEDURE — 84403 ASSAY OF TOTAL TESTOSTERONE: CPT

## 2022-04-21 PROCEDURE — 36415 COLL VENOUS BLD VENIPUNCTURE: CPT

## 2022-04-21 PROCEDURE — 82306 VITAMIN D 25 HYDROXY: CPT

## 2022-04-21 PROCEDURE — 82746 ASSAY OF FOLIC ACID SERUM: CPT

## 2022-05-17 ENCOUNTER — TELEPHONE (OUTPATIENT)
Dept: TELEHEALTH | Age: 67
End: 2022-05-17

## 2022-05-17 ENCOUNTER — HOSPITAL ENCOUNTER (EMERGENCY)
Age: 67
Discharge: HOME | End: 2022-05-17
Attending: EMERGENCY MEDICINE

## 2022-05-17 ENCOUNTER — APPOINTMENT (OUTPATIENT)
Dept: CT IMAGING | Age: 67
End: 2022-05-17
Attending: EMERGENCY MEDICINE

## 2022-05-17 VITALS
OXYGEN SATURATION: 97 % | RESPIRATION RATE: 18 BRPM | WEIGHT: 220 LBS | DIASTOLIC BLOOD PRESSURE: 79 MMHG | TEMPERATURE: 99.1 F | HEART RATE: 78 BPM | BODY MASS INDEX: 37.76 KG/M2 | SYSTOLIC BLOOD PRESSURE: 132 MMHG

## 2022-05-17 DIAGNOSIS — R19.7 DIARRHEA, UNSPECIFIED TYPE: ICD-10-CM

## 2022-05-17 DIAGNOSIS — R53.81 MALAISE AND FATIGUE: ICD-10-CM

## 2022-05-17 DIAGNOSIS — R11.2 NAUSEA AND VOMITING IN ADULT: Primary | ICD-10-CM

## 2022-05-17 DIAGNOSIS — R53.83 MALAISE AND FATIGUE: ICD-10-CM

## 2022-05-17 LAB
ALBUMIN SERPL-MCNC: 3.7 G/DL (ref 3.6–5.1)
ALBUMIN/GLOB SERPL: 0.9 {RATIO} (ref 1–2.4)
ALP SERPL-CCNC: 93 UNITS/L (ref 45–117)
ALT SERPL-CCNC: 41 UNITS/L
ANION GAP SERPL CALC-SCNC: 14 MMOL/L (ref 10–20)
AST SERPL-CCNC: 49 UNITS/L
BASOPHILS # BLD: 0 K/MCL (ref 0–0.3)
BASOPHILS NFR BLD: 1 %
BILIRUB SERPL-MCNC: 0.6 MG/DL (ref 0.2–1)
BUN SERPL-MCNC: 11 MG/DL (ref 6–20)
BUN/CREAT SERPL: 10 (ref 7–25)
CALCIUM SERPL-MCNC: 8.6 MG/DL (ref 8.4–10.2)
CHLORIDE SERPL-SCNC: 102 MMOL/L (ref 98–107)
CO2 SERPL-SCNC: 24 MMOL/L (ref 21–32)
CREAT SERPL-MCNC: 1.1 MG/DL (ref 0.67–1.17)
DEPRECATED RDW RBC: 35.3 FL (ref 39–50)
EOSINOPHIL # BLD: 0 K/MCL (ref 0–0.5)
EOSINOPHIL NFR BLD: 1 %
ERYTHROCYTE [DISTWIDTH] IN BLOOD: 11.6 % (ref 11–15)
FASTING DURATION TIME PATIENT: ABNORMAL H
FLUAV RNA RESP QL NAA+PROBE: NOT DETECTED
FLUBV RNA RESP QL NAA+PROBE: NOT DETECTED
GFR SERPLBLD BASED ON 1.73 SQ M-ARVRAT: 74 ML/MIN
GLOBULIN SER-MCNC: 4.1 G/DL (ref 2–4)
GLUCOSE SERPL-MCNC: 119 MG/DL (ref 70–99)
HCT VFR BLD CALC: 44.3 % (ref 39–51)
HGB BLD-MCNC: 15.6 G/DL (ref 13–17)
IMM GRANULOCYTES # BLD AUTO: 0 K/MCL (ref 0–0.2)
IMM GRANULOCYTES # BLD: 1 %
LIPASE SERPL-CCNC: 102 UNITS/L (ref 73–393)
LYMPHOCYTES # BLD: 0.8 K/MCL (ref 1–4)
LYMPHOCYTES NFR BLD: 47 %
MAGNESIUM SERPL-MCNC: 2.2 MG/DL (ref 1.7–2.4)
MCH RBC QN AUTO: 29.7 PG (ref 26–34)
MCHC RBC AUTO-ENTMCNC: 35.2 G/DL (ref 32–36.5)
MCV RBC AUTO: 84.2 FL (ref 78–100)
MONOCYTES # BLD: 0.3 K/MCL (ref 0.3–0.9)
MONOCYTES NFR BLD: 17 %
NEUTROPHILS # BLD: 0.5 K/MCL (ref 1.8–7.7)
NEUTROPHILS NFR BLD: 33 %
NRBC BLD MANUAL-RTO: 0 /100 WBC
PLATELET # BLD AUTO: 100 K/MCL (ref 140–450)
POTASSIUM SERPL-SCNC: 4.1 MMOL/L (ref 3.4–5.1)
PROT SERPL-MCNC: 7.8 G/DL (ref 6.4–8.2)
RBC # BLD: 5.26 MIL/MCL (ref 4.5–5.9)
SARS-COV-2 RNA RESP QL NAA+PROBE: NOT DETECTED
SERVICE CMNT-IMP: NORMAL
SERVICE CMNT-IMP: NORMAL
SODIUM SERPL-SCNC: 136 MMOL/L (ref 135–145)
TROPONIN I SERPL DL<=0.01 NG/ML-MCNC: 9 NG/L
WBC # BLD: 1.6 K/MCL (ref 4.2–11)

## 2022-05-17 PROCEDURE — 93005 ELECTROCARDIOGRAM TRACING: CPT | Performed by: EMERGENCY MEDICINE

## 2022-05-17 PROCEDURE — 96361 HYDRATE IV INFUSION ADD-ON: CPT

## 2022-05-17 PROCEDURE — 96360 HYDRATION IV INFUSION INIT: CPT

## 2022-05-17 PROCEDURE — 36415 COLL VENOUS BLD VENIPUNCTURE: CPT

## 2022-05-17 PROCEDURE — 99284 EMERGENCY DEPT VISIT MOD MDM: CPT

## 2022-05-17 PROCEDURE — 99285 EMERGENCY DEPT VISIT HI MDM: CPT | Performed by: EMERGENCY MEDICINE

## 2022-05-17 PROCEDURE — 83690 ASSAY OF LIPASE: CPT | Performed by: EMERGENCY MEDICINE

## 2022-05-17 PROCEDURE — 84484 ASSAY OF TROPONIN QUANT: CPT | Performed by: EMERGENCY MEDICINE

## 2022-05-17 PROCEDURE — G1004 CDSM NDSC: HCPCS | Performed by: RADIOLOGY

## 2022-05-17 PROCEDURE — 80053 COMPREHEN METABOLIC PANEL: CPT | Performed by: EMERGENCY MEDICINE

## 2022-05-17 PROCEDURE — 74176 CT ABD & PELVIS W/O CONTRAST: CPT

## 2022-05-17 PROCEDURE — 96375 TX/PRO/DX INJ NEW DRUG ADDON: CPT

## 2022-05-17 PROCEDURE — 93010 ELECTROCARDIOGRAM REPORT: CPT | Performed by: EMERGENCY MEDICINE

## 2022-05-17 PROCEDURE — 0240U SARS-COV-2/INFLUENZA BY PCR: CPT | Performed by: EMERGENCY MEDICINE

## 2022-05-17 PROCEDURE — 96374 THER/PROPH/DIAG INJ IV PUSH: CPT

## 2022-05-17 PROCEDURE — 83735 ASSAY OF MAGNESIUM: CPT | Performed by: EMERGENCY MEDICINE

## 2022-05-17 PROCEDURE — 10002807 HB RX 258: Performed by: EMERGENCY MEDICINE

## 2022-05-17 PROCEDURE — 74176 CT ABD & PELVIS W/O CONTRAST: CPT | Performed by: RADIOLOGY

## 2022-05-17 PROCEDURE — 10002800 HB RX 250 W HCPCS: Performed by: EMERGENCY MEDICINE

## 2022-05-17 PROCEDURE — G1004 CDSM NDSC: HCPCS

## 2022-05-17 PROCEDURE — 85025 COMPLETE CBC W/AUTO DIFF WBC: CPT | Performed by: EMERGENCY MEDICINE

## 2022-05-17 RX ORDER — ONDANSETRON 4 MG/1
4 TABLET, FILM COATED ORAL EVERY 8 HOURS PRN
Qty: 10 TABLET | Refills: 0 | Status: SHIPPED
Start: 2022-05-17 | End: 2022-05-20

## 2022-05-17 RX ORDER — LOPERAMIDE HYDROCHLORIDE 2 MG/1
2-4 TABLET ORAL 4 TIMES DAILY PRN
Qty: 10 TABLET | Refills: 0 | Status: SHIPPED
Start: 2022-05-17 | End: 2022-05-19

## 2022-05-17 RX ORDER — ACETAMINOPHEN 325 MG/1
650 TABLET ORAL EVERY 4 HOURS PRN
Qty: 20 TABLET | Refills: 0 | Status: SHIPPED
Start: 2022-05-17 | End: 2022-05-20

## 2022-05-17 RX ORDER — ONDANSETRON 2 MG/ML
4 INJECTION INTRAMUSCULAR; INTRAVENOUS ONCE
Status: COMPLETED | OUTPATIENT
Start: 2022-05-17 | End: 2022-05-17

## 2022-05-17 RX ADMIN — KETOROLAC TROMETHAMINE 15 MG: 30 INJECTION, SOLUTION INTRAMUSCULAR; INTRAVENOUS at 11:38

## 2022-05-17 RX ADMIN — ONDANSETRON 4 MG: 2 INJECTION INTRAMUSCULAR; INTRAVENOUS at 11:38

## 2022-05-17 RX ADMIN — SODIUM CHLORIDE, POTASSIUM CHLORIDE, SODIUM LACTATE AND CALCIUM CHLORIDE 1000 ML: 600; 310; 30; 20 INJECTION, SOLUTION INTRAVENOUS at 11:36

## 2022-05-17 ASSESSMENT — ENCOUNTER SYMPTOMS
HEADACHES: 1
DIARRHEA: 1
VOMITING: 1
ABDOMINAL PAIN: 1
CHILLS: 1
NUMBNESS: 0
APPETITE CHANGE: 1
SHORTNESS OF BREATH: 0
WEAKNESS: 0
ACTIVITY CHANGE: 0
LIGHT-HEADEDNESS: 0
COUGH: 0
FEVER: 0
FATIGUE: 1
WOUND: 0
NAUSEA: 1

## 2022-05-17 ASSESSMENT — PAIN DESCRIPTION - PAIN TYPE: TYPE: ACUTE PAIN

## 2022-05-17 ASSESSMENT — PAIN SCALES - GENERAL
PAINLEVEL_OUTOF10: 6
PAINLEVEL_OUTOF10: 7

## 2022-05-18 LAB
ATRIAL RATE (BPM): 66
P AXIS (DEGREES): 72
PR-INTERVAL (MSEC): 176
QRS-INTERVAL (MSEC): 94
QT-INTERVAL (MSEC): 402
QTC: 421
R AXIS (DEGREES): 60
REPORT TEXT: NORMAL
T AXIS (DEGREES): 53
VENTRICULAR RATE EKG/MIN (BPM): 66

## 2022-05-20 ENCOUNTER — OFFICE VISIT (OUTPATIENT)
Dept: INTERNAL MEDICINE | Age: 67
End: 2022-05-20
Attending: STUDENT IN AN ORGANIZED HEALTH CARE EDUCATION/TRAINING PROGRAM

## 2022-05-20 VITALS
BODY MASS INDEX: 36.43 KG/M2 | HEIGHT: 64 IN | DIASTOLIC BLOOD PRESSURE: 73 MMHG | WEIGHT: 213.4 LBS | RESPIRATION RATE: 12 BRPM | HEART RATE: 67 BPM | TEMPERATURE: 96.9 F | SYSTOLIC BLOOD PRESSURE: 129 MMHG | OXYGEN SATURATION: 99 %

## 2022-05-20 DIAGNOSIS — K52.9 GASTROENTERITIS: Primary | ICD-10-CM

## 2022-05-20 PROCEDURE — 99213 OFFICE O/P EST LOW 20 MIN: CPT | Performed by: STUDENT IN AN ORGANIZED HEALTH CARE EDUCATION/TRAINING PROGRAM

## 2022-05-20 ASSESSMENT — PATIENT HEALTH QUESTIONNAIRE - PHQ9
CLINICAL INTERPRETATION OF PHQ2 SCORE: NO FURTHER SCREENING NEEDED
1. LITTLE INTEREST OR PLEASURE IN DOING THINGS: NOT AT ALL
2. FEELING DOWN, DEPRESSED OR HOPELESS: NOT AT ALL
SUM OF ALL RESPONSES TO PHQ9 QUESTIONS 1 AND 2: 0
SUM OF ALL RESPONSES TO PHQ9 QUESTIONS 1 AND 2: 0

## 2022-06-28 DIAGNOSIS — E78.5 DYSLIPIDEMIA: ICD-10-CM

## 2022-06-28 RX ORDER — SIMVASTATIN 40 MG
40 TABLET ORAL NIGHTLY
Qty: 90 TABLET | Refills: 1 | Status: SHIPPED | OUTPATIENT
Start: 2022-06-28 | End: 2022-10-26 | Stop reason: SDUPTHER

## 2022-10-14 ENCOUNTER — TRANSCRIBE ORDERS (OUTPATIENT)
Dept: ADMINISTRATIVE | Facility: HOSPITAL | Age: 67
End: 2022-10-14

## 2022-10-14 ENCOUNTER — LAB (OUTPATIENT)
Dept: LAB | Facility: HOSPITAL | Age: 67
End: 2022-10-14

## 2022-10-14 DIAGNOSIS — I10 ESSENTIAL HYPERTENSION, MALIGNANT: ICD-10-CM

## 2022-10-14 DIAGNOSIS — E78.5 HYPERLIPIDEMIA, UNSPECIFIED HYPERLIPIDEMIA TYPE: Primary | ICD-10-CM

## 2022-10-14 DIAGNOSIS — E78.5 HYPERLIPIDEMIA, UNSPECIFIED HYPERLIPIDEMIA TYPE: ICD-10-CM

## 2022-10-14 LAB
ALBUMIN SERPL-MCNC: 4.1 G/DL (ref 3.5–5.2)
ALBUMIN/GLOB SERPL: 1.6 G/DL
ALP SERPL-CCNC: 113 U/L (ref 39–117)
ALT SERPL W P-5'-P-CCNC: 24 U/L (ref 1–41)
ANION GAP SERPL CALCULATED.3IONS-SCNC: 7.9 MMOL/L (ref 5–15)
AST SERPL-CCNC: 10 U/L (ref 1–40)
BILIRUB SERPL-MCNC: 0.6 MG/DL (ref 0–1.2)
BUN SERPL-MCNC: 8 MG/DL (ref 8–23)
BUN/CREAT SERPL: 7.5 (ref 7–25)
CALCIUM SPEC-SCNC: 9.7 MG/DL (ref 8.6–10.5)
CHLORIDE SERPL-SCNC: 104 MMOL/L (ref 98–107)
CHOLEST SERPL-MCNC: 126 MG/DL (ref 0–200)
CO2 SERPL-SCNC: 28.1 MMOL/L (ref 22–29)
CREAT SERPL-MCNC: 1.06 MG/DL (ref 0.76–1.27)
EGFRCR SERPLBLD CKD-EPI 2021: 76.9 ML/MIN/1.73
GLOBULIN UR ELPH-MCNC: 2.5 GM/DL
GLUCOSE SERPL-MCNC: 106 MG/DL (ref 65–99)
HDLC SERPL-MCNC: 43 MG/DL (ref 40–60)
LDLC SERPL CALC-MCNC: 65 MG/DL (ref 0–100)
LDLC/HDLC SERPL: 1.48 {RATIO}
POTASSIUM SERPL-SCNC: 4.4 MMOL/L (ref 3.5–5.2)
PROT SERPL-MCNC: 6.6 G/DL (ref 6–8.5)
SODIUM SERPL-SCNC: 140 MMOL/L (ref 136–145)
TRIGL SERPL-MCNC: 96 MG/DL (ref 0–150)
VLDLC SERPL-MCNC: 18 MG/DL (ref 5–40)

## 2022-10-14 PROCEDURE — 36415 COLL VENOUS BLD VENIPUNCTURE: CPT

## 2022-10-14 PROCEDURE — 80053 COMPREHEN METABOLIC PANEL: CPT

## 2022-10-14 PROCEDURE — 80061 LIPID PANEL: CPT

## 2022-10-26 ENCOUNTER — OFFICE VISIT (OUTPATIENT)
Dept: INTERNAL MEDICINE | Age: 67
End: 2022-10-26
Attending: NURSE PRACTITIONER

## 2022-10-26 VITALS
OXYGEN SATURATION: 96 % | SYSTOLIC BLOOD PRESSURE: 136 MMHG | HEIGHT: 64 IN | DIASTOLIC BLOOD PRESSURE: 82 MMHG | TEMPERATURE: 97.9 F | HEART RATE: 88 BPM | WEIGHT: 214.7 LBS | BODY MASS INDEX: 36.66 KG/M2

## 2022-10-26 DIAGNOSIS — K21.9 GASTROESOPHAGEAL REFLUX DISEASE, UNSPECIFIED WHETHER ESOPHAGITIS PRESENT: ICD-10-CM

## 2022-10-26 DIAGNOSIS — I10 ESSENTIAL HYPERTENSION: Primary | ICD-10-CM

## 2022-10-26 DIAGNOSIS — E78.5 DYSLIPIDEMIA: ICD-10-CM

## 2022-10-26 DIAGNOSIS — Z23 NEED FOR VACCINATION: ICD-10-CM

## 2022-10-26 PROCEDURE — 10002800 HB RX 250 W HCPCS: Performed by: NURSE PRACTITIONER

## 2022-10-26 PROCEDURE — G0439 PPPS, SUBSEQ VISIT: HCPCS | Performed by: NURSE PRACTITIONER

## 2022-10-26 PROCEDURE — 90686 IIV4 VACC NO PRSV 0.5 ML IM: CPT | Performed by: NURSE PRACTITIONER

## 2022-10-26 PROCEDURE — 90471 IMMUNIZATION ADMIN: CPT | Performed by: NURSE PRACTITIONER

## 2022-10-26 PROCEDURE — 90750 HZV VACC RECOMBINANT IM: CPT | Performed by: NURSE PRACTITIONER

## 2022-10-26 PROCEDURE — 99214 OFFICE O/P EST MOD 30 MIN: CPT | Performed by: NURSE PRACTITIONER

## 2022-10-26 PROCEDURE — 90472 IMMUNIZATION ADMIN EACH ADD: CPT | Performed by: NURSE PRACTITIONER

## 2022-10-26 RX ORDER — SIMVASTATIN 40 MG
40 TABLET ORAL NIGHTLY
Qty: 90 TABLET | Refills: 0 | Status: SHIPPED | OUTPATIENT
Start: 2022-10-26 | End: 2023-01-27 | Stop reason: ALTCHOICE

## 2022-10-26 RX ORDER — PANTOPRAZOLE SODIUM 20 MG/1
20 TABLET, DELAYED RELEASE ORAL
Qty: 90 TABLET | Refills: 0 | Status: SHIPPED | OUTPATIENT
Start: 2022-10-26 | End: 2023-01-27 | Stop reason: SDUPTHER

## 2022-10-26 RX ORDER — HYDROCHLOROTHIAZIDE 25 MG/1
25 TABLET ORAL DAILY
Qty: 90 TABLET | Refills: 0 | Status: SHIPPED | OUTPATIENT
Start: 2022-10-26 | End: 2023-01-27 | Stop reason: SDUPTHER

## 2022-10-26 RX ADMIN — ZOSTER VACCINE RECOMBINANT, ADJUVANTED 0.5 ML: KIT at 14:59

## 2022-10-26 RX ADMIN — INFLUENZA VIRUS VACCINE 0.5 ML: 15; 15; 15; 15 SUSPENSION INTRAMUSCULAR at 14:58

## 2022-10-26 ASSESSMENT — PATIENT HEALTH QUESTIONNAIRE - PHQ9
CLINICAL INTERPRETATION OF PHQ2 SCORE: NO FURTHER SCREENING NEEDED
SUM OF ALL RESPONSES TO PHQ9 QUESTIONS 1 AND 2: 0
1. LITTLE INTEREST OR PLEASURE IN DOING THINGS: NOT AT ALL
2. FEELING DOWN, DEPRESSED OR HOPELESS: NOT AT ALL
SUM OF ALL RESPONSES TO PHQ9 QUESTIONS 1 AND 2: 0

## 2022-10-31 ENCOUNTER — TELEPHONE (OUTPATIENT)
Dept: INTERNAL MEDICINE | Age: 67
End: 2022-10-31

## 2022-10-31 ENCOUNTER — LAB SERVICES (OUTPATIENT)
Dept: LAB | Age: 67
End: 2022-10-31
Attending: NURSE PRACTITIONER

## 2022-10-31 DIAGNOSIS — E78.5 DYSLIPIDEMIA: ICD-10-CM

## 2022-10-31 DIAGNOSIS — I10 ESSENTIAL HYPERTENSION: ICD-10-CM

## 2022-10-31 LAB
ANION GAP SERPL CALC-SCNC: 11 MMOL/L (ref 7–19)
BUN SERPL-MCNC: 12 MG/DL (ref 6–20)
BUN/CREAT SERPL: 13 (ref 7–25)
CALCIUM SERPL-MCNC: 9.4 MG/DL (ref 8.4–10.2)
CHLORIDE SERPL-SCNC: 104 MMOL/L (ref 97–110)
CHOLEST SERPL-MCNC: 229 MG/DL
CHOLEST/HDLC SERPL: 4.4 {RATIO}
CO2 SERPL-SCNC: 26 MMOL/L (ref 21–32)
CREAT SERPL-MCNC: 0.92 MG/DL (ref 0.67–1.17)
FASTING DURATION TIME PATIENT: 13 HOURS (ref 0–999)
FASTING DURATION TIME PATIENT: 13 HOURS (ref 0–999)
GFR SERPLBLD BASED ON 1.73 SQ M-ARVRAT: >90 ML/MIN
GLUCOSE SERPL-MCNC: 100 MG/DL (ref 70–99)
HDLC SERPL-MCNC: 52 MG/DL
LDLC SERPL CALC-MCNC: 138 MG/DL
NONHDLC SERPL-MCNC: 177 MG/DL
POTASSIUM SERPL-SCNC: 4.1 MMOL/L (ref 3.4–5.1)
SODIUM SERPL-SCNC: 137 MMOL/L (ref 135–145)
TRIGL SERPL-MCNC: 193 MG/DL

## 2022-10-31 PROCEDURE — 36415 COLL VENOUS BLD VENIPUNCTURE: CPT

## 2022-10-31 PROCEDURE — 80061 LIPID PANEL: CPT

## 2022-10-31 PROCEDURE — 80048 BASIC METABOLIC PNL TOTAL CA: CPT

## 2023-01-26 ENCOUNTER — OFFICE VISIT (OUTPATIENT)
Dept: INTERNAL MEDICINE | Age: 68
End: 2023-01-26
Attending: INTERNAL MEDICINE

## 2023-01-26 ENCOUNTER — APPOINTMENT (OUTPATIENT)
Dept: LAB | Age: 68
End: 2023-01-26
Attending: INTERNAL MEDICINE

## 2023-01-26 VITALS
DIASTOLIC BLOOD PRESSURE: 86 MMHG | HEIGHT: 64 IN | SYSTOLIC BLOOD PRESSURE: 152 MMHG | WEIGHT: 220 LBS | OXYGEN SATURATION: 97 % | BODY MASS INDEX: 37.56 KG/M2 | TEMPERATURE: 97.5 F | HEART RATE: 63 BPM

## 2023-01-26 DIAGNOSIS — N40.0 BENIGN PROSTATIC HYPERPLASIA, UNSPECIFIED WHETHER LOWER URINARY TRACT SYMPTOMS PRESENT: ICD-10-CM

## 2023-01-26 DIAGNOSIS — Z23 IMMUNIZATION DUE: ICD-10-CM

## 2023-01-26 DIAGNOSIS — E66.01 SEVERE OBESITY (BMI 35.0-39.9) WITH COMORBIDITY (CMD): ICD-10-CM

## 2023-01-26 DIAGNOSIS — K21.9 GASTROESOPHAGEAL REFLUX DISEASE, UNSPECIFIED WHETHER ESOPHAGITIS PRESENT: ICD-10-CM

## 2023-01-26 DIAGNOSIS — I10 ESSENTIAL HYPERTENSION: ICD-10-CM

## 2023-01-26 DIAGNOSIS — E78.5 DYSLIPIDEMIA: ICD-10-CM

## 2023-01-26 DIAGNOSIS — Z00.00 MEDICARE ANNUAL WELLNESS VISIT, SUBSEQUENT: Primary | ICD-10-CM

## 2023-01-26 DIAGNOSIS — H61.23 BILATERAL IMPACTED CERUMEN: ICD-10-CM

## 2023-01-26 DIAGNOSIS — D69.6 THROMBOCYTOPENIA (CMD): ICD-10-CM

## 2023-01-26 DIAGNOSIS — Z78.9 ALCOHOL CONSUMPTION OF ONE TO FOUR DRINKS PER DAY: ICD-10-CM

## 2023-01-26 DIAGNOSIS — H53.8 BLURRED VISION: ICD-10-CM

## 2023-01-26 LAB
ALBUMIN SERPL-MCNC: 4.2 G/DL (ref 3.6–5.1)
ALBUMIN/GLOB SERPL: 1.2 {RATIO} (ref 1–2.4)
ALP SERPL-CCNC: 89 UNITS/L (ref 45–117)
ALT SERPL-CCNC: 34 UNITS/L
ANION GAP SERPL CALC-SCNC: 10 MMOL/L (ref 7–19)
AST SERPL-CCNC: 22 UNITS/L
BASOPHILS # BLD: 0.1 K/MCL (ref 0–0.3)
BASOPHILS NFR BLD: 1 %
BILIRUB SERPL-MCNC: 0.7 MG/DL (ref 0.2–1)
BUN SERPL-MCNC: 10 MG/DL (ref 6–20)
BUN/CREAT SERPL: 10 (ref 7–25)
CALCIUM SERPL-MCNC: 9.2 MG/DL (ref 8.4–10.2)
CHLORIDE SERPL-SCNC: 105 MMOL/L (ref 97–110)
CHOLEST SERPL-MCNC: 220 MG/DL
CHOLEST/HDLC SERPL: 3.5 {RATIO}
CO2 SERPL-SCNC: 26 MMOL/L (ref 21–32)
CREAT SERPL-MCNC: 0.96 MG/DL (ref 0.67–1.17)
DEPRECATED RDW RBC: 37.9 FL (ref 39–50)
EOSINOPHIL # BLD: 0.3 K/MCL (ref 0–0.5)
EOSINOPHIL NFR BLD: 5 %
ERYTHROCYTE [DISTWIDTH] IN BLOOD: 11.9 % (ref 11–15)
FASTING DURATION TIME PATIENT: ABNORMAL H
FASTING DURATION TIME PATIENT: NORMAL H
FOLATE SERPL-MCNC: 17.2 NG/ML
GFR SERPLBLD BASED ON 1.73 SQ M-ARVRAT: 87 ML/MIN
GLOBULIN SER-MCNC: 3.6 G/DL (ref 2–4)
GLUCOSE SERPL-MCNC: 96 MG/DL (ref 70–99)
HBA1C MFR BLD: 5.5 % (ref 4.5–5.6)
HCT VFR BLD CALC: 46.5 % (ref 39–51)
HDLC SERPL-MCNC: 63 MG/DL
HGB BLD-MCNC: 16 G/DL (ref 13–17)
IMM GRANULOCYTES # BLD AUTO: 0 K/MCL (ref 0–0.2)
IMM GRANULOCYTES # BLD: 0 %
LDLC SERPL CALC-MCNC: 116 MG/DL
LYMPHOCYTES # BLD: 2.9 K/MCL (ref 1–4)
LYMPHOCYTES NFR BLD: 48 %
MCH RBC QN AUTO: 30 PG (ref 26–34)
MCHC RBC AUTO-ENTMCNC: 34.4 G/DL (ref 32–36.5)
MCV RBC AUTO: 87.2 FL (ref 78–100)
MONOCYTES # BLD: 0.6 K/MCL (ref 0.3–0.9)
MONOCYTES NFR BLD: 10 %
NEUTROPHILS # BLD: 2.2 K/MCL (ref 1.8–7.7)
NEUTROPHILS NFR BLD: 36 %
NONHDLC SERPL-MCNC: 157 MG/DL
NRBC BLD MANUAL-RTO: 0 /100 WBC
PLATELET # BLD AUTO: 162 K/MCL (ref 140–450)
POTASSIUM SERPL-SCNC: 3.9 MMOL/L (ref 3.4–5.1)
PROT SERPL-MCNC: 7.8 G/DL (ref 6.4–8.2)
PSA SERPL-MCNC: 2.02 NG/ML
RBC # BLD: 5.33 MIL/MCL (ref 4.5–5.9)
SODIUM SERPL-SCNC: 137 MMOL/L (ref 135–145)
TRIGL SERPL-MCNC: 203 MG/DL
TSH SERPL-ACNC: 1.9 MCUNITS/ML (ref 0.35–5)
VIT B12 SERPL-MCNC: 916 PG/ML (ref 211–911)
WBC # BLD: 6 K/MCL (ref 4.2–11)

## 2023-01-26 PROCEDURE — 80053 COMPREHEN METABOLIC PANEL: CPT | Performed by: NURSE PRACTITIONER

## 2023-01-26 PROCEDURE — 85025 COMPLETE CBC W/AUTO DIFF WBC: CPT | Performed by: NURSE PRACTITIONER

## 2023-01-26 PROCEDURE — 83036 HEMOGLOBIN GLYCOSYLATED A1C: CPT | Performed by: NURSE PRACTITIONER

## 2023-01-26 PROCEDURE — 84153 ASSAY OF PSA TOTAL: CPT | Performed by: NURSE PRACTITIONER

## 2023-01-26 PROCEDURE — 80061 LIPID PANEL: CPT | Performed by: NURSE PRACTITIONER

## 2023-01-26 PROCEDURE — 90677 PCV20 VACCINE IM: CPT | Performed by: NURSE PRACTITIONER

## 2023-01-26 PROCEDURE — 84425 ASSAY OF VITAMIN B-1: CPT | Performed by: NURSE PRACTITIONER

## 2023-01-26 PROCEDURE — G0439 PPPS, SUBSEQ VISIT: HCPCS | Performed by: NURSE PRACTITIONER

## 2023-01-26 PROCEDURE — 84443 ASSAY THYROID STIM HORMONE: CPT | Performed by: NURSE PRACTITIONER

## 2023-01-26 PROCEDURE — 36415 COLL VENOUS BLD VENIPUNCTURE: CPT | Performed by: NURSE PRACTITIONER

## 2023-01-26 PROCEDURE — 82607 VITAMIN B-12: CPT | Performed by: NURSE PRACTITIONER

## 2023-01-26 PROCEDURE — 99214 OFFICE O/P EST MOD 30 MIN: CPT | Performed by: NURSE PRACTITIONER

## 2023-01-26 PROCEDURE — 10002800 HB RX 250 W HCPCS: Performed by: NURSE PRACTITIONER

## 2023-01-26 PROCEDURE — 90471 IMMUNIZATION ADMIN: CPT | Performed by: NURSE PRACTITIONER

## 2023-01-26 RX ADMIN — PNEUMOCOCCAL 20-VALENT CONJUGATE VACCINE 0.5 ML
2.2; 2.2; 2.2; 2.2; 2.2; 2.2; 2.2; 2.2; 2.2; 2.2; 2.2; 2.2; 2.2; 2.2; 2.2; 2.2; 4.4; 2.2; 2.2; 2.2 INJECTION, SUSPENSION INTRAMUSCULAR at 14:23

## 2023-01-27 ENCOUNTER — TELEPHONE (OUTPATIENT)
Dept: INTERNAL MEDICINE | Age: 68
End: 2023-01-27

## 2023-01-27 DIAGNOSIS — K21.9 GASTROESOPHAGEAL REFLUX DISEASE, UNSPECIFIED WHETHER ESOPHAGITIS PRESENT: ICD-10-CM

## 2023-01-27 DIAGNOSIS — E78.5 DYSLIPIDEMIA: Primary | ICD-10-CM

## 2023-01-27 DIAGNOSIS — I10 ESSENTIAL HYPERTENSION: ICD-10-CM

## 2023-01-27 RX ORDER — HYDROCHLOROTHIAZIDE 25 MG/1
25 TABLET ORAL DAILY
Qty: 90 TABLET | Refills: 3 | Status: SHIPPED | OUTPATIENT
Start: 2023-01-27 | End: 2023-11-08

## 2023-01-27 RX ORDER — PANTOPRAZOLE SODIUM 20 MG/1
20 TABLET, DELAYED RELEASE ORAL
Qty: 90 TABLET | Refills: 1 | Status: SHIPPED | OUTPATIENT
Start: 2023-01-27

## 2023-01-27 RX ORDER — ATORVASTATIN CALCIUM 20 MG/1
20 TABLET, FILM COATED ORAL AT BEDTIME
Qty: 90 TABLET | Refills: 3 | Status: SHIPPED | OUTPATIENT
Start: 2023-01-27

## 2023-01-31 ENCOUNTER — APPOINTMENT (OUTPATIENT)
Dept: CT IMAGING | Age: 68
End: 2023-01-31
Attending: PHYSICIAN ASSISTANT

## 2023-01-31 ENCOUNTER — HOSPITAL ENCOUNTER (EMERGENCY)
Age: 68
Discharge: HOME | End: 2023-01-31

## 2023-01-31 ENCOUNTER — APPOINTMENT (OUTPATIENT)
Dept: GENERAL RADIOLOGY | Age: 68
End: 2023-01-31
Attending: PHYSICIAN ASSISTANT

## 2023-01-31 VITALS
DIASTOLIC BLOOD PRESSURE: 83 MMHG | WEIGHT: 219 LBS | TEMPERATURE: 97.3 F | BODY MASS INDEX: 37.59 KG/M2 | RESPIRATION RATE: 18 BRPM | OXYGEN SATURATION: 94 % | HEART RATE: 63 BPM | SYSTOLIC BLOOD PRESSURE: 147 MMHG

## 2023-01-31 DIAGNOSIS — R93.7 ABNORMAL CT SCAN, CERVICAL SPINE: ICD-10-CM

## 2023-01-31 DIAGNOSIS — M54.2 NECK PAIN: ICD-10-CM
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
## 2023-01-31 PROCEDURE — 70450 CT HEAD/BRAIN W/O DYE: CPT

## 2023-01-31 PROCEDURE — 99284 EMERGENCY DEPT VISIT MOD MDM: CPT

## 2023-01-31 PROCEDURE — 71046 X-RAY EXAM CHEST 2 VIEWS: CPT

## 2023-01-31 PROCEDURE — 71046 X-RAY EXAM CHEST 2 VIEWS: CPT | Performed by: RADIOLOGY

## 2023-01-31 PROCEDURE — 72125 CT NECK SPINE W/O DYE: CPT | Performed by: RADIOLOGY

## 2023-01-31 PROCEDURE — G1004 CDSM NDSC: HCPCS

## 2023-01-31 PROCEDURE — 10004651 HB RX, NO CHARGE ITEM: Performed by: PHYSICIAN ASSISTANT

## 2023-01-31 PROCEDURE — 10002803 HB RX 637: Performed by: PHYSICIAN ASSISTANT

## 2023-01-31 PROCEDURE — 99284 EMERGENCY DEPT VISIT MOD MDM: CPT | Performed by: PHYSICIAN ASSISTANT

## 2023-01-31 PROCEDURE — G1004 CDSM NDSC: HCPCS | Performed by: RADIOLOGY

## 2023-01-31 PROCEDURE — 72125 CT NECK SPINE W/O DYE: CPT

## 2023-01-31 PROCEDURE — 70450 CT HEAD/BRAIN W/O DYE: CPT | Performed by: RADIOLOGY

## 2023-01-31 RX ORDER — ACETAMINOPHEN 325 MG/1
650 TABLET ORAL ONCE
Status: COMPLETED | OUTPATIENT
Start: 2023-01-31 | End: 2023-01-31

## 2023-01-31 RX ORDER — LIDOCAINE 4 G/G
1 PATCH TOPICAL ONCE
Status: DISCONTINUED | OUTPATIENT
Start: 2023-01-31 | End: 2023-01-31 | Stop reason: HOSPADM

## 2023-01-31 RX ORDER — LIDOCAINE 50 MG/G
1 PATCH TOPICAL EVERY 24 HOURS
Qty: 15 PATCH | Refills: 0 | Status: SHIPPED | OUTPATIENT
Start: 2023-01-31

## 2023-01-31 RX ADMIN — LIDOCAINE 1 PATCH: 4 PATCH TOPICAL at 13:31

## 2023-01-31 RX ADMIN — ACETAMINOPHEN 650 MG: 325 TABLET ORAL at 13:31

## 2023-01-31 ASSESSMENT — PAIN SCALES - GENERAL: PAINLEVEL_OUTOF10: 7

## 2023-02-02 ENCOUNTER — APPOINTMENT (OUTPATIENT)
Dept: LAB | Age: 68
End: 2023-02-02
Attending: INTERNAL MEDICINE

## 2023-02-02 ENCOUNTER — NURSE ONLY (OUTPATIENT)
Dept: INTERNAL MEDICINE | Age: 68
End: 2023-02-02
Attending: INTERNAL MEDICINE

## 2023-02-02 VITALS — SYSTOLIC BLOOD PRESSURE: 152 MMHG | DIASTOLIC BLOOD PRESSURE: 98 MMHG

## 2023-02-02 DIAGNOSIS — H61.23 BILATERAL IMPACTED CERUMEN: Primary | ICD-10-CM

## 2023-02-02 LAB — VIT B1 PYROPHOSHATE BLD-SCNC: 98 NMOL/L (ref 70–180)

## 2023-02-02 PROCEDURE — 81003 URINALYSIS AUTO W/O SCOPE: CPT | Performed by: NURSE PRACTITIONER

## 2023-02-02 PROCEDURE — 82570 ASSAY OF URINE CREATININE: CPT | Performed by: NURSE PRACTITIONER

## 2023-02-03 LAB
APPEARANCE UR: CLEAR
BILIRUB UR QL STRIP: NEGATIVE
COLOR UR: YELLOW
CREAT UR-MCNC: 144 MG/DL
GLUCOSE UR STRIP-MCNC: NEGATIVE MG/DL
HGB UR QL STRIP: NEGATIVE
KETONES UR STRIP-MCNC: NEGATIVE MG/DL
LEUKOCYTE ESTERASE UR QL STRIP: NEGATIVE
MICROALBUMIN UR-MCNC: 0.55 MG/DL
MICROALBUMIN/CREAT UR: 3.8 MG/G
NITRITE UR QL STRIP: NEGATIVE
PH UR STRIP: 5.5 [PH] (ref 5–7)
PROT UR STRIP-MCNC: NEGATIVE MG/DL
SP GR UR STRIP: 1.02 (ref 1–1.03)
UROBILINOGEN UR STRIP-MCNC: 0.2 MG/DL

## 2023-02-10 ENCOUNTER — TELEPHONE (OUTPATIENT)
Dept: INTERNAL MEDICINE | Age: 68
End: 2023-02-10

## 2023-02-13 ENCOUNTER — OFFICE VISIT (OUTPATIENT)
Dept: INTERNAL MEDICINE | Age: 68
End: 2023-02-13
Attending: NURSE PRACTITIONER

## 2023-02-13 VITALS — HEART RATE: 77 BPM | SYSTOLIC BLOOD PRESSURE: 137 MMHG | DIASTOLIC BLOOD PRESSURE: 84 MMHG

## 2023-02-13 DIAGNOSIS — Z01.30 BLOOD PRESSURE CHECK: Primary | ICD-10-CM

## 2023-02-13 DIAGNOSIS — I10 ESSENTIAL HYPERTENSION: Primary | ICD-10-CM

## 2023-02-13 RX ORDER — LOSARTAN POTASSIUM 25 MG/1
25 TABLET ORAL DAILY
Qty: 90 TABLET | Refills: 0 | Status: SHIPPED | OUTPATIENT
Start: 2023-02-13 | End: 2023-04-26

## 2023-02-28 ENCOUNTER — PREP FOR CASE (OUTPATIENT)
Dept: GASTROENTEROLOGY | Age: 68
End: 2023-02-28

## 2023-02-28 DIAGNOSIS — Z86.010 PERSONAL HISTORY OF COLONIC POLYPS: Primary | ICD-10-CM

## 2023-03-02 ENCOUNTER — OFFICE VISIT (OUTPATIENT)
Dept: OPHTHALMOLOGY | Age: 68
End: 2023-03-02

## 2023-03-02 DIAGNOSIS — H52.13 MYOPIA OF BOTH EYES WITH REGULAR ASTIGMATISM AND PRESBYOPIA: ICD-10-CM

## 2023-03-02 DIAGNOSIS — H52.4 MYOPIA OF BOTH EYES WITH REGULAR ASTIGMATISM AND PRESBYOPIA: ICD-10-CM

## 2023-03-02 DIAGNOSIS — H52.223 MYOPIA OF BOTH EYES WITH REGULAR ASTIGMATISM AND PRESBYOPIA: ICD-10-CM

## 2023-03-02 DIAGNOSIS — H40.003 GLAUCOMA SUSPECT OF BOTH EYES: ICD-10-CM

## 2023-03-02 DIAGNOSIS — H25.13 NUCLEAR SENILE CATARACT OF BOTH EYES: Primary | ICD-10-CM

## 2023-03-02 PROCEDURE — 92015 DETERMINE REFRACTIVE STATE: CPT | Performed by: OPTOMETRIST

## 2023-03-02 PROCEDURE — 92004 COMPRE OPH EXAM NEW PT 1/>: CPT | Performed by: OPTOMETRIST

## 2023-03-02 ASSESSMENT — VISUAL ACUITY
OD_SC: 20/40
OD_SC: JAEGER 3
OD_PH_SC+: -2
OS_SC: JAEGER 5
OD_SC+: -1
OD_PH_SC: 20/30
OS_SC: 20/30
METHOD: SNELLEN - LINEAR

## 2023-03-02 ASSESSMENT — REFRACTION
OS_ADD: +2.25
OD_AXIS: 070
OD_ADD: +2.25
OD_SPHERE: -1.00
OS_CYLINDER: +1.00
OS_AXIS: 085
OD_CYLINDER: +1.25
OS_SPHERE: -1.00

## 2023-03-02 ASSESSMENT — CONF VISUAL FIELD
OD_INFERIOR_NASAL_RESTRICTION: 0
OD_NORMAL: 1
OD_SUPERIOR_TEMPORAL_RESTRICTION: 0
METHOD: COUNTING FINGERS
OS_INFERIOR_NASAL_RESTRICTION: 0
OS_SUPERIOR_TEMPORAL_RESTRICTION: 0
OD_SUPERIOR_NASAL_RESTRICTION: 0
OS_SUPERIOR_NASAL_RESTRICTION: 0
OS_NORMAL: 1
OS_INFERIOR_TEMPORAL_RESTRICTION: 0
OD_INFERIOR_TEMPORAL_RESTRICTION: 0

## 2023-03-02 ASSESSMENT — TONOMETRY
OD_IOP_MMHG: 18.4
OS_IOP_MMHG: 15.4

## 2023-03-02 ASSESSMENT — PACHYMETRY
OS_CT(UM): 560(-1)
OD_CT(UM): 560(-1)

## 2023-03-02 ASSESSMENT — SLIT LAMP EXAM - LIDS
COMMENTS: NORMAL
COMMENTS: NORMAL

## 2023-03-02 ASSESSMENT — CUP TO DISC RATIO
OS_RATIO: 0.6
OD_RATIO: 0.65

## 2023-03-02 ASSESSMENT — EXTERNAL EXAM - RIGHT EYE: OD_EXAM: NORMAL

## 2023-03-02 ASSESSMENT — EXTERNAL EXAM - LEFT EYE: OS_EXAM: NORMAL

## 2023-03-06 RX ORDER — POLYETHYLENE GLYCOL 3350, SODIUM CHLORIDE, SODIUM BICARBONATE, POTASSIUM CHLORIDE 420; 11.2; 5.72; 1.48 G/4L; G/4L; G/4L; G/4L
4000 POWDER, FOR SOLUTION ORAL ONCE
Qty: 4000 ML | Refills: 0 | Status: SHIPPED | OUTPATIENT
Start: 2023-03-06 | End: 2023-03-06

## 2023-03-14 ENCOUNTER — TELEPHONE (OUTPATIENT)
Dept: INTERNAL MEDICINE | Age: 68
End: 2023-03-14

## 2023-03-15 ENCOUNTER — NURSE ONLY (OUTPATIENT)
Dept: INTERNAL MEDICINE | Age: 68
End: 2023-03-15
Attending: INTERNAL MEDICINE

## 2023-03-15 VITALS — SYSTOLIC BLOOD PRESSURE: 128 MMHG | DIASTOLIC BLOOD PRESSURE: 72 MMHG

## 2023-03-15 DIAGNOSIS — Z01.30 BLOOD PRESSURE CHECK: Primary | ICD-10-CM

## 2023-04-14 ENCOUNTER — TRANSCRIBE ORDERS (OUTPATIENT)
Dept: ADMINISTRATIVE | Facility: HOSPITAL | Age: 68
End: 2023-04-14
Payer: MEDICARE

## 2023-04-14 ENCOUNTER — LAB (OUTPATIENT)
Dept: LAB | Facility: HOSPITAL | Age: 68
End: 2023-04-14
Payer: MEDICARE

## 2023-04-14 DIAGNOSIS — Z00.00 ROUTINE GENERAL MEDICAL EXAMINATION AT A HEALTH CARE FACILITY: Primary | ICD-10-CM

## 2023-04-14 DIAGNOSIS — R73.03 PREDIABETES: ICD-10-CM

## 2023-04-14 DIAGNOSIS — E78.5 HYPERLIPIDEMIA, UNSPECIFIED HYPERLIPIDEMIA TYPE: ICD-10-CM

## 2023-04-14 DIAGNOSIS — E55.9 VITAMIN D DEFICIENCY: ICD-10-CM

## 2023-04-14 DIAGNOSIS — I11.0 HYPERTENSIVE HEART FAILURE: ICD-10-CM

## 2023-04-14 DIAGNOSIS — Z00.00 ROUTINE GENERAL MEDICAL EXAMINATION AT A HEALTH CARE FACILITY: ICD-10-CM

## 2023-04-14 DIAGNOSIS — Z12.5 SPECIAL SCREENING FOR MALIGNANT NEOPLASM OF PROSTATE: ICD-10-CM

## 2023-04-14 LAB
25(OH)D3 SERPL-MCNC: 67.3 NG/ML (ref 30–100)
ALBUMIN SERPL-MCNC: 3.9 G/DL (ref 3.5–5.2)
ALBUMIN/GLOB SERPL: 1.5 G/DL
ALP SERPL-CCNC: 104 U/L (ref 39–117)
ALT SERPL W P-5'-P-CCNC: 21 U/L (ref 1–41)
ANION GAP SERPL CALCULATED.3IONS-SCNC: 9 MMOL/L (ref 5–15)
AST SERPL-CCNC: 24 U/L (ref 1–40)
BACTERIA UR QL AUTO: ABNORMAL /HPF
BASOPHILS # BLD AUTO: 0.06 10*3/MM3 (ref 0–0.2)
BASOPHILS NFR BLD AUTO: 0.5 % (ref 0–1.5)
BILIRUB SERPL-MCNC: 0.3 MG/DL (ref 0–1.2)
BILIRUB UR QL STRIP: NEGATIVE
BUN SERPL-MCNC: 11 MG/DL (ref 8–23)
BUN/CREAT SERPL: 10.5 (ref 7–25)
CALCIUM SPEC-SCNC: 8.9 MG/DL (ref 8.6–10.5)
CHLORIDE SERPL-SCNC: 106 MMOL/L (ref 98–107)
CHOLEST SERPL-MCNC: 108 MG/DL (ref 0–200)
CLARITY UR: CLEAR
CO2 SERPL-SCNC: 27 MMOL/L (ref 22–29)
COLOR UR: YELLOW
CREAT SERPL-MCNC: 1.05 MG/DL (ref 0.76–1.27)
DEPRECATED RDW RBC AUTO: 40.1 FL (ref 37–54)
EGFRCR SERPLBLD CKD-EPI 2021: 77.8 ML/MIN/1.73
EOSINOPHIL # BLD AUTO: 0.23 10*3/MM3 (ref 0–0.4)
EOSINOPHIL NFR BLD AUTO: 2.1 % (ref 0.3–6.2)
ERYTHROCYTE [DISTWIDTH] IN BLOOD BY AUTOMATED COUNT: 12 % (ref 12.3–15.4)
GLOBULIN UR ELPH-MCNC: 2.6 GM/DL
GLUCOSE SERPL-MCNC: 144 MG/DL (ref 65–99)
GLUCOSE UR STRIP-MCNC: NEGATIVE MG/DL
HBA1C MFR BLD: 5.8 % (ref 4.8–5.6)
HCT VFR BLD AUTO: 43.4 % (ref 37.5–51)
HDLC SERPL-MCNC: 54 MG/DL (ref 40–60)
HGB BLD-MCNC: 15.3 G/DL (ref 13–17.7)
HGB UR QL STRIP.AUTO: ABNORMAL
HYALINE CASTS UR QL AUTO: ABNORMAL /LPF
IMM GRANULOCYTES # BLD AUTO: 0.05 10*3/MM3 (ref 0–0.05)
IMM GRANULOCYTES NFR BLD AUTO: 0.5 % (ref 0–0.5)
KETONES UR QL STRIP: ABNORMAL
LDLC SERPL CALC-MCNC: 38 MG/DL (ref 0–100)
LDLC/HDLC SERPL: 0.7 {RATIO}
LEUKOCYTE ESTERASE UR QL STRIP.AUTO: NEGATIVE
LYMPHOCYTES # BLD AUTO: 2.37 10*3/MM3 (ref 0.7–3.1)
LYMPHOCYTES NFR BLD AUTO: 21.7 % (ref 19.6–45.3)
MCH RBC QN AUTO: 31.8 PG (ref 26.6–33)
MCHC RBC AUTO-ENTMCNC: 35.3 G/DL (ref 31.5–35.7)
MCV RBC AUTO: 90.2 FL (ref 79–97)
MONOCYTES # BLD AUTO: 0.61 10*3/MM3 (ref 0.1–0.9)
MONOCYTES NFR BLD AUTO: 5.6 % (ref 5–12)
MUCOUS THREADS URNS QL MICRO: ABNORMAL /HPF
NEUTROPHILS NFR BLD AUTO: 69.6 % (ref 42.7–76)
NEUTROPHILS NFR BLD AUTO: 7.6 10*3/MM3 (ref 1.7–7)
NITRITE UR QL STRIP: NEGATIVE
NRBC BLD AUTO-RTO: 0 /100 WBC (ref 0–0.2)
PH UR STRIP.AUTO: 6.5 [PH] (ref 5–8)
PLATELET # BLD AUTO: 358 10*3/MM3 (ref 140–450)
PMV BLD AUTO: 9.7 FL (ref 6–12)
POTASSIUM SERPL-SCNC: 4.1 MMOL/L (ref 3.5–5.2)
PROT SERPL-MCNC: 6.5 G/DL (ref 6–8.5)
PROT UR QL STRIP: ABNORMAL
PSA SERPL-MCNC: 0.86 NG/ML (ref 0–4)
RBC # BLD AUTO: 4.81 10*6/MM3 (ref 4.14–5.8)
RBC # UR STRIP: ABNORMAL /HPF
REF LAB TEST METHOD: ABNORMAL
SODIUM SERPL-SCNC: 142 MMOL/L (ref 136–145)
SP GR UR STRIP: 1.02 (ref 1–1.03)
SQUAMOUS #/AREA URNS HPF: ABNORMAL /HPF
TRIGL SERPL-MCNC: 81 MG/DL (ref 0–150)
TSH SERPL DL<=0.05 MIU/L-ACNC: 0.68 UIU/ML (ref 0.27–4.2)
UROBILINOGEN UR QL STRIP: ABNORMAL
VLDLC SERPL-MCNC: 16 MG/DL (ref 5–40)
WBC # UR STRIP: ABNORMAL /HPF
WBC NRBC COR # BLD: 10.92 10*3/MM3 (ref 3.4–10.8)

## 2023-04-14 PROCEDURE — 80061 LIPID PANEL: CPT

## 2023-04-14 PROCEDURE — 82306 VITAMIN D 25 HYDROXY: CPT

## 2023-04-14 PROCEDURE — 81001 URINALYSIS AUTO W/SCOPE: CPT

## 2023-04-14 PROCEDURE — 36415 COLL VENOUS BLD VENIPUNCTURE: CPT

## 2023-04-14 PROCEDURE — 84443 ASSAY THYROID STIM HORMONE: CPT

## 2023-04-14 PROCEDURE — 80053 COMPREHEN METABOLIC PANEL: CPT

## 2023-04-14 PROCEDURE — 85025 COMPLETE CBC W/AUTO DIFF WBC: CPT

## 2023-04-14 PROCEDURE — 83036 HEMOGLOBIN GLYCOSYLATED A1C: CPT

## 2023-04-14 PROCEDURE — G0103 PSA SCREENING: HCPCS

## 2023-04-25 ENCOUNTER — OFF PREMISE CHARGES (OUTPATIENT)
Dept: GASTROENTEROLOGY | Age: 68
End: 2023-04-25

## 2023-04-25 ENCOUNTER — HOSPITAL ENCOUNTER (OUTPATIENT)
Age: 68
Discharge: HOME | End: 2023-04-25
Attending: INTERNAL MEDICINE | Admitting: INTERNAL MEDICINE

## 2023-04-25 VITALS
HEART RATE: 63 BPM | WEIGHT: 214.29 LBS | BODY MASS INDEX: 36.58 KG/M2 | HEIGHT: 64 IN | RESPIRATION RATE: 20 BRPM | TEMPERATURE: 96.6 F | OXYGEN SATURATION: 96 % | DIASTOLIC BLOOD PRESSURE: 85 MMHG | SYSTOLIC BLOOD PRESSURE: 124 MMHG

## 2023-04-25 DIAGNOSIS — D12.2 ADENOMATOUS POLYP OF ASCENDING COLON: ICD-10-CM

## 2023-04-25 DIAGNOSIS — Z86.010 PERSONAL HISTORY OF COLONIC POLYPS: ICD-10-CM

## 2023-04-25 PROCEDURE — G0500 MOD SEDAT ENDO SERVICE >5YRS: HCPCS | Performed by: INTERNAL MEDICINE

## 2023-04-25 PROCEDURE — 10004185 HB COUNTER-VISIT  CENSUS

## 2023-04-25 PROCEDURE — 88305 TISSUE EXAM BY PATHOLOGIST: CPT | Performed by: INTERNAL MEDICINE

## 2023-04-25 PROCEDURE — 10002801 HB RX 250 W/O HCPCS: Performed by: INTERNAL MEDICINE

## 2023-04-25 PROCEDURE — 10003428 HB COLONOSCOPY: Performed by: INTERNAL MEDICINE

## 2023-04-25 PROCEDURE — 10002803 HB RX 637: Performed by: INTERNAL MEDICINE

## 2023-04-25 PROCEDURE — 10002807 HB RX 258: Performed by: STUDENT IN AN ORGANIZED HEALTH CARE EDUCATION/TRAINING PROGRAM

## 2023-04-25 PROCEDURE — 10002800 HB RX 250 W HCPCS: Performed by: INTERNAL MEDICINE

## 2023-04-25 PROCEDURE — 45380 COLONOSCOPY AND BIOPSY: CPT | Performed by: INTERNAL MEDICINE

## 2023-04-25 RX ORDER — SIMETHICONE 20 MG/.3ML
EMULSION ORAL PRN
Status: DISCONTINUED | OUTPATIENT
Start: 2023-04-25 | End: 2023-04-25 | Stop reason: HOSPADM

## 2023-04-25 RX ORDER — SODIUM CHLORIDE 9 MG/ML
INJECTION, SOLUTION INTRAVENOUS CONTINUOUS
Status: DISCONTINUED | OUTPATIENT
Start: 2023-04-25 | End: 2023-04-25 | Stop reason: HOSPADM

## 2023-04-25 RX ORDER — MIDAZOLAM HYDROCHLORIDE 1 MG/ML
INJECTION, SOLUTION INTRAMUSCULAR; INTRAVENOUS PRN
Status: DISCONTINUED | OUTPATIENT
Start: 2023-04-25 | End: 2023-04-25 | Stop reason: HOSPADM

## 2023-04-25 RX ORDER — 0.9 % SODIUM CHLORIDE 0.9 %
2 VIAL (ML) INJECTION EVERY 12 HOURS SCHEDULED
Status: DISCONTINUED | OUTPATIENT
Start: 2023-04-25 | End: 2023-04-25 | Stop reason: HOSPADM

## 2023-04-25 RX ADMIN — SODIUM CHLORIDE: 9 INJECTION, SOLUTION INTRAVENOUS at 08:12

## 2023-04-25 ASSESSMENT — COGNITIVE AND FUNCTIONAL STATUS - GENERAL
ARE YOU DEAF OR DO YOU HAVE SERIOUS DIFFICULTY  HEARING: NO
ARE YOU BLIND OR DO YOU HAVE SERIOUS DIFFICULTY SEEING, EVEN WHEN WEARING GLASSES: NO

## 2023-04-25 ASSESSMENT — ACTIVITIES OF DAILY LIVING (ADL)
HISTORY OF FALLING IN THE LAST YEAR (PRIOR TO ADMISSION): NO
CHRONIC_PAIN_PRESENT: NO
ADL_SHORT_OF_BREATH: NO
ADL_BEFORE_ADMISSION: INDEPENDENT
RECENT_DECLINE_ADL: NO
ADL_SCORE: 12
NEEDS_ASSIST: NO
SENSORY_SUPPORT_DEVICES: DENTURES

## 2023-04-25 ASSESSMENT — PAIN SCALES - GENERAL
PAINLEVEL_OUTOF10: 0

## 2023-04-25 ASSESSMENT — LIFESTYLE VARIABLES: SMOKING_HISTORY: NO

## 2023-04-26 DIAGNOSIS — I10 ESSENTIAL HYPERTENSION: ICD-10-CM

## 2023-04-26 LAB
ASR DISCLAIMER: NORMAL
CASE RPRT: NORMAL
CLINICAL INFO: NORMAL
PATH REPORT.FINAL DX SPEC: NORMAL
PATH REPORT.GROSS SPEC: NORMAL
PATH REPORT.MICROSCOPIC SPEC OTHER STN: NORMAL

## 2023-04-26 RX ORDER — LOSARTAN POTASSIUM 25 MG/1
TABLET ORAL
Qty: 90 TABLET | Refills: 2 | Status: SHIPPED | OUTPATIENT
Start: 2023-04-26 | End: 2023-05-31 | Stop reason: SDUPTHER

## 2023-05-09 ENCOUNTER — TRANSCRIBE ORDERS (OUTPATIENT)
Dept: ADMINISTRATIVE | Facility: HOSPITAL | Age: 68
End: 2023-05-09
Payer: MEDICARE

## 2023-05-09 DIAGNOSIS — J44.9 CHRONIC OBSTRUCTIVE PULMONARY DISEASE, UNSPECIFIED COPD TYPE: Primary | ICD-10-CM

## 2023-05-17 ENCOUNTER — TELEPHONE (OUTPATIENT)
Dept: OTHER | Age: 68
End: 2023-05-17

## 2023-05-31 DIAGNOSIS — I10 ESSENTIAL HYPERTENSION: ICD-10-CM

## 2023-05-31 RX ORDER — LOSARTAN POTASSIUM 25 MG/1
25 TABLET ORAL DAILY
Qty: 90 TABLET | Refills: 2 | Status: SHIPPED | OUTPATIENT
Start: 2023-05-31

## 2023-06-27 PROBLEM — R41.82 ALTERED MENTAL STATUS, UNSPECIFIED ALTERED MENTAL STATUS TYPE: Status: ACTIVE | Noted: 2023-06-27

## 2023-06-28 PROBLEM — F10.90 ALCOHOL USE DISORDER: Status: ACTIVE | Noted: 2023-06-28

## 2023-06-28 PROBLEM — R63.4 WEIGHT LOSS: Status: ACTIVE | Noted: 2023-06-28

## 2023-06-28 PROBLEM — F10.939 ALCOHOL WITHDRAWAL: Status: ACTIVE | Noted: 2023-06-28

## 2023-06-28 PROBLEM — R59.9 LYMPH NODE ENLARGEMENT: Status: ACTIVE | Noted: 2023-06-28

## 2023-06-28 PROBLEM — I95.9 HYPOTENSION: Status: ACTIVE | Noted: 2023-06-28

## 2023-06-28 PROBLEM — E86.0 DEHYDRATION: Status: ACTIVE | Noted: 2023-06-28

## 2023-06-28 PROBLEM — N17.9 AKI (ACUTE KIDNEY INJURY): Status: ACTIVE | Noted: 2023-06-28

## 2023-06-28 PROBLEM — K52.9 CHRONIC DIARRHEA: Status: ACTIVE | Noted: 2023-06-28

## 2023-06-28 PROBLEM — G93.41 METABOLIC ENCEPHALOPATHY: Status: ACTIVE | Noted: 2023-06-28

## 2023-06-29 PROBLEM — E43 SEVERE MALNUTRITION: Status: ACTIVE | Noted: 2023-06-29

## 2023-07-03 ENCOUNTER — HOSPITAL ENCOUNTER (EMERGENCY)
Age: 68
Discharge: LEFT BEFORE DISCHARGE | End: 2023-07-03

## 2023-07-03 VITALS
RESPIRATION RATE: 18 BRPM | TEMPERATURE: 97.7 F | DIASTOLIC BLOOD PRESSURE: 83 MMHG | HEIGHT: 64 IN | BODY MASS INDEX: 36.85 KG/M2 | OXYGEN SATURATION: 97 % | HEART RATE: 79 BPM | SYSTOLIC BLOOD PRESSURE: 144 MMHG | WEIGHT: 215.83 LBS

## 2023-07-03 PROCEDURE — 10003627 HB COUNTER ED NO SERVICE

## 2023-07-05 PROBLEM — G93.41 METABOLIC ENCEPHALOPATHY: Status: RESOLVED | Noted: 2023-06-28 | Resolved: 2023-07-05

## 2023-07-05 PROBLEM — F10.27: Status: ACTIVE | Noted: 2023-07-05

## 2023-07-05 PROBLEM — N17.9 AKI (ACUTE KIDNEY INJURY): Status: RESOLVED | Noted: 2023-06-28 | Resolved: 2023-07-05

## 2023-07-05 PROBLEM — K52.9 CHRONIC DIARRHEA: Status: RESOLVED | Noted: 2023-06-28 | Resolved: 2023-07-05

## 2023-07-05 PROBLEM — I95.9 HYPOTENSION: Status: RESOLVED | Noted: 2023-06-28 | Resolved: 2023-07-05

## 2023-07-05 PROBLEM — R91.1 PULMONARY NODULE: Status: ACTIVE | Noted: 2023-07-05

## 2023-07-05 PROBLEM — F10.939 ALCOHOL WITHDRAWAL: Status: RESOLVED | Noted: 2023-06-28 | Resolved: 2023-07-05

## 2023-07-05 PROBLEM — E86.0 DEHYDRATION: Status: RESOLVED | Noted: 2023-06-28 | Resolved: 2023-07-05

## 2023-07-06 ENCOUNTER — APPOINTMENT (OUTPATIENT)
Dept: CT IMAGING | Age: 68
End: 2023-07-06
Attending: EMERGENCY MEDICINE

## 2023-07-06 ENCOUNTER — NURSE ONLY (OUTPATIENT)
Dept: INTERNAL MEDICINE | Age: 68
End: 2023-07-06

## 2023-07-06 ENCOUNTER — APPOINTMENT (OUTPATIENT)
Dept: MRI IMAGING | Age: 68
End: 2023-07-06
Attending: EMERGENCY MEDICINE

## 2023-07-06 ENCOUNTER — HOSPITAL ENCOUNTER (EMERGENCY)
Age: 68
Discharge: HOME | End: 2023-07-06
Attending: EMERGENCY MEDICINE

## 2023-07-06 ENCOUNTER — APPOINTMENT (OUTPATIENT)
Dept: GENERAL RADIOLOGY | Age: 68
End: 2023-07-06
Attending: EMERGENCY MEDICINE

## 2023-07-06 VITALS
OXYGEN SATURATION: 98 % | HEART RATE: 80 BPM | DIASTOLIC BLOOD PRESSURE: 68 MMHG | SYSTOLIC BLOOD PRESSURE: 141 MMHG | RESPIRATION RATE: 20 BRPM

## 2023-07-06 VITALS
HEART RATE: 62 BPM | SYSTOLIC BLOOD PRESSURE: 127 MMHG | DIASTOLIC BLOOD PRESSURE: 60 MMHG | HEIGHT: 64 IN | TEMPERATURE: 98.5 F | RESPIRATION RATE: 19 BRPM | BODY MASS INDEX: 37.05 KG/M2 | OXYGEN SATURATION: 98 %

## 2023-07-06 DIAGNOSIS — H61.23 IMPACTED CERUMEN OF BOTH EARS: ICD-10-CM

## 2023-07-06 DIAGNOSIS — R42 DIZZINESS: Primary | ICD-10-CM

## 2023-07-06 DIAGNOSIS — I65.23 CAROTID STENOSIS, ASYMPTOMATIC, BILATERAL: ICD-10-CM

## 2023-07-06 DIAGNOSIS — R42 VERTIGO: Primary | ICD-10-CM

## 2023-07-06 LAB
ALBUMIN SERPL-MCNC: 4 G/DL (ref 3.6–5.1)
ALBUMIN/GLOB SERPL: 1 {RATIO} (ref 1–2.4)
ALP SERPL-CCNC: 97 UNITS/L (ref 45–117)
ALT SERPL-CCNC: 24 UNITS/L
ANION GAP BLD CALC-SCNC: 14 MMOL/L (ref 7–19)
ANION GAP SERPL CALC-SCNC: 15 MMOL/L (ref 7–19)
APPEARANCE UR: CLEAR
AST SERPL-CCNC: 19 UNITS/L
BACTERIA #/AREA URNS HPF: ABNORMAL /HPF
BASOPHILS # BLD: 0.1 K/MCL (ref 0–0.3)
BASOPHILS NFR BLD: 1 %
BILIRUB SERPL-MCNC: 0.9 MG/DL (ref 0.2–1)
BILIRUB UR QL STRIP: NEGATIVE
BUN BLD-MCNC: 10 MG/DL (ref 6–20)
BUN SERPL-MCNC: 10 MG/DL (ref 6–20)
BUN/CREAT SERPL: 10 (ref 7–25)
CA-I BLD-SCNC: 1.12 MMOL/L (ref 1.15–1.29)
CALCIUM SERPL-MCNC: 9.2 MG/DL (ref 8.4–10.2)
CHLORIDE BLD-SCNC: 104 MMOL/L (ref 97–110)
CHLORIDE SERPL-SCNC: 102 MMOL/L (ref 97–110)
CO2 BLD-SCNC: 25 MMOL/L (ref 19–24)
CO2 SERPL-SCNC: 24 MMOL/L (ref 21–32)
COLOR UR: ABNORMAL
CREAT SERPL-MCNC: 1 MG/DL (ref 0.67–1.17)
CREAT SERPL-MCNC: 1.02 MG/DL (ref 0.67–1.17)
DEPRECATED RDW RBC: 38.1 FL (ref 39–50)
EOSINOPHIL # BLD: 0.2 K/MCL (ref 0–0.5)
EOSINOPHIL NFR BLD: 4 %
ERYTHROCYTE [DISTWIDTH] IN BLOOD: 12.3 % (ref 11–15)
FASTING DURATION TIME PATIENT: ABNORMAL H
GFR SERPLBLD BASED ON 1.73 SQ M-ARVRAT: 80 ML/MIN
GFR SERPLBLD BASED ON 1.73 SQ M-ARVRAT: 82 ML/MIN
GLOBULIN SER-MCNC: 4 G/DL (ref 2–4)
GLUCOSE BLD-MCNC: 125 MG/DL (ref 70–99)
GLUCOSE SERPL-MCNC: 129 MG/DL (ref 70–99)
GLUCOSE UR STRIP-MCNC: NEGATIVE MG/DL
HCT VFR BLD CALC: 46 % (ref 39–51)
HCT VFR BLD CALC: 48 % (ref 39–51)
HGB BLD CALC-MCNC: 16.3 G/DL (ref 13–17)
HGB BLD-MCNC: 15.8 G/DL (ref 13–17)
HGB UR QL STRIP: NEGATIVE
HYALINE CASTS #/AREA URNS LPF: ABNORMAL /LPF
IMM GRANULOCYTES # BLD AUTO: 0 K/MCL (ref 0–0.2)
IMM GRANULOCYTES # BLD: 0 %
KETONES UR STRIP-MCNC: NEGATIVE MG/DL
LEUKOCYTE ESTERASE UR QL STRIP: NEGATIVE
LYMPHOCYTES # BLD: 2.4 K/MCL (ref 1–4)
LYMPHOCYTES NFR BLD: 42 %
MAGNESIUM SERPL-MCNC: 2 MG/DL (ref 1.7–2.4)
MCH RBC QN AUTO: 29.5 PG (ref 26–34)
MCHC RBC AUTO-ENTMCNC: 34.3 G/DL (ref 32–36.5)
MCV RBC AUTO: 85.8 FL (ref 78–100)
MONOCYTES # BLD: 0.6 K/MCL (ref 0.3–0.9)
MONOCYTES NFR BLD: 11 %
MUCOUS THREADS URNS QL MICRO: PRESENT
NEUTROPHILS # BLD: 2.3 K/MCL (ref 1.8–7.7)
NEUTROPHILS NFR BLD: 42 %
NITRITE UR QL STRIP: NEGATIVE
NRBC BLD MANUAL-RTO: 0 /100 WBC
PH UR STRIP: 6 [PH] (ref 5–7)
PLATELET # BLD AUTO: 194 K/MCL (ref 140–450)
POTASSIUM BLD-SCNC: 3.8 MMOL/L (ref 3.4–5.1)
POTASSIUM SERPL-SCNC: 3.7 MMOL/L (ref 3.4–5.1)
PROT SERPL-MCNC: 8 G/DL (ref 6.4–8.2)
PROT UR STRIP-MCNC: NEGATIVE MG/DL
RBC # BLD: 5.36 MIL/MCL (ref 4.5–5.9)
RBC #/AREA URNS HPF: ABNORMAL /HPF
SODIUM BLDC-SCNC: 138 MMOL/L (ref 135–145)
SODIUM SERPL-SCNC: 137 MMOL/L (ref 135–145)
SP GR UR STRIP: <1.005 (ref 1–1.03)
SQUAMOUS #/AREA URNS HPF: ABNORMAL /HPF
TROPONIN I SERPL DL<=0.01 NG/ML-MCNC: 4 NG/L
UROBILINOGEN UR STRIP-MCNC: 0.2 MG/DL
WBC # BLD: 5.6 K/MCL (ref 4.2–11)
WBC #/AREA URNS HPF: ABNORMAL /HPF

## 2023-07-06 PROCEDURE — 93010 ELECTROCARDIOGRAM REPORT: CPT | Performed by: EMERGENCY MEDICINE

## 2023-07-06 PROCEDURE — 70450 CT HEAD/BRAIN W/O DYE: CPT

## 2023-07-06 PROCEDURE — 99285 EMERGENCY DEPT VISIT HI MDM: CPT

## 2023-07-06 PROCEDURE — 10002805 HB CONTRAST AGENT: Performed by: EMERGENCY MEDICINE

## 2023-07-06 PROCEDURE — 85025 COMPLETE CBC W/AUTO DIFF WBC: CPT | Performed by: EMERGENCY MEDICINE

## 2023-07-06 PROCEDURE — 81001 URINALYSIS AUTO W/SCOPE: CPT | Performed by: EMERGENCY MEDICINE

## 2023-07-06 PROCEDURE — 70551 MRI BRAIN STEM W/O DYE: CPT | Performed by: RADIOLOGY

## 2023-07-06 PROCEDURE — 83735 ASSAY OF MAGNESIUM: CPT | Performed by: EMERGENCY MEDICINE

## 2023-07-06 PROCEDURE — 70496 CT ANGIOGRAPHY HEAD: CPT

## 2023-07-06 PROCEDURE — 84484 ASSAY OF TROPONIN QUANT: CPT | Performed by: EMERGENCY MEDICINE

## 2023-07-06 PROCEDURE — 70496 CT ANGIOGRAPHY HEAD: CPT | Performed by: RADIOLOGY

## 2023-07-06 PROCEDURE — 85014 HEMATOCRIT: CPT

## 2023-07-06 PROCEDURE — 71045 X-RAY EXAM CHEST 1 VIEW: CPT | Performed by: RADIOLOGY

## 2023-07-06 PROCEDURE — G1004 CDSM NDSC: HCPCS

## 2023-07-06 PROCEDURE — 80053 COMPREHEN METABOLIC PANEL: CPT | Performed by: EMERGENCY MEDICINE

## 2023-07-06 PROCEDURE — 71045 X-RAY EXAM CHEST 1 VIEW: CPT

## 2023-07-06 PROCEDURE — 99284 EMERGENCY DEPT VISIT MOD MDM: CPT | Performed by: EMERGENCY MEDICINE

## 2023-07-06 PROCEDURE — 93005 ELECTROCARDIOGRAM TRACING: CPT | Performed by: EMERGENCY MEDICINE

## 2023-07-06 PROCEDURE — 70551 MRI BRAIN STEM W/O DYE: CPT

## 2023-07-06 PROCEDURE — 36415 COLL VENOUS BLD VENIPUNCTURE: CPT

## 2023-07-06 PROCEDURE — 10002803 HB RX 637: Performed by: EMERGENCY MEDICINE

## 2023-07-06 PROCEDURE — G1004 CDSM NDSC: HCPCS | Performed by: RADIOLOGY

## 2023-07-06 PROCEDURE — 70498 CT ANGIOGRAPHY NECK: CPT | Performed by: RADIOLOGY

## 2023-07-06 PROCEDURE — 70450 CT HEAD/BRAIN W/O DYE: CPT | Performed by: RADIOLOGY

## 2023-07-06 RX ORDER — MECLIZINE HYDROCHLORIDE 25 MG/1
25 TABLET ORAL 3 TIMES DAILY PRN
Qty: 15 TABLET | Refills: 0 | Status: SHIPPED | OUTPATIENT
Start: 2023-07-06

## 2023-07-06 RX ORDER — MECLIZINE HCL 12.5 MG/1
25 TABLET ORAL ONCE
Status: COMPLETED | OUTPATIENT
Start: 2023-07-06 | End: 2023-07-06

## 2023-07-06 RX ADMIN — MECLIZINE HCL 12.5 MG 25 MG: 12.5 TABLET ORAL at 11:22

## 2023-07-06 RX ADMIN — DOCUSATE SODIUM 10 MG: 50 LIQUID ORAL at 11:24

## 2023-07-06 RX ADMIN — IOHEXOL 100 ML: 350 INJECTION, SOLUTION INTRAVENOUS at 10:43

## 2023-07-06 ASSESSMENT — ENCOUNTER SYMPTOMS
ABDOMINAL PAIN: 0
WOUND: 0
LIGHT-HEADEDNESS: 0
FACIAL SWELLING: 0
DIZZINESS: 1
VOMITING: 0
NUMBNESS: 0
FEVER: 0
NAUSEA: 0
SHORTNESS OF BREATH: 0
CHILLS: 0
WEAKNESS: 0
HEADACHES: 1
DIARRHEA: 0

## 2023-07-06 ASSESSMENT — PAIN SCALES - GENERAL: PAINLEVEL_OUTOF10: 2

## 2023-07-07 ENCOUNTER — TELEPHONE (OUTPATIENT)
Dept: INTERNAL MEDICINE | Age: 68
End: 2023-07-07

## 2023-07-07 LAB
ATRIAL RATE (BPM): 69
P AXIS (DEGREES): 54
PR-INTERVAL (MSEC): 174
QRS-INTERVAL (MSEC): 96
QT-INTERVAL (MSEC): 410
QTC: 439
R AXIS (DEGREES): 17
REPORT TEXT: NORMAL
T AXIS (DEGREES): 34
VENTRICULAR RATE EKG/MIN (BPM): 69

## 2023-08-07 ENCOUNTER — TELEPHONE (OUTPATIENT)
Dept: INTERNAL MEDICINE | Age: 68
End: 2023-08-07

## 2023-08-22 ENCOUNTER — TRANSCRIBE ORDERS (OUTPATIENT)
Dept: ADMINISTRATIVE | Facility: HOSPITAL | Age: 68
End: 2023-08-22
Payer: MEDICARE

## 2023-08-22 DIAGNOSIS — R59.9 ENLARGED LYMPH NODES: ICD-10-CM

## 2023-08-22 DIAGNOSIS — R59.0 ABDOMINAL LYMPHADENOPATHY: ICD-10-CM

## 2023-08-29 ENCOUNTER — TRANSCRIBE ORDERS (OUTPATIENT)
Dept: ADMINISTRATIVE | Facility: HOSPITAL | Age: 68
End: 2023-08-29
Payer: MEDICARE

## 2023-08-29 ENCOUNTER — OFFICE VISIT (OUTPATIENT)
Dept: INTERNAL MEDICINE | Age: 68
End: 2023-08-29
Attending: INTERNAL MEDICINE

## 2023-08-29 VITALS
TEMPERATURE: 97 F | OXYGEN SATURATION: 98 % | BODY MASS INDEX: 36.7 KG/M2 | HEART RATE: 82 BPM | HEIGHT: 64 IN | RESPIRATION RATE: 16 BRPM | SYSTOLIC BLOOD PRESSURE: 135 MMHG | DIASTOLIC BLOOD PRESSURE: 71 MMHG | WEIGHT: 215 LBS

## 2023-08-29 DIAGNOSIS — H40.003 GLAUCOMA SUSPECT OF BOTH EYES: ICD-10-CM

## 2023-08-29 DIAGNOSIS — Z87.828 HISTORY OF MOTOR VEHICLE ACCIDENT: ICD-10-CM

## 2023-08-29 DIAGNOSIS — R59.9 SWELLING OF LYMPH NODES: Primary | ICD-10-CM

## 2023-08-29 DIAGNOSIS — I65.23 BILATERAL CAROTID ARTERY STENOSIS: ICD-10-CM

## 2023-08-29 DIAGNOSIS — I10 ESSENTIAL HYPERTENSION: Primary | ICD-10-CM

## 2023-08-29 DIAGNOSIS — N52.9 ERECTILE DYSFUNCTION, UNSPECIFIED ERECTILE DYSFUNCTION TYPE: ICD-10-CM

## 2023-08-29 DIAGNOSIS — E78.5 DYSLIPIDEMIA: ICD-10-CM

## 2023-08-29 DIAGNOSIS — H61.23 BILATERAL IMPACTED CERUMEN: ICD-10-CM

## 2023-08-29 PROCEDURE — 99214 OFFICE O/P EST MOD 30 MIN: CPT | Performed by: NURSE PRACTITIONER

## 2023-08-29 RX ORDER — SILDENAFIL 25 MG/1
25 TABLET, FILM COATED ORAL DAILY PRN
Qty: 15 TABLET | Refills: 1 | Status: SHIPPED | OUTPATIENT
Start: 2023-08-29

## 2023-08-30 ENCOUNTER — TELEPHONE (OUTPATIENT)
Dept: INTERNAL MEDICINE | Age: 68
End: 2023-08-30

## 2023-09-04 ENCOUNTER — APPOINTMENT (OUTPATIENT)
Dept: INTERNAL MEDICINE | Age: 68
End: 2023-09-04

## 2023-09-06 ENCOUNTER — TELEPHONE (OUTPATIENT)
Dept: INTERNAL MEDICINE | Age: 68
End: 2023-09-06

## 2023-09-06 ENCOUNTER — LAB SERVICES (OUTPATIENT)
Dept: LAB | Age: 68
End: 2023-09-06
Attending: NURSE PRACTITIONER

## 2023-09-06 DIAGNOSIS — R93.89 ABNORMAL FINDINGS ON IMAGING TEST: Primary | ICD-10-CM

## 2023-09-06 DIAGNOSIS — N18.2 CKD (CHRONIC KIDNEY DISEASE) STAGE 2, GFR 60-89 ML/MIN: ICD-10-CM

## 2023-09-06 DIAGNOSIS — R09.89 OTHER SPECIFIED SYMPTOMS AND SIGNS INVOLVING THE CIRCULATORY AND RESPIRATORY SYSTEMS: ICD-10-CM

## 2023-09-06 DIAGNOSIS — I10 ESSENTIAL HYPERTENSION: ICD-10-CM

## 2023-09-06 DIAGNOSIS — E78.5 DYSLIPIDEMIA: ICD-10-CM

## 2023-09-06 LAB
ALBUMIN SERPL-MCNC: 4.2 G/DL (ref 3.6–5.1)
ALBUMIN/GLOB SERPL: 1.2 {RATIO} (ref 1–2.4)
ALP SERPL-CCNC: 96 UNITS/L (ref 45–117)
ALT SERPL-CCNC: 30 UNITS/L
ANION GAP SERPL CALC-SCNC: 10 MMOL/L (ref 7–19)
AST SERPL-CCNC: 24 UNITS/L
BILIRUB SERPL-MCNC: 1.3 MG/DL (ref 0.2–1)
BUN SERPL-MCNC: 9 MG/DL (ref 6–20)
BUN/CREAT SERPL: 10 (ref 7–25)
CALCIUM SERPL-MCNC: 9.1 MG/DL (ref 8.4–10.2)
CHLORIDE SERPL-SCNC: 108 MMOL/L (ref 97–110)
CHOLEST SERPL-MCNC: 202 MG/DL
CHOLEST/HDLC SERPL: 3.6 {RATIO}
CO2 SERPL-SCNC: 24 MMOL/L (ref 21–32)
CREAT SERPL-MCNC: 0.94 MG/DL (ref 0.67–1.17)
EGFRCR SERPLBLD CKD-EPI 2021: 88 ML/MIN/{1.73_M2}
FASTING DURATION TIME PATIENT: ABNORMAL H
GLOBULIN SER-MCNC: 3.5 G/DL (ref 2–4)
GLUCOSE SERPL-MCNC: 116 MG/DL (ref 70–99)
HBA1C MFR BLD: 5.7 % (ref 4.5–5.6)
HDLC SERPL-MCNC: 56 MG/DL
LDLC SERPL CALC-MCNC: 109 MG/DL
NONHDLC SERPL-MCNC: 146 MG/DL
POTASSIUM SERPL-SCNC: 3.9 MMOL/L (ref 3.4–5.1)
PROT SERPL-MCNC: 7.7 G/DL (ref 6.4–8.2)
SODIUM SERPL-SCNC: 138 MMOL/L (ref 135–145)
TRIGL SERPL-MCNC: 183 MG/DL

## 2023-09-06 PROCEDURE — 80061 LIPID PANEL: CPT

## 2023-09-06 PROCEDURE — 36415 COLL VENOUS BLD VENIPUNCTURE: CPT

## 2023-09-06 PROCEDURE — 83036 HEMOGLOBIN GLYCOSYLATED A1C: CPT

## 2023-09-06 PROCEDURE — 80053 COMPREHEN METABOLIC PANEL: CPT

## 2023-09-11 ENCOUNTER — NURSE ONLY (OUTPATIENT)
Dept: INTERNAL MEDICINE | Age: 68
End: 2023-09-11
Attending: NURSE PRACTITIONER

## 2023-09-11 DIAGNOSIS — H61.23 BILATERAL IMPACTED CERUMEN: Primary | ICD-10-CM

## 2023-09-30 ENCOUNTER — APPOINTMENT (OUTPATIENT)
Dept: GENERAL RADIOLOGY | Facility: HOSPITAL | Age: 68
DRG: 897 | End: 2023-09-30
Payer: MEDICARE

## 2023-09-30 ENCOUNTER — APPOINTMENT (OUTPATIENT)
Dept: CT IMAGING | Facility: HOSPITAL | Age: 68
DRG: 897 | End: 2023-09-30
Payer: MEDICARE

## 2023-09-30 ENCOUNTER — HOSPITAL ENCOUNTER (INPATIENT)
Facility: HOSPITAL | Age: 68
LOS: 6 days | Discharge: HOME | DRG: 897 | End: 2023-10-06
Attending: EMERGENCY MEDICINE | Admitting: INTERNAL MEDICINE
Payer: MEDICARE

## 2023-09-30 DIAGNOSIS — F10.931 ALCOHOL WITHDRAWAL SYNDROME, WITH DELIRIUM: ICD-10-CM

## 2023-09-30 DIAGNOSIS — D72.829 LEUKOCYTOSIS, UNSPECIFIED TYPE: ICD-10-CM

## 2023-09-30 DIAGNOSIS — Z87.09 HISTORY OF COPD: ICD-10-CM

## 2023-09-30 DIAGNOSIS — F10.11 HISTORY OF ALCOHOL ABUSE: ICD-10-CM

## 2023-09-30 DIAGNOSIS — R41.82 ALTERED MENTAL STATUS, UNSPECIFIED ALTERED MENTAL STATUS TYPE: Primary | ICD-10-CM

## 2023-09-30 LAB
ALBUMIN SERPL-MCNC: 4.5 G/DL (ref 3.5–5.2)
ALBUMIN/GLOB SERPL: 1.8 G/DL
ALP SERPL-CCNC: 161 U/L (ref 39–117)
ALT SERPL W P-5'-P-CCNC: 81 U/L (ref 1–41)
AMMONIA BLD-SCNC: 23 UMOL/L (ref 16–60)
AMPHET+METHAMPHET UR QL: NEGATIVE
ANION GAP SERPL CALCULATED.3IONS-SCNC: 15 MMOL/L (ref 5–15)
APTT PPP: 27.4 SECONDS (ref 22.7–35.4)
ARTERIAL PATENCY WRIST A: POSITIVE
AST SERPL-CCNC: 39 U/L (ref 1–40)
ATMOSPHERIC PRESS: 752.7 MMHG
B PARAPERT DNA SPEC QL NAA+PROBE: NOT DETECTED
B PERT DNA SPEC QL NAA+PROBE: NOT DETECTED
BACTERIA UR QL AUTO: NORMAL /HPF
BARBITURATES UR QL SCN: NEGATIVE
BASE EXCESS BLDA CALC-SCNC: -1.8 MMOL/L (ref 0–2)
BASOPHILS # BLD AUTO: 0.05 10*3/MM3 (ref 0–0.2)
BASOPHILS NFR BLD AUTO: 0.4 % (ref 0–1.5)
BDY SITE: ABNORMAL
BENZODIAZ UR QL SCN: NEGATIVE
BILIRUB SERPL-MCNC: 0.7 MG/DL (ref 0–1.2)
BILIRUB UR QL STRIP: NEGATIVE
BUN SERPL-MCNC: 14 MG/DL (ref 8–23)
BUN/CREAT SERPL: 14 (ref 7–25)
C PNEUM DNA NPH QL NAA+NON-PROBE: NOT DETECTED
CALCIUM SPEC-SCNC: 9.5 MG/DL (ref 8.6–10.5)
CANNABINOIDS SERPL QL: NEGATIVE
CHLORIDE SERPL-SCNC: 110 MMOL/L (ref 98–107)
CLARITY UR: ABNORMAL
CO2 BLDA-SCNC: 19.2 MMOL/L (ref 23–27)
CO2 SERPL-SCNC: 17 MMOL/L (ref 22–29)
COCAINE UR QL: NEGATIVE
COLOR UR: YELLOW
CREAT SERPL-MCNC: 1 MG/DL (ref 0.76–1.27)
D-LACTATE SERPL-SCNC: 1.3 MMOL/L (ref 0.5–2)
DEPRECATED RDW RBC AUTO: 40.7 FL (ref 37–54)
EGFRCR SERPLBLD CKD-EPI 2021: 82 ML/MIN/1.73
EOSINOPHIL # BLD AUTO: 0.01 10*3/MM3 (ref 0–0.4)
EOSINOPHIL NFR BLD AUTO: 0.1 % (ref 0.3–6.2)
ERYTHROCYTE [DISTWIDTH] IN BLOOD BY AUTOMATED COUNT: 12.5 % (ref 12.3–15.4)
ETHANOL BLD-MCNC: <10 MG/DL (ref 0–10)
ETHANOL UR QL: <0.01 %
FENTANYL UR-MCNC: NEGATIVE NG/ML
FLUAV SUBTYP SPEC NAA+PROBE: NOT DETECTED
FLUBV RNA ISLT QL NAA+PROBE: NOT DETECTED
GLOBULIN UR ELPH-MCNC: 2.5 GM/DL
GLUCOSE SERPL-MCNC: 129 MG/DL (ref 65–99)
GLUCOSE UR STRIP-MCNC: NEGATIVE MG/DL
HADV DNA SPEC NAA+PROBE: NOT DETECTED
HCO3 BLDA-SCNC: 18.6 MMOL/L (ref 22–28)
HCOV 229E RNA SPEC QL NAA+PROBE: NOT DETECTED
HCOV HKU1 RNA SPEC QL NAA+PROBE: NOT DETECTED
HCOV NL63 RNA SPEC QL NAA+PROBE: NOT DETECTED
HCOV OC43 RNA SPEC QL NAA+PROBE: NOT DETECTED
HCT VFR BLD AUTO: 41.5 % (ref 37.5–51)
HEMODILUTION: NO
HGB BLD-MCNC: 14.3 G/DL (ref 13–17.7)
HGB UR QL STRIP.AUTO: ABNORMAL
HMPV RNA NPH QL NAA+NON-PROBE: NOT DETECTED
HPIV1 RNA ISLT QL NAA+PROBE: NOT DETECTED
HPIV2 RNA SPEC QL NAA+PROBE: NOT DETECTED
HPIV3 RNA NPH QL NAA+PROBE: NOT DETECTED
HPIV4 P GENE NPH QL NAA+PROBE: NOT DETECTED
HYALINE CASTS UR QL AUTO: NORMAL /LPF
IMM GRANULOCYTES # BLD AUTO: 0.04 10*3/MM3 (ref 0–0.05)
IMM GRANULOCYTES NFR BLD AUTO: 0.3 % (ref 0–0.5)
INHALED O2 CONCENTRATION: 21 %
INR PPP: 1.03 (ref 0.9–1.1)
KETONES UR QL STRIP: NEGATIVE
LEUKOCYTE ESTERASE UR QL STRIP.AUTO: NEGATIVE
LYMPHOCYTES # BLD AUTO: 3.15 10*3/MM3 (ref 0.7–3.1)
LYMPHOCYTES NFR BLD AUTO: 23.3 % (ref 19.6–45.3)
M PNEUMO IGG SER IA-ACNC: NOT DETECTED
MAGNESIUM SERPL-MCNC: 1.9 MG/DL (ref 1.6–2.4)
MCH RBC QN AUTO: 31.3 PG (ref 26.6–33)
MCHC RBC AUTO-ENTMCNC: 34.5 G/DL (ref 31.5–35.7)
MCV RBC AUTO: 90.8 FL (ref 79–97)
METHADONE UR QL SCN: NEGATIVE
MODALITY: ABNORMAL
MONOCYTES # BLD AUTO: 0.96 10*3/MM3 (ref 0.1–0.9)
MONOCYTES NFR BLD AUTO: 7.1 % (ref 5–12)
NEUTROPHILS NFR BLD AUTO: 68.8 % (ref 42.7–76)
NEUTROPHILS NFR BLD AUTO: 9.3 10*3/MM3 (ref 1.7–7)
NITRITE UR QL STRIP: NEGATIVE
NRBC BLD AUTO-RTO: 0 /100 WBC (ref 0–0.2)
NT-PROBNP SERPL-MCNC: 157 PG/ML (ref 0–900)
O2 A-A PPRESDIFF RESPIRATORY: 1.7 MMHG
OPIATES UR QL: NEGATIVE
OXYCODONE UR QL SCN: NEGATIVE
PCO2 BLDA: 21.7 MM HG (ref 35–45)
PH BLDA: 7.54 PH UNITS (ref 7.35–7.45)
PH UR STRIP.AUTO: 8 [PH] (ref 5–8)
PLATELET # BLD AUTO: 354 10*3/MM3 (ref 140–450)
PMV BLD AUTO: 9.1 FL (ref 6–12)
PO2 BLDA: 220 MM HG (ref 80–100)
POTASSIUM SERPL-SCNC: 3.4 MMOL/L (ref 3.5–5.2)
PROCALCITONIN SERPL-MCNC: 0.07 NG/ML (ref 0–0.25)
PROT SERPL-MCNC: 7 G/DL (ref 6–8.5)
PROT UR QL STRIP: NEGATIVE
PROTHROMBIN TIME: 13.7 SECONDS (ref 11.7–14.2)
RBC # BLD AUTO: 4.57 10*6/MM3 (ref 4.14–5.8)
RBC # UR STRIP: NORMAL /HPF
REF LAB TEST METHOD: NORMAL
RHINOVIRUS RNA SPEC NAA+PROBE: NOT DETECTED
RSV RNA NPH QL NAA+NON-PROBE: NOT DETECTED
SAO2 % BLDCOA: 99.9 % (ref 92–98.5)
SARS-COV-2 RNA NPH QL NAA+NON-PROBE: NOT DETECTED
SODIUM SERPL-SCNC: 142 MMOL/L (ref 136–145)
SP GR UR STRIP: 1.01 (ref 1–1.03)
SQUAMOUS #/AREA URNS HPF: NORMAL /HPF
TOTAL RATE: 24 BREATHS/MINUTE
TROPONIN T SERPL HS-MCNC: 18 NG/L
UROBILINOGEN UR QL STRIP: ABNORMAL
WBC # UR STRIP: NORMAL /HPF
WBC NRBC COR # BLD: 13.51 10*3/MM3 (ref 3.4–10.8)

## 2023-09-30 PROCEDURE — 87040 BLOOD CULTURE FOR BACTERIA: CPT | Performed by: PHYSICIAN ASSISTANT

## 2023-09-30 PROCEDURE — 94761 N-INVAS EAR/PLS OXIMETRY MLT: CPT

## 2023-09-30 PROCEDURE — 82140 ASSAY OF AMMONIA: CPT | Performed by: PHYSICIAN ASSISTANT

## 2023-09-30 PROCEDURE — 84145 PROCALCITONIN (PCT): CPT | Performed by: PHYSICIAN ASSISTANT

## 2023-09-30 PROCEDURE — 80307 DRUG TEST PRSMV CHEM ANLYZR: CPT | Performed by: PHYSICIAN ASSISTANT

## 2023-09-30 PROCEDURE — 70450 CT HEAD/BRAIN W/O DYE: CPT

## 2023-09-30 PROCEDURE — 25010000002 THIAMINE HCL 200 MG/2ML SOLUTION 2 ML VIAL: Performed by: INTERNAL MEDICINE

## 2023-09-30 PROCEDURE — 99285 EMERGENCY DEPT VISIT HI MDM: CPT

## 2023-09-30 PROCEDURE — 36415 COLL VENOUS BLD VENIPUNCTURE: CPT

## 2023-09-30 PROCEDURE — 93005 ELECTROCARDIOGRAM TRACING: CPT | Performed by: EMERGENCY MEDICINE

## 2023-09-30 PROCEDURE — 25010000002 LORAZEPAM PER 2 MG: Performed by: EMERGENCY MEDICINE

## 2023-09-30 PROCEDURE — 25010000002 ENOXAPARIN PER 10 MG: Performed by: INTERNAL MEDICINE

## 2023-09-30 PROCEDURE — 93010 ELECTROCARDIOGRAM REPORT: CPT | Performed by: INTERNAL MEDICINE

## 2023-09-30 PROCEDURE — 0202U NFCT DS 22 TRGT SARS-COV-2: CPT | Performed by: PHYSICIAN ASSISTANT

## 2023-09-30 PROCEDURE — 85730 THROMBOPLASTIN TIME PARTIAL: CPT | Performed by: PHYSICIAN ASSISTANT

## 2023-09-30 PROCEDURE — 85610 PROTHROMBIN TIME: CPT | Performed by: PHYSICIAN ASSISTANT

## 2023-09-30 PROCEDURE — 81001 URINALYSIS AUTO W/SCOPE: CPT | Performed by: PHYSICIAN ASSISTANT

## 2023-09-30 PROCEDURE — 94799 UNLISTED PULMONARY SVC/PX: CPT

## 2023-09-30 PROCEDURE — 82077 ASSAY SPEC XCP UR&BREATH IA: CPT | Performed by: PHYSICIAN ASSISTANT

## 2023-09-30 PROCEDURE — 71045 X-RAY EXAM CHEST 1 VIEW: CPT

## 2023-09-30 PROCEDURE — 82803 BLOOD GASES ANY COMBINATION: CPT

## 2023-09-30 PROCEDURE — 36600 WITHDRAWAL OF ARTERIAL BLOOD: CPT

## 2023-09-30 PROCEDURE — 94640 AIRWAY INHALATION TREATMENT: CPT

## 2023-09-30 PROCEDURE — 94664 DEMO&/EVAL PT USE INHALER: CPT

## 2023-09-30 PROCEDURE — 83880 ASSAY OF NATRIURETIC PEPTIDE: CPT | Performed by: PHYSICIAN ASSISTANT

## 2023-09-30 PROCEDURE — 83735 ASSAY OF MAGNESIUM: CPT | Performed by: PHYSICIAN ASSISTANT

## 2023-09-30 PROCEDURE — 85025 COMPLETE CBC W/AUTO DIFF WBC: CPT | Performed by: PHYSICIAN ASSISTANT

## 2023-09-30 PROCEDURE — 84484 ASSAY OF TROPONIN QUANT: CPT | Performed by: PHYSICIAN ASSISTANT

## 2023-09-30 PROCEDURE — 83605 ASSAY OF LACTIC ACID: CPT | Performed by: PHYSICIAN ASSISTANT

## 2023-09-30 PROCEDURE — 80053 COMPREHEN METABOLIC PANEL: CPT | Performed by: PHYSICIAN ASSISTANT

## 2023-09-30 PROCEDURE — 25010000002 METHYLPREDNISOLONE PER 125 MG: Performed by: PHYSICIAN ASSISTANT

## 2023-09-30 RX ORDER — THIAMINE HYDROCHLORIDE 100 MG/ML
200 INJECTION, SOLUTION INTRAMUSCULAR; INTRAVENOUS EVERY 8 HOURS SCHEDULED
Status: DISCONTINUED | OUTPATIENT
Start: 2023-10-03 | End: 2023-10-06 | Stop reason: HOSPADM

## 2023-09-30 RX ORDER — ATORVASTATIN CALCIUM 20 MG/1
10 TABLET, FILM COATED ORAL DAILY
Status: DISCONTINUED | OUTPATIENT
Start: 2023-10-01 | End: 2023-10-06 | Stop reason: HOSPADM

## 2023-09-30 RX ORDER — LORAZEPAM 1 MG/1
1 TABLET ORAL EVERY 6 HOURS
Status: DISCONTINUED | OUTPATIENT
Start: 2023-10-01 | End: 2023-10-01

## 2023-09-30 RX ORDER — ONDANSETRON 4 MG/1
4 TABLET, FILM COATED ORAL EVERY 6 HOURS PRN
Status: DISCONTINUED | OUTPATIENT
Start: 2023-09-30 | End: 2023-10-06 | Stop reason: HOSPADM

## 2023-09-30 RX ORDER — LORAZEPAM 2 MG/ML
2 INJECTION INTRAMUSCULAR
Status: DISCONTINUED | OUTPATIENT
Start: 2023-09-30 | End: 2023-10-02

## 2023-09-30 RX ORDER — ONDANSETRON 2 MG/ML
4 INJECTION INTRAMUSCULAR; INTRAVENOUS EVERY 6 HOURS PRN
Status: DISCONTINUED | OUTPATIENT
Start: 2023-09-30 | End: 2023-10-06 | Stop reason: HOSPADM

## 2023-09-30 RX ORDER — LORAZEPAM 2 MG/ML
1 INJECTION INTRAMUSCULAR
Status: DISCONTINUED | OUTPATIENT
Start: 2023-09-30 | End: 2023-10-02

## 2023-09-30 RX ORDER — BUDESONIDE AND FORMOTEROL FUMARATE DIHYDRATE 160; 4.5 UG/1; UG/1
2 AEROSOL RESPIRATORY (INHALATION)
Status: DISCONTINUED | OUTPATIENT
Start: 2023-09-30 | End: 2023-10-06 | Stop reason: HOSPADM

## 2023-09-30 RX ORDER — MIRTAZAPINE 15 MG/1
15 TABLET, FILM COATED ORAL NIGHTLY
Status: DISCONTINUED | OUTPATIENT
Start: 2023-09-30 | End: 2023-10-06 | Stop reason: HOSPADM

## 2023-09-30 RX ORDER — LORAZEPAM 1 MG/1
2 TABLET ORAL
Status: DISCONTINUED | OUTPATIENT
Start: 2023-09-30 | End: 2023-10-02

## 2023-09-30 RX ORDER — LORAZEPAM 1 MG/1
1 TABLET ORAL
Status: DISCONTINUED | OUTPATIENT
Start: 2023-09-30 | End: 2023-10-02

## 2023-09-30 RX ORDER — ACETAMINOPHEN 325 MG/1
650 TABLET ORAL EVERY 4 HOURS PRN
Status: DISCONTINUED | OUTPATIENT
Start: 2023-09-30 | End: 2023-10-06 | Stop reason: HOSPADM

## 2023-09-30 RX ORDER — AMOXICILLIN 250 MG
2 CAPSULE ORAL 2 TIMES DAILY
Status: DISCONTINUED | OUTPATIENT
Start: 2023-09-30 | End: 2023-10-06 | Stop reason: HOSPADM

## 2023-09-30 RX ORDER — LORAZEPAM 2 MG/ML
1 INJECTION INTRAMUSCULAR ONCE
Status: COMPLETED | OUTPATIENT
Start: 2023-09-30 | End: 2023-09-30

## 2023-09-30 RX ORDER — LURASIDONE HYDROCHLORIDE 20 MG/1
20 TABLET, FILM COATED ORAL DAILY
Status: DISCONTINUED | OUTPATIENT
Start: 2023-10-01 | End: 2023-10-01

## 2023-09-30 RX ORDER — LORAZEPAM 1 MG/1
2 TABLET ORAL EVERY 6 HOURS
Status: DISCONTINUED | OUTPATIENT
Start: 2023-09-30 | End: 2023-10-01

## 2023-09-30 RX ORDER — BISACODYL 5 MG/1
5 TABLET, DELAYED RELEASE ORAL DAILY PRN
Status: DISCONTINUED | OUTPATIENT
Start: 2023-09-30 | End: 2023-10-06 | Stop reason: HOSPADM

## 2023-09-30 RX ORDER — FOLIC ACID 1 MG/1
1 TABLET ORAL DAILY
Status: DISCONTINUED | OUTPATIENT
Start: 2023-10-01 | End: 2023-10-06 | Stop reason: HOSPADM

## 2023-09-30 RX ORDER — BUSPIRONE HYDROCHLORIDE 15 MG/1
15 TABLET ORAL 3 TIMES DAILY
Status: DISCONTINUED | OUTPATIENT
Start: 2023-09-30 | End: 2023-10-06 | Stop reason: HOSPADM

## 2023-09-30 RX ORDER — POLYETHYLENE GLYCOL 3350 17 G/17G
17 POWDER, FOR SOLUTION ORAL DAILY PRN
Status: DISCONTINUED | OUTPATIENT
Start: 2023-09-30 | End: 2023-10-06 | Stop reason: HOSPADM

## 2023-09-30 RX ORDER — AMLODIPINE BESYLATE 5 MG/1
5 TABLET ORAL DAILY
Status: DISCONTINUED | OUTPATIENT
Start: 2023-10-01 | End: 2023-10-06 | Stop reason: HOSPADM

## 2023-09-30 RX ORDER — IPRATROPIUM BROMIDE AND ALBUTEROL SULFATE 2.5; .5 MG/3ML; MG/3ML
3 SOLUTION RESPIRATORY (INHALATION) ONCE
Status: COMPLETED | OUTPATIENT
Start: 2023-09-30 | End: 2023-09-30

## 2023-09-30 RX ORDER — UREA 10 %
3 LOTION (ML) TOPICAL NIGHTLY PRN
Status: DISCONTINUED | OUTPATIENT
Start: 2023-09-30 | End: 2023-10-06 | Stop reason: HOSPADM

## 2023-09-30 RX ORDER — BISACODYL 10 MG
10 SUPPOSITORY, RECTAL RECTAL DAILY PRN
Status: DISCONTINUED | OUTPATIENT
Start: 2023-09-30 | End: 2023-10-06 | Stop reason: HOSPADM

## 2023-09-30 RX ORDER — MULTIPLE VITAMINS W/ MINERALS TAB 9MG-400MCG
1 TAB ORAL DAILY
Status: DISCONTINUED | OUTPATIENT
Start: 2023-10-01 | End: 2023-10-06 | Stop reason: HOSPADM

## 2023-09-30 RX ORDER — ENOXAPARIN SODIUM 100 MG/ML
40 INJECTION SUBCUTANEOUS NIGHTLY
Status: DISCONTINUED | OUTPATIENT
Start: 2023-09-30 | End: 2023-10-06 | Stop reason: HOSPADM

## 2023-09-30 RX ORDER — METHYLPREDNISOLONE SODIUM SUCCINATE 125 MG/2ML
125 INJECTION, POWDER, LYOPHILIZED, FOR SOLUTION INTRAMUSCULAR; INTRAVENOUS ONCE
Status: COMPLETED | OUTPATIENT
Start: 2023-09-30 | End: 2023-09-30

## 2023-09-30 RX ADMIN — MIRTAZAPINE 15 MG: 15 TABLET, FILM COATED ORAL at 22:02

## 2023-09-30 RX ADMIN — BUSPIRONE HYDROCHLORIDE 15 MG: 15 TABLET ORAL at 22:02

## 2023-09-30 RX ADMIN — LORAZEPAM 2 MG: 1 TABLET ORAL at 22:02

## 2023-09-30 RX ADMIN — IPRATROPIUM BROMIDE AND ALBUTEROL SULFATE 3 ML: .5; 2.5 SOLUTION RESPIRATORY (INHALATION) at 13:35

## 2023-09-30 RX ADMIN — SENNOSIDES AND DOCUSATE SODIUM 2 TABLET: 50; 8.6 TABLET ORAL at 22:02

## 2023-09-30 RX ADMIN — LORAZEPAM 1 MG: 2 INJECTION INTRAMUSCULAR; INTRAVENOUS at 17:20

## 2023-09-30 RX ADMIN — ENOXAPARIN SODIUM 40 MG: 100 INJECTION SUBCUTANEOUS at 22:02

## 2023-09-30 RX ADMIN — METHYLPREDNISOLONE SODIUM SUCCINATE 125 MG: 125 INJECTION, POWDER, FOR SOLUTION INTRAMUSCULAR; INTRAVENOUS at 13:49

## 2023-09-30 RX ADMIN — THIAMINE HYDROCHLORIDE 500 MG: 100 INJECTION, SOLUTION INTRAMUSCULAR; INTRAVENOUS at 22:03

## 2023-09-30 RX ADMIN — LORAZEPAM 1 MG: 2 INJECTION INTRAMUSCULAR; INTRAVENOUS at 14:19

## 2023-09-30 NOTE — ED PROVIDER NOTES
EMERGENCY DEPARTMENT ENCOUNTER    Room Number:  29/29  PCP: Ten Coon MD  Discussed/ obtained information from independent historians: EMS      HPI:  Chief Complaint: Altered mental status  A complete HPI/ROS/PMH/PSH/SH/FH are unobtainable due to: Altered mental status  Context: Stanislav Choi is a 68 y.o. male who presents to the ED for evaluation after altered mental status.  Patient reportedly found at a storage facility and was acting altered.  Was noted to have some difficulty breathing.  Patient reportedly A&O x1 for EMS.  He is A&O x2 on my evaluation.  He does admit to shortness of breath and reports he feels chilled.  Patient is very obviously restless and pacing around the room.  Patient has listed history of alcohol use disorder and alcoholic dementia.  History is otherwise limited as patient is altered.      External (non-ED) record review:   Reviewed note from office visit with neurology on 2/19/2023 where patient seen for alcoholic dementia.  Reviewed assessment and plan.  We will plan for EMG and EEG.  Reviewed prior laboratory studies.  Most recent ammonia 17.  Most recent CMP with creatinine 1.08.      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    Confusion state 02/19/2019    Altered mental status, unspecified altered mental status type 06/27/2023    Alcohol use disorder 06/28/2023    Weight loss 06/28/2023    Lymph node enlargement 06/28/2023    Severe malnutrition 06/29/2023    Alcoholic dementia 07/05/2023    Pulmonary nodule 07/05/2023     Resolved Ambulatory Problems     Diagnosis Date Noted    Psychosis 03/29/2018    Metabolic encephalopathy 06/28/2023    Alcohol withdrawal 06/28/2023     JON (acute kidney injury) 06/28/2023    Dehydration 06/28/2023    Hypotension 06/28/2023    Chronic diarrhea 06/28/2023     Past Medical History:   Diagnosis Date    Depression     Elevated cholesterol     HL (hearing loss)     Hyperlipidemia     Memory loss     Peripheral neuropathy     Shingles          PAST SURGICAL HISTORY  Past Surgical History:   Procedure Laterality Date    JOINT REPLACEMENT           FAMILY HISTORY  Family History   Problem Relation Age of Onset    Cancer Mother     Hypertension Mother     Cancer Father     Heart disease Father     Cancer Brother     Cancer Maternal Grandmother     Cancer Maternal Grandfather     Heart disease Paternal Grandfather          SOCIAL HISTORY  Social History     Socioeconomic History    Marital status:    Tobacco Use    Smoking status: Every Day     Packs/day: 1.00     Years: 40.00     Pack years: 40.00     Types: Cigarettes    Smokeless tobacco: Never   Vaping Use    Vaping Use: Never used   Substance and Sexual Activity    Alcohol use: Not Currently     Comment: Hx ETOH abuse    Drug use: No    Sexual activity: Defer         ALLERGIES  Iodinated contrast media        REVIEW OF SYSTEMS  Review of Systems   Unable to perform ROS: Mental status change          PHYSICAL EXAM  ED Triage Vitals [09/30/23 1210]   Temp Heart Rate Resp BP SpO2   97.6 °F (36.4 °C) 96 (!) 32 164/88 99 %      Temp src Heart Rate Source Patient Position BP Location FiO2 (%)   Tympanic Monitor Sitting Left arm --       Physical Exam  Constitutional:       General: He is not in acute distress.     Appearance: Normal appearance.   HENT:      Head: Normocephalic and atraumatic.      Nose: Nose normal.      Mouth/Throat:      Mouth: Mucous membranes are moist.   Eyes:      Conjunctiva/sclera: Conjunctivae normal.      Pupils: Pupils are equal, round, and reactive to light.   Cardiovascular:      Rate and Rhythm: Normal rate and regular rhythm.      Pulses: Normal pulses.      Heart  sounds: Normal heart sounds.   Pulmonary:      Effort: Tachypnea present.      Breath sounds: Normal breath sounds.      Comments: Diminished breath sounds bilateral bases.  Sats 99% on room air  Abdominal:      General: There is no distension.   Musculoskeletal:         General: Normal range of motion.      Cervical back: Normal range of motion and neck supple.   Skin:     General: Skin is warm.      Capillary Refill: Capillary refill takes less than 2 seconds.   Neurological:      General: No focal deficit present.      Mental Status: He is alert.      Comments: Patient oriented to person and place   Psychiatric:         Mood and Affect: Affect is labile.      Comments: Patient restless         Vital signs and nursing notes reviewed.          LAB RESULTS  Recent Results (from the past 24 hour(s))   ECG 12 Lead Altered Mental Status    Collection Time: 09/30/23  1:16 PM   Result Value Ref Range    QT Interval 370 ms    QTC Interval 473 ms   Comprehensive Metabolic Panel    Collection Time: 09/30/23  1:39 PM    Specimen: Blood   Result Value Ref Range    Glucose 129 (H) 65 - 99 mg/dL    BUN 14 8 - 23 mg/dL    Creatinine 1.00 0.76 - 1.27 mg/dL    Sodium 142 136 - 145 mmol/L    Potassium 3.4 (L) 3.5 - 5.2 mmol/L    Chloride 110 (H) 98 - 107 mmol/L    CO2 17.0 (L) 22.0 - 29.0 mmol/L    Calcium 9.5 8.6 - 10.5 mg/dL    Total Protein 7.0 6.0 - 8.5 g/dL    Albumin 4.5 3.5 - 5.2 g/dL    ALT (SGPT) 81 (H) 1 - 41 U/L    AST (SGOT) 39 1 - 40 U/L    Alkaline Phosphatase 161 (H) 39 - 117 U/L    Total Bilirubin 0.7 0.0 - 1.2 mg/dL    Globulin 2.5 gm/dL    A/G Ratio 1.8 g/dL    BUN/Creatinine Ratio 14.0 7.0 - 25.0    Anion Gap 15.0 5.0 - 15.0 mmol/L    eGFR 82.0 >60.0 mL/min/1.73   Protime-INR    Collection Time: 09/30/23  1:39 PM    Specimen: Blood   Result Value Ref Range    Protime 13.7 11.7 - 14.2 Seconds    INR 1.03 0.90 - 1.10   aPTT    Collection Time: 09/30/23  1:39 PM    Specimen: Blood   Result Value Ref Range    PTT  27.4 22.7 - 35.4 seconds   BNP    Collection Time: 09/30/23  1:39 PM    Specimen: Blood   Result Value Ref Range    proBNP 157.0 0.0 - 900.0 pg/mL   Single High Sensitivity Troponin T    Collection Time: 09/30/23  1:39 PM    Specimen: Blood   Result Value Ref Range    HS Troponin T 18 (H) <15 ng/L   Procalcitonin    Collection Time: 09/30/23  1:39 PM    Specimen: Blood   Result Value Ref Range    Procalcitonin 0.07 0.00 - 0.25 ng/mL   Lactic Acid, Plasma    Collection Time: 09/30/23  1:39 PM    Specimen: Blood   Result Value Ref Range    Lactate 1.3 0.5 - 2.0 mmol/L   Magnesium    Collection Time: 09/30/23  1:39 PM    Specimen: Blood   Result Value Ref Range    Magnesium 1.9 1.6 - 2.4 mg/dL   Ethanol    Collection Time: 09/30/23  1:39 PM    Specimen: Blood   Result Value Ref Range    Ethanol <10 0 - 10 mg/dL    Ethanol % <0.010 %   CBC Auto Differential    Collection Time: 09/30/23  1:39 PM    Specimen: Blood   Result Value Ref Range    WBC 13.51 (H) 3.40 - 10.80 10*3/mm3    RBC 4.57 4.14 - 5.80 10*6/mm3    Hemoglobin 14.3 13.0 - 17.7 g/dL    Hematocrit 41.5 37.5 - 51.0 %    MCV 90.8 79.0 - 97.0 fL    MCH 31.3 26.6 - 33.0 pg    MCHC 34.5 31.5 - 35.7 g/dL    RDW 12.5 12.3 - 15.4 %    RDW-SD 40.7 37.0 - 54.0 fl    MPV 9.1 6.0 - 12.0 fL    Platelets 354 140 - 450 10*3/mm3    Neutrophil % 68.8 42.7 - 76.0 %    Lymphocyte % 23.3 19.6 - 45.3 %    Monocyte % 7.1 5.0 - 12.0 %    Eosinophil % 0.1 (L) 0.3 - 6.2 %    Basophil % 0.4 0.0 - 1.5 %    Immature Grans % 0.3 0.0 - 0.5 %    Neutrophils, Absolute 9.30 (H) 1.70 - 7.00 10*3/mm3    Lymphocytes, Absolute 3.15 (H) 0.70 - 3.10 10*3/mm3    Monocytes, Absolute 0.96 (H) 0.10 - 0.90 10*3/mm3    Eosinophils, Absolute 0.01 0.00 - 0.40 10*3/mm3    Basophils, Absolute 0.05 0.00 - 0.20 10*3/mm3    Immature Grans, Absolute 0.04 0.00 - 0.05 10*3/mm3    nRBC 0.0 0.0 - 0.2 /100 WBC   Ammonia    Collection Time: 09/30/23  1:39 PM    Specimen: Blood   Result Value Ref Range    Ammonia 23 16  - 60 umol/L   Blood Gas, Arterial -    Collection Time: 09/30/23  1:49 PM    Specimen: Arterial Blood   Result Value Ref Range    Site Left Brachial     Nilesh's Test Positive     pH, Arterial 7.540 (H) 7.350 - 7.450 pH units    pCO2, Arterial 21.7 (L) 35.0 - 45.0 mm Hg    pO2, Arterial 220.0 (H) 80.0 - 100.0 mm Hg    HCO3, Arterial 18.6 (L) 22.0 - 28.0 mmol/L    Base Excess, Arterial -1.8 (L) 0.0 - 2.0 mmol/L    O2 Saturation, Arterial 99.9 (H) 92.0 - 98.5 %    A-a DO2 1.7 mmHg    CO2 Content 19.2 (L) 23 - 27 mmol/L    Barometric Pressure for Blood Gas 752.7000 mmHg    Modality Room Air     FIO2 21 %    Rate 24 Breaths/minute    Hemodilution No    Respiratory Panel PCR w/COVID-19(SARS-CoV-2) ZOE/DESI/GABRIEL/PAD/COR/MAD/DE In-House, NP Swab in [facility], 3-4 HR TAT - Swab, Nasopharynx    Collection Time: 09/30/23  1:52 PM    Specimen: Nasopharynx; Swab   Result Value Ref Range    ADENOVIRUS, PCR Not Detected Not Detected    Coronavirus 229E Not Detected Not Detected    Coronavirus HKU1 Not Detected Not Detected    Coronavirus NL63 Not Detected Not Detected    Coronavirus OC43 Not Detected Not Detected    COVID19 Not Detected Not Detected - Ref. Range    Human Metapneumovirus Not Detected Not Detected    Human Rhinovirus/Enterovirus Not Detected Not Detected    Influenza A PCR Not Detected Not Detected    Influenza B PCR Not Detected Not Detected    Parainfluenza Virus 1 Not Detected Not Detected    Parainfluenza Virus 2 Not Detected Not Detected    Parainfluenza Virus 3 Not Detected Not Detected    Parainfluenza Virus 4 Not Detected Not Detected    RSV, PCR Not Detected Not Detected    Bordetella pertussis pcr Not Detected Not Detected    Bordetella parapertussis PCR Not Detected Not Detected    Chlamydophila pneumoniae PCR Not Detected Not Detected    Mycoplasma pneumo by PCR Not Detected Not Detected       Ordered the above labs and reviewed the results.        RADIOLOGY  CT Head Without Contrast    Result Date:  9/30/2023  CT HEAD WITHOUT CONTRAST  CLINICAL HISTORY: Altered mental status  TECHNIQUE: CT scan of the head was obtained with 3 mm axial soft tissue algorithm images. No intravenous contrast was administered. Sagittal and coronal reconstructions were obtained.  COMPARISON: CT head 6/27/2023 and 3/29/2018  FINDINGS:  No intracranial hemorrhage.  No midline shift or mass effect.  No CT evidence of acute territorial infarction.  No hydrocephalus. Unchanged left nasal suspected polyp.       No acute intracranial abnormality.    Radiation dose reduction techniques were utilized, including automated exposure control and exposure modulation based on body size.  This report was finalized on 9/30/2023 3:01 PM by Dr. Bienvenido Lipscomb M.D.      XR Chest 1 View    Result Date: 9/30/2023  CHEST SINGLE VIEW  HISTORY: Shortness of air with altered mental status.  COMPARISON: CT chest 06/27/2023, AP chest 06/27/2023.  FINDINGS: Cardiomediastinal silhouette is normal. Lungs appear clear. There is no evidence for pulmonary edema  or pleural effusion. There is a calcified nodule in the right upper lobe. Right hilar calcified lymph node is present. There is no evidence for pulmonary edema or pleural effusion or infiltrate.      No evidence for active disease in the chest.  This report was finalized on 9/30/2023 3:02 PM by Dr. Braulio Solis M.D.       Ordered the above noted radiological studies. Reviewed by me in PACS.              MEDICATIONS GIVEN IN ER  Medications   ipratropium-albuterol (DUO-NEB) nebulizer solution 3 mL (3 mL Nebulization Given 9/30/23 1335)   methylPREDNISolone sodium succinate (SOLU-Medrol) injection 125 mg (125 mg Intravenous Given 9/30/23 1349)   LORazepam (ATIVAN) injection 1 mg (1 mg Intravenous Given 9/30/23 1419)             MEDICAL DECISION MAKING, PROGRESS, and CONSULTS    All labs have been independently reviewed by me.  All radiology studies have been reviewed by me and I have also reviewed the  radiology report.   EKG's independently viewed and interpreted by me.  Discussion below represents my analysis of pertinent findings related to patient's condition, differential diagnosis, treatment plan and final disposition.            Orders placed during this visit:  Orders Placed This Encounter   Procedures    Respiratory Panel PCR w/COVID-19(SARS-CoV-2) ZOE/DESI/GABRIEL/PAD/COR/MAD/DE In-House, NP Swab in UTM/VTM, 3-4 HR TAT - Swab, Nasopharynx    Blood Culture - Blood,    Blood Culture - Blood,    XR Chest 1 View    CT Head Without Contrast    Comprehensive Metabolic Panel    Protime-INR    aPTT    Urinalysis With Microscopic If Indicated (No Culture) - Urine, Clean Catch    BNP    Single High Sensitivity Troponin T    Procalcitonin    Lactic Acid, Plasma    Magnesium    Urine Drug Screen - Urine, Clean Catch    Ethanol    CBC Auto Differential    Ammonia    Blood Gas, Arterial -    Blood Gas, Arterial -    LHA (on-call MD unless specified) Details    LHA (on-call MD unless specified) Details    ECG 12 Lead Altered Mental Status    Inpatient Admission    Legal Status 72hr Hold    CBC & Differential           Differential diagnosis:  Acute alcohol withdrawal, alcoholic dementia with behavioral disturbance, COPD exacerbation      Independent interpretation of labs, radiology studies, and discussions with consultants:  ED Course as of 09/30/23 1549   Sat Sep 30, 2023   1230 After placing orders I went back to reassess the patient and he was missing from his room.  I looked throughout the emergency department and could not find the patient.  I have spoken with security who will review video footage and attempt to find where the patient went. [MP]   1250 I have gone back and checked in this room several times and this patient is not in the room.  I checked when initially signed up for the patient and just checked again.  I talked with Marcia the midlevel provider and she states that this patient is not in the room as  well. [MM]   1306 Patient is still not in the room on repeat checking on him.  Obviously staff knows about this and are trying to track him down. [MM]   1330 My own independent rotation of the EKG that was done at 1:16 PM reveals a rate of 98 it is normal sinus rhythm there is some mild intravelar conduction delay with normal axis.  I do not see any acute injury pattern QT looks unremarkable some nonspecific ST and T wave changes  I compared to the EKG that was done on 6/27/2023 and it looks fairly similar [MM]   1357 Reviewed this patient's blood gas.  Patient has respiratory alkalosis likely from hyperventilation from alcohol withdrawal.  I did give him a dose of Ativan here IV. [MM]   1357 pH, Arterial(!): 7.540 [MM]   1358 pCO2, Arterial(!): 21.7 [MM]   1358 pO2, Arterial(!): 220.0 [MM]   1400 Chest x-ray independently interpreted by me as no pneumothorax [MP]   1419 WBC(!): 13.51 [MP]   1419 Hemoglobin: 14.3 [MP]   1440 Glucose(!): 129 [MP]   1440 Potassium(!): 3.4 [MP]   1440 Chloride(!): 110 [MP]   1440 CO2(!): 17.0 [MP]   1440 HS Troponin T(!): 18 [MP]   1448 CT scan of the head independently interpreted me as no acute hemorrhage [MP]   1508 Ethanol: <10 [MP]   1519 I spoke with Dr. Dela Cruz with A.  Reviewed patient presentation and ED findings.  She agrees to admit today telemetry bed. [MP]   1522 CO2(!): 17.0 [MM]   1522 Ethanol %: <0.010 [MM]   1522 WBC(!): 13.51 [MM]   1522 Lactate: 1.3 [MM]   1522 Chest x-ray is unremarkable no active disease seen on the chest x-ray.  I looked at the chest x-ray and also reviewed the radiologist report as well. [MM]   1523 I reviewed the report of the CT scan of the head.  There is no active intracranial abnormality seen.  No bleeding or hemorrhage or any swelling or midline shift.  Please see complete dictated report from radiologist. [MM]      ED Course User Index  [MM] Ten Linn MD  [MP] Marcia Grossman, PANbaC       Additional orders considered but not  ordered:  CTA of the chest      Additional sources:    - Chronic or social conditions impacting care: Chronic alcohol dependence          DIAGNOSIS  Final diagnoses:   Altered mental status, unspecified altered mental status type   History of alcohol abuse   Leukocytosis, unspecified type   History of COPD   Alcohol withdrawal syndrome, with delirium           Latest Documented Vital Signs:  As of 15:49 EDT  BP- 151/90 HR- 89 Temp- 97.6 °F (36.4 °C) (Tympanic) O2 sat- 98%              --    Please note that portions of this were completed with a voice recognition program.       Note Disclaimer: At [facility], we believe that sharing information builds trust and better relationships. You are receiving this note because you are receiving care at [facility] or recently visited. It is possible you will see health information before a provider has talked with you about it. This kind of information can be easy to misunderstand. To help you fully understand what it means for your health, we urge you to discuss this note with your provider.             Marcia Grossman PA-C  09/30/23 1547

## 2023-09-30 NOTE — ED NOTES
Nursing report ED to floor  Stanislav Choi  68 y.o.  male    HPI :   Chief Complaint   Patient presents with    Altered Mental Status       Admitting doctor:   Leonora Dela Cruz MD    Admitting diagnosis:   The primary encounter diagnosis was Altered mental status, unspecified altered mental status type. Diagnoses of History of alcohol abuse, Leukocytosis, unspecified type, History of COPD, and Alcohol withdrawal syndrome, with delirium were also pertinent to this visit.    Code status:   Current Code Status       Date Active Code Status Order ID Comments User Context       Prior            Allergies:   Iodinated contrast media    Isolation:   No active isolations    Intake and Output  No intake or output data in the 24 hours ending 09/30/23 1617    Weight:   There were no vitals filed for this visit.    Most recent vitals:   Vitals:    09/30/23 1348 09/30/23 1547 09/30/23 1548 09/30/23 1615   BP: 164/93  151/90    BP Location:   Right arm    Patient Position:   Lying    Pulse: 88 95 89 92   Resp: 16   16   Temp:       TempSrc:       SpO2: 99% 98%  96%       Active LDAs/IV Access:   Lines, Drains & Airways       Active LDAs       Name Placement date Placement time Site Days    Peripheral IV 09/30/23 1348 Right Forearm 09/30/23  1348  Forearm  less than 1                    Labs (abnormal labs have a star):   Labs Reviewed   COMPREHENSIVE METABOLIC PANEL - Abnormal; Notable for the following components:       Result Value    Glucose 129 (*)     Potassium 3.4 (*)     Chloride 110 (*)     CO2 17.0 (*)     ALT (SGPT) 81 (*)     Alkaline Phosphatase 161 (*)     All other components within normal limits    Narrative:     GFR Normal >60  Chronic Kidney Disease <60  Kidney Failure <15     URINALYSIS W/ MICROSCOPIC IF INDICATED (NO CULTURE) - Abnormal; Notable for the following components:    Appearance, UA Cloudy (*)     Blood, UA Trace (*)     All other components within normal limits   SINGLE HSTROPONIN T - Abnormal;  Notable for the following components:    HS Troponin T 18 (*)     All other components within normal limits    Narrative:     High Sensitive Troponin T Reference Range:  <10.0 ng/L- Negative Female for AMI  <15.0 ng/L- Negative Male for AMI  >=10 - Abnormal Female indicating possible myocardial injury.  >=15 - Abnormal Male indicating possible myocardial injury.   Clinicians would have to utilize clinical acumen, EKG, Troponin, and serial changes to determine if it is an Acute Myocardial Infarction or myocardial injury due to an underlying chronic condition.        CBC WITH AUTO DIFFERENTIAL - Abnormal; Notable for the following components:    WBC 13.51 (*)     Eosinophil % 0.1 (*)     Neutrophils, Absolute 9.30 (*)     Lymphocytes, Absolute 3.15 (*)     Monocytes, Absolute 0.96 (*)     All other components within normal limits   BLOOD GAS, ARTERIAL - Abnormal; Notable for the following components:    pH, Arterial 7.540 (*)     pCO2, Arterial 21.7 (*)     pO2, Arterial 220.0 (*)     HCO3, Arterial 18.6 (*)     Base Excess, Arterial -1.8 (*)     O2 Saturation, Arterial 99.9 (*)     CO2 Content 19.2 (*)     All other components within normal limits   RESPIRATORY PANEL PCR W/ COVID-19 (SARS-COV-2) ZOE/DESI/GABRIEL/PAD/COR/MAD/DE IN-HOUSE, NP SWAB IN UT/VTP, 3-4 HR TAT - Normal    Narrative:     In the setting of a positive respiratory panel with a viral infection PLUS a negative procalcitonin without other underlying concern for bacterial infection, consider observing off antibiotics or discontinuation of antibiotics and continue supportive care. If the respiratory panel is positive for atypical bacterial infection (Bordetella pertussis, Chlamydophila pneumoniae, or Mycoplasma pneumoniae), consider antibiotic de-escalation to target atypical bacterial infection.   PROTIME-INR - Normal   APTT - Normal   BNP (IN-HOUSE) - Normal    Narrative:     This assay is used as an aid in the diagnosis of individuals suspected of  "having heart failure. It can be used as an aid in the diagnosis of acute decompensated heart failure (ADHF) in patients presenting with signs and symptoms of ADHF to the emergency department (ED). In addition, NT-proBNP of <300 pg/mL indicates ADHF is not likely.   PROCALCITONIN - Normal    Narrative:     As a Marker for Sepsis (Non-Neonates):    1. <0.5 ng/mL represents a low risk of severe sepsis and/or septic shock.  2. >2 ng/mL represents a high risk of severe sepsis and/or septic shock.    As a Marker for Lower Respiratory Tract Infections that require antibiotic therapy:    PCT on Admission    Antibiotic Therapy       6-12 Hrs later    >0.5                Strongly Recommended  >0.25 - <0.5        Recommended   0.1 - 0.25          Discouraged              Remeasure/reassess PCT  <0.1                Strongly Discouraged     Remeasure/reassess PCT    As 28 day mortality risk marker: \"Change in Procalcitonin Result\" (>80% or <=80%) if Day 0 (or Day 1) and Day 4 values are available. Refer to http://www.[employer]-pct-calculator.com    Change in PCT <=80%  A decrease of PCT levels below or equal to 80% defines a positive change in PCT test result representing a higher risk for 28-day all-cause mortality of patients diagnosed with severe sepsis for septic shock.    Change in PCT >80%  A decrease of PCT levels of more than 80% defines a negative change in PCT result representing a lower risk for 28-day all-cause mortality of patients diagnosed with severe sepsis or septic shock.      LACTIC ACID, PLASMA - Normal   MAGNESIUM - Normal   AMMONIA - Normal   BLOOD CULTURE   BLOOD CULTURE   ETHANOL   BLOOD GAS, ARTERIAL   URINALYSIS, MICROSCOPIC ONLY   URINE DRUG SCREEN   CBC AND DIFFERENTIAL    Narrative:     The following orders were created for panel order CBC & Differential.  Procedure                               Abnormality         Status                     ---------                               -----------         " ------                     CBC Auto Differential[[id number]]        Abnormal            Final result                 Please view results for these tests on the individual orders.       EKG:   ECG 12 Lead Altered Mental Status   Preliminary Result   HEART RATE= 98  bpm   RR Interval= 612  ms   NE Interval= 179  ms   P Horizontal Axis= 7  deg   P Front Axis= 88  deg   QRSD Interval= 96  ms   QT Interval= 370  ms   QTcB= 473  ms   QRS Axis= 48  deg   T Wave Axis= 65  deg   - NORMAL ECG -   Sinus rhythm   Electronically Signed By:    Date and Time of Study: 2023-09-30 13:16:29          Meds given in ED:   Medications   ipratropium-albuterol (DUO-NEB) nebulizer solution 3 mL (3 mL Nebulization Given 9/30/23 1335)   methylPREDNISolone sodium succinate (SOLU-Medrol) injection 125 mg (125 mg Intravenous Given 9/30/23 1349)   LORazepam (ATIVAN) injection 1 mg (1 mg Intravenous Given 9/30/23 1419)       Imaging results:  CT Head Without Contrast    Result Date: 9/30/2023  No acute intracranial abnormality.    Radiation dose reduction techniques were utilized, including automated exposure control and exposure modulation based on body size.  This report was finalized on 9/30/2023 3:01 PM by Dr. Bienvenido Lipscomb M.D.      XR Chest 1 View    Result Date: 9/30/2023  No evidence for active disease in the chest.  This report was finalized on 9/30/2023 3:02 PM by Dr. Braulio Solis M.D.       Ambulatory status:   - Stand-by assist    Social issues:   Social History     Socioeconomic History    Marital status:    Tobacco Use    Smoking status: Every Day     Packs/day: 1.00     Years: 40.00     Pack years: 40.00     Types: Cigarettes    Smokeless tobacco: Never   Vaping Use    Vaping Use: Never used   Substance and Sexual Activity    Alcohol use: Not Currently     Comment: Hx ETOH abuse    Drug use: No    Sexual activity: Defer       NIH Stroke Scale:       Leonila Smiley RN  09/30/23 16:17 EDT

## 2023-09-30 NOTE — ED NOTES
Pt to ED via EMS, found at [facility] space storage([facility])--staff called because they noticed he was very altered. Pt had drove himself there.   Staff at storage place called his spouse and unable to make contact. Pt also has a hotel key in his pocket--staff at storage place also called the hotel and wife not at hotel.     Pt Alert to self in triage.
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
## 2023-09-30 NOTE — ED NOTES
Per wife, patient does not live with her. Wife states that they had dinner out last night and the patient was his normal self. Wife states that the patient is being taken off of a psychiatric medication (she did not know the name) and she feels that he has been overmedicated.

## 2023-09-30 NOTE — ED NOTES
Nursing report ED to floor  Stanislav Choi  68 y.o.  male    HPI :   Chief Complaint   Patient presents with    Altered Mental Status       Admitting doctor:   Leonora Dela Cruz MD    Admitting diagnosis:   The primary encounter diagnosis was Altered mental status, unspecified altered mental status type. Diagnoses of History of alcohol abuse, Leukocytosis, unspecified type, History of COPD, and Alcohol withdrawal syndrome, with delirium were also pertinent to this visit.    Code status:   Current Code Status       Date Active Code Status Order ID Comments User Context       9/30/2023 1654 CPR (Attempt to Resuscitate) [id number]  Leonora Dela Cruz MD ED        Question Answer    Code Status (Patient has no pulse and is not breathing) CPR (Attempt to Resuscitate)    Medical Interventions (Patient has pulse or is breathing) Full                    Allergies:   Iodinated contrast media    Isolation:   No active isolations    Intake and Output  No intake or output data in the 24 hours ending 09/30/23 1724    Weight:   There were no vitals filed for this visit.    Most recent vitals:   Vitals:    09/30/23 1548 09/30/23 1615 09/30/23 1621 09/30/23 1720   BP: 151/90   (!) 155/103   BP Location: Right arm   Right arm   Patient Position: Lying   Lying   Pulse: 89 92 99 97   Resp:  16  16   Temp:       TempSrc:       SpO2:  96% 98% 98%       Active LDAs/IV Access:   Lines, Drains & Airways       Active LDAs       Name Placement date Placement time Site Days    Peripheral IV 09/30/23 1348 Right Forearm 09/30/23  1348  Forearm  less than 1                    Labs (abnormal labs have a star):   Labs Reviewed   COMPREHENSIVE METABOLIC PANEL - Abnormal; Notable for the following components:       Result Value    Glucose 129 (*)     Potassium 3.4 (*)     Chloride 110 (*)     CO2 17.0 (*)     ALT (SGPT) 81 (*)     Alkaline Phosphatase 161 (*)     All other components within normal limits    Narrative:     GFR Normal  >60  Chronic Kidney Disease <60  Kidney Failure <15     URINALYSIS W/ MICROSCOPIC IF INDICATED (NO CULTURE) - Abnormal; Notable for the following components:    Appearance, UA Cloudy (*)     Blood, UA Trace (*)     All other components within normal limits   SINGLE HSTROPONIN T - Abnormal; Notable for the following components:    HS Troponin T 18 (*)     All other components within normal limits    Narrative:     High Sensitive Troponin T Reference Range:  <10.0 ng/L- Negative Female for AMI  <15.0 ng/L- Negative Male for AMI  >=10 - Abnormal Female indicating possible myocardial injury.  >=15 - Abnormal Male indicating possible myocardial injury.   Clinicians would have to utilize clinical acumen, EKG, Troponin, and serial changes to determine if it is an Acute Myocardial Infarction or myocardial injury due to an underlying chronic condition.        CBC WITH AUTO DIFFERENTIAL - Abnormal; Notable for the following components:    WBC 13.51 (*)     Eosinophil % 0.1 (*)     Neutrophils, Absolute 9.30 (*)     Lymphocytes, Absolute 3.15 (*)     Monocytes, Absolute 0.96 (*)     All other components within normal limits   BLOOD GAS, ARTERIAL - Abnormal; Notable for the following components:    pH, Arterial 7.540 (*)     pCO2, Arterial 21.7 (*)     pO2, Arterial 220.0 (*)     HCO3, Arterial 18.6 (*)     Base Excess, Arterial -1.8 (*)     O2 Saturation, Arterial 99.9 (*)     CO2 Content 19.2 (*)     All other components within normal limits   RESPIRATORY PANEL PCR W/ COVID-19 (SARS-COV-2) ZOE/DESI/GABRIEL/PAD/COR/MAD/DE IN-HOUSE, NP SWAB IN [facility], 3-4 HR TAT - Normal    Narrative:     In the setting of a positive respiratory panel with a viral infection PLUS a negative procalcitonin without other underlying concern for bacterial infection, consider observing off antibiotics or discontinuation of antibiotics and continue supportive care. If the respiratory panel is positive for atypical bacterial infection (Bordetella  "pertussis, Chlamydophila pneumoniae, or Mycoplasma pneumoniae), consider antibiotic de-escalation to target atypical bacterial infection.   PROTIME-INR - Normal   APTT - Normal   BNP (IN-HOUSE) - Normal    Narrative:     This assay is used as an aid in the diagnosis of individuals suspected of having heart failure. It can be used as an aid in the diagnosis of acute decompensated heart failure (ADHF) in patients presenting with signs and symptoms of ADHF to the emergency department (ED). In addition, NT-proBNP of <300 pg/mL indicates ADHF is not likely.   PROCALCITONIN - Normal    Narrative:     As a Marker for Sepsis (Non-Neonates):    1. <0.5 ng/mL represents a low risk of severe sepsis and/or septic shock.  2. >2 ng/mL represents a high risk of severe sepsis and/or septic shock.    As a Marker for Lower Respiratory Tract Infections that require antibiotic therapy:    PCT on Admission    Antibiotic Therapy       6-12 Hrs later    >0.5                Strongly Recommended  >0.25 - <0.5        Recommended   0.1 - 0.25          Discouraged              Remeasure/reassess PCT  <0.1                Strongly Discouraged     Remeasure/reassess PCT    As 28 day mortality risk marker: \"Change in Procalcitonin Result\" (>80% or <=80%) if Day 0 (or Day 1) and Day 4 values are available. Refer to http://www.[employer]-pct-calculator.com    Change in PCT <=80%  A decrease of PCT levels below or equal to 80% defines a positive change in PCT test result representing a higher risk for 28-day all-cause mortality of patients diagnosed with severe sepsis for septic shock.    Change in PCT >80%  A decrease of PCT levels of more than 80% defines a negative change in PCT result representing a lower risk for 28-day all-cause mortality of patients diagnosed with severe sepsis or septic shock.      LACTIC ACID, PLASMA - Normal   MAGNESIUM - Normal   URINE DRUG SCREEN - Normal    Narrative:     Negative Thresholds Per Drugs " Screened:    Amphetamines                 500 ng/ml  Barbiturates                 200 ng/ml  Benzodiazepines              100 ng/ml  Cocaine                      300 ng/ml  Methadone                    300 ng/ml  Opiates                      300 ng/ml  Oxycodone                    100 ng/ml  THC                           50 ng/ml  Fentanyl                       5 ng/ml      The Normal Value for all drugs tested is negative. This report includes final unconfirmed screening results to be used for medical treatment purposes only. Unconfirmed results must not be used for non-medical purposes such as employment or legal testing. Clinical consideration should be applied to any drug of abuse test, particularly when unconfirmed results are used.           AMMONIA - Normal   BLOOD CULTURE   BLOOD CULTURE   ETHANOL   BLOOD GAS, ARTERIAL   URINALYSIS, MICROSCOPIC ONLY   CBC AND DIFFERENTIAL    Narrative:     The following orders were created for panel order CBC & Differential.  Procedure                               Abnormality         Status                     ---------                               -----------         ------                     CBC Auto Differential[[id number]]        Abnormal            Final result                 Please view results for these tests on the individual orders.       EKG:   ECG 12 Lead Altered Mental Status   Preliminary Result   HEART RATE= 98  bpm   RR Interval= 612  ms   FL Interval= 179  ms   P Horizontal Axis= 7  deg   P Front Axis= 88  deg   QRSD Interval= 96  ms   QT Interval= 370  ms   QTcB= 473  ms   QRS Axis= 48  deg   T Wave Axis= 65  deg   - NORMAL ECG -   Sinus rhythm   Electronically Signed By:    Date and Time of Study: 2023-09-30 13:16:29          Meds given in ED:   Medications   acetaminophen (TYLENOL) tablet 650 mg (has no administration in time range)   sennosides-docusate (PERICOLACE) 8.6-50 MG per tablet 2 tablet (has no administration in time range)     And    polyethylene glycol (MIRALAX) packet 17 g (has no administration in time range)     And   bisacodyl (DULCOLAX) EC tablet 5 mg (has no administration in time range)     And   bisacodyl (DULCOLAX) suppository 10 mg (has no administration in time range)   ondansetron (ZOFRAN) tablet 4 mg (has no administration in time range)     Or   ondansetron (ZOFRAN) injection 4 mg (has no administration in time range)   melatonin tablet 3 mg (has no administration in time range)   Pharmacy to Dose enoxaparin (LOVENOX) (has no administration in time range)   ipratropium-albuterol (DUO-NEB) nebulizer solution 3 mL (3 mL Nebulization Given 9/30/23 1335)   methylPREDNISolone sodium succinate (SOLU-Medrol) injection 125 mg (125 mg Intravenous Given 9/30/23 1349)   LORazepam (ATIVAN) injection 1 mg (1 mg Intravenous Given 9/30/23 1419)   LORazepam (ATIVAN) injection 1 mg (1 mg Intravenous Given 9/30/23 1720)       Imaging results:  CT Head Without Contrast    Result Date: 9/30/2023  No acute intracranial abnormality.    Radiation dose reduction techniques were utilized, including automated exposure control and exposure modulation based on body size.  This report was finalized on 9/30/2023 3:01 PM by Dr. Bienvenido Lipscomb M.D.      XR Chest 1 View    Result Date: 9/30/2023  No evidence for active disease in the chest.  This report was finalized on 9/30/2023 3:02 PM by Dr. Braulio Solis M.D.       Ambulatory status:   - Stand-by assist; 72 hour hold    Social issues:   Social History     Socioeconomic History    Marital status:    Tobacco Use    Smoking status: Every Day     Packs/day: 1.00     Years: 40.00     Pack years: 40.00     Types: Cigarettes    Smokeless tobacco: Never   Vaping Use    Vaping Use: Never used   Substance and Sexual Activity    Alcohol use: Not Currently     Comment: Hx ETOH abuse    Drug use: No    Sexual activity: Defer       NIH Stroke Scale:       Leonila Smiley RN  09/30/23 17:24 EDT

## 2023-09-30 NOTE — Clinical Note
Patient not in the room. Per [employer]EF[employer], Security looked for patient and was unable to find him.

## 2023-09-30 NOTE — PROGRESS NOTES
"[facility] Clinical Pharmacy Services: Enoxaparin Consult    Stanislav Choi has a pharmacy consult to dose prophylactic enoxaparin per Dr. Dela Cruz's request.     Indication: VTE Prophylaxis  Home Anticoagulation: N/A     Relevant clinical data and objective history reviewed:  68 y.o. male 175.3 cm (69\") 55 kg (121 lb 4.1 oz)   Body mass index is 17.91 kg/m².   Results from last 7 days   Lab Units 09/30/23  1339   PLATELETS 10*3/mm3 354     Estimated Creatinine Clearance: 55 mL/min (by C-G formula based on SCr of 1 mg/dL).    Assessment/Plan    Will start patient on 40mg subcutaneous every 24 hours, adjusted for renal function. Consult order will be discontinued but pharmacy will continue to follow.     Elmer Galloway III, [facility]  Clinical Pharmacist    "

## 2023-10-01 PROBLEM — D72.829 ELEVATED WBC COUNT: Status: ACTIVE | Noted: 2023-10-01

## 2023-10-01 LAB
ANION GAP SERPL CALCULATED.3IONS-SCNC: 11.7 MMOL/L (ref 5–15)
BUN SERPL-MCNC: 20 MG/DL (ref 8–23)
BUN/CREAT SERPL: 22 (ref 7–25)
CALCIUM SPEC-SCNC: 9.2 MG/DL (ref 8.6–10.5)
CHLORIDE SERPL-SCNC: 111 MMOL/L (ref 98–107)
CO2 SERPL-SCNC: 20.3 MMOL/L (ref 22–29)
CREAT SERPL-MCNC: 0.91 MG/DL (ref 0.76–1.27)
DEPRECATED RDW RBC AUTO: 42 FL (ref 37–54)
EGFRCR SERPLBLD CKD-EPI 2021: 91.8 ML/MIN/1.73
ERYTHROCYTE [DISTWIDTH] IN BLOOD BY AUTOMATED COUNT: 12.4 % (ref 12.3–15.4)
GLUCOSE SERPL-MCNC: 133 MG/DL (ref 65–99)
HCT VFR BLD AUTO: 39.7 % (ref 37.5–51)
HGB BLD-MCNC: 13.7 G/DL (ref 13–17.7)
MAGNESIUM SERPL-MCNC: 2.4 MG/DL (ref 1.6–2.4)
MCH RBC QN AUTO: 31.7 PG (ref 26.6–33)
MCHC RBC AUTO-ENTMCNC: 34.5 G/DL (ref 31.5–35.7)
MCV RBC AUTO: 91.9 FL (ref 79–97)
PHOSPHATE SERPL-MCNC: 5 MG/DL (ref 2.5–4.5)
PLATELET # BLD AUTO: 333 10*3/MM3 (ref 140–450)
PMV BLD AUTO: 9.1 FL (ref 6–12)
POTASSIUM SERPL-SCNC: 4.5 MMOL/L (ref 3.5–5.2)
RBC # BLD AUTO: 4.32 10*6/MM3 (ref 4.14–5.8)
SODIUM SERPL-SCNC: 143 MMOL/L (ref 136–145)
WBC NRBC COR # BLD: 15.99 10*3/MM3 (ref 3.4–10.8)

## 2023-10-01 PROCEDURE — 84100 ASSAY OF PHOSPHORUS: CPT | Performed by: STUDENT IN AN ORGANIZED HEALTH CARE EDUCATION/TRAINING PROGRAM

## 2023-10-01 PROCEDURE — 97162 PT EVAL MOD COMPLEX 30 MIN: CPT

## 2023-10-01 PROCEDURE — 80048 BASIC METABOLIC PNL TOTAL CA: CPT | Performed by: INTERNAL MEDICINE

## 2023-10-01 PROCEDURE — 25010000002 THIAMINE HCL 200 MG/2ML SOLUTION 2 ML VIAL: Performed by: INTERNAL MEDICINE

## 2023-10-01 PROCEDURE — 25010000002 LORAZEPAM PER 2 MG: Performed by: INTERNAL MEDICINE

## 2023-10-01 PROCEDURE — 83735 ASSAY OF MAGNESIUM: CPT | Performed by: STUDENT IN AN ORGANIZED HEALTH CARE EDUCATION/TRAINING PROGRAM

## 2023-10-01 PROCEDURE — 94799 UNLISTED PULMONARY SVC/PX: CPT

## 2023-10-01 PROCEDURE — 36415 COLL VENOUS BLD VENIPUNCTURE: CPT | Performed by: INTERNAL MEDICINE

## 2023-10-01 PROCEDURE — 85027 COMPLETE CBC AUTOMATED: CPT | Performed by: INTERNAL MEDICINE

## 2023-10-01 PROCEDURE — 94761 N-INVAS EAR/PLS OXIMETRY MLT: CPT

## 2023-10-01 PROCEDURE — 25010000002 ENOXAPARIN PER 10 MG: Performed by: INTERNAL MEDICINE

## 2023-10-01 RX ORDER — NICOTINE 21 MG/24HR
1 PATCH, TRANSDERMAL 24 HOURS TRANSDERMAL
Status: DISCONTINUED | OUTPATIENT
Start: 2023-10-01 | End: 2023-10-06 | Stop reason: HOSPADM

## 2023-10-01 RX ORDER — ARIPIPRAZOLE 5 MG/1
5 TABLET ORAL NIGHTLY
Status: DISCONTINUED | OUTPATIENT
Start: 2023-10-01 | End: 2023-10-06 | Stop reason: HOSPADM

## 2023-10-01 RX ORDER — GABAPENTIN 100 MG/1
100 CAPSULE ORAL 3 TIMES DAILY
Status: DISCONTINUED | OUTPATIENT
Start: 2023-10-01 | End: 2023-10-06 | Stop reason: HOSPADM

## 2023-10-01 RX ADMIN — BUDESONIDE AND FORMOTEROL FUMARATE DIHYDRATE 2 PUFF: 160; 4.5 AEROSOL RESPIRATORY (INHALATION) at 20:10

## 2023-10-01 RX ADMIN — BUSPIRONE HYDROCHLORIDE 15 MG: 15 TABLET ORAL at 15:22

## 2023-10-01 RX ADMIN — LURASIDONE HYDROCHLORIDE 20 MG: 20 TABLET, FILM COATED ORAL at 08:09

## 2023-10-01 RX ADMIN — GABAPENTIN 100 MG: 100 CAPSULE ORAL at 16:24

## 2023-10-01 RX ADMIN — TIOTROPIUM BROMIDE INHALATION SPRAY 2 PUFF: 3.12 SPRAY, METERED RESPIRATORY (INHALATION) at 11:09

## 2023-10-01 RX ADMIN — GABAPENTIN 100 MG: 100 CAPSULE ORAL at 21:57

## 2023-10-01 RX ADMIN — SENNOSIDES AND DOCUSATE SODIUM 2 TABLET: 50; 8.6 TABLET ORAL at 21:57

## 2023-10-01 RX ADMIN — BUDESONIDE AND FORMOTEROL FUMARATE DIHYDRATE 2 PUFF: 160; 4.5 AEROSOL RESPIRATORY (INHALATION) at 11:09

## 2023-10-01 RX ADMIN — THIAMINE HYDROCHLORIDE 500 MG: 100 INJECTION, SOLUTION INTRAMUSCULAR; INTRAVENOUS at 05:25

## 2023-10-01 RX ADMIN — SENNOSIDES AND DOCUSATE SODIUM 2 TABLET: 50; 8.6 TABLET ORAL at 08:08

## 2023-10-01 RX ADMIN — THIAMINE HYDROCHLORIDE 500 MG: 100 INJECTION, SOLUTION INTRAMUSCULAR; INTRAVENOUS at 21:58

## 2023-10-01 RX ADMIN — LORAZEPAM 2 MG: 2 INJECTION INTRAMUSCULAR; INTRAVENOUS at 17:36

## 2023-10-01 RX ADMIN — MULTIPLE VITAMINS W/ MINERALS TAB 1 TABLET: TAB at 08:09

## 2023-10-01 RX ADMIN — MIRTAZAPINE 15 MG: 15 TABLET, FILM COATED ORAL at 21:57

## 2023-10-01 RX ADMIN — FOLIC ACID 1 MG: 1 TABLET ORAL at 08:09

## 2023-10-01 RX ADMIN — BUSPIRONE HYDROCHLORIDE 15 MG: 15 TABLET ORAL at 08:09

## 2023-10-01 RX ADMIN — ARIPIPRAZOLE 5 MG: 5 TABLET ORAL at 21:57

## 2023-10-01 RX ADMIN — THIAMINE HYDROCHLORIDE 500 MG: 100 INJECTION, SOLUTION INTRAMUSCULAR; INTRAVENOUS at 13:15

## 2023-10-01 RX ADMIN — BUSPIRONE HYDROCHLORIDE 15 MG: 15 TABLET ORAL at 21:57

## 2023-10-01 RX ADMIN — AMLODIPINE BESYLATE 5 MG: 5 TABLET ORAL at 08:08

## 2023-10-01 RX ADMIN — NICOTINE 1 PATCH: 21 PATCH, EXTENDED RELEASE TRANSDERMAL at 16:24

## 2023-10-01 RX ADMIN — ENOXAPARIN SODIUM 40 MG: 100 INJECTION SUBCUTANEOUS at 21:56

## 2023-10-01 RX ADMIN — ATORVASTATIN CALCIUM 10 MG: 20 TABLET, FILM COATED ORAL at 08:08

## 2023-10-01 NOTE — PROGRESS NOTES
Name: Stanislav Choi ADMIT: 2023   : 1955  PCP: Ten Coon MD    MRN: [id number] LOS: 1 days   AGE/SEX: 68 y.o. male  ROOM: [facility]     Subjective   Subjective   Patient resting comfortably in bed.  About to work with physical therapy.  Patient is pleasantly confused but guarded.  Alert to self only    Review of Systems  Unable to obtain secondary to mental status     Objective   Objective   Vital Signs  Temp:  [96.8 °F (36 °C)-98.6 °F (37 °C)] 98.1 °F (36.7 °C)  Heart Rate:  [] 93  Resp:  [16-32] 16  BP: (118-164)/() 154/88  SpO2:  [96 %-100 %] 97 %  on   ;   Device (Oxygen Therapy): room air  Body mass index is 17.91 kg/m².  Physical Exam  Vitals and nursing note reviewed.   Constitutional:       General: He is not in acute distress.     Appearance: He is ill-appearing (Chronically).   Cardiovascular:      Rate and Rhythm: Normal rate and regular rhythm.   Pulmonary:      Effort: Pulmonary effort is normal. No respiratory distress.   Abdominal:      General: Abdomen is flat. There is no distension.      Tenderness: There is no abdominal tenderness.   Musculoskeletal:         General: No swelling or deformity.   Skin:     General: Skin is warm and dry.   Neurological:      Mental Status: He is alert. He is disoriented.      Sensory: No sensory deficit.      Motor: No weakness.      Comments: Alert to self only       Results Review     I reviewed the patient's new clinical results.  Results from last 7 days   Lab Units 10/01/23  0557 23  1339   WBC 10*3/mm3 15.99* 13.51*   HEMOGLOBIN g/dL 13.7 14.3   PLATELETS 10*3/mm3 333 354     Results from last 7 days   Lab Units 10/01/23  0557 23  1339   SODIUM mmol/L 143 142   POTASSIUM mmol/L 4.5 3.4*   CHLORIDE mmol/L 111* 110*   CO2 mmol/L 20.3* 17.0*   BUN mg/dL 20 14   CREATININE mg/dL 0.91 1.00   GLUCOSE mg/dL 133* 129*   Estimated Creatinine Clearance: 60.4 mL/min (by C-G formula based on SCr of 0.91 mg/dL).  Results from  last 7 days   Lab Units 09/30/23  1339   ALBUMIN g/dL 4.5   BILIRUBIN mg/dL 0.7   ALK PHOS U/L 161*   AST (SGOT) U/L 39   ALT (SGPT) U/L 81*     Results from last 7 days   Lab Units 10/01/23  0557 09/30/23  1339   CALCIUM mg/dL 9.2 9.5   ALBUMIN g/dL  --  4.5   MAGNESIUM mg/dL 2.4 1.9   PHOSPHORUS mg/dL 5.0*  --      Results from last 7 days   Lab Units 09/30/23  1339   PROCALCITONIN ng/mL 0.07   LACTATE mmol/L 1.3     COVID19   Date Value Ref Range Status   09/30/2023 Not Detected Not Detected - Ref. Range Final   11/29/2022 Not Detected  Final     No results found for: HGBA1C, POCGLU    XR Chest 1 View  Narrative: CHEST SINGLE VIEW     HISTORY: Shortness of air with altered mental status.     COMPARISON: CT chest 06/27/2023, AP chest 06/27/2023.     FINDINGS: Cardiomediastinal silhouette is normal. Lungs appear clear.  There is no evidence for pulmonary edema  or pleural effusion. There is  a calcified nodule in the right upper lobe. Right hilar calcified lymph  node is present. There is no evidence for pulmonary edema or pleural  effusion or infiltrate.     Impression: No evidence for active disease in the chest.     This report was finalized on 9/30/2023 3:02 PM by Dr. Braulio Solis M.D.     CT Head Without Contrast  Narrative: CT HEAD WITHOUT CONTRAST     CLINICAL HISTORY: Altered mental status     TECHNIQUE: CT scan of the head was obtained with 3 mm axial soft tissue  algorithm images. No intravenous contrast was administered. Sagittal and  coronal reconstructions were obtained.     COMPARISON: CT head 6/27/2023 and 3/29/2018     FINDINGS:  No intracranial hemorrhage.  No midline shift or mass effect.   No CT evidence of acute territorial infarction.  No hydrocephalus.  Unchanged left nasal suspected polyp.        Impression: No acute intracranial abnormality.            Radiation dose reduction techniques were utilized, including automated  exposure control and exposure modulation based on body size.      This report was finalized on 9/30/2023 3:01 PM by Dr. Bienvenido Lipscomb M.D.       I reviewed the patient's daily medications.  Scheduled Medications  amLODIPine, 5 mg, Oral, Daily  atorvastatin, 10 mg, Oral, Daily  budesonide-formoterol, 2 puff, Inhalation, BID - RT   And  tiotropium bromide monohydrate, 2 puff, Inhalation, Daily - RT  busPIRone, 15 mg, Oral, TID  enoxaparin, 40 mg, Subcutaneous, Nightly  folic acid, 1 mg, Oral, Daily  Lurasidone HCl, 20 mg, Oral, Daily  mirtazapine, 15 mg, Oral, Nightly  multivitamin with minerals, 1 tablet, Oral, Daily  senna-docusate sodium, 2 tablet, Oral, BID  thiamine (B-1) IV, 500 mg, Intravenous, Q8H   Followed by  [START ON 10/3/2023] thiamine (B-1) IV, 200 mg, Intravenous, Q8H   Followed by  [START ON 10/8/2023] thiamine, 100 mg, Oral, Daily    Infusions   Diet  Diet: Cardiac Diets; Healthy Heart (2-3 Na+); Texture: Regular Texture (IDDSI 7); Fluid Consistency: Thin (IDDSI 0)         I have personally reviewed:  [x]  Laboratory   [x]  Microbiology   [x]  Radiology   []  EKG/Telemetry   []  Cardiology/Vascular   []  Pathology   [x]  Records     Assessment/Plan     Active Hospital Problems    Diagnosis  POA    **Altered mental status [R41.82]  Yes    Elevated WBC count [D72.829]  Yes    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       68 y.o. male admitted with Altered mental status.    Acute encephalopathy  Alcohol induced dementia  Alcohol use disorder with concern for withdrawal  -Recent TSH normal.  RPR, B12, HIV pending  -Ethanol negative on admission, will continue CIWA for now.  Stop scheduled Ativan.  Ativan as needed per CIWA protocol  -No infectious cause at this time  -UDS negative, CT head with no  acute findings  -Multivitamin, thiamine, folate  -Psych consulted, appreciate recommendations  -Hold gabapentin for now    Leukocytosis  -UA bland, RVP negative, chest x-ray without any acute findings.  -Blood cultures pending    COPD-continue home inhalers    Anxiety/mood disorder  -Continue home BuSpar, Latuda, mirtazapine      Lovenox 40 mg SC daily for DVT prophylaxis.  Full code.  Discussed with patient and nursing staff.  Anticipate discharge home with HH vs SNU facility in 2-3 days.    Expected discharge date/ time has not been documented.      Dion Campbell MD  [facility]  10/01/23  10:55 EDT

## 2023-10-01 NOTE — NURSING NOTE
[facility] attempted evaluation with patient. Patient only oriented to self. The patient stating his birth date when asked what year it was, where he lived and if he has been drinking ETOH.  The patient continuously asking what is going on. The patient was explained situation at storage facility which led to his hospitalization. The patient denied any recollection of events. Psychiatrist to see. Access Center will attmept evaluation again when patient more appropriate.

## 2023-10-01 NOTE — CONSULTS
Requesting Provider:  Dr. Dela Cruz  Reason for Consult: AMS, likely dementia    Date of admission: September 30, 2023  Date of assessment: October 1, 2023    Chief Complaint: None given    History of presenting illness: Patient is a 68 y.o. male with a past psychiatric history of depression and alcohol dependence seen on consultation for the above-stated reason.  The patient is well known to the psychiatric service.  Patient had previous admissions to this [facility] March 30 through April 5, 2018 and January 11 through January 17, 2019.  Patient was also seen on consultation by Dr. Alvarado July 3, 2023.  Please see these notes.  Additionally, this patient is followed on an outpatient basis by this physician at [facility].  His last outpatient visit was approximately 10 days ago.  During that visit, the patient presented an appropriate manner.  He was alone for his appointment.  He had recently finished an intensive outpatient program at the [facility].  He had been actively going to AA meetings and maintaining his sobriety.  He had been driving himself to all of his appointments and meetings.  He was residing in a hotel and has been doing so for approximately 3 months.  He was noted to exhibit EPS and his Latuda was decreased from 80 mg to 40 mg.  The plan at that time was for patient to have TMS treatment.  The patient called the office on September 28, 2023 reporting ongoing EPS.  The patient's Latuda was recommended to be decreased to 20 mg, but patient requested that he go off of it.      The patient had presented to the ED after reportedly being found in a storage facility and acting bizarre.  This is very similar to other presentations.  The patient currently continues to be confused.  He is oriented to self and place.  He does not recall what he been doing in a storage facility.  He denies any acute suicidal ideation, homicide ideation and audiovisual hallucinations.    Past psychiatric history: See HPI    Past medical  history:  Diagnoses: COPD, hypercholesterolemia, hyperlipidemia, hypertension, peripheral neuropathy.  Medications:     Current Facility-Administered Medications   Medication Dose Route Frequency Provider Last Rate Last Admin    acetaminophen (TYLENOL) tablet 650 mg  650 mg Oral Q4H PRN Leonora Dela Cruz MD        amLODIPine (NORVASC) tablet 5 mg  5 mg Oral Daily Leonora Dela Cruz MD   5 mg at 10/01/23 0808    atorvastatin (LIPITOR) tablet 10 mg  10 mg Oral Daily Leonora Dela Cruz MD   10 mg at 10/01/23 0808    sennosides-docusate (PERICOLACE) 8.6-50 MG per tablet 2 tablet  2 tablet Oral BID Leonora Dela Cruz MD   2 tablet at 10/01/23 0808    And    polyethylene glycol (MIRALAX) packet 17 g  17 g Oral Daily PRN Leonora Dela Cruz MD        And    bisacodyl (DULCOLAX) EC tablet 5 mg  5 mg Oral Daily PRN Leonora Dela Cruz MD        And    bisacodyl (DULCOLAX) suppository 10 mg  10 mg Rectal Daily PRN Leonora Dela Cruz MD        budesonide-formoterol (SYMBICORT) 160-4.5 MCG/ACT inhaler 2 puff  2 puff Inhalation BID - RT Leonora Dela Cruz MD   2 puff at 10/01/23 1109    And    tiotropium (SPIRIVA RESPIMAT) 2.5 mcg/act aerosol solution inhaler  2 puff Inhalation Daily - RT Leonora Dela Cruz MD   2 puff at 10/01/23 1109    busPIRone (BUSPAR) tablet 15 mg  15 mg Oral TID Leonora Dela Cruz MD   15 mg at 10/01/23 0809    Enoxaparin Sodium (LOVENOX) syringe 40 mg  40 mg Subcutaneous Nightly Leonora Dela Cruz MD   40 mg at 09/30/23 2202    folic acid (FOLVITE) tablet 1 mg  1 mg Oral Daily Leonora Dela Cruz MD   1 mg at 10/01/23 0809    LORazepam (ATIVAN) tablet 1 mg  1 mg Oral Q1H PRN Leonora Dela Cruz MD        Or    LORazepam (ATIVAN) injection 1 mg  1 mg Intravenous Q1H PRN Leonora Dela Cruz MD        Or    LORazepam (ATIVAN) tablet 2 mg  2 mg Oral Q1H PRN Leonora Dela Cruz MD        Or    LORazepam (ATIVAN) injection 2 mg  2 mg Intravenous Q1H PRN  Leonora Dela Cruz MD        Or    LORazepam (ATIVAN) injection 2 mg  2 mg Intravenous Q15 Min PRN Leonora Dela Cruz MD        Or    LORazepam (ATIVAN) injection 2 mg  2 mg Intramuscular Q15 Min PRN Leonora Dela Cruz MD        Lurasidone HCl (LATUDA) tablet 20 mg  20 mg Oral Daily Leonora Dela Cruz MD   20 mg at 10/01/23 0809    Magnesium Standard Dose Replacement - Follow Nurse / BPA Driven Protocol   Does not apply PRN Leonora Dela Cruz MD        melatonin tablet 3 mg  3 mg Oral Nightly PRN Leonora Dela Cruz MD        mirtazapine (REMERON) tablet 15 mg  15 mg Oral Nightly Leonora Dela Cruz MD   15 mg at 09/30/23 2202    multivitamin with minerals 1 tablet  1 tablet Oral Daily Leonora Dela Cruz MD   1 tablet at 10/01/23 0809    ondansetron (ZOFRAN) tablet 4 mg  4 mg Oral Q6H PRN Leonora Dela Cruz MD        Or    ondansetron (ZOFRAN) injection 4 mg  4 mg Intravenous Q6H PRN Leonora Dela Cruz MD        thiamine (B-1) 500 mg in sodium chloride 0.9 % 100 mL IVPB  500 mg Intravenous Q8H Leonora Dela Cruz  mL/hr at 10/01/23 1315 500 mg at 10/01/23 1315    Followed by    [START ON 10/3/2023] thiamine (B-1) injection 200 mg  200 mg Intravenous Q8H Leonora Dela Cruz MD        Followed by    [START ON 10/8/2023] thiamine (VITAMIN B-1) tablet 100 mg  100 mg Oral Daily Leonora Dela Cruz MD          Medication allergies:      Social history: Noncontributory    Family history: Noncontributory    Substance abuse history: See HPI.  UDS is negative.    Vital Signs    Temp: 97.7   Heart Rate: 84   Resp:  16  BP:  140/77    Mental Status Exam: The patient is found lying in bed.  He is initially asleep but easily awakened.  He is dressed in hospital attire.  Alert and oriented times.  Speech is fluent, somewhat slowed.  He is oriented x 2.  Mood is depressed.  Affect congruent.  He denies any acute suicidal ideation, homicidal ideations or audiovisual  hallucinations.  Thought processes are slowed.  Judgment and insight are poor.  Memory is poor.    Assessment:   Major depressive disorder, severe, recurrent with psychotic features;  Alcohol dependence.    Treatment Plan:     The patient and his history are well-known to me.  The patient has had multiple episodes where he is appeared to have a dementing illness, which spontaneously resolves.  This is more consistent with a pseudodementia.  At his baseline the patient is fully oriented and independent.  The patient has been tried on multiple antidepressants over the past several years, none of which he was able to tolerate or have benefit from.  He was most recently placed on Latuda, but began having EPS.  His Latuda was first decreased and then discontinued, corresponding with his worsening confusion.  I will initiate Abilify 5 mg at bedtime.  The patient may require inpatient psychiatric hospitalization due to his confusion.  The plan will remain to be for the patient received TMS treatment once his confusion resolves.    Thank you for this consultation.  Please contact [facility] for any additional requests.

## 2023-10-01 NOTE — PLAN OF CARE
"Goal Outcome Evaluation:  Plan of Care Reviewed With: patient           Outcome Evaluation: Pt is a 67 y/o M who presented to ED after being found at a storage facility with AMS. Pt was recently admitted and diagnosed w/ alcohol induced dementia and has been in/out of psych facilities since that DC. Pt is currently A&O to self only; unable to stay , year, place or situation, however when given options of \"Home, hospital, restaurant or hotel\" pt was able to state hospital. During questioning pt asking \"Are these things I should know?\" and \"I don't understand what is going on\". Pt was SBA for bed mobility/STS and CG/Melquiades for ambulation of 60' without use of AD. Pt was mildly unsteady, but no overt LOB noted. Pt presents below assumed baseline of independence and presents with generalized weakness, impaired cognition and impaired balance, thus will benefit from skilled PT services to address those deficits. Currently rec SNF vs Home as pts mobility is fair, but his cognition would make him unsafe to be alone at this time.      Anticipated Discharge Disposition (PT): home with assist, skilled nursing facility, home  "
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
## 2023-10-01 NOTE — THERAPY EVALUATION
"Patient Name: Stanislav Choi  : 1955    MRN: [id number]                              Today's Date: 10/1/2023       Admit Date: 2023    Visit Dx:     ICD-10-CM ICD-9-CM   1. Altered mental status, unspecified altered mental status type  R41.82 780.97   2. History of alcohol abuse  F10.11 305.03   3. Leukocytosis, unspecified type  D72.829 288.60   4. History of COPD  Z87.09 V12.69   5. 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    Confusion state    Altered mental status, unspecified altered mental status type    Alcohol use disorder    Weight loss    Lymph node enlargement    Severe malnutrition    Alcoholic dementia    Pulmonary nodule    Altered mental status    Elevated WBC count     Past Medical History:   Diagnosis Date    Anxiety     Chronic pain disorder     COPD (chronic obstructive pulmonary disease)     Depression     Elevated cholesterol     HL (hearing loss)     Hyperlipidemia     Hypertension     Memory loss     Peripheral neuropathy     Shingles      Past Surgical History:   Procedure Laterality Date    JOINT REPLACEMENT        General Information       Row Name 10/01/23 0951          Physical Therapy Time and Intention    Document Type evaluation  -MG     Mode of Treatment individual therapy;physical therapy  -MG       Row Name 10/01/23 0951          General Information    Patient Profile Reviewed yes  -MG     Prior Level of Function independent:  Unknown as pt w/ AMS. States \"I don't know\" when as if used any AD.  -MG     Existing Precautions/Restrictions fall  -MG     Barriers to Rehab cognitive status  -MG       Row Name 10/01/23 0951          Living Environment    People in Home other (see comments);alone  States \"Mom and " "Dad\". Per chart pt was living w/ spouse, but most recently living alone in an extended stay hotel.  -MG       Row Name 10/01/23 0951          Cognition    Orientation Status (Cognition) oriented to;person;verbal cues/prompts needed for orientation;disoriented to;situation;place;time  Unable to state , place or year. Given \"home, hospital, restaurant or hotel\" pt able to state at hospital. Confused throughout.  -MG       Row Name 10/01/23 0951          Safety Issues, Functional Mobility    Impairments Affecting Function (Mobility) balance;cognition  -MG     Comment, Safety Issues/Impairments (Mobility) Gait belt and non-skid socks donned. Sitter present.  -MG               User Key  (r) = Recorded By, (t) = Taken By, (c) = Cosigned By      Initials Name Provider Type    MG Tammie Montoya, PT Physical Therapist                   Mobility       Row Name 10/01/23 1230          Bed Mobility    Bed Mobility supine-sit;sit-supine  -MG     Supine-Sit [place] (Bed Mobility) standby assist  -MG     Sit-Supine [place] (Bed Mobility) standby assist  -MG     Assistive Device (Bed Mobility) head of bed elevated  -MG       Row Name 10/01/23 1230          Sit-Stand Transfer    Sit-Stand [place] (Transfers) standby assist  -MG     Assistive Device (Sit-Stand Transfers) other (see comments)  None  -MG       Row Name 10/01/23 1230          Gait/Stairs (Locomotion)    [place] Level (Gait) contact guard;minimum assist (75% patient effort)  -MG     Assistive Device (Gait) other (see comments)  None  -MG     Distance in Feet (Gait) 60  -MG     Deviations/Abnormal Patterns (Gait) gait speed decreased;base of support, narrow;stride length decreased  -MG     Bilateral Gait Deviations heel strike decreased  -MG     Comment, (Gait/Stairs) Pt amb 3-4 laps in room, good command following w/ directioning. Mildly unsteady w/ intermittent shuffling. States \"I think so\" when asked if he feels at his baseline mobility. No c/o " dizziness, SOB and no noted overt LOB.  -MG               User Key  (r) = Recorded By, (t) = Taken By, (c) = Cosigned By      Initials Name Provider Type    Tammie Arias, PT Physical Therapist                   Obj/Interventions       Row Name 10/01/23 1233          Range of Motion Comprehensive    General Range of Motion bilateral lower extremity ROM WFL  -MG       Row Name 10/01/23 1233          Strength Comprehensive (MMT)    General Manual Muscle Testing (MMT) Assessment lower extremity strength deficits identified  -MG     Comment, General Manual Muscle Testing (MMT) Assessment Generalized weakness. Grossly 4+/5.  -MG       Row Name 10/01/23 1233          Sensory Assessment (Somatosensory)    Sensory Assessment (Somatosensory) sensation intact  Denies N/T  -MG               User Key  (r) = Recorded By, (t) = Taken By, (c) = Cosigned By      Initials Name Provider Type    Tammie Arias, PT Physical Therapist                   Goals/Plan       Row Name 10/01/23 1243          Bed Mobility Goal 1 (PT)    Activity/Assistive Device (Bed Mobility Goal 1, PT) bed mobility activities, all  -MG     [place] Level/Cues Needed (Bed Mobility Goal 1, PT) independent;modified independence  -MG     Time Frame (Bed Mobility Goal 1, PT) 1 week  -MG       Row Name 10/01/23 1243          Transfer Goal 1 (PT)    Activity/Assistive Device (Transfer Goal 1, PT) transfers, all  -MG     [place] Level/Cues Needed (Transfer Goal 1, PT) modified independence;independent  -MG     Time Frame (Transfer Goal 1, PT) 1 week  -MG       Row Name 10/01/23 1243          Gait Training Goal 1 (PT)    Activity/Assistive Device (Gait Training Goal 1, PT) gait (walking locomotion)  -MG     [place] Level (Gait Training Goal 1, PT) modified independence;independent  -MG     Distance (Gait Training Goal 1, PT) 120  -MG       Row Name 10/01/23 1243          Therapy Assessment/Plan (PT)    Planned Therapy Interventions (PT) balance  "training;bed mobility training;gait training;home exercise program;ROM (range of motion);stair training;neuromuscular re-education;strengthening;stretching;patient/family education;transfer training;postural re-education  -MG               User Key  (r) = Recorded By, (t) = Taken By, (c) = Cosigned By      Initials Name Provider Type    MG Tammie Montoya, PT Physical Therapist                   Clinical Impression       Row Name 10/01/23 1234          Pain    Pretreatment Pain Rating 0/10 - no pain  -MG     Posttreatment Pain Rating 0/10 - no pain  -MG     Pain Intervention(s) Medication (See MAR);Ambulation/increased activity;Repositioned;Rest  -MG       Row Name 10/01/23 1234          Plan of Care Review    Plan of Care Reviewed With patient  -MG     Outcome Evaluation Pt is a 69 y/o M who presented to ED after being found at a storage facility with AMS. Pt was recently admitted and diagnosed w/ alcohol induced dementia and has been in/out of psych facilities since that DC. Pt is currently A&O to self only; unable to stay , year, place or situation, however when given options of \"Home, hospital, restaurant or hotel\" pt was able to state hospital. During questioning pt asking \"Are these things I should know?\" and \"I don't understand what is going on\". Pt was SBA for bed mobility/STS and CG/Melquiades for ambulation of 60' without use of AD. Pt was mildly unsteady, but no overt LOB noted. Pt presents below assumed baseline of independence and presents with generalized weakness, impaired cognition and impaired balance, thus will benefit from skilled PT services to address those deficits. Currently rec SNF vs Home as pts mobility is fair, but his cognition would make him unsafe to be alone at this time.  -MG       Row Name 10/01/23 1234          Therapy Assessment/Plan (PT)    Rehab Potential (PT) fair, will monitor progress closely  -MG     Criteria for Skilled Interventions Met (PT) yes;meets criteria  -MG     Therapy " Frequency (PT) 5 times/wk  -MG       Row Name 10/01/23 1234          Vital Signs    O2 Delivery Pre Treatment room air  -MG     O2 Delivery Intra Treatment room air  -MG     O2 Delivery Post Treatment room air  -MG     Pre Patient Position Side Lying  -MG     Intra Patient Position Standing  -MG     Post Patient Position Supine  -MG       Row Name 10/01/23 1234          Positioning and Restraints    Pre-Treatment Position in bed  -MG     Post Treatment Position bed  -MG     In Bed notified nsg;supine;fowlers;call light within reach;encouraged to call for assist;exit alarm on;side rails up x3;with nsg  w/ sitter  -MG               User Key  (r) = Recorded By, (t) = Taken By, (c) = Cosigned By      Initials Name Provider Type    Tammie Arias PT Physical Therapist                   Outcome Measures       Row Name 10/01/23 1244          How much help from another person do you currently need...    Turning from your back to your side while in flat bed without using bedrails? 4  -MG     Moving from lying on back to sitting on the side of a flat bed without bedrails? 3  -MG     Moving to and from a bed to a chair (including a wheelchair)? 3  -MG     Standing up from a chair using your arms (e.g., wheelchair, bedside chair)? 3  -MG     Climbing 3-5 steps with a railing? 3  -MG     To walk in hospital room? 3  -MG     AM-PAC 6 Clicks Score (PT) 19  -MG     Highest level of mobility 6 --> Walked 10 steps or more  -MG       Row Name 10/01/23 1244          Functional Assessment    Outcome Measure Options AM-PAC 6 Clicks Basic Mobility (PT)  -MG               User Key  (r) = Recorded By, (t) = Taken By, (c) = Cosigned By      Initials Name Provider Type    Tammie Arias PT Physical Therapist                                 Physical Therapy Education       Title: PT OT SLP Therapies (In Progress)       Topic: Physical Therapy (In Progress)       Point: Mobility training (Done)       Learning Progress Summary            "  Patient Acceptance, E,TB,D, VU,DU,NR by  at 10/1/2023 1244                         Point: Home exercise program (Not Started)       Learner Progress:  Not documented in this visit.              Point: Body mechanics (Not Started)       Learner Progress:  Not documented in this visit.              Point: Precautions (Done)       Learning Progress Summary             Patient Acceptance, E,TB,D, VU,DU,NR by  at 10/1/2023 1244                                         User Key       Initials Effective Dates Name Provider Type Discipline     22 -  Tammie Montoya, PT Physical Therapist PT                  PT Recommendation and Plan  Planned Therapy Interventions (PT): balance training, bed mobility training, gait training, home exercise program, ROM (range of motion), stair training, neuromuscular re-education, strengthening, stretching, patient/family education, transfer training, postural re-education  Plan of Care Reviewed With: patient  Outcome Evaluation: Pt is a 67 y/o M who presented to ED after being found at a storage facility with AMS. Pt was recently admitted and diagnosed w/ alcohol induced dementia and has been in/out of psych facilities since that DC. Pt is currently A&O to self only; unable to stay , year, place or situation, however when given options of \"Home, hospital, restaurant or hotel\" pt was able to state hospital. During questioning pt asking \"Are these things I should know?\" and \"I don't understand what is going on\". Pt was SBA for bed mobility/STS and CG/Melquiades for ambulation of 60' without use of AD. Pt was mildly unsteady, but no overt LOB noted. Pt presents below assumed baseline of independence and presents with generalized weakness, impaired cognition and impaired balance, thus will benefit from skilled PT services to address those deficits. Currently rec SNF vs Home as pts mobility is fair, but his cognition would make him unsafe to be alone at this time.     Time Calculation: "         PT Charges       Row Name 10/01/23 1244             Time Calculation    Start Time 0926  -MG      Stop Time 0939  -MG      Time Calculation (min) 13 min  -MG      PT Received On 10/01/23  -MG      PT - Next Appointment 10/02/23  -MG      PT Goal Re-Cert Due Date 10/08/23  -MG                User Key  (r) = Recorded By, (t) = Taken By, (c) = Cosigned By      Initials Name Provider Type    MG Tammie Montoya, PT Physical Therapist                  Therapy Charges for Today       Code Description Service Date Service Provider Modifiers Qty    71602640535 HC PT EVAL MOD COMPLEXITY 3 10/1/2023 Tammie Montoya, PT GP 1            PT G-Codes  Outcome Measure Options: AM-PAC 6 Clicks Basic Mobility (PT)  AM-PAC 6 Clicks Score (PT): 19  PT Discharge Summary  Anticipated Discharge Disposition (PT): home with assist, skilled nursing facility, home    Tammie Montoya, PT  10/1/2023

## 2023-10-02 LAB
ALBUMIN SERPL-MCNC: 3.8 G/DL (ref 3.5–5.2)
ALBUMIN/GLOB SERPL: 1.6 G/DL
ALP SERPL-CCNC: 121 U/L (ref 39–117)
ALT SERPL W P-5'-P-CCNC: 67 U/L (ref 1–41)
ANION GAP SERPL CALCULATED.3IONS-SCNC: 10.2 MMOL/L (ref 5–15)
AST SERPL-CCNC: 44 U/L (ref 1–40)
BILIRUB SERPL-MCNC: 0.4 MG/DL (ref 0–1.2)
BUN SERPL-MCNC: 22 MG/DL (ref 8–23)
BUN/CREAT SERPL: 22.9 (ref 7–25)
CALCIUM SPEC-SCNC: 8.9 MG/DL (ref 8.6–10.5)
CHLORIDE SERPL-SCNC: 107 MMOL/L (ref 98–107)
CO2 SERPL-SCNC: 23.8 MMOL/L (ref 22–29)
CREAT SERPL-MCNC: 0.96 MG/DL (ref 0.76–1.27)
DEPRECATED RDW RBC AUTO: 40.1 FL (ref 37–54)
EGFRCR SERPLBLD CKD-EPI 2021: 86.1 ML/MIN/1.73
ERYTHROCYTE [DISTWIDTH] IN BLOOD BY AUTOMATED COUNT: 12.2 % (ref 12.3–15.4)
GLOBULIN UR ELPH-MCNC: 2.4 GM/DL
GLUCOSE SERPL-MCNC: 84 MG/DL (ref 65–99)
HCT VFR BLD AUTO: 37.7 % (ref 37.5–51)
HGB BLD-MCNC: 13.1 G/DL (ref 13–17.7)
HIV 1+2 AB+HIV1 P24 AG SERPL QL IA: NORMAL
MAGNESIUM SERPL-MCNC: 2.2 MG/DL (ref 1.6–2.4)
MCH RBC QN AUTO: 31.7 PG (ref 26.6–33)
MCHC RBC AUTO-ENTMCNC: 34.7 G/DL (ref 31.5–35.7)
MCV RBC AUTO: 91.3 FL (ref 79–97)
PHOSPHATE SERPL-MCNC: 3.4 MG/DL (ref 2.5–4.5)
PLATELET # BLD AUTO: 323 10*3/MM3 (ref 140–450)
PMV BLD AUTO: 9.4 FL (ref 6–12)
POTASSIUM SERPL-SCNC: 4.2 MMOL/L (ref 3.5–5.2)
PROT SERPL-MCNC: 6.2 G/DL (ref 6–8.5)
QT INTERVAL: 370 MS
QTC INTERVAL: 473 MS
RBC # BLD AUTO: 4.13 10*6/MM3 (ref 4.14–5.8)
RPR SER QL: NORMAL
SODIUM SERPL-SCNC: 141 MMOL/L (ref 136–145)
VIT B12 BLD-MCNC: 970 PG/ML (ref 211–946)
WBC NRBC COR # BLD: 15.1 10*3/MM3 (ref 3.4–10.8)

## 2023-10-02 PROCEDURE — 83735 ASSAY OF MAGNESIUM: CPT | Performed by: STUDENT IN AN ORGANIZED HEALTH CARE EDUCATION/TRAINING PROGRAM

## 2023-10-02 PROCEDURE — 94799 UNLISTED PULMONARY SVC/PX: CPT

## 2023-10-02 PROCEDURE — 97530 THERAPEUTIC ACTIVITIES: CPT

## 2023-10-02 PROCEDURE — 25010000002 THIAMINE HCL 200 MG/2ML SOLUTION 2 ML VIAL: Performed by: INTERNAL MEDICINE

## 2023-10-02 PROCEDURE — 25010000002 ENOXAPARIN PER 10 MG: Performed by: INTERNAL MEDICINE

## 2023-10-02 PROCEDURE — 90791 PSYCH DIAGNOSTIC EVALUATION: CPT

## 2023-10-02 PROCEDURE — 80053 COMPREHEN METABOLIC PANEL: CPT | Performed by: STUDENT IN AN ORGANIZED HEALTH CARE EDUCATION/TRAINING PROGRAM

## 2023-10-02 PROCEDURE — 86592 SYPHILIS TEST NON-TREP QUAL: CPT | Performed by: STUDENT IN AN ORGANIZED HEALTH CARE EDUCATION/TRAINING PROGRAM

## 2023-10-02 PROCEDURE — 84100 ASSAY OF PHOSPHORUS: CPT | Performed by: STUDENT IN AN ORGANIZED HEALTH CARE EDUCATION/TRAINING PROGRAM

## 2023-10-02 PROCEDURE — 82607 VITAMIN B-12: CPT | Performed by: STUDENT IN AN ORGANIZED HEALTH CARE EDUCATION/TRAINING PROGRAM

## 2023-10-02 PROCEDURE — 85027 COMPLETE CBC AUTOMATED: CPT | Performed by: STUDENT IN AN ORGANIZED HEALTH CARE EDUCATION/TRAINING PROGRAM

## 2023-10-02 PROCEDURE — G0432 EIA HIV-1/HIV-2 SCREEN: HCPCS | Performed by: STUDENT IN AN ORGANIZED HEALTH CARE EDUCATION/TRAINING PROGRAM

## 2023-10-02 RX ORDER — HYDROXYZINE HYDROCHLORIDE 25 MG/1
25 TABLET, FILM COATED ORAL 3 TIMES DAILY PRN
Status: DISCONTINUED | OUTPATIENT
Start: 2023-10-02 | End: 2023-10-06 | Stop reason: HOSPADM

## 2023-10-02 RX ADMIN — FOLIC ACID 1 MG: 1 TABLET ORAL at 08:27

## 2023-10-02 RX ADMIN — GABAPENTIN 100 MG: 100 CAPSULE ORAL at 20:30

## 2023-10-02 RX ADMIN — GABAPENTIN 100 MG: 100 CAPSULE ORAL at 08:26

## 2023-10-02 RX ADMIN — THIAMINE HYDROCHLORIDE 500 MG: 100 INJECTION, SOLUTION INTRAMUSCULAR; INTRAVENOUS at 14:35

## 2023-10-02 RX ADMIN — SENNOSIDES AND DOCUSATE SODIUM 2 TABLET: 50; 8.6 TABLET ORAL at 20:30

## 2023-10-02 RX ADMIN — BUSPIRONE HYDROCHLORIDE 15 MG: 15 TABLET ORAL at 20:31

## 2023-10-02 RX ADMIN — BUSPIRONE HYDROCHLORIDE 15 MG: 15 TABLET ORAL at 08:26

## 2023-10-02 RX ADMIN — MIRTAZAPINE 15 MG: 15 TABLET, FILM COATED ORAL at 20:31

## 2023-10-02 RX ADMIN — BUSPIRONE HYDROCHLORIDE 15 MG: 15 TABLET ORAL at 17:27

## 2023-10-02 RX ADMIN — GABAPENTIN 100 MG: 100 CAPSULE ORAL at 14:43

## 2023-10-02 RX ADMIN — MULTIPLE VITAMINS W/ MINERALS TAB 1 TABLET: TAB at 08:27

## 2023-10-02 RX ADMIN — ENOXAPARIN SODIUM 40 MG: 100 INJECTION SUBCUTANEOUS at 20:30

## 2023-10-02 RX ADMIN — NICOTINE 1 PATCH: 21 PATCH, EXTENDED RELEASE TRANSDERMAL at 14:44

## 2023-10-02 RX ADMIN — ATORVASTATIN CALCIUM 10 MG: 20 TABLET, FILM COATED ORAL at 08:27

## 2023-10-02 RX ADMIN — ARIPIPRAZOLE 5 MG: 5 TABLET ORAL at 20:31

## 2023-10-02 RX ADMIN — AMLODIPINE BESYLATE 5 MG: 5 TABLET ORAL at 08:26

## 2023-10-02 RX ADMIN — HYDROXYZINE HYDROCHLORIDE 25 MG: 25 TABLET, FILM COATED ORAL at 18:33

## 2023-10-02 RX ADMIN — BUDESONIDE AND FORMOTEROL FUMARATE DIHYDRATE 2 PUFF: 160; 4.5 AEROSOL RESPIRATORY (INHALATION) at 19:12

## 2023-10-02 RX ADMIN — THIAMINE HYDROCHLORIDE 500 MG: 100 INJECTION, SOLUTION INTRAMUSCULAR; INTRAVENOUS at 22:56

## 2023-10-02 RX ADMIN — THIAMINE HYDROCHLORIDE 500 MG: 100 INJECTION, SOLUTION INTRAMUSCULAR; INTRAVENOUS at 06:36

## 2023-10-02 NOTE — PROGRESS NOTES
"Nutrition Services    Patient Name:  Stanislav Choi  YOB: 1955  MRN: [id number]  Admit Date:  9/30/2023  Assessment Date:  10/02/23    Summary: Nutrition screen for low BMI  This is a 67 yo male admitted with AMS. He states his appetite has been ok lately and staff reports he was able to eat some eggs for breakfast but not the sausage.  He reports a hx of about 20lb wt loss.    Pt agrees to a boost supplement but is hoping to be d/c home soon.  Encouraged good po intake with all meals. Will send Boost plus BID and continue to monitor intake.    RD to follow.  CLINICAL NUTRITION ASSESSMENT      Reason for Assessment BMI     Diagnosis/Problem   AMS   Medical/Surgical History Past Medical History:   Diagnosis Date    Anxiety     Chronic pain disorder     COPD (chronic obstructive pulmonary disease)     Depression     Elevated cholesterol     HL (hearing loss)     Hyperlipidemia     Hypertension     Memory loss     Peripheral neuropathy     Shingles        Past Surgical History:   Procedure Laterality Date    JOINT REPLACEMENT          Anthropometrics        Current Height  Current Weight  BMI kg/m2 Height: 175.3 cm (69\")  Weight: 55 kg (121 lb 4.1 oz) (09/30/23 1900)  Body mass index is 17.91 kg/m².   Adjusted BMI (if applicable)    BMI Category Underweight (18.4 or below)   Ideal Body Weight (IBW) 155lb   Usual Body Weight (UBW) 140-150   Weight Trend Loss   Weight History Wt Readings from Last 30 Encounters:   09/30/23 1900 55 kg (121 lb 4.1 oz)   07/22/23 1438 59 kg (130 lb)   07/06/23 0517 64.5 kg (142 lb 1.6 oz)   07/05/23 0530 66.7 kg (147 lb 1.6 oz)   07/04/23 0521 61.1 kg (134 lb 11.2 oz)   07/03/23 0512 63 kg (138 lb 14.2 oz)   07/02/23 0534 63.5 kg (140 lb)   07/01/23 0526 65.6 kg (144 lb 11.2 oz)   06/30/23 0620 65.4 kg (144 lb 3.2 oz)   06/29/23 0530 64.4 kg (142 lb)   06/28/23 0635 61.6 kg (135 lb 12.2 oz)   06/27/23 1820 65 kg (143 lb 4.8 oz)   06/27/23 1533 61.2 kg (135 lb)   11/29/22 " 1459 68 kg (150 lb)   03/26/19 1548 65.6 kg (144 lb 9.6 oz)   02/19/19 0926 65.3 kg (144 lb)   01/10/19 1829 67.6 kg (149 lb 1.6 oz)   01/12/19 1222 67.7 kg (149 lb 3.2 oz)   01/10/19 1322 67.6 kg (149 lb 1.6 oz)   03/29/18 1554 67.1 kg (148 lb)   03/29/18 1039 70.3 kg (155 lb)   12/03/15 0827 66.2 kg (146 lb 0.2 oz)   10/30/15 1208 66.7 kg (147 lb)   06/25/15 0846 67.1 kg (147 lb 15.9 oz)   05/20/15 0947 68.5 kg (150 lb 15.9 oz)   05/07/15 1454 68.5 kg (150 lb 15.9 oz)   10/02/14 1101 68 kg (150 lb)   10/01/13 1328 64.4 kg (142 lb)   05/28/13 0800 68 kg (150 lb)   05/14/13 1354 68 kg (150 lb)        Estimated/Assessed Needs        Energy Requirements    Weight for Calculation 55kg   Method for Estimation  30-35 kcal/kg   EST Needs (kcal/day) 5079-2316       Protein Requirements    Weight for Calculation 55kg   EST Protein Needs (g/kg) 1.2 - 1.5 gm/kg   EST Daily Needs (g/day) 66-82       Fluid Requirements     Method for Estimation 1 mL/kcal    Estimated Needs (mL/day)        Fluid Deficit    Current Na Level (mEq/L)    Desired Na Level (mEq/L)    Estimated Fluid Deficit (L)       Labs       Pertinent Labs    Results from last 7 days   Lab Units 10/01/23  0557 09/30/23  1339   SODIUM mmol/L 143 142   POTASSIUM mmol/L 4.5 3.4*   CHLORIDE mmol/L 111* 110*   CO2 mmol/L 20.3* 17.0*   BUN mg/dL 20 14   CREATININE mg/dL 0.91 1.00   CALCIUM mg/dL 9.2 9.5   BILIRUBIN mg/dL  --  0.7   ALK PHOS U/L  --  161*   ALT (SGPT) U/L  --  81*   AST (SGOT) U/L  --  39   GLUCOSE mg/dL 133* 129*     Results from last 7 days   Lab Units 10/02/23  0501 10/01/23  0557 09/30/23  1339   MAGNESIUM mg/dL  --  2.4 1.9   PHOSPHORUS mg/dL  --  5.0*  --    HEMOGLOBIN g/dL 13.1 13.7 14.3   HEMATOCRIT % 37.7 39.7 41.5   WBC 10*3/mm3 15.10* 15.99* 13.51*   ALBUMIN g/dL  --   --  4.5     Results from last 7 days   Lab Units 10/02/23  0501 10/01/23  0557 09/30/23  1339   INR   --   --  1.03   APTT seconds  --   --  27.4   PLATELETS 10*3/mm3 323 393 457      COVID19   Date Value Ref Range Status   09/30/2023 Not Detected Not Detected - Ref. Range Final     Lab Results   Component Value Date    HGBA1C 5.90 (H) 07/12/2023          Medications           Scheduled Medications amLODIPine, 5 mg, Oral, Daily  ARIPiprazole, 5 mg, Oral, Nightly  atorvastatin, 10 mg, Oral, Daily  budesonide-formoterol, 2 puff, Inhalation, BID - RT   And  tiotropium bromide monohydrate, 2 puff, Inhalation, Daily - RT  busPIRone, 15 mg, Oral, TID  enoxaparin, 40 mg, Subcutaneous, Nightly  folic acid, 1 mg, Oral, Daily  gabapentin, 100 mg, Oral, TID  mirtazapine, 15 mg, Oral, Nightly  multivitamin with minerals, 1 tablet, Oral, Daily  nicotine, 1 patch, Transdermal, Q24H  senna-docusate sodium, 2 tablet, Oral, BID  thiamine (B-1) IV, 500 mg, Intravenous, Q8H   Followed by  [START ON 10/3/2023] thiamine (B-1) IV, 200 mg, Intravenous, Q8H   Followed by  [START ON 10/8/2023] thiamine, 100 mg, Oral, Daily       Infusions     PRN Medications   acetaminophen    senna-docusate sodium **AND** polyethylene glycol **AND** bisacodyl **AND** bisacodyl    LORazepam **OR** LORazepam **OR** LORazepam **OR** LORazepam **OR** LORazepam **OR** LORazepam    Magnesium Standard Dose Replacement - Follow Nurse / BPA Driven Protocol    melatonin    ondansetron **OR** ondansetron     Physical Findings          General Findings confused, disoriented   Oral/Mouth Cavity WNL   Edema  no edema   Gastrointestinal normoactive   Skin  skin intact   Tubes/Drains/Lines none   NFPE No clinical signs of muscle wasting or fat loss   --  Current Nutrition Orders & Evaluation of Intake       Oral Nutrition     Food Allergies NKFA   Current PO Diet Diet: Cardiac Diets; Healthy Heart (2-3 Na+); Texture: Regular Texture (IDDSI 7); Fluid Consistency: Thin (IDDSI 0)   Supplement n/a   PO Evaluation     % PO Intake 50%    Factors Affecting Intake: altered mental status         PES STATEMENT / NUTRITION DIAGNOSIS      Nutrition Dx Problem   Problem: Unintentional Weight Loss  Etiology: Factors Affecting Nutrition - AMS    Signs/Symptoms: PO intake, BMI, Unintended Weight Change, and Report/Observation   --  NUTRITION INTERVENTION / PLAN OF CARE      Intervention Goal(s) Maintain nutrition status, Reduce/improve symptoms, Meet estimated needs, Increase intake, Maintain weight, Appropriate weight gain, and PO intake goal %: 75+         RD Intervention/Action Interview for preferences, Encourage intake, Continue to monitor, and Recommend/order: ONS         Prescription/Orders:       PO Diet       Supplements Boost      Enteral Nutrition .      Parenteral Nutrition    New Prescription Ordered? Yes   --      Monitor/Evaluation Per protocol   Discharge Plan/Needs Pending clinical course   --    RD to follow per protocol.    Electronically signed by:  Lorna Lopez RD  10/02/23 12:02 EDT

## 2023-10-02 NOTE — PROGRESS NOTES
Name: Stanislav Choi ADMIT: 2023   : 1955  PCP: Ten Coon MD    MRN: [id number] LOS: 2 days   AGE/SEX: 68 y.o. male  ROOM: [facility]     Subjective   Subjective   Patient's mentation much improved this morning.  Lying comfortably in bed.  Alert and orient x3.  States last thing remembers is going to his storage unit because he gets better reception they are to call his wife.  Remembers that he was staying in a motel.  States that the storage in the motel were closed.  Does not remember coming to the hospital.    Review of Systems   Constitutional:  Negative for chills and fever.   Respiratory:  Negative for cough and shortness of breath.    Cardiovascular:  Negative for chest pain and palpitations.   Gastrointestinal:  Negative for abdominal pain, diarrhea, nausea and vomiting.        Objective   Objective   Vital Signs  Temp:  [97.9 °F (36.6 °C)-98.3 °F (36.8 °C)] 97.9 °F (36.6 °C)  Heart Rate:  [71-89] 81  Resp:  [16-18] 18  BP: (140-157)/(80-92) 151/84  SpO2:  [97 %-100 %] 98 %  on   ;   Device (Oxygen Therapy): room air  Body mass index is 17.91 kg/m².  Physical Exam  Vitals and nursing note reviewed.   Constitutional:       General: He is not in acute distress.     Appearance: He is not ill-appearing.   Cardiovascular:      Rate and Rhythm: Normal rate and regular rhythm.   Pulmonary:      Effort: Pulmonary effort is normal. No respiratory distress.   Abdominal:      General: Abdomen is flat. There is no distension.      Tenderness: There is no abdominal tenderness.   Musculoskeletal:         General: No swelling or deformity.   Skin:     General: Skin is warm and dry.   Neurological:      Mental Status: He is alert and oriented to person, place, and time.      Sensory: No sensory deficit.      Motor: No weakness.       Results Review     I reviewed the patient's new clinical results.  Results from last 7 days   Lab Units 10/02/23  0501 10/01/23  0557 23  1339   WBC 10*3/mm3 15.10*  15.99* 13.51*   HEMOGLOBIN g/dL 13.1 13.7 14.3   PLATELETS 10*3/mm3 323 333 354       Results from last 7 days   Lab Units 10/02/23  0501 10/01/23  0557 09/30/23  1339   SODIUM mmol/L 141 143 142   POTASSIUM mmol/L 4.2 4.5 3.4*   CHLORIDE mmol/L 107 111* 110*   CO2 mmol/L 23.8 20.3* 17.0*   BUN mg/dL 22 20 14   CREATININE mg/dL 0.96 0.91 1.00   GLUCOSE mg/dL 84 133* 129*     Estimated Creatinine Clearance: 57.3 mL/min (by C-G formula based on SCr of 0.96 mg/dL).  Results from last 7 days   Lab Units 10/02/23  0501 09/30/23  1339   ALBUMIN g/dL 3.8 4.5   BILIRUBIN mg/dL 0.4 0.7   ALK PHOS U/L 121* 161*   AST (SGOT) U/L 44* 39   ALT (SGPT) U/L 67* 81*       Results from last 7 days   Lab Units 10/02/23  0501 10/01/23  0557 09/30/23  1339   CALCIUM mg/dL 8.9 9.2 9.5   ALBUMIN g/dL 3.8  --  4.5   MAGNESIUM mg/dL 2.2 2.4 1.9   PHOSPHORUS mg/dL 3.4 5.0*  --        Results from last 7 days   Lab Units 09/30/23  1339   PROCALCITONIN ng/mL 0.07   LACTATE mmol/L 1.3       COVID19   Date Value Ref Range Status   09/30/2023 Not Detected Not Detected - Ref. Range Final   11/29/2022 Not Detected  Final     No results found for: HGBA1C, POCGLU    XR Chest 1 View  Narrative: CHEST SINGLE VIEW     HISTORY: Shortness of air with altered mental status.     COMPARISON: CT chest 06/27/2023, AP chest 06/27/2023.     FINDINGS: Cardiomediastinal silhouette is normal. Lungs appear clear.  There is no evidence for pulmonary edema  or pleural effusion. There is  a calcified nodule in the right upper lobe. Right hilar calcified lymph  node is present. There is no evidence for pulmonary edema or pleural  effusion or infiltrate.     Impression: No evidence for active disease in the chest.     This report was finalized on 9/30/2023 3:02 PM by Dr. Braulio Solis M.D.     CT Head Without Contrast  Narrative: CT HEAD WITHOUT CONTRAST     CLINICAL HISTORY: Altered mental status     TECHNIQUE: CT scan of the head was obtained with 3 mm axial soft  tissue  algorithm images. No intravenous contrast was administered. Sagittal and  coronal reconstructions were obtained.     COMPARISON: CT head 6/27/2023 and 3/29/2018     FINDINGS:  No intracranial hemorrhage.  No midline shift or mass effect.   No CT evidence of acute territorial infarction.  No hydrocephalus.  Unchanged left nasal suspected polyp.        Impression: No acute intracranial abnormality.            Radiation dose reduction techniques were utilized, including automated  exposure control and exposure modulation based on body size.     This report was finalized on 9/30/2023 3:01 PM by Dr. Bienvenido Lipscomb M.D.       I reviewed the patient's daily medications.  Scheduled Medications  amLODIPine, 5 mg, Oral, Daily  ARIPiprazole, 5 mg, Oral, Nightly  atorvastatin, 10 mg, Oral, Daily  budesonide-formoterol, 2 puff, Inhalation, BID - RT   And  tiotropium bromide monohydrate, 2 puff, Inhalation, Daily - RT  busPIRone, 15 mg, Oral, TID  enoxaparin, 40 mg, Subcutaneous, Nightly  folic acid, 1 mg, Oral, Daily  gabapentin, 100 mg, Oral, TID  mirtazapine, 15 mg, Oral, Nightly  multivitamin with minerals, 1 tablet, Oral, Daily  nicotine, 1 patch, Transdermal, Q24H  senna-docusate sodium, 2 tablet, Oral, BID  thiamine (B-1) IV, 500 mg, Intravenous, Q8H   Followed by  [START ON 10/3/2023] thiamine (B-1) IV, 200 mg, Intravenous, Q8H   Followed by  [START ON 10/8/2023] thiamine, 100 mg, Oral, Daily    Infusions   Diet  Diet: Cardiac Diets; Healthy Heart (2-3 Na+); Texture: Regular Texture (IDDSI 7); Fluid Consistency: Thin (IDDSI 0)         I have personally reviewed:  [x]  Laboratory   [x]  Microbiology   [x]  Radiology   []  EKG/Telemetry   []  Cardiology/Vascular   []  Pathology   []  Records     Assessment/Plan     Active Hospital Problems    Diagnosis  POA    **Altered mental status [R41.82]  Yes    Elevated WBC count [D72.829]  Yes    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       68 y.o. male admitted with Altered mental status.    Acute encephalopathy  Alcohol induced dementia  Alcohol use disorder with concern for withdrawal  -Recent TSH normal.  RPR, B12, HIV pending  -Ethanol negative on admission, will continue CIWA for now.  Stop scheduled Ativan.  Stop CIWA  -No infectious cause at this time  -UDS negative, CT head with no acute findings  -Multivitamin, thiamine, folate  -Psych consulted, appreciate recommendations-considering inpatient psych hospitalization.  Started Abilify yesterday.  -72-hour hold placed by ER physician, defer to psych  -Continue home gabapentin    Leukocytosis  -UA bland, RVP negative, chest x-ray without any acute findings.  -Blood cultures no growth for 2 days    COPD-continue home inhalers    Anxiety/mood disorder  -Continue home BuSpar, mirtazapine    Nicotine use disorder-nicotine patch      Lovenox 40 mg SC daily for DVT prophylaxis.  Full code.  Discussed with patient and nursing staff.  Anticipate discharge  to inpatient psych hospitalization versus home with wife  in 1-2 days.  Pending psychiatry evaluation    Expected Discharge Date: 10/3/2023; Expected Discharge Time:       Dion Campbell MD  [facility]  10/02/23  15:51 EDT

## 2023-10-02 NOTE — CONSULTS
"Patient seen by [facility] d/t ETOH. Patient interviewed alone in room 526 (5N). Introduced self and role. Patient agreed to be evaluated. Patient is A/OX4. Patient keeping eyes closed for majority of evaluation. The patient was only oriented to self yesterday.    The patient is a 68 y.o.  male who has been living in a hotel recently. The patient voiced there has been some family issues lately.   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  Hobbies: \"some things I want to do but not there yet.\"   Legal: Denies  : Yes. [employer] for 4-5 years  Support System: Wife, God, AA sponsors. The patient stated he has 2 sponsors.  Hx of Violence: Denies  Hx of Abuse/Trauma: Denies  Feel safe at Home: Yes    The patient presented to the ED on 9/30//23 after EMS was called by staff at a storage facility d/t the patient acting altered. The patient drove himself to the storage facility. Yesterday the patient could not remember the events leading up to his hospitalization. Today the patient stated he remembers leaving his hotel to get better reception to call his wife. The patient stated the next thing he knew he was in the hospital. The patient has been seen by [facility] multiple times and the patient was inpatient on [facility] X2. Please see previous notes. The patient is followed by Dr. George through [facility]. The patient was noted to be appropriate during his last visit with Dr. George about 10 days ago.     The patient was last seen by [facility] in June 2023. The patient stated he has been sober since then. The patient's UDS and BAL were negative upon ED arrival. The patient completed an intensive outpatient program at [facility] and is now attending AA meetings. According to notes by the psychiatrist the patient has been able to drive himself to the meetings. The patient denied any other substance " use.    The patient has a mental health history of depression. The patient is a patient of Dr. George at [facility]. The patient is prescribed Buspar, Remeron, and had Abilify started yesterday. The patient recently had his Latuda discontinued d/t EPS symptoms. The psychiatrist noted the patient has had multiple episodes which appear as a dementing illness but spontaneously resolves suggesting a pseudodementia.     The patient rated anxiety 5/10 r/t his confusion around his hospitalization. The patient denied depression, SI, wish to be dead, HI, or hallucinations. The patient reported poor sleep last night but stated it was been fine otherwise. The patient stated his appetite hasn't been great but the  reported patient ate a good amount of his breakfast.     The patient is currently on a 72 hour hold. The patient denied any current needs from [facility] at this time. The patient is open to follow-up visits from [facility]. The patient is also being followed by psychiatry. [facility].

## 2023-10-02 NOTE — THERAPY TREATMENT NOTE
Patient Name: Stanislav Choi  : 1955    MRN: [id number]                              Today's Date: 10/2/2023       Admit Date: 2023    Visit Dx:     ICD-10-CM ICD-9-CM   1. Altered mental status, unspecified altered mental status type  R41.82 780.97   2. History of alcohol abuse  F10.11 305.03   3. Leukocytosis, unspecified type  D72.829 288.60   4. History of COPD  Z87.09 V12.69   5. 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    Confusion state    Altered mental status, unspecified altered mental status type    Alcohol use disorder    Weight loss    Lymph node enlargement    Severe malnutrition    Alcoholic dementia    Pulmonary nodule    Altered mental status    Elevated WBC count     Past Medical History:   Diagnosis Date    Anxiety     Chronic pain disorder     COPD (chronic obstructive pulmonary disease)     Depression     Elevated cholesterol     HL (hearing loss)     Hyperlipidemia     Hypertension     Memory loss     Peripheral neuropathy     Shingles      Past Surgical History:   Procedure Laterality Date    JOINT REPLACEMENT        General Information       Row Name 10/02/23 1507          Physical Therapy Time and Intention    Document Type therapy note (daily note)  -     Mode of Treatment individual therapy;physical therapy  -       Row Name 10/02/23 1507          General Information    Existing Precautions/Restrictions fall  -       Row Name 10/02/23 1507          Cognition    Orientation Status (Cognition) oriented to;person;place  -       Row Name 10/02/23 1507          Safety Issues, Functional Mobility    Impairments Affecting Function (Mobility) balance;cognition  -SM               User Key  (r) = Recorded By, (t) =  Taken By, (c) = Cosigned By      Initials Name Provider Type     Tamara Berman PT Physical Therapist                   Mobility       Row Name 10/02/23 1509          Bed Mobility    Bed Mobility supine-sit;sit-supine  -SM     Supine-Sit [place] (Bed Mobility) standby assist  -SM     Sit-Supine [place] (Bed Mobility) standby assist  -SM     Assistive Device (Bed Mobility) head of bed elevated;bed rails  -SM       Row Name 10/02/23 1509          Sit-Stand Transfer    Sit-Stand [place] (Transfers) standby assist  -     Assistive Device (Sit-Stand Transfers) other (see comments)  no AD  -SM       Row Name 10/02/23 1509          Gait/Stairs (Locomotion)    [place] Level (Gait) contact guard  -     Assistive Device (Gait) other (see comments)  no AD  -SM     Distance in Feet (Gait) 240ft  -SM     Deviations/Abnormal Patterns (Gait) gait speed decreased;base of support, narrow;stride length decreased;festinating/shuffling  -     Bilateral Gait Deviations heel strike decreased  -     Comment, (Gait/Stairs) Gait slow and shuffled. Patient mildly unsteady. No overt LOB noted.  -               User Key  (r) = Recorded By, (t) = Taken By, (c) = Cosigned By      Initials Name Provider Type     Tamara Berman PT Physical Therapist                   Obj/Interventions       Row Name 10/02/23 1517          Balance    Balance Assessment sitting static balance;sitting dynamic balance;sit to stand dynamic balance;standing static balance;standing dynamic balance  -     Static Sitting Balance supervision  -     Dynamic Sitting Balance supervision  -     Position, Sitting Balance sitting edge of bed  -     Sit to Stand Dynamic Balance standby assist  -     Static Standing Balance standby assist  -SM     Dynamic Standing Balance contact guard  -     Position/Device Used, Standing Balance unsupported  -     Balance Interventions sitting;standing;sit to stand;dynamic;static  -SM                User Key  (r) = Recorded By, (t) = Taken By, (c) = Cosigned By      Initials Name Provider Type    Tamara Montes PT Physical Therapist                   Goals/Plan    No documentation.                  Clinical Impression       Row Name 10/02/23 1514          Pain    Pretreatment Pain Rating 0/10 - no pain  -     Posttreatment Pain Rating 0/10 - no pain  -SM       Row Name 10/02/23 1514          Plan of Care Review    Plan of Care Reviewed With patient  -     Outcome Evaluation Patient seen for PT session this PM. Patient supine in bed upon arrival. Patient AO to self and place this date. Some confusion with date. Patient performed all bed mobility with SBA. Patient performed STS from EOB with CGA. Patient ambulated around unit twice with no AD and CGA. Gait slow and shuffled. Patient mildly unsteady. No overt LOB noted. Patien treturned to bed at end of session. Patient may continue to benefit from skilled PT intervention to address deficits in functional mobility. PT will continue to monitor.  -       Row Name 10/02/23 1514          Therapy Assessment/Plan (PT)    Therapy Frequency (PT) 3 times/wk  -       Row Name 10/02/23 1514          Vital Signs    Pre Patient Position Supine  -SM     Intra Patient Position Standing  -SM     Post Patient Position Supine  -SM       Row Name 10/02/23 1514          Positioning and Restraints    Pre-Treatment Position in bed  -SM     Post Treatment Position bed  -SM     In Bed notified nsg;encouraged to call for assist;exit alarm on;call light within reach;fowlers  with sitter  -               User Key  (r) = Recorded By, (t) = Taken By, (c) = Cosigned By      Initials Name Provider Type    Tamara Montes PT Physical Therapist                   Outcome Measures       Row Name 10/02/23 1519          How much help from another person do you currently need...    Turning from your back to your side while in flat bed without using bedrails? 4  -SM      Moving from lying on back to sitting on the side of a flat bed without bedrails? 4  -SM     Moving to and from a bed to a chair (including a wheelchair)? 3  -SM     Standing up from a chair using your arms (e.g., wheelchair, bedside chair)? 3  -SM     Climbing 3-5 steps with a railing? 2  -SM     To walk in hospital room? 3  -SM     AM-PAC 6 Clicks Score (PT) 19  -SM     Highest level of mobility 6 --> Walked 10 steps or more  -       Row Name 10/02/23 1519          Functional Assessment    Outcome Measure Options AM-PAC 6 Clicks Basic Mobility (PT)  -               User Key  (r) = Recorded By, (t) = Taken By, (c) = Cosigned By      Initials Name Provider Type     Tamara Berman PT Physical Therapist                                 Physical Therapy Education       Title: PT OT SLP Therapies (Done)       Topic: Physical Therapy (Done)       Point: Mobility training (Done)       Learning Progress Summary             Patient Acceptance, E, NR,VU by  at 10/2/2023 1520    Acceptance, E,TB,D, VU,DU,NR by  at 10/1/2023 1244                         Point: Home exercise program (Done)       Learning Progress Summary             Patient Acceptance, E, NR,VU by  at 10/2/2023 1520                         Point: Body mechanics (Done)       Learning Progress Summary             Patient Acceptance, E, NR,VU by  at 10/2/2023 1520                         Point: Precautions (Done)       Learning Progress Summary             Patient Acceptance, E, NR,VU by  at 10/2/2023 1520    Acceptance, E,TB,D, VU,DU,NR by  at 10/1/2023 1244                                         User Key       Initials Effective Dates Name Provider Type Discipline     05/24/22 -  Tammie Montoya, PT Physical Therapist PT     05/02/22 -  Tamara Berman PT Physical Therapist PT                  PT Recommendation and Plan     Plan of Care Reviewed With: patient  Outcome Evaluation: Patient seen for PT session this PM. Patient supine in  bed upon arrival. Patient AO to self and place this date. Some confusion with date. Patient performed all bed mobility with SBA. Patient performed STS from EOB with CGA. Patient ambulated around unit twice with no AD and CGA. Gait slow and shuffled. Patient mildly unsteady. No overt LOB noted. Patien treturned to bed at end of session. Patient may continue to benefit from skilled PT intervention to address deficits in functional mobility. PT will continue to monitor.     Time Calculation:         PT Charges       Row Name 10/02/23 1520             Time Calculation    Start Time 1330  -SM      Stop Time 1340  -SM      Time Calculation (min) 10 min  -SM      PT Received On 10/02/23  -SM      PT - Next Appointment 10/03/23  -SM         Time Calculation- PT    Total Timed Code Minutes- PT 10 minute(s)  -SM         Timed Charges    85388 - PT Therapeutic Activity Minutes 10  -SM         Total Minutes    Timed Charges Total Minutes 10  -SM       Total Minutes 10  -SM                User Key  (r) = Recorded By, (t) = Taken By, (c) = Cosigned By      Initials Name Provider Type     Tamara Berman PT Physical Therapist                  Therapy Charges for Today       Code Description Service Date Service Provider Modifiers Qty    [id number]  PT THERAPEUTIC ACT EA 15 MIN 10/2/2023 Tamara Berman PT GP 1            PT G-Codes  Outcome Measure Options: AM-PAC 6 Clicks Basic Mobility (PT)  AM-PAC 6 Clicks Score (PT): 19       Tamara Berman PT  10/2/2023

## 2023-10-02 NOTE — PLAN OF CARE
Goal Outcome Evaluation:  Plan of Care Reviewed With: patient           Outcome Evaluation: Patient seen for PT session this PM. Patient supine in bed upon arrival. Patient AO to self and place this date. Some confusion with date. Patient performed all bed mobility with SBA. Patient performed STS from EOB with CGA. Patient ambulated around unit twice with no AD and CGA. Gait slow and shuffled. Patient mildly unsteady. No overt LOB noted. Patien treturned to bed at end of session. Patient may continue to benefit from skilled PT intervention to address deficits in functional mobility. PT will continue to monitor.

## 2023-10-02 NOTE — CASE MANAGEMENT/SOCIAL WORK
Discharge Planning Assessment  [facility]     Patient Name: Stanislav Choi  MRN: [id number]  Today's Date: 10/2/2023    Admit Date: 9/30/2023    Plan: home with wife   Discharge Needs Assessment       Row Name 10/02/23 1545       Living Environment    People in Home alone    Current Living Arrangements home    Potentially Unsafe Housing Conditions none    Primary Care Provided by self    Provides Primary Care For no one    Family Caregiver if Needed spouse    Quality of Family Relationships helpful;involved    Able to Return to Prior Arrangements yes       Resource/Environmental Concerns    Resource/Environmental Concerns none       Food Insecurity    Within the past 12 months, you worried that your food would run out before you got the money to buy more. Never true    Within the past 12 months, the food you bought just didn't last and you didn't have money to get more. Never true       Transition Planning    Patient/Family Anticipates Transition to home with family    Patient/Family Anticipated Services at Transition none    Transportation Anticipated family or friend will provide       Discharge Needs Assessment    Readmission Within the Last 30 Days no previous admission in last 30 days    Equipment Currently Used at Home none    Concerns to be Addressed discharge planning    Equipment Needed After Discharge none    Provided Post Acute Provider List? N/A                   Discharge Plan       Row Name 10/02/23 1548       Plan    Plan home with wife    Patient/Family in Agreement with Plan yes    Plan Comments pt is confused. Spoke with pt's wife Ana via phone. Confirmed facesheet correct. Explained role of CCP. Pt lives with his wife. Prior to hospitalization pt was IADLs no DME used and was driving. Verified PCP and pharmacy. Pt has no HH history and has been to [place] in past after knee surgery. Plan is for pt to return home at d/c. CCP to follow for needs.                  Continued Care and Services  - Admitted Since 9/30/2023    Coordination has not been started for this encounter.       Expected Discharge Date and Time       Expected Discharge Date Expected Discharge Time    Oct 3, 2023            Demographic Summary    No documentation.                  Functional Status    No documentation.                  Psychosocial    No documentation.                  Abuse/Neglect    No documentation.                  Legal    No documentation.                  Substance Abuse    No documentation.                  Patient Forms    No documentation.                     ABI Kapoor

## 2023-10-03 ENCOUNTER — APPOINTMENT (OUTPATIENT)
Dept: NEUROLOGY | Facility: HOSPITAL | Age: 68
DRG: 897 | End: 2023-10-03
Payer: MEDICARE

## 2023-10-03 LAB
ALBUMIN SERPL-MCNC: 4 G/DL (ref 3.5–5.2)
ALBUMIN/GLOB SERPL: 1.6 G/DL
ALP SERPL-CCNC: 133 U/L (ref 39–117)
ALT SERPL W P-5'-P-CCNC: 61 U/L (ref 1–41)
ANION GAP SERPL CALCULATED.3IONS-SCNC: 10.8 MMOL/L (ref 5–15)
AST SERPL-CCNC: 33 U/L (ref 1–40)
BILIRUB SERPL-MCNC: 0.4 MG/DL (ref 0–1.2)
BUN SERPL-MCNC: 19 MG/DL (ref 8–23)
BUN/CREAT SERPL: 20.2 (ref 7–25)
CALCIUM SPEC-SCNC: 9.1 MG/DL (ref 8.6–10.5)
CHLORIDE SERPL-SCNC: 105 MMOL/L (ref 98–107)
CO2 SERPL-SCNC: 23.2 MMOL/L (ref 22–29)
CREAT SERPL-MCNC: 0.94 MG/DL (ref 0.76–1.27)
DEPRECATED RDW RBC AUTO: 39.7 FL (ref 37–54)
EGFRCR SERPLBLD CKD-EPI 2021: 88.3 ML/MIN/1.73
ERYTHROCYTE [DISTWIDTH] IN BLOOD BY AUTOMATED COUNT: 12.1 % (ref 12.3–15.4)
GLOBULIN UR ELPH-MCNC: 2.5 GM/DL
GLUCOSE SERPL-MCNC: 114 MG/DL (ref 65–99)
HCT VFR BLD AUTO: 40.7 % (ref 37.5–51)
HGB BLD-MCNC: 14.1 G/DL (ref 13–17.7)
MAGNESIUM SERPL-MCNC: 2.2 MG/DL (ref 1.6–2.4)
MCH RBC QN AUTO: 31.4 PG (ref 26.6–33)
MCHC RBC AUTO-ENTMCNC: 34.6 G/DL (ref 31.5–35.7)
MCV RBC AUTO: 90.6 FL (ref 79–97)
PHOSPHATE SERPL-MCNC: 3.6 MG/DL (ref 2.5–4.5)
PLATELET # BLD AUTO: 357 10*3/MM3 (ref 140–450)
PMV BLD AUTO: 9 FL (ref 6–12)
POTASSIUM SERPL-SCNC: 3.6 MMOL/L (ref 3.5–5.2)
PROT SERPL-MCNC: 6.5 G/DL (ref 6–8.5)
RBC # BLD AUTO: 4.49 10*6/MM3 (ref 4.14–5.8)
SODIUM SERPL-SCNC: 139 MMOL/L (ref 136–145)
TSH SERPL DL<=0.05 MIU/L-ACNC: 1.19 UIU/ML (ref 0.27–4.2)
WBC NRBC COR # BLD: 13.45 10*3/MM3 (ref 3.4–10.8)

## 2023-10-03 PROCEDURE — 25010000002 HALOPERIDOL LACTATE PER 5 MG: Performed by: STUDENT IN AN ORGANIZED HEALTH CARE EDUCATION/TRAINING PROGRAM

## 2023-10-03 PROCEDURE — 25010000002 THIAMINE HCL 200 MG/2ML SOLUTION: Performed by: INTERNAL MEDICINE

## 2023-10-03 PROCEDURE — 83735 ASSAY OF MAGNESIUM: CPT | Performed by: STUDENT IN AN ORGANIZED HEALTH CARE EDUCATION/TRAINING PROGRAM

## 2023-10-03 PROCEDURE — 95816 EEG AWAKE AND DROWSY: CPT | Performed by: PSYCHIATRY & NEUROLOGY

## 2023-10-03 PROCEDURE — 84443 ASSAY THYROID STIM HORMONE: CPT | Performed by: PSYCHIATRY & NEUROLOGY

## 2023-10-03 PROCEDURE — 25010000002 THIAMINE HCL 200 MG/2ML SOLUTION 2 ML VIAL: Performed by: INTERNAL MEDICINE

## 2023-10-03 PROCEDURE — 25010000002 ENOXAPARIN PER 10 MG: Performed by: INTERNAL MEDICINE

## 2023-10-03 PROCEDURE — 94799 UNLISTED PULMONARY SVC/PX: CPT

## 2023-10-03 PROCEDURE — 63710000001 ONDANSETRON PER 8 MG: Performed by: INTERNAL MEDICINE

## 2023-10-03 PROCEDURE — 94664 DEMO&/EVAL PT USE INHALER: CPT

## 2023-10-03 PROCEDURE — 94761 N-INVAS EAR/PLS OXIMETRY MLT: CPT

## 2023-10-03 PROCEDURE — 99221 1ST HOSP IP/OBS SF/LOW 40: CPT | Performed by: PSYCHIATRY & NEUROLOGY

## 2023-10-03 PROCEDURE — 80053 COMPREHEN METABOLIC PANEL: CPT | Performed by: STUDENT IN AN ORGANIZED HEALTH CARE EDUCATION/TRAINING PROGRAM

## 2023-10-03 PROCEDURE — 85027 COMPLETE CBC AUTOMATED: CPT | Performed by: STUDENT IN AN ORGANIZED HEALTH CARE EDUCATION/TRAINING PROGRAM

## 2023-10-03 PROCEDURE — 84100 ASSAY OF PHOSPHORUS: CPT | Performed by: STUDENT IN AN ORGANIZED HEALTH CARE EDUCATION/TRAINING PROGRAM

## 2023-10-03 PROCEDURE — 95816 EEG AWAKE AND DROWSY: CPT

## 2023-10-03 RX ORDER — LORAZEPAM 1 MG/1
1 TABLET ORAL
Status: DISCONTINUED | OUTPATIENT
Start: 2023-10-03 | End: 2023-10-03

## 2023-10-03 RX ORDER — HALOPERIDOL 5 MG/ML
5 INJECTION INTRAMUSCULAR EVERY 6 HOURS PRN
Status: DISCONTINUED | OUTPATIENT
Start: 2023-10-03 | End: 2023-10-06 | Stop reason: HOSPADM

## 2023-10-03 RX ORDER — LORAZEPAM 1 MG/1
4 TABLET ORAL
Status: DISCONTINUED | OUTPATIENT
Start: 2023-10-03 | End: 2023-10-03

## 2023-10-03 RX ORDER — HALOPERIDOL 5 MG/ML
1 INJECTION INTRAMUSCULAR ONCE
Status: COMPLETED | OUTPATIENT
Start: 2023-10-03 | End: 2023-10-03

## 2023-10-03 RX ORDER — THIAMINE HYDROCHLORIDE 100 MG/ML
200 INJECTION, SOLUTION INTRAMUSCULAR; INTRAVENOUS EVERY 8 HOURS SCHEDULED
Status: DISCONTINUED | OUTPATIENT
Start: 2023-10-03 | End: 2023-10-03 | Stop reason: SDUPTHER

## 2023-10-03 RX ORDER — LORAZEPAM 1 MG/1
2 TABLET ORAL
Status: DISCONTINUED | OUTPATIENT
Start: 2023-10-03 | End: 2023-10-03

## 2023-10-03 RX ORDER — FOLIC ACID 1 MG/1
1 TABLET ORAL DAILY
Status: DISCONTINUED | OUTPATIENT
Start: 2023-10-03 | End: 2023-10-03 | Stop reason: SDUPTHER

## 2023-10-03 RX ADMIN — MULTIPLE VITAMINS W/ MINERALS TAB 1 TABLET: TAB at 08:11

## 2023-10-03 RX ADMIN — AMLODIPINE BESYLATE 5 MG: 5 TABLET ORAL at 08:11

## 2023-10-03 RX ADMIN — TIOTROPIUM BROMIDE INHALATION SPRAY 2 PUFF: 3.12 SPRAY, METERED RESPIRATORY (INHALATION) at 07:30

## 2023-10-03 RX ADMIN — BUSPIRONE HYDROCHLORIDE 15 MG: 15 TABLET ORAL at 08:11

## 2023-10-03 RX ADMIN — BUDESONIDE AND FORMOTEROL FUMARATE DIHYDRATE 2 PUFF: 160; 4.5 AEROSOL RESPIRATORY (INHALATION) at 07:30

## 2023-10-03 RX ADMIN — ENOXAPARIN SODIUM 40 MG: 100 INJECTION SUBCUTANEOUS at 21:54

## 2023-10-03 RX ADMIN — ATORVASTATIN CALCIUM 10 MG: 20 TABLET, FILM COATED ORAL at 08:11

## 2023-10-03 RX ADMIN — THIAMINE HYDROCHLORIDE 500 MG: 100 INJECTION, SOLUTION INTRAMUSCULAR; INTRAVENOUS at 14:53

## 2023-10-03 RX ADMIN — SENNOSIDES AND DOCUSATE SODIUM 2 TABLET: 50; 8.6 TABLET ORAL at 08:10

## 2023-10-03 RX ADMIN — MIRTAZAPINE 15 MG: 15 TABLET, FILM COATED ORAL at 21:54

## 2023-10-03 RX ADMIN — SENNOSIDES AND DOCUSATE SODIUM 2 TABLET: 50; 8.6 TABLET ORAL at 21:56

## 2023-10-03 RX ADMIN — THIAMINE HYDROCHLORIDE 200 MG: 100 INJECTION, SOLUTION INTRAMUSCULAR; INTRAVENOUS at 21:56

## 2023-10-03 RX ADMIN — BUSPIRONE HYDROCHLORIDE 15 MG: 15 TABLET ORAL at 21:54

## 2023-10-03 RX ADMIN — HYDROXYZINE HYDROCHLORIDE 25 MG: 25 TABLET, FILM COATED ORAL at 00:10

## 2023-10-03 RX ADMIN — NICOTINE 1 PATCH: 21 PATCH, EXTENDED RELEASE TRANSDERMAL at 16:08

## 2023-10-03 RX ADMIN — ONDANSETRON HYDROCHLORIDE 4 MG: 4 TABLET, FILM COATED ORAL at 08:20

## 2023-10-03 RX ADMIN — GABAPENTIN 100 MG: 100 CAPSULE ORAL at 08:11

## 2023-10-03 RX ADMIN — HYDROXYZINE HYDROCHLORIDE 25 MG: 25 TABLET, FILM COATED ORAL at 08:11

## 2023-10-03 RX ADMIN — HALOPERIDOL LACTATE 1 MG: 5 INJECTION, SOLUTION INTRAMUSCULAR at 14:49

## 2023-10-03 RX ADMIN — THIAMINE HYDROCHLORIDE 500 MG: 100 INJECTION, SOLUTION INTRAMUSCULAR; INTRAVENOUS at 06:19

## 2023-10-03 RX ADMIN — LORAZEPAM 1 MG: 1 TABLET ORAL at 13:42

## 2023-10-03 RX ADMIN — GABAPENTIN 100 MG: 100 CAPSULE ORAL at 21:56

## 2023-10-03 RX ADMIN — BUSPIRONE HYDROCHLORIDE 15 MG: 15 TABLET ORAL at 16:08

## 2023-10-03 RX ADMIN — ARIPIPRAZOLE 5 MG: 5 TABLET ORAL at 21:54

## 2023-10-03 RX ADMIN — FOLIC ACID 1 MG: 1 TABLET ORAL at 08:11

## 2023-10-03 NOTE — PROGRESS NOTES
The patient remains confused and is oriented x 2 only.  He is pushing to leave the hospital and has a sitter in place at this time.  We may need to consider inpatient psychiatric hospitalization as his confusion has persisted and Dr. 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  I will leave a sitter in place for now.

## 2023-10-03 NOTE — PLAN OF CARE
Problem: Adult Inpatient Plan of Care  Goal: Plan of Care Review  Outcome: Ongoing, Not Progressing  Flowsheets (Taken 10/2/2023 2001)  Plan of Care Reviewed With: patient  Outcome Evaluation: Patient was alert and oriented times 3 then about 1715 he became anxious, breathing rapid, reporting increased anxiety, attempted deep breathing to calm, but became increasingly anxious and agitated with decrease orientation to person onlly. He did not know his wife and dreported that she was her mother. Dr Campbell notified of patient report and atarax given. Patient calmed. He remains on 72 hour hold  Goal: Optimal Comfort and Wellbeing  Outcome: Ongoing, Not Progressing  Intervention: Provide Person-Centered Care  Recent Flowsheet Documentation  Taken 10/2/2023 1438 by Areli Gavin RN  Trust Relationship/Rapport: care explained  Taken 10/2/2023 0827 by Areli Gavin, RN  Trust Relationship/Rapport:   care explained   choices provided   questions encouraged   reassurance provided   thoughts/feelings acknowledged  Goal: Readiness for Transition of Care  Outcome: Ongoing, Not Progressing   Goal Outcome Evaluation:  Plan of Care Reviewed With: patient           Outcome Evaluation: Patient was alert and oriented times 3 then about 1715 he became anxious, breathing rapid, reporting increased anxiety, attempted deep breathing to calm, but became increasingly anxious and agitated with decrease orientation to person onlly. He did not know his wife and dreported that she was her mother. Dr Campbell notified of patient report and atarax given. Patient calmed. He remains on 72 hour hold

## 2023-10-03 NOTE — NURSING NOTE
Patient given PRN hydroxyzine after becoming very anxious last night.This seemed to help calm him enough to fall asleep. Sitter remains at bedside.

## 2023-10-03 NOTE — PROGRESS NOTES
Name: Stanislav Choi ADMIT: 2023   : 1955  PCP: Ten Coon MD    MRN: [id number] LOS: 3 days   AGE/SEX: 68 y.o. male  ROOM: [facility]     Subjective   Subjective   No acute events overnight.  Was contacted by nursing this morning as she felt that patient may be having more signs of withdrawal, and his Ativan has been discontinued.  The CIWA protocol was reordered.  On evaluation, the patient is fully oriented.  He is somewhat aggravated but redirectable.  Does not have a good understanding of why he is hospitalized or of his current mental state.     Objective   Objective   Vital Signs  Temp:  [97 °F (36.1 °C)-98.2 °F (36.8 °C)] 97 °F (36.1 °C)  Heart Rate:  [81-97] 95  Resp:  [16-20] 16  BP: (127-149)/(79-98) 149/97  SpO2:  [96 %-100 %] 96 %  on   ;   Device (Oxygen Therapy): room air  Body mass index is 17.91 kg/m².  General: Alert, no acute distress.  Somewhat agitated.  Oriented x3.  ENT: No conjunctival injection or scleral icterus. Moist mucous membranes.   Neuro: Eyes open and moving in all directions, strength normal in all extremities, no focal deficits.   Lungs: Clear to auscultation bilaterally. No wheeze or crackles. No distress.   Heart: RRR, no murmurs. No edema.  Abdomen: Soft, non-tender, non-distended. Normal bowel sounds. No hepatosplenomegaly.   Ext: Warm and well-perfused. No edema.   Skin: No rashes or lesions.     Results Review     I reviewed the patient's new clinical results.  Results from last 7 days   Lab Units 10/03/23  0436 10/02/23  0501 10/01/23  0557 09/30/23  1339   WBC 10*3/mm3 13.45* 15.10* 15.99* 13.51*   HEMOGLOBIN g/dL 14.1 13.1 13.7 14.3   PLATELETS 10*3/mm3 357 323 333 354     Results from last 7 days   Lab Units 10/03/23  0436 10/02/23  0501 10/01/23  0557 09/30/23  1339   SODIUM mmol/L 139 141 143 142   POTASSIUM mmol/L 3.6 4.2 4.5 3.4*   CHLORIDE mmol/L 105 107 111* 110*   CO2 mmol/L 23.2 23.8 20.3* 17.0*   BUN mg/dL 19 22 20 14   CREATININE mg/dL 0.94  0.96 0.91 1.00   GLUCOSE mg/dL 114* 84 133* 129*   Estimated Creatinine Clearance: 58.5 mL/min (by C-G formula based on SCr of 0.94 mg/dL).  Results from last 7 days   Lab Units 10/03/23  0436 10/02/23  0501 09/30/23  1339   ALBUMIN g/dL 4.0 3.8 4.5   BILIRUBIN mg/dL 0.4 0.4 0.7   ALK PHOS U/L 133* 121* 161*   AST (SGOT) U/L 33 44* 39   ALT (SGPT) U/L 61* 67* 81*     Results from last 7 days   Lab Units 10/03/23  0436 10/02/23  0501 10/01/23  0557 09/30/23  1339   CALCIUM mg/dL 9.1 8.9 9.2 9.5   ALBUMIN g/dL 4.0 3.8  --  4.5   MAGNESIUM mg/dL 2.2 2.2 2.4 1.9   PHOSPHORUS mg/dL 3.6 3.4 5.0*  --      Results from last 7 days   Lab Units 09/30/23  1339   PROCALCITONIN ng/mL 0.07   LACTATE mmol/L 1.3     COVID19   Date Value Ref Range Status   09/30/2023 Not Detected Not Detected - Ref. Range Final   11/29/2022 Not Detected  Final     No results found for: HGBA1C, POCGLU    XR Chest 1 View  Narrative: CHEST SINGLE VIEW     HISTORY: Shortness of air with altered mental status.     COMPARISON: CT chest 06/27/2023, AP chest 06/27/2023.     FINDINGS: Cardiomediastinal silhouette is normal. Lungs appear clear.  There is no evidence for pulmonary edema  or pleural effusion. There is  a calcified nodule in the right upper lobe. Right hilar calcified lymph  node is present. There is no evidence for pulmonary edema or pleural  effusion or infiltrate.     Impression: No evidence for active disease in the chest.     This report was finalized on 9/30/2023 3:02 PM by Dr. Braulio Solis M.D.     CT Head Without Contrast  Narrative: CT HEAD WITHOUT CONTRAST     CLINICAL HISTORY: Altered mental status     TECHNIQUE: CT scan of the head was obtained with 3 mm axial soft tissue  algorithm images. No intravenous contrast was administered. Sagittal and  coronal reconstructions were obtained.     COMPARISON: CT head 6/27/2023 and 3/29/2018     FINDINGS:  No intracranial hemorrhage.  No midline shift or mass effect.   No CT evidence of  acute territorial infarction.  No hydrocephalus.  Unchanged left nasal suspected polyp.        Impression: No acute intracranial abnormality.            Radiation dose reduction techniques were utilized, including automated  exposure control and exposure modulation based on body size.     This report was finalized on 9/30/2023 3:01 PM by Dr. Bienvenido Lipscomb M.D.       I reviewed the patient's daily medications.  Scheduled Medications  amLODIPine, 5 mg, Oral, Daily  ARIPiprazole, 5 mg, Oral, Nightly  atorvastatin, 10 mg, Oral, Daily  budesonide-formoterol, 2 puff, Inhalation, BID - RT   And  tiotropium bromide monohydrate, 2 puff, Inhalation, Daily - RT  busPIRone, 15 mg, Oral, TID  enoxaparin, 40 mg, Subcutaneous, Nightly  folic acid, 1 mg, Oral, Daily  gabapentin, 100 mg, Oral, TID  mirtazapine, 15 mg, Oral, Nightly  multivitamin with minerals, 1 tablet, Oral, Daily  nicotine, 1 patch, Transdermal, Q24H  senna-docusate sodium, 2 tablet, Oral, BID  thiamine (B-1) IV, 500 mg, Intravenous, Q8H   Followed by  thiamine (B-1) IV, 200 mg, Intravenous, Q8H   Followed by  [START ON 10/8/2023] thiamine, 100 mg, Oral, Daily    Infusions   Diet  Diet: Cardiac Diets; Healthy Heart (2-3 Na+); Texture: Regular Texture (IDDSI 7); Fluid Consistency: Thin (IDDSI 0)         I have personally reviewed:  [x]  Laboratory   [x]  Microbiology   [x]  Radiology   [x]  EKG/Telemetry   []  Cardiology/Vascular   []  Pathology   []  Records     Assessment/Plan     Active Hospital Problems    Diagnosis  POA    **Altered mental status [R41.82]  Yes    Elevated WBC count [D72.829]  Yes    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       68 y.o. male admitted with Altered mental  status.    Acute encephalopathy  Alcohol induced dementia  Alcohol use disorder with concern for withdrawal  -Recent TSH normal.  RPR, HIV negative.  B12 970.  -Ethanol negative on admission, continue CIWA.  Ativan as needed.  High-dose thiamine, folate, multivitamin.  -No infectious cause at this time  -UDS negative, CT head with no acute findings  -Multivitamin, thiamine, folate  -Psych consulted, appreciate recommendations-considering inpatient psych hospitalization.  Started Abilify yesterday.  -72-hour hold placed by ER physician, defer to psych  -Continue home gabapentin  -Neurology now consulted, appreciate recommendations; planning to obtain EEG and MRI brain for further evaluation    Enlarged retroperitoneal lymph node  -Patient scheduled to have CT chest with contrast for follow-up tomorrow, but wife reports that patient is allergic to the contrast  -CT chest on 6/27/2023 with prominent nonspecific retroperitoneal lymph node  -Given need for above imaging per neurology, will hold off on this repeat CT for now  -Will attempt to get imaging prior to patient discharge home    Leukocytosis  -UA bland, RVP negative, chest x-ray without any acute findings.  -Blood cultures no growth for 2 days  -Monitoring with daily CBC, WBC improved to 13.45 K today    COPD-continue home inhalers    Anxiety/mood disorder  -Continue home BuSpar, mirtazapine    Nicotine use disorder-nicotine patch      Lovenox 40 mg SC daily for DVT prophylaxis.  Full code.  Discussed with patient and nursing staff.  Anticipate discharge  TBD, inpatient psych versus possible placement somewhere else; we will continue ongoing discussions with wife    Chio Horvath MD  [facility]  10/03/23  12:36 EDT

## 2023-10-03 NOTE — PROGRESS NOTES
"Access did follow up with pt. Pt is agitated and anxious. Pt upset he doesn't have his phone. Pt 's nurse states he was very anxious last night and this morning. Pt continues with a sitter. Pt rates depression and anxiety as \"really bad\". Access will continue to follow. Pt to be seen by psychiatrist again.  "

## 2023-10-03 NOTE — CONSULTS
Neurology Consult Note    Referring Provider: Dr. Horvath  Reason for Consultation: Mental status change      History of present illness:      68-year-old man with past medical history of depression, anxiety, alcohol dependence, hyperlipidemia, hypertension, peripheral neuropathy, COPD.  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 History of multiple episodes where he presents with confusion which resolves spontaneously.  On multiple psychiatric medications including Remeron, Latuda, flumazenil, Antabuse, BuSpar.  There has been concern for extraparametal side effects in the past.  Also on gabapentin.    Evaluated for confusion, bizarre behavior memory loss by neurology in 2019, had neuropsych testing at that time which showed some cognitive deficits per the notes in epic (I do not have the results in front of me), initially thought to possibly be related to alcoholic dementia, or possibly related to psychiatric medications.  He had a neuro work-up including brain MRI without acute findings and a normal 8-hour video EEG.  He also had a EMG which showed absent motor responses in the lower extremities consistent with a severe diffuse polyneuropathy.    Presents now after having been found in a storage facility with bizarre behavior.  Initial work-up without abnormalities, head CT without acute findings, alcohol level less than 10. Seen by psychiatry who notes that this presentation is similar to previous presentations as noted above.  Latuda was recently discontinued due to EPS and Abilify was started.  He was also placed on a CIWA protocol and treated with high-dose thiamine 500 mg IV every 8 hours for 3 days, followed by 200 mg every 8 hours for 2 doses.  Is no longer on thiamine IV apparently due to pulling out his IV.    Per nursing report he was completely oriented yesterday  evening and then became acutely confused and disoriented, did not recognize his wife.  Has been afebrile, vital signs stable.  He continues to be intermittently confused and disoriented.  He denies any complaints presently but does quite anxious.  He denies fever, constitutional symptoms, pain.  There is no report of seizure or seizure-like activity.      Past Medical History:   Diagnosis Date    Anxiety     Chronic pain disorder     COPD (chronic obstructive pulmonary disease)     Depression     Elevated cholesterol     HL (hearing loss)     Hyperlipidemia     Hypertension     Memory loss     Peripheral neuropathy     Shingles        Allergies   Allergen Reactions    Iodinated Contrast Media Shortness Of Breath and Swelling     SWELLING OF THROAT; DIFFICULTY BREATHING     No current facility-administered medications on file prior to encounter.     Current Outpatient Medications on File Prior to Encounter   Medication Sig    amLODIPine (NORVASC) 5 MG tablet Take 1 tablet by mouth Daily.    atorvastatin (LIPITOR) 10 MG tablet Take 1 tablet by mouth Daily. Indications: High Amount of Fats in the Blood    busPIRone (BUSPAR) 15 MG tablet Take 1 tablet by mouth Every 12 (Twelve) Hours. (Patient taking differently: Take 1 tablet by mouth 3 (Three) Times a Day.)    disulfiram (ANTABUSE) 250 MG tablet Take 2 tablets by mouth Daily.    folic acid (FOLVITE) 1 MG tablet Take 1 tablet by mouth Daily.    gabapentin (NEURONTIN) 800 MG tablet Take 1 tablet by mouth 3 (Three) Times a Day.    hydrOXYzine pamoate (VISTARIL) 50 MG capsule Take 1 capsule by mouth Every 4 (Four) Hours As Needed for Anxiety.    Lurasidone HCl (LATUDA) 80 MG tablet tablet Take 20 mg by mouth 1 (One) Time.    multivitamin with minerals tablet tablet Take 1 tablet by mouth Daily.    Remeron 15 MG tablet Take 1 tablet by mouth Every Night.    thiamine (VITAMIN B1) 100 MG tablet Take 1 tablet by mouth Daily.    Thiamine Mononitrate (B1) 100 MG tablet Take 1  tablet by mouth Daily.    Ana Ellipta 200-62.5-25 MCG/ACT aerosol powder      vilazodone (VIIBRYD) 20 MG tablet tablet Take 1 tablet by mouth Daily.       Social History     Socioeconomic History    Marital status:    Tobacco Use    Smoking status: Every Day     Packs/day: 1.00     Years: 40.00     Pack years: 40.00     Types: Cigarettes    Smokeless tobacco: Never   Vaping Use    Vaping Use: Never used   Substance and Sexual Activity    Alcohol use: Not Currently     Comment: Hx ETOH abuse    Drug use: No    Sexual activity: Defer     Family History   Problem Relation Age of Onset    Cancer Mother     Hypertension Mother     Cancer Father     Heart disease Father     Cancer Brother     Cancer Maternal Grandmother     Cancer Maternal Grandfather     Heart disease Paternal Grandfather        Review of Systems  A 14-point review of systems was reviewed and was negative except for the symptoms mentioned in the HPI    Vital Signs   Temp:  [97 °F (36.1 °C)-98.2 °F (36.8 °C)] 97 °F (36.1 °C)  Heart Rate:  [81-97] 95  Resp:  [16-20] 16  BP: (127-151)/(79-98) 149/97    General Exam: Thin, appears somewhat malnourished  Head:  Normocephalic, atraumatic, bilateral temporal muscle wasting  HEENT:  Neck supple  CVS:  Regular rate and rhythm.  No murmurs  Carotid Examination:  No bruits  Lungs:  Clear to auscultation  Abdomen:  Nontender, nondistended  Extremities:  No signs of peripheral edema  Skin:  No rashes, bronze color    Neurologic Exam:    Mental Status: Awake, alert, anxious, oriented to the hospital, August 14, mildly perseverative, intermittently distracted, able to count 20-1 without errors, no aphasia, briskly interactive, no neglect  Cranial nerves: Pupils equal and reactive, extraocular movements intact without nystagmus, no diplopia, normal visual acuity OU, face symmetric, tongue midline, symmetric smile, no dysarthria  Motor: normal strength and tone throughout  Sensory: intact to LT and pinprick  throughout  Reflexes: 3+ throughout, plantar reflexes flexor on the right, equivocal on the left  Cerebellar: Finger-to-nose within normal limits  Gait: Mildly widened base, Romberg positive      Results Review:  Lab Results (last 24 hours)       Procedure Component Value Units Date/Time    Comprehensive Metabolic Panel [[id number]]  (Abnormal) Collected: 10/03/23 0436    Specimen: Blood Updated: 10/03/23 0526     Glucose 114 mg/dL      BUN 19 mg/dL      Creatinine 0.94 mg/dL      Sodium 139 mmol/L      Potassium 3.6 mmol/L      Chloride 105 mmol/L      CO2 23.2 mmol/L      Calcium 9.1 mg/dL      Total Protein 6.5 g/dL      Albumin 4.0 g/dL      ALT (SGPT) 61 U/L      AST (SGOT) 33 U/L      Alkaline Phosphatase 133 U/L      Total Bilirubin 0.4 mg/dL      Globulin 2.5 gm/dL      A/G Ratio 1.6 g/dL      BUN/Creatinine Ratio 20.2     Anion Gap 10.8 mmol/L      eGFR 88.3 mL/min/1.73     Narrative:      GFR Normal >60  Chronic Kidney Disease <60  Kidney Failure <15      Magnesium [[id number]]  (Normal) Collected: 10/03/23 0436    Specimen: Blood Updated: 10/03/23 0526     Magnesium 2.2 mg/dL     Phosphorus [[id number]]  (Normal) Collected: 10/03/23 0436    Specimen: Blood Updated: 10/03/23 0526     Phosphorus 3.6 mg/dL     CBC (No Diff) [[id number]]  (Abnormal) Collected: 10/03/23 0436    Specimen: Blood Updated: 10/03/23 0508     WBC 13.45 10*3/mm3      RBC 4.49 10*6/mm3      Hemoglobin 14.1 g/dL      Hematocrit 40.7 %      MCV 90.6 fL      MCH 31.4 pg      MCHC 34.6 g/dL      RDW 12.1 %      RDW-SD 39.7 fl      MPV 9.0 fL      Platelets 357 10*3/mm3     RPR [[id number]]  (Normal) Collected: 10/02/23 0501    Specimen: Blood Updated: 10/02/23 1713     RPR Non-Reactive    Blood Culture - Blood, Wrist, Right [[id number]]  (Normal) Collected: 09/30/23 1416    Specimen: Blood from Wrist, Right Updated: 10/02/23 1430     Blood Culture No growth at 2 days    Blood Culture - Blood, Hand, Right [[id number]]  (Normal) Collected:  09/30/23 1339    Specimen: Blood from Hand, Right Updated: 10/02/23 1415     Blood Culture No growth at 2 days    HIV-1 / O / 2 Ag / Antibody [[id number]]  (Normal) Collected: 10/02/23 0501    Specimen: Blood Updated: 10/02/23 1233     HIV DUO Non-Reactive    Narrative:      The HIV antibody/antigen combo assay is a qualitative assay for HIV that includes the p24 antigen as well as antibodies to HIV types 1 and 2. This test is intended to be used as a screening assay in the diagnosis of HIV infection in patients over the age of 2.    Vitamin B12 [[id number]]  (Abnormal) Collected: 10/02/23 0501    Specimen: Blood Updated: 10/02/23 1233     Vitamin B-12 970 pg/mL     Narrative:      Results may be falsely increased if patient taking Biotin.      Phosphorus [[id number]]  (Normal) Collected: 10/02/23 0501    Specimen: Blood Updated: 10/02/23 1220     Phosphorus 3.4 mg/dL     Magnesium [[id number]]  (Normal) Collected: 10/02/23 0501    Specimen: Blood Updated: 10/02/23 1214     Magnesium 2.2 mg/dL     Comprehensive Metabolic Panel [[id number]]  (Abnormal) Collected: 10/02/23 0501    Specimen: Blood Updated: 10/02/23 1214     Glucose 84 mg/dL      BUN 22 mg/dL      Creatinine 0.96 mg/dL      Sodium 141 mmol/L      Potassium 4.2 mmol/L      Chloride 107 mmol/L      CO2 23.8 mmol/L      Calcium 8.9 mg/dL      Total Protein 6.2 g/dL      Albumin 3.8 g/dL      ALT (SGPT) 67 U/L      AST (SGOT) 44 U/L      Alkaline Phosphatase 121 U/L      Total Bilirubin 0.4 mg/dL      Globulin 2.4 gm/dL      A/G Ratio 1.6 g/dL      BUN/Creatinine Ratio 22.9     Anion Gap 10.2 mmol/L      eGFR 86.1 mL/min/1.73     Narrative:      GFR Normal >60  Chronic Kidney Disease <60  Kidney Failure <15            RPR negative, HIV negative, B12 normal, UA negative, urine drug screen negative  .  Imaging Results (Last 24 Hours)       ** No results found for the last 24 hours. **            Head CT 9/30 reviewed, no acute intracranial process, mild global  atrophy    Impression    68-year-old man with past medical history of depression, anxiety, alcohol dependence, hyperlipidemia, hypertension, peripheral neuropathy, COPD.  Followed closely by psychiatry for depression, anxiety, history of multiple admissions  with confusion, disorientation, bizarre behavior which are thought to be due to psychiatric conditions.  Now presents with a similar episode with waxing and waning confusion.  Differential diagnosis includes delirium, alcohol withdrawal, toxic/metabolic/infectious encephalopathy.  Work-up so far has been unrevealing.  Low suspicion for meningitis or meningoencephalitis.    Plan    MRI brain without contrast  EEG   continue CIWA protocol  Continue thiamine 200 mg IV every 8 hours if possible (I see that this has been ordered but he no longer has an IV), defer to primary team   TSH  Continue behavioral health evaluation, appears to likely need psychiatric inpatient admission  Social work  assessment    I discussed the patient's findings and my recommendations with patient and nursing staff    Debra Mendoza MD  10/03/23  11:23 EDT

## 2023-10-03 NOTE — CASE MANAGEMENT/SOCIAL WORK
Continued Stay Note  [facility]     Patient Name: Stanislav Choi  MRN: [id number]  Today's Date: 10/3/2023    Admit Date: 2023    Plan: TBD   Discharge Plan       Row Name 10/03/23 1017       Plan    Plan TBD    Patient/Family in Agreement with Plan yes    Plan Comments Call from pt's wife to discuss d/c planning as she was in a doctor’s office yesterday and unable to elaborate on home situation. Pt has been living in a hotel after she took out an EPO that has since . Pt has been receiving outpatient psych treatment. The plan would not be for him to return to her home but back to hotel if he doesn’t go to an inpatient psych facility. [facility] is following. Neurology has been consulted.                   Discharge Codes    No documentation.                 Expected Discharge Date and Time       Expected Discharge Date Expected Discharge Time    Oct 4, 2023               ABI Kapoor

## 2023-10-04 ENCOUNTER — HOSPITAL ENCOUNTER (OUTPATIENT)
Dept: ULTRASOUND IMAGING | Age: 68
Discharge: HOME | End: 2023-10-04
Attending: NURSE PRACTITIONER

## 2023-10-04 ENCOUNTER — APPOINTMENT (OUTPATIENT)
Dept: MRI IMAGING | Facility: HOSPITAL | Age: 68
DRG: 897 | End: 2023-10-04
Payer: MEDICARE

## 2023-10-04 DIAGNOSIS — N18.2 CKD (CHRONIC KIDNEY DISEASE) STAGE 2, GFR 60-89 ML/MIN: ICD-10-CM

## 2023-10-04 DIAGNOSIS — R93.89 ABNORMAL FINDINGS ON IMAGING TEST: ICD-10-CM

## 2023-10-04 DIAGNOSIS — R09.89 OTHER SPECIFIED SYMPTOMS AND SIGNS INVOLVING THE CIRCULATORY AND RESPIRATORY SYSTEMS: ICD-10-CM

## 2023-10-04 LAB
ALBUMIN SERPL-MCNC: 4.1 G/DL (ref 3.5–5.2)
ALBUMIN/GLOB SERPL: 1.7 G/DL
ALP SERPL-CCNC: 133 U/L (ref 39–117)
ALT SERPL W P-5'-P-CCNC: 45 U/L (ref 1–41)
ANION GAP SERPL CALCULATED.3IONS-SCNC: 10.6 MMOL/L (ref 5–15)
AST SERPL-CCNC: 22 U/L (ref 1–40)
BILIRUB SERPL-MCNC: 0.5 MG/DL (ref 0–1.2)
BUN SERPL-MCNC: 20 MG/DL (ref 8–23)
BUN/CREAT SERPL: 22.5 (ref 7–25)
CALCIUM SPEC-SCNC: 8.8 MG/DL (ref 8.6–10.5)
CHLORIDE SERPL-SCNC: 110 MMOL/L (ref 98–107)
CO2 SERPL-SCNC: 21.4 MMOL/L (ref 22–29)
CREAT SERPL-MCNC: 0.89 MG/DL (ref 0.76–1.27)
DEPRECATED RDW RBC AUTO: 39.8 FL (ref 37–54)
EGFRCR SERPLBLD CKD-EPI 2021: 93.3 ML/MIN/1.73
ERYTHROCYTE [DISTWIDTH] IN BLOOD BY AUTOMATED COUNT: 12.2 % (ref 12.3–15.4)
GLOBULIN UR ELPH-MCNC: 2.4 GM/DL
GLUCOSE SERPL-MCNC: 115 MG/DL (ref 65–99)
HCT VFR BLD AUTO: 42.2 % (ref 37.5–51)
HGB BLD-MCNC: 14.5 G/DL (ref 13–17.7)
MAGNESIUM SERPL-MCNC: 2.4 MG/DL (ref 1.6–2.4)
MCH RBC QN AUTO: 31.4 PG (ref 26.6–33)
MCHC RBC AUTO-ENTMCNC: 34.4 G/DL (ref 31.5–35.7)
MCV RBC AUTO: 91.3 FL (ref 79–97)
PHOSPHATE SERPL-MCNC: 4.1 MG/DL (ref 2.5–4.5)
PLATELET # BLD AUTO: 354 10*3/MM3 (ref 140–450)
PMV BLD AUTO: 9 FL (ref 6–12)
POTASSIUM SERPL-SCNC: 3.9 MMOL/L (ref 3.5–5.2)
PROT SERPL-MCNC: 6.5 G/DL (ref 6–8.5)
RBC # BLD AUTO: 4.62 10*6/MM3 (ref 4.14–5.8)
SODIUM SERPL-SCNC: 142 MMOL/L (ref 136–145)
WBC NRBC COR # BLD: 12.94 10*3/MM3 (ref 3.4–10.8)

## 2023-10-04 PROCEDURE — 85027 COMPLETE CBC AUTOMATED: CPT | Performed by: STUDENT IN AN ORGANIZED HEALTH CARE EDUCATION/TRAINING PROGRAM

## 2023-10-04 PROCEDURE — 99233 SBSQ HOSP IP/OBS HIGH 50: CPT | Performed by: PHYSICIAN ASSISTANT

## 2023-10-04 PROCEDURE — 25010000002 THIAMINE HCL 200 MG/2ML SOLUTION: Performed by: INTERNAL MEDICINE

## 2023-10-04 PROCEDURE — 94664 DEMO&/EVAL PT USE INHALER: CPT

## 2023-10-04 PROCEDURE — 93924 LWR XTR VASC STDY BILAT: CPT | Performed by: RADIOLOGY

## 2023-10-04 PROCEDURE — 94799 UNLISTED PULMONARY SVC/PX: CPT

## 2023-10-04 PROCEDURE — 83735 ASSAY OF MAGNESIUM: CPT | Performed by: STUDENT IN AN ORGANIZED HEALTH CARE EDUCATION/TRAINING PROGRAM

## 2023-10-04 PROCEDURE — 80053 COMPREHEN METABOLIC PANEL: CPT | Performed by: STUDENT IN AN ORGANIZED HEALTH CARE EDUCATION/TRAINING PROGRAM

## 2023-10-04 PROCEDURE — 25010000002 ENOXAPARIN PER 10 MG: Performed by: INTERNAL MEDICINE

## 2023-10-04 PROCEDURE — 94760 N-INVAS EAR/PLS OXIMETRY 1: CPT

## 2023-10-04 PROCEDURE — 84100 ASSAY OF PHOSPHORUS: CPT | Performed by: STUDENT IN AN ORGANIZED HEALTH CARE EDUCATION/TRAINING PROGRAM

## 2023-10-04 PROCEDURE — 93924 LWR XTR VASC STDY BILAT: CPT

## 2023-10-04 PROCEDURE — 76770 US EXAM ABDO BACK WALL COMP: CPT

## 2023-10-04 PROCEDURE — 70551 MRI BRAIN STEM W/O DYE: CPT

## 2023-10-04 PROCEDURE — 94761 N-INVAS EAR/PLS OXIMETRY MLT: CPT

## 2023-10-04 PROCEDURE — 76770 US EXAM ABDO BACK WALL COMP: CPT | Performed by: RADIOLOGY

## 2023-10-04 RX ADMIN — ATORVASTATIN CALCIUM 10 MG: 20 TABLET, FILM COATED ORAL at 08:44

## 2023-10-04 RX ADMIN — BUSPIRONE HYDROCHLORIDE 15 MG: 15 TABLET ORAL at 17:23

## 2023-10-04 RX ADMIN — MIRTAZAPINE 15 MG: 15 TABLET, FILM COATED ORAL at 20:05

## 2023-10-04 RX ADMIN — GABAPENTIN 100 MG: 100 CAPSULE ORAL at 08:44

## 2023-10-04 RX ADMIN — THIAMINE HYDROCHLORIDE 200 MG: 100 INJECTION, SOLUTION INTRAMUSCULAR; INTRAVENOUS at 13:36

## 2023-10-04 RX ADMIN — TIOTROPIUM BROMIDE INHALATION SPRAY 2 PUFF: 3.12 SPRAY, METERED RESPIRATORY (INHALATION) at 08:05

## 2023-10-04 RX ADMIN — FOLIC ACID 1 MG: 1 TABLET ORAL at 08:44

## 2023-10-04 RX ADMIN — AMLODIPINE BESYLATE 5 MG: 5 TABLET ORAL at 08:44

## 2023-10-04 RX ADMIN — ENOXAPARIN SODIUM 40 MG: 100 INJECTION SUBCUTANEOUS at 20:06

## 2023-10-04 RX ADMIN — BUDESONIDE AND FORMOTEROL FUMARATE DIHYDRATE 2 PUFF: 160; 4.5 AEROSOL RESPIRATORY (INHALATION) at 08:05

## 2023-10-04 RX ADMIN — THIAMINE HYDROCHLORIDE 200 MG: 100 INJECTION, SOLUTION INTRAMUSCULAR; INTRAVENOUS at 20:05

## 2023-10-04 RX ADMIN — THIAMINE HYDROCHLORIDE 200 MG: 100 INJECTION, SOLUTION INTRAMUSCULAR; INTRAVENOUS at 05:50

## 2023-10-04 RX ADMIN — SENNOSIDES AND DOCUSATE SODIUM 2 TABLET: 50; 8.6 TABLET ORAL at 08:44

## 2023-10-04 RX ADMIN — BUSPIRONE HYDROCHLORIDE 15 MG: 15 TABLET ORAL at 08:44

## 2023-10-04 RX ADMIN — BUDESONIDE AND FORMOTEROL FUMARATE DIHYDRATE 2 PUFF: 160; 4.5 AEROSOL RESPIRATORY (INHALATION) at 19:07

## 2023-10-04 RX ADMIN — NICOTINE 1 PATCH: 21 PATCH, EXTENDED RELEASE TRANSDERMAL at 17:25

## 2023-10-04 RX ADMIN — ARIPIPRAZOLE 5 MG: 5 TABLET ORAL at 20:05

## 2023-10-04 RX ADMIN — GABAPENTIN 100 MG: 100 CAPSULE ORAL at 20:05

## 2023-10-04 RX ADMIN — HYDROXYZINE HYDROCHLORIDE 25 MG: 25 TABLET, FILM COATED ORAL at 13:56

## 2023-10-04 RX ADMIN — BUSPIRONE HYDROCHLORIDE 15 MG: 15 TABLET ORAL at 20:05

## 2023-10-04 RX ADMIN — GABAPENTIN 100 MG: 100 CAPSULE ORAL at 17:23

## 2023-10-04 RX ADMIN — MULTIPLE VITAMINS W/ MINERALS TAB 1 TABLET: TAB at 08:44

## 2023-10-04 NOTE — PROGRESS NOTES
"DOS: 10/4/2023  NAME: Stanislav Choi   : 1955  PCP: Ten Coon MD  Chief Complaint   Patient presents with    Altered Mental Status       Chief complaint: AMS  Subjective: Patient sitting up in chair.  He is cooperative but some irritated questioning.  Wife at bedside.  MRI not done.  Patient asking about his gabapentin    Objective:  Vital signs: /74 (BP Location: Left arm, Patient Position: Lying)   Pulse 86   Temp 97 °F (36.1 °C) (Oral)   Resp 16   Ht 175.3 cm (69\")   Wt 59.7 kg (131 lb 9.8 oz)   SpO2 98%   BMI 19.44 kg/m²      Gen: NAD, vitals reviewed  Psych: Flattened affect, good eye contact, mood irritable at times  MS: oriented x3, remote memory impaired, 2 out of 3 immediate recall, normal attention/concentration, language intact, no neglect.  CN: visual acuity grossly normal, PERRL, EOMI, no facial droop, no dysarthria  Motor: 5/5 throughout upper and lower extremities, normal tone no abnormal movements  Sensory: intact to light touch all 4 ext.    ROS:  No weakness, numbness  No fevers, chills      Laboratory results:  Lab Results   Component Value Date    GLUCOSE 115 (H) 10/04/2023    CALCIUM 8.8 10/04/2023     10/04/2023    K 3.9 10/04/2023    CO2 21.4 (L) 10/04/2023     (H) 10/04/2023    BUN 20 10/04/2023    CREATININE 0.89 10/04/2023    EGFRIFAFRI  2016      Comment:      <15 Indicative of kidney failure.    EGFRIFNONA 79 2021    BCR 22.5 10/04/2023    ANIONGAP 10.6 10/04/2023     Lab Results   Component Value Date    WBC 12.94 (H) 10/04/2023    HGB 14.5 10/04/2023    HCT 42.2 10/04/2023    MCV 91.3 10/04/2023     10/04/2023     Lab Results   Component Value Date    LDL 38 2023    LDL 65 10/14/2022    LDL 72 2022            Review of labs:     Review and interpretation of imaging: Head CT on  NAF    Workup to date:  EEG recording is diffusely slow no ictal DC's    Diagnoses:  1 psychosis  2 depression  3 history of EtOH " dependence    Impression: 68-year-old male with previous psychiatric history and EtOH abuse admitted acting confused increasingly aggressive behavior.  He has been previously diagnosed with dementia and well-known to psychiatric service with inpatient admission.  Reportedly was doing well and independently attending appointments and meetings.  He had set EPS thought secondary to Latuda which was decreased and ultimately discontinued.  The plan is for TMS treatment.  He has had neuro work-up in the past and per documentation psychiatrist noted multiple episodes that appear as dementing illness but spontaneous resolve motion suggesting pseudodementia.  Wife describes these as likely attributable to medication effect.  His first psychiatric inpatient stay was 20 years ago.  EEG has been done and no ictal DC's but generalized slowing.  MRI brain pending    We will be following along you    Thank you for this consultation.  Discussed above plan with neuro attending, Dr. Mendoza who agrees with above plan.  Neurology team is available for concerns or questions.

## 2023-10-04 NOTE — NURSING NOTE
PT C/O DISCOMFORT IN PELVIC REGION. STATES NEED TO VOID BUT NO URINE PASSING . BLADDER SCAN REVEALS 290ML URINE IN BLADDER. CALL PLACED TO MD AND ORDER TO ST CATH OBTAINED. TOTAL URINE  ML.

## 2023-10-04 NOTE — SIGNIFICANT NOTE
10/04/23 1457   OTHER   Discipline physical therapy assistant   Rehab Time/Intention   Session Not Performed patient unavailable for treatment  (Pt just heading [facility] this late PM for testing. PT will follow up tomorrow.)

## 2023-10-04 NOTE — PROGRESS NOTES
The patient's confusion appears to have resolved and he is no longer in restraints.  He is seen with wife present today.  She states that he does not appear to be in his psychiatric baseline though his confusional symptoms have resolved.  It is her belief, and a belief shared by this physician, that the patient should be admitted psychiatrically once medically stable.  He just recently left the [facility] and I have recommended that the patient return to that facility once he is medically able to do so.  In the meantime we are continuing current psychotropic medications.

## 2023-10-04 NOTE — PROGRESS NOTES
Name: Stanislav Choi ADMIT: 2023   : 1955  PCP: Ten Coon MD    MRN: [id number] LOS: 4 days   AGE/SEX: 68 y.o. male  ROOM: [facility]     Subjective   Subjective   The patient is doing much better today.  He is no longer in restraints.  He is very calm.  Answers questions appropriately.  Sitter is at bedside.  Denies any complaints.     Objective   Objective   Vital Signs  Temp:  [97 °F (36.1 °C)-98 °F (36.7 °C)] 97 °F (36.1 °C)  Heart Rate:  [] 86  Resp:  [16-20] 16  BP: (137-161)/() 137/74  SpO2:  [96 %-100 %] 98 %  on   ;   Device (Oxygen Therapy): room air  Body mass index is 19.44 kg/m².  General: Alert, no acute distress.  Calm and cooperative with exam today.  Fully oriented.  ENT: No conjunctival injection or scleral icterus. Moist mucous membranes.   Neuro: Eyes open and moving in all directions, strength normal in all extremities, no focal deficits.   Lungs: Clear to auscultation bilaterally. No wheeze or crackles. No distress.   Heart: RRR, no murmurs. No edema.  Abdomen: Soft, non-tender, non-distended. Normal bowel sounds. No hepatosplenomegaly.   Ext: Warm and well-perfused. No edema.   Skin: No rashes or lesions.     Results Review     I reviewed the patient's new clinical results.  Results from last 7 days   Lab Units 10/04/23  0506 10/03/23  0436 10/02/23  0501 10/01/23  0557   WBC 10*3/mm3 12.94* 13.45* 15.10* 15.99*   HEMOGLOBIN g/dL 14.5 14.1 13.1 13.7   PLATELETS 10*3/mm3 354 357 323 333     Results from last 7 days   Lab Units 10/04/23  0507 10/03/23  0436 10/02/23  0501 10/01/23  0557   SODIUM mmol/L 142 139 141 143   POTASSIUM mmol/L 3.9 3.6 4.2 4.5   CHLORIDE mmol/L 110* 105 107 111*   CO2 mmol/L 21.4* 23.2 23.8 20.3*   BUN mg/dL 20 19 22 20   CREATININE mg/dL 0.89 0.94 0.96 0.91   GLUCOSE mg/dL 115* 114* 84 133*   Estimated Creatinine Clearance: 67.1 mL/min (by C-G formula based on SCr of 0.89 mg/dL).  Results from last 7 days   Lab Units 10/04/23  8334  10/03/23  0436 10/02/23  0501 09/30/23  1339   ALBUMIN g/dL 4.1 4.0 3.8 4.5   BILIRUBIN mg/dL 0.5 0.4 0.4 0.7   ALK PHOS U/L 133* 133* 121* 161*   AST (SGOT) U/L 22 33 44* 39   ALT (SGPT) U/L 45* 61* 67* 81*     Results from last 7 days   Lab Units 10/04/23  0507 10/03/23  0436 10/02/23  0501 10/01/23  0557 09/30/23  1339   CALCIUM mg/dL 8.8 9.1 8.9 9.2 9.5   ALBUMIN g/dL 4.1 4.0 3.8  --  4.5   MAGNESIUM mg/dL 2.4 2.2 2.2 2.4 1.9   PHOSPHORUS mg/dL 4.1 3.6 3.4 5.0*  --      Results from last 7 days   Lab Units 09/30/23  1339   PROCALCITONIN ng/mL 0.07   LACTATE mmol/L 1.3     COVID19   Date Value Ref Range Status   09/30/2023 Not Detected Not Detected - Ref. Range Final   11/29/2022 Not Detected  Final     No results found for: HGBA1C, POCGLU    EEG  Table formatting from the original result was not included.  EEG Report          #    Indication: Confusion; alcohol withdrawal    History: 68-year-old male with confusion.  Longstanding alcohol use with   diagnosis alcoholic dementia.  Multiple psychotropic medications.  Study   includes time locked video    Medical diagnoses: Alcohol use disorder, alcoholic dementia, anxiety and   depression, chronic pain disorder, COPD, hyperlipidemia    Current Facility-Administered Medications   Medication Dose Route Frequency Provider Last Rate Last Admin    acetaminophen (TYLENOL) tablet 650 mg  650 mg Oral Q4H PRN Leonora Dela Cruz MD        amLODIPine (NORVASC) tablet 5 mg  5 mg Oral Daily Leonora Dela Cruz MD   5 mg at 10/03/23 0811    ARIPiprazole (ABILIFY) tablet 5 mg  5 mg Oral Nightly Chadwick George MD   5 mg at 10/02/23 2031    atorvastatin (LIPITOR) tablet 10 mg  10 mg Oral Daily Leonora Dela Cruz MD   10 mg at 10/03/23 0811    sennosides-docusate (PERICOLACE) 8.6-50 MG per tablet 2 tablet  2 tablet   Oral BID Leonora Dela Cruz MD   2 tablet at 10/03/23 0810    And    polyethylene glycol (MIRALAX) packet 17 g  17 g Oral Daily PRN  Leonora Dela Cruz MD        And    bisacodyl (DULCOLAX) EC tablet 5 mg  5 mg Oral Daily PRN Leonora Dela Cruz MD        And    bisacodyl (DULCOLAX) suppository 10 mg  10 mg Rectal Daily PRN Leonora Dela Cruz MD        budesonide-formoterol (SYMBICORT) 160-4.5 MCG/ACT inhaler 2 puff  2 puff   Inhalation BID - RT Leonora Dela Cruz MD   2 puff at 10/03/23 0730    And    tiotropium (SPIRIVA RESPIMAT) 2.5 mcg/act aerosol solution inhaler  2   puff Inhalation Daily - RT Leonora Dela Cruz MD   2 puff at 10/03/23   0730    busPIRone (BUSPAR) tablet 15 mg  15 mg Oral TID Leonora Dela Cruz MD   15 mg at 10/03/23 1608    Enoxaparin Sodium (LOVENOX) syringe 40 mg  40 mg Subcutaneous Nightly   Leonora Dela Cruz MD   40 mg at 10/02/23 2030    folic acid (FOLVITE) tablet 1 mg  1 mg Oral Daily Leonora Dela Cruz MD   1 mg at 10/03/23 0811    gabapentin (NEURONTIN) capsule 100 mg  100 mg Oral TID Dion Campbell MD     100 mg at 10/03/23 0811    haloperidol lactate (HALDOL) injection 5 mg  5 mg Intramuscular Q6H PRN   Rell Alvarado III, MD        hydrOXYzine (ATARAX) tablet 25 mg  25 mg Oral TID PRN Dion Campbell MD     25 mg at 10/03/23 0811    Magnesium Standard Dose Replacement - Follow Nurse / BPA Driven Protocol     Does not apply PRN Leonora Dela Cruz MD        melatonin tablet 3 mg  3 mg Oral Nightly PRN Leonora Dela Cruz MD          mirtazapine (REMERON) tablet 15 mg  15 mg Oral Nightly Leonora Dela Cruz MD   15 mg at 10/02/23 2031    multivitamin with minerals 1 tablet  1 tablet Oral Daily Leonora Dela Cruz MD   1 tablet at 10/03/23 0811    nicotine (NICODERM CQ) 21 MG/24HR patch 1 patch  1 patch Transdermal   Q24H Dion Campbell MD   1 patch at 10/03/23 1608    ondansetron (ZOFRAN) tablet 4 mg  4 mg Oral Q6H PRN Stingl, Leonora Jimenez MD   4 mg at 10/03/23 0820    Or    ondansetron (ZOFRAN) injection 4 mg  4 mg Intravenous Q6H PRN  Leonora Dela Cruz MD        thiamine (B-1) injection 200 mg  200 mg Intravenous Q8H Leonora Dela Cruz MD        Followed by    [START ON 10/8/2023] thiamine (VITAMIN B-1) tablet 100 mg  100 mg Oral   Daily Leonora Dela Cruz MD         Time of study: 23 minutes 13 seconds    Technical summary: The 10-20 system was used for electrode placement   Background: The background rhythm was composed of 7 Hz moderate amplitude   poorly regulated and sustained posteriorly dominant rhythm.  Slower theta   and delta activities are seen interspersed throughout the study.     Sleep: The patient was only transiently drowsy noted by attenuation of   the background with an increased proportion of slower activities being   seen.  No vertex sharp transients or sleep spindles were seen     Hyperventilation: Not obtained     Photic stimulation: Photic stimulation was performed at various flash   frequencies showing no significant change in the background activity     EKG: Sinus rhythm with PVCs around 100     Video: The patient had multiple limb and head movements throughout the   study nothing associated with epileptiform activity however.    Impression:  Abnormal EEG being mildly diffusely slow consistent with a mild diffuse   encephalopathy versus bihemispheric structural lesions.  No epileptiform   activities were seen.    Dictated utilizing Dragon dictation.      I reviewed the patient's daily medications.  Scheduled Medications  amLODIPine, 5 mg, Oral, Daily  ARIPiprazole, 5 mg, Oral, Nightly  atorvastatin, 10 mg, Oral, Daily  budesonide-formoterol, 2 puff, Inhalation, BID - RT   And  tiotropium bromide monohydrate, 2 puff, Inhalation, Daily - RT  busPIRone, 15 mg, Oral, TID  enoxaparin, 40 mg, Subcutaneous, Nightly  folic acid, 1 mg, Oral, Daily  gabapentin, 100 mg, Oral, TID  mirtazapine, 15 mg, Oral, Nightly  multivitamin with minerals, 1 tablet, Oral, Daily  nicotine, 1 patch, Transdermal, Q24H  senna-docusate  sodium, 2 tablet, Oral, BID  thiamine (B-1) IV, 200 mg, Intravenous, Q8H   Followed by  [START ON 10/8/2023] thiamine, 100 mg, Oral, Daily    Infusions   Diet  Diet: Cardiac Diets; Healthy Heart (2-3 Na+); Texture: Regular Texture (IDDSI 7); Fluid Consistency: Thin (IDDSI 0)         I have personally reviewed:  [x]  Laboratory   [x]  Microbiology   [x]  Radiology   [x]  EKG/Telemetry   []  Cardiology/Vascular   []  Pathology   [x]  Records     Assessment/Plan     Active Hospital Problems    Diagnosis  POA    **Altered mental status [R41.82]  Yes    Elevated WBC count [D72.829]  Yes    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       68 y.o. male admitted with Altered mental status.    Acute encephalopathy  Alcohol induced dementia  Alcohol use disorder with concern for withdrawal  -Recent TSH normal.  RPR, HIV negative.  B12 970.  -Ethanol negative on admission, continue CIWA.  Ativan as needed.  High-dose thiamine, folate, multivitamin.  -No infectious cause at this time  -UDS negative, CT head with no acute findings  -Multivitamin, thiamine, folate  -Psych consulted, appreciate recommendations-considering inpatient psych hospitalization when medically ready for discharge.  Continue Abilify  -Continue home gabapentin  -Neurology now consulted, appreciate recommendations  -EEG was abnormal due to mildly diffusely slow consistent with a mild diffuse encephalopathy versus bihemispheric structural lesions, no activity  -Obtaining brain MRI today, will follow-up results and discuss with neurology    Enlarged retroperitoneal lymph node  -Patient scheduled to have CT chest with contrast for follow-up, but wife reports that patient is allergic to the contrast  -CT  chest on 6/27/2023 with prominent nonspecific retroperitoneal lymph node  -Given need for above imaging per neurology, will hold off on this repeat CT for now  -Will attempt to get imaging prior to patient discharge home    Leukocytosis  -UA bland, RVP negative, chest x-ray without any acute findings.  -Blood cultures no growth to date  -Monitoring with daily CBC, WBC improved to 12.9 K today    COPD-continue home inhalers    Anxiety/mood disorder  -Continue home BuSpar, mirtazapine    Nicotine use disorder-nicotine patch      Lovenox 40 mg SC daily for DVT prophylaxis.  Full code.  Discussed with patient and nursing staff.  Anticipate discharge  TBD, inpatient psych versus possible placement somewhere else; we will continue ongoing discussions with wife    Chio Horvath MD  [facility]  10/04/23  12:50 EDT

## 2023-10-04 NOTE — PLAN OF CARE
Problem: Adult Inpatient Plan of Care  Goal: Plan of Care Review  Outcome: Ongoing, Not Progressing  Flowsheets (Taken 10/3/2023 2004)  Outcome Evaluation: Patient's cognitvely waxed and waned being alert and oriented times 2 to3 then alert and oriented to self only. He had episodes of labile emotions which became threatening via verbal remarks and  increasing aggression which resulted in his noncompliance with redirection. He did not comprehend or understand the risk of actions to himself. His wife was notified of restraints. She helped complete the MRI paperwork. Additionally she reported to the nurse that the patient has a nodule in his lungs that has increased in size and was supposed to have a CT with contrast but he needed the prep related to his allergy to the contrast. Dr Horvath was made aware and the wife was made aware that test scheduled as outpatient were not focus of treatment inpatient but the information was good and MD was aware. Dr Alvarado gave orders for haldol which were effective in calming the patient who rested post haldol im injection of 5mg.  Goal: Optimal Comfort and Wellbeing  Outcome: Ongoing, Not Progressing  Intervention: Provide Person-Centered Care  Recent Flowsheet Documentation  Taken 10/3/2023 1334 by Areli Gavin RN  Trust Relationship/Rapport:   care explained   choices provided   reassurance provided   thoughts/feelings acknowledged  Taken 10/3/2023 0815 by Areli Gavin RN  Trust Relationship/Rapport:   care explained   choices provided  Goal: Readiness for Transition of Care  Outcome: Ongoing, Not Progressing     Problem: Behavioral Health Comorbidity  Goal: Maintenance of Behavioral Health Symptom Control  Outcome: Ongoing, Not Progressing  Intervention: Maintain Behavioral Health Symptom Control  Recent Flowsheet Documentation  Taken 10/3/2023 1810 by Areli Gavin RN  Medication Review/Management: medications reviewed  Taken 10/3/2023 1800 by Areli Gavin RN  Medication  Review/Management: medications reviewed  Taken 10/3/2023 1600 by Areli Gavin RN  Medication Review/Management: medications reviewed  Taken 10/3/2023 1427 by Areli Gavin RN  Medication Review/Management: medications reviewed  Taken 10/3/2023 1415 by Areli Gavin RN  Medication Review/Management: medications reviewed     Problem: Restraint, Nonviolent  Goal: Absence of Harm or Injury  Outcome: Ongoing, Not Progressing  Intervention: Implement Least Restrictive Safety Strategies  Recent Flowsheet Documentation  Taken 10/3/2023 1810 by Areli Gavin RN  Medical Device Protection: IV pole/bag removed from visual field  Less Restrictive Alternative:   1:1 observation maintained   medication offered   surveillance provided   emotional support provided   safety enhancements provided   problem-solving encouraged   positive reinforcement provided  De-Escalation Techniques:   quiet time facilitated   reoriented  Diversional Activities: television  Taken 10/3/2023 1800 by Areli Gavin RN  Medical Device Protection: IV pole/bag removed from visual field  Less Restrictive Alternative:   1:1 observation maintained   medication offered   surveillance provided   emotional support provided   safety enhancements provided   problem-solving encouraged   positive reinforcement provided  De-Escalation Techniques: 1:1 observation initiated  Diversional Activities: television  Taken 10/3/2023 1600 by Areli Gavin RN  Medical Device Protection: IV pole/bag removed from visual field  Less Restrictive Alternative:   1:1 observation maintained   medication offered   surveillance provided   emotional support provided   safety enhancements provided   problem-solving encouraged   positive reinforcement provided  De-Escalation Techniques:   reoriented   quiet time facilitated   medication offered   medication administered  Diversional Activities: television  Taken 10/3/2023 1500 by Areli Gavin RN  Medical Device Protection:   IV pole/bag removed  from visual field   torso covered  Less Restrictive Alternative:   1:1 observation maintained   problem-solving encouraged   positive reinforcement provided   medication offered   environment adjusted   coping techniques promoted  De-Escalation Techniques:   reoriented   quiet time facilitated  Diversional Activities: television  Taken 10/3/2023 1440 by Areli Gavin RN  Medical Device Protection: IV pole/bag removed from visual field  Less Restrictive Alternative:   1:1 observation maintained   problem-solving encouraged   positive reinforcement provided   medication offered   environment adjusted   coping techniques promoted  De-Escalation Techniques:   appropriate behavior reinforced   medication administered   quiet time facilitated   medication offered   reoriented  Diversional Activities: television  Taken 10/3/2023 1427 by Areli Gavin RN  Medical Device Protection: IV pole/bag removed from visual field  Less Restrictive Alternative:   1:1 observation maintained   emotional support provided   calming techniques promoted   surveillance provided   problem-solving encouraged  De-Escalation Techniques:   diversional activity encouraged   quiet time facilitated   reoriented  Intervention: Protect Dignity, Rights, and Personal Wellbeing  Recent Flowsheet Documentation  Taken 10/3/2023 1334 by Areli Gavin RN  Trust Relationship/Rapport:   care explained   choices provided   reassurance provided   thoughts/feelings acknowledged  Taken 10/3/2023 0815 by Areli Gavin RN  Trust Relationship/Rapport:   care explained   choices provided  Intervention: Protect Skin and Joint Integrity  Recent Flowsheet Documentation  Taken 10/3/2023 1810 by Areli Gavin RN  Body Position: supine  Taken 10/3/2023 1800 by Areli Gavin RN  Body Position: position changed independently  Taken 10/3/2023 1600 by Areli Gavin RN  Body Position: sitting up in bed  Taken 10/3/2023 0815 by Areli Gavin RN  Range of Motion:   active ROM (range of  motion) encouraged   ROM (range of motion) performed   Goal Outcome Evaluation:  Plan of Care Reviewed With: patient           Outcome Evaluation: Patient's cognitvely waxed and waned being alert and oriented times 2 to3 then alert and oriented to self only. He had episodes of labile emotions which became threatening via verbal remarks and  increasing aggression which resulted in his noncompliance with redirection. He did not comprehend or understand the risk of actions to himself. His wife was notified of restraints. She helped complete the MRI paperwork. Additionally she reported to the nurse that the patient has a nodule in his lungs that has increased in size and was supposed to have a CT with contrast but he needed the prep related to his allergy to the contrast. Dr Horvath was made aware and the wife was made aware that test scheduled as outpatient were not focus of treatment inpatient but the information was good and MD was aware. Dr Alvarado gave orders for haldol which were effective in calming the patient who rested post haldol im injection of 5mg.

## 2023-10-04 NOTE — PLAN OF CARE
Goal Outcome Evaluation:  Plan of Care Reviewed With: patient, spouse  Pt pleasant and tolerated treatment well today. Restraints removed. MRI completed and waiting on results. VSS throughout shift.

## 2023-10-04 NOTE — NURSING NOTE
"Access follow-up regards ETOH:    Pt taken out of restraints this AM. Sitter remains at bedside. Primary RN reported pt is more agreeable today.    Pt denied use of alcohol since earlier this summer. He denied current s/s withdrawal. He denied any current craving for ETOH.    He denied current SI/HI/AVH. He rated both depression and anxiety 5/10 (10 worst) today. He stated his appetite \"isn't real good\". He reports a hard time sleeping in the hospital.     Psychiatrist also following - see note.  "

## 2023-10-05 ENCOUNTER — TELEPHONE (OUTPATIENT)
Dept: INTERNAL MEDICINE | Age: 68
End: 2023-10-05

## 2023-10-05 LAB
ALBUMIN SERPL-MCNC: 3.9 G/DL (ref 3.5–5.2)
ALBUMIN/GLOB SERPL: 1.7 G/DL
ALP SERPL-CCNC: 136 U/L (ref 39–117)
ALT SERPL W P-5'-P-CCNC: 42 U/L (ref 1–41)
ANION GAP SERPL CALCULATED.3IONS-SCNC: 11 MMOL/L (ref 5–15)
AST SERPL-CCNC: 25 U/L (ref 1–40)
BACTERIA SPEC AEROBE CULT: NORMAL
BACTERIA SPEC AEROBE CULT: NORMAL
BILIRUB SERPL-MCNC: 0.4 MG/DL (ref 0–1.2)
BUN SERPL-MCNC: 21 MG/DL (ref 8–23)
BUN/CREAT SERPL: 21.9 (ref 7–25)
CALCIUM SPEC-SCNC: 8.9 MG/DL (ref 8.6–10.5)
CHLORIDE SERPL-SCNC: 108 MMOL/L (ref 98–107)
CO2 SERPL-SCNC: 22 MMOL/L (ref 22–29)
CREAT SERPL-MCNC: 0.96 MG/DL (ref 0.76–1.27)
DEPRECATED RDW RBC AUTO: 40.9 FL (ref 37–54)
EGFRCR SERPLBLD CKD-EPI 2021: 86.1 ML/MIN/1.73
ERYTHROCYTE [DISTWIDTH] IN BLOOD BY AUTOMATED COUNT: 12.4 % (ref 12.3–15.4)
GLOBULIN UR ELPH-MCNC: 2.3 GM/DL
GLUCOSE SERPL-MCNC: 107 MG/DL (ref 65–99)
HCT VFR BLD AUTO: 41.4 % (ref 37.5–51)
HGB BLD-MCNC: 14.1 G/DL (ref 13–17.7)
MAGNESIUM SERPL-MCNC: 2.1 MG/DL (ref 1.6–2.4)
MCH RBC QN AUTO: 31.3 PG (ref 26.6–33)
MCHC RBC AUTO-ENTMCNC: 34.1 G/DL (ref 31.5–35.7)
MCV RBC AUTO: 91.8 FL (ref 79–97)
PHOSPHATE SERPL-MCNC: 3.4 MG/DL (ref 2.5–4.5)
PLATELET # BLD AUTO: 328 10*3/MM3 (ref 140–450)
PMV BLD AUTO: 9 FL (ref 6–12)
POTASSIUM SERPL-SCNC: 4.2 MMOL/L (ref 3.5–5.2)
PROT SERPL-MCNC: 6.2 G/DL (ref 6–8.5)
RBC # BLD AUTO: 4.51 10*6/MM3 (ref 4.14–5.8)
SODIUM SERPL-SCNC: 141 MMOL/L (ref 136–145)
WBC NRBC COR # BLD: 12.41 10*3/MM3 (ref 3.4–10.8)

## 2023-10-05 PROCEDURE — 83735 ASSAY OF MAGNESIUM: CPT | Performed by: STUDENT IN AN ORGANIZED HEALTH CARE EDUCATION/TRAINING PROGRAM

## 2023-10-05 PROCEDURE — 94761 N-INVAS EAR/PLS OXIMETRY MLT: CPT

## 2023-10-05 PROCEDURE — 99233 SBSQ HOSP IP/OBS HIGH 50: CPT | Performed by: NURSE PRACTITIONER

## 2023-10-05 PROCEDURE — 84100 ASSAY OF PHOSPHORUS: CPT | Performed by: STUDENT IN AN ORGANIZED HEALTH CARE EDUCATION/TRAINING PROGRAM

## 2023-10-05 PROCEDURE — 94760 N-INVAS EAR/PLS OXIMETRY 1: CPT

## 2023-10-05 PROCEDURE — 97530 THERAPEUTIC ACTIVITIES: CPT

## 2023-10-05 PROCEDURE — 25010000002 THIAMINE HCL 200 MG/2ML SOLUTION: Performed by: INTERNAL MEDICINE

## 2023-10-05 PROCEDURE — 25010000002 ENOXAPARIN PER 10 MG: Performed by: INTERNAL MEDICINE

## 2023-10-05 PROCEDURE — 85027 COMPLETE CBC AUTOMATED: CPT | Performed by: STUDENT IN AN ORGANIZED HEALTH CARE EDUCATION/TRAINING PROGRAM

## 2023-10-05 PROCEDURE — 94799 UNLISTED PULMONARY SVC/PX: CPT

## 2023-10-05 PROCEDURE — 80053 COMPREHEN METABOLIC PANEL: CPT | Performed by: STUDENT IN AN ORGANIZED HEALTH CARE EDUCATION/TRAINING PROGRAM

## 2023-10-05 RX ADMIN — SENNOSIDES AND DOCUSATE SODIUM 2 TABLET: 50; 8.6 TABLET ORAL at 09:42

## 2023-10-05 RX ADMIN — SENNOSIDES AND DOCUSATE SODIUM 2 TABLET: 50; 8.6 TABLET ORAL at 21:00

## 2023-10-05 RX ADMIN — ENOXAPARIN SODIUM 40 MG: 100 INJECTION SUBCUTANEOUS at 21:00

## 2023-10-05 RX ADMIN — AMLODIPINE BESYLATE 5 MG: 5 TABLET ORAL at 09:41

## 2023-10-05 RX ADMIN — ARIPIPRAZOLE 5 MG: 5 TABLET ORAL at 21:00

## 2023-10-05 RX ADMIN — THIAMINE HYDROCHLORIDE 200 MG: 100 INJECTION, SOLUTION INTRAMUSCULAR; INTRAVENOUS at 06:25

## 2023-10-05 RX ADMIN — NICOTINE 1 PATCH: 21 PATCH, EXTENDED RELEASE TRANSDERMAL at 17:17

## 2023-10-05 RX ADMIN — BUDESONIDE AND FORMOTEROL FUMARATE DIHYDRATE 2 PUFF: 160; 4.5 AEROSOL RESPIRATORY (INHALATION) at 21:14

## 2023-10-05 RX ADMIN — MIRTAZAPINE 15 MG: 15 TABLET, FILM COATED ORAL at 21:00

## 2023-10-05 RX ADMIN — MULTIPLE VITAMINS W/ MINERALS TAB 1 TABLET: TAB at 09:41

## 2023-10-05 RX ADMIN — FOLIC ACID 1 MG: 1 TABLET ORAL at 09:41

## 2023-10-05 RX ADMIN — GABAPENTIN 100 MG: 100 CAPSULE ORAL at 21:00

## 2023-10-05 RX ADMIN — THIAMINE HYDROCHLORIDE 200 MG: 100 INJECTION, SOLUTION INTRAMUSCULAR; INTRAVENOUS at 14:34

## 2023-10-05 RX ADMIN — GABAPENTIN 100 MG: 100 CAPSULE ORAL at 14:34

## 2023-10-05 RX ADMIN — BUSPIRONE HYDROCHLORIDE 15 MG: 15 TABLET ORAL at 09:41

## 2023-10-05 RX ADMIN — GABAPENTIN 100 MG: 100 CAPSULE ORAL at 09:42

## 2023-10-05 RX ADMIN — THIAMINE HYDROCHLORIDE 200 MG: 100 INJECTION, SOLUTION INTRAMUSCULAR; INTRAVENOUS at 21:00

## 2023-10-05 RX ADMIN — ATORVASTATIN CALCIUM 10 MG: 20 TABLET, FILM COATED ORAL at 09:41

## 2023-10-05 RX ADMIN — BUSPIRONE HYDROCHLORIDE 15 MG: 15 TABLET ORAL at 17:00

## 2023-10-05 RX ADMIN — BUSPIRONE HYDROCHLORIDE 15 MG: 15 TABLET ORAL at 21:00

## 2023-10-05 NOTE — PLAN OF CARE
Goal Outcome Evaluation:  Plan of Care Reviewed With: patient, spouse        Progress: improving  Outcome Evaluation: Pt seen by PT this date for tx. Pt was in bed w/ hospitalist and spouse in room. Pt appropriate throughout session and following directions when given. Pt sat up to the EOB w/ SBA. Pt stood w/ SBA and no Ad. Pt amb around nsg station 3x req CGA and no AD. Pt slow w/ mild unsteadiness noted a few times req CGA and no AD. Pt returned to room to sit in chair for conversation w/ psychiatrist, hospitalist and spouse. PT will prog as pt niya.      Anticipated Discharge Disposition (PT): home with assist

## 2023-10-05 NOTE — NURSING NOTE
Access center follow up regarding ETOH: last CIWA score recorded of 2. Restraints removed yesterday. No acute changes overnight or inappropriate behaviors noted. Access following.

## 2023-10-05 NOTE — PROGRESS NOTES
Name: Stanislav Choi ADMIT: 2023   : 1955  PCP: Ten Coon MD    MRN: [id number] LOS: 5 days   AGE/SEX: 68 y.o. male  ROOM: [facility]     Subjective   Subjective   No acute events overnight.  Patient doing well today.  Denies any chest pain or shortness of breath.  Answers questions appropriately.  See documentation below for full conversation with patient and his wife.     Objective   Objective   Vital Signs  Temp:  [97.7 °F (36.5 °C)-98.2 °F (36.8 °C)] 97.9 °F (36.6 °C)  Heart Rate:  [77-98] 77  Resp:  [16] 16  BP: (115-142)/(68-89) 115/68  SpO2:  [95 %-98 %] 98 %  on   ;   Device (Oxygen Therapy): room air  Body mass index is 19.44 kg/m².  General: Alert, no acute distress.  Calm and cooperative with exam today.  Fully oriented.  ENT: No conjunctival injection or scleral icterus. Moist mucous membranes.   Neuro: Eyes open and moving in all directions, strength normal in all extremities, no focal deficits.   Lungs: Clear to auscultation bilaterally. No wheeze or crackles. No distress.   Heart: RRR, no murmurs. No edema.  Abdomen: Soft, non-tender, non-distended. Normal bowel sounds. No hepatosplenomegaly.   Ext: Warm and well-perfused. No edema.   Skin: No rashes or lesions.     Physical examination unchanged on 10/5/2023 from yesterday's exam    Results Review     I reviewed the patient's new clinical results.  Results from last 7 days   Lab Units 10/05/23  0430 10/04/23  0506 10/03/23  0436 10/02/23  0501   WBC 10*3/mm3 12.41* 12.94* 13.45* 15.10*   HEMOGLOBIN g/dL 14.1 14.5 14.1 13.1   PLATELETS 10*3/mm3 328 354 357 323     Results from last 7 days   Lab Units 10/05/23  0430 10/04/23  0507 10/03/23  0436 10/02/23  0501   SODIUM mmol/L 141 142 139 141   POTASSIUM mmol/L 4.2 3.9 3.6 4.2   CHLORIDE mmol/L 108* 110* 105 107   CO2 mmol/L 22.0 21.4* 23.2 23.8   BUN mg/dL 21 20 19 22   CREATININE mg/dL 0.96 0.89 0.94 0.96   GLUCOSE mg/dL 107* 115* 114* 84   Estimated Creatinine Clearance: 62.2  mL/min (by C-G formula based on SCr of 0.96 mg/dL).  Results from last 7 days   Lab Units 10/05/23  0430 10/04/23  0507 10/03/23  0436 10/02/23  0501   ALBUMIN g/dL 3.9 4.1 4.0 3.8   BILIRUBIN mg/dL 0.4 0.5 0.4 0.4   ALK PHOS U/L 136* 133* 133* 121*   AST (SGOT) U/L 25 22 33 44*   ALT (SGPT) U/L 42* 45* 61* 67*     Results from last 7 days   Lab Units 10/05/23  0430 10/04/23  0507 10/03/23  0436 10/02/23  0501   CALCIUM mg/dL 8.9 8.8 9.1 8.9   ALBUMIN g/dL 3.9 4.1 4.0 3.8   MAGNESIUM mg/dL 2.1 2.4 2.2 2.2   PHOSPHORUS mg/dL 3.4 4.1 3.6 3.4     Results from last 7 days   Lab Units 09/30/23  1339   PROCALCITONIN ng/mL 0.07   LACTATE mmol/L 1.3     COVID19   Date Value Ref Range Status   09/30/2023 Not Detected Not Detected - Ref. Range Final   11/29/2022 Not Detected  Final     No results found for: HGBA1C, POCGLU    MRI Brain Without Contrast  Narrative: MRI BRAIN WITHOUT CONTRAST     HISTORY: Mental status change, anxiety, and delirium.      TECHNIQUE: Brain MRI includes sagittal T1 as well as axial diffusion,  FLAIR, T1, T2, gradient echo sequences.     COMPARISON: MRI brain without contrast 01/12/2019 and CT head  09/30/2023.      FINDINGS: There are no abnormal foci of restricted diffusion to diagnose  an acute infarction. There is mild confluent periventricular and patchy  deep cerebral white matter FLAIR hyperintense signal which is  nonspecific though is consistent with chronic small vessel ischemic  white matter change. There is no evidence for mass effect or shift of  the midline structures. Major intracranial flow voids appear within  normal limits. There is mild ethmoid sinus mucosal thickening.     Impression: No infarction or evidence for acute intracranial  abnormality.           This report was finalized on 10/4/2023 6:33 PM by Dr. Braulio Solis M.D on Workstation: [id number]       I reviewed the patient's daily medications.  Scheduled Medications  amLODIPine, 5 mg, Oral, Daily  ARIPiprazole, 5 mg,  Oral, Nightly  atorvastatin, 10 mg, Oral, Daily  budesonide-formoterol, 2 puff, Inhalation, BID - RT   And  tiotropium bromide monohydrate, 2 puff, Inhalation, Daily - RT  busPIRone, 15 mg, Oral, TID  enoxaparin, 40 mg, Subcutaneous, Nightly  folic acid, 1 mg, Oral, Daily  gabapentin, 100 mg, Oral, TID  mirtazapine, 15 mg, Oral, Nightly  multivitamin with minerals, 1 tablet, Oral, Daily  nicotine, 1 patch, Transdermal, Q24H  senna-docusate sodium, 2 tablet, Oral, BID  thiamine (B-1) IV, 200 mg, Intravenous, Q8H   Followed by  [START ON 10/8/2023] thiamine, 100 mg, Oral, Daily    Infusions   Diet  Diet: Cardiac Diets; Healthy Heart (2-3 Na+); Texture: Regular Texture (IDDSI 7); Fluid Consistency: Thin (IDDSI 0)         I have personally reviewed:  [x]  Laboratory   []  Microbiology   [x]  Radiology   [x]  EKG/Telemetry   []  Cardiology/Vascular   []  Pathology   [x]  Records     Assessment/Plan     Active Hospital Problems    Diagnosis  POA    **Altered mental status [R41.82]  Yes    Elevated WBC count [D72.829]  Yes    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       68 y.o. male admitted with Altered mental status.    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  RPR, HIV negative.  B12 970.  -Ethanol negative on admission, continue CIWA.  Ativan as needed.  High-dose thiamine, folate, multivitamin.  -No infectious cause at this time  -UDS negative, CT head with no acute findings  -Multivitamin, thiamine, folate  -Psych consulted, appreciate recommendations-no longer planning for inpatient psych hospitalization at discharge.  Continue Abilify  -Continue home  gabapentin  -Neurology now consulted, appreciate recommendations  -EEG was abnormal due to mildly diffusely slow consistent with a mild diffuse encephalopathy versus bihemispheric structural lesions, no activity  -MRI brain consistent with chronic small vessel ischemic white matter change  -Will need outpatient neuropsych testing    Enlarged retroperitoneal lymph node  -Patient scheduled to have CT chest with contrast for follow-up, but wife reports that patient is allergic to the contrast  -CT chest on 6/27/2023 with prominent nonspecific retroperitoneal lymph node  -Given need for above imaging per neurology, will hold off on this repeat CT for now  -Will have patient follow-up with PCP to reschedule    Leukocytosis  -UA bland, RVP negative, chest x-ray without any acute findings.  -Blood cultures no growth to date  -Monitoring with daily CBC, WBC now stable and only mildly elevated    COPD-continue home inhalers    Anxiety/mood disorder  -Continue home BuSpar, mirtazapine    Nicotine use disorder-nicotine patch    Had extensive in-depth discussion with patient and his wife today.  His wife had questions with regard to his brain MRI and the finding of chronic small vessel ischemic white matter change.  I did explain to her that this seems most likely chronic vascular dementia, though this diagnosis will need to be fully determined as an outpatient after further testing.  She states this would be a new diagnosis for him.  We discussed the need for outpatient neuropsych testing to further differentiate and officially diagnose.  The patient already follows with an outpatient psychiatrist, and she has begun the process of arranging neuropsych testing.  Given this new finding, the patient's wife would really like for him to not be discharged to inpatient psych.  He worked with physical therapy, and he does not qualify for rehab.  We did discuss that though he may have vascular dementia, the psychiatric component could  be related to that or additional psychiatric issues.  The neuropsych testing will help tease this out.  Educated her while the chronic vascular changes may be newly identified, the management will not change significantly.  Dr. Alvarado evaluated patient while I was having a discussion.  He is agreeable with foregoing inpatient psych hospitalization given the patient's improvement in confusion.  Had a long discussion with the patient regarding the diagnosis of dementia in addition to his psychiatric symptoms.  We talked about him being unaware of when he is confused and our desire to keep him safe.  I did very clearly tell the patient that he would not be able to drive after discharge.  Pending the results of his neuropsychiatric testing, that will be left up to the outpatient provider as to whether or not he can drive in the future.  His wife conveyed understanding of all of this.  I plan to reach out to neurology today to discuss further, and she would like them to come by the room.  The plan for now will be to discuss with neurology and anticipate discharge home with wife and home health tomorrow.  She feels comfortable with this plan.    Lovenox 40 mg SC daily for DVT prophylaxis.  Full code.  Discussed with patient and nursing staff.  Anticipate discharge  home with wife in the next 24-48 hours pending discussion with neurology    Chio Horvath MD  [facility]  10/05/23  13:34 EDT

## 2023-10-05 NOTE — PROGRESS NOTES
DOS: 10/5/2023  NAME: Stanislav Choi   : 1955  PCP: Ten Coon MD    Chief Complaint   Patient presents with    Altered Mental Status         Subjective: Pt seen in follow up, however the problem is new to me.  Patient was sitting up in his recliner.  Wife at bedside.  He states he feels okay.  He was aggravated about being told not being able to drive.  He denies any new weakness, numbness, speech or visual disturbances, or headaches.    Objective:  Vital signs:   Vitals:    10/04/23 1901 10/04/23 1907 10/04/23 2317 10/05/23 0715   BP: 131/75  130/75 115/68   BP Location: Right arm  Right arm Right arm   Patient Position: Lying  Lying Lying   Pulse: 87  90 77   Resp: 16 16 16 16   Temp: 97.7 °F (36.5 °C)  98.2 °F (36.8 °C) 97.9 °F (36.6 °C)   TempSrc: Oral  Oral Oral   SpO2: 98% 98% 95% 98%   Weight:       Height:           Current Facility-Administered Medications:     acetaminophen (TYLENOL) tablet 650 mg, 650 mg, Oral, Q4H PRN, Leonora Dela Cruz MD    amLODIPine (NORVASC) tablet 5 mg, 5 mg, Oral, Daily, Leonora Dela Cruz MD, 5 mg at 10/05/23 09    ARIPiprazole (ABILIFY) tablet 5 mg, 5 mg, Oral, Nightly, Chadwick George MD, 5 mg at 10/04/23 2005    atorvastatin (LIPITOR) tablet 10 mg, 10 mg, Oral, Daily, Leonora Dela Cruz MD, 10 mg at 10/05/23 0941    sennosides-docusate (PERICOLACE) 8.6-50 MG per tablet 2 tablet, 2 tablet, Oral, BID, 2 tablet at 10/05/23 0942 **AND** polyethylene glycol (MIRALAX) packet 17 g, 17 g, Oral, Daily PRN **AND** bisacodyl (DULCOLAX) EC tablet 5 mg, 5 mg, Oral, Daily PRN **AND** bisacodyl (DULCOLAX) suppository 10 mg, 10 mg, Rectal, Daily PRN, Leonora Dela Cruz MD    budesonide-formoterol (SYMBICORT) 160-4.5 MCG/ACT inhaler 2 puff, 2 puff, Inhalation, BID - RT, 2 puff at 10/04/23 1907 **AND** tiotropium (SPIRIVA RESPIMAT) 2.5 mcg/act aerosol solution inhaler, 2 puff, Inhalation, Daily - RT, Leonora Dela Cruz MD, 2 puff at 10/04/23 0805     busPIRone (BUSPAR) tablet 15 mg, 15 mg, Oral, TID, Leonora Dela Cruz MD, 15 mg at 10/05/23 0941    Enoxaparin Sodium (LOVENOX) syringe 40 mg, 40 mg, Subcutaneous, Nightly, Leonora Dela Cruz MD, 40 mg at 10/04/23 2006    folic acid (FOLVITE) tablet 1 mg, 1 mg, Oral, Daily, Leonora Dela Cruz MD, 1 mg at 10/05/23 0941    gabapentin (NEURONTIN) capsule 100 mg, 100 mg, Oral, TID, Dion Campbell MD, 100 mg at 10/05/23 1434    haloperidol lactate (HALDOL) injection 5 mg, 5 mg, Intramuscular, Q6H PRN, Rell Alvarado III, MD    hydrOXYzine (ATARAX) tablet 25 mg, 25 mg, Oral, TID PRN, Dion Campbell MD, 25 mg at 10/04/23 1356    Magnesium Standard Dose Replacement - Follow Nurse / BPA Driven Protocol, , Does not apply, PRN, Leonora Dela Cruz MD    melatonin tablet 3 mg, 3 mg, Oral, Nightly PRN, Leonora Dela Cruz MD    mirtazapine (REMERON) tablet 15 mg, 15 mg, Oral, Nightly, Leonora Dela Cruz MD, 15 mg at 10/04/23 2005    multivitamin with minerals 1 tablet, 1 tablet, Oral, Daily, Leonora Dela Cruz MD, 1 tablet at 10/05/23 0941    nicotine (NICODERM CQ) 21 MG/24HR patch 1 patch, 1 patch, Transdermal, Q24H, Dion Campbell MD, 1 patch at 10/04/23 1725    ondansetron (ZOFRAN) tablet 4 mg, 4 mg, Oral, Q6H PRN, 4 mg at 10/03/23 0820 **OR** ondansetron (ZOFRAN) injection 4 mg, 4 mg, Intravenous, Q6H PRN, Leonora Dela Cruz MD    [COMPLETED] thiamine (B-1) 500 mg in sodium chloride 0.9 % 100 mL IVPB, 500 mg, Intravenous, Q8H, Last Rate: 200 mL/hr at 10/03/23 1453, 500 mg at 10/03/23 1453 **FOLLOWED BY** thiamine (B-1) injection 200 mg, 200 mg, Intravenous, Q8H, 200 mg at 10/05/23 1434 **FOLLOWED BY** [START ON 10/8/2023] thiamine (VITAMIN B-1) tablet 100 mg, 100 mg, Oral, Daily, Stingjamshid, Leonora Jimenez MD    PRN meds    acetaminophen    senna-docusate sodium **AND** polyethylene glycol **AND** bisacodyl **AND** bisacodyl    haloperidol lactate    hydrOXYzine    Magnesium Standard  Dose Replacement - Follow Nurse / BPA Driven Protocol    melatonin    ondansetron **OR** ondansetron    No current facility-administered medications on file prior to encounter.     Current Outpatient Medications on File Prior to Encounter   Medication Sig    amLODIPine (NORVASC) 5 MG tablet Take 1 tablet by mouth Daily.    atorvastatin (LIPITOR) 10 MG tablet Take 1 tablet by mouth Daily. Indications: High Amount of Fats in the Blood    busPIRone (BUSPAR) 15 MG tablet Take 1 tablet by mouth Every 12 (Twelve) Hours. (Patient taking differently: Take 1 tablet by mouth 3 (Three) Times a Day.)    disulfiram (ANTABUSE) 250 MG tablet Take 2 tablets by mouth Daily.    folic acid (FOLVITE) 1 MG tablet Take 1 tablet by mouth Daily.    gabapentin (NEURONTIN) 800 MG tablet Take 1 tablet by mouth 3 (Three) Times a Day.    hydrOXYzine pamoate (VISTARIL) 50 MG capsule Take 1 capsule by mouth Every 4 (Four) Hours As Needed for Anxiety.    Lurasidone HCl (LATUDA) 80 MG tablet tablet Take 20 mg by mouth 1 (One) Time.    multivitamin with minerals tablet tablet Take 1 tablet by mouth Daily.    Remeron 15 MG tablet Take 1 tablet by mouth Every Night.    thiamine (VITAMIN B1) 100 MG tablet Take 1 tablet by mouth Daily.    Thiamine Mononitrate (B1) 100 MG tablet Take 1 tablet by mouth Daily.    Trelegy Ellipta 200-62.5-25 MCG/ACT aerosol powder      vilazodone (VIIBRYD) 20 MG tablet tablet Take 1 tablet by mouth Daily.       General appearance: Elderly male, appears older than stated age, NAD, alert and cooperative,   HEENT: Normocephalic, atraumatic  Resp: Even and unlabored  Extremities: No obvious edema.  Skin: warm, dry    Neurological:   MS: oriented x3, normal attention/concentration, language intact, no neglect  CN: visual fields full, EOMI, facial sensation equal, no facial droop, hearing symmetric, palate elevates symmetrically, shoulder shrug equal, tongue midline  Motor: 5/5 in all 4 ext., normal tone  Sensory: light touch and  cold sensation intact in all 4 ext.  Coordination: Normal finger to nose test  Gait and station: Deferred  Rapid alternating movements: normal finger to thumb tap    Laboratory results:  Lab Results   Component Value Date    TSH 1.190 10/03/2023     Lab Results   Component Value Date    QOXXDHGO15 970 (H) 10/02/2023     Lab Results   Component Value Date    HGBA1C 5.90 (H) 07/12/2023     Lab Results   Component Value Date    GLUCOSE 107 (H) 10/05/2023    BUN 21 10/05/2023    CREATININE 0.96 10/05/2023    EGFRIFNONA 79 09/23/2021    EGFRIFAFRI  09/21/2016      Comment:      <15 Indicative of kidney failure.    BCR 21.9 10/05/2023    K 4.2 10/05/2023    CO2 22.0 10/05/2023    CALCIUM 8.9 10/05/2023    PROTENTOTREF 6.6 02/20/2019    ALBUMIN 3.9 10/05/2023    LABIL2 1.9 (H) 02/20/2019    AST 25 10/05/2023    ALT 42 (H) 10/05/2023     Lab Results   Component Value Date    WBC 12.41 (H) 10/05/2023    HGB 14.1 10/05/2023    HCT 41.4 10/05/2023    MCV 91.8 10/05/2023     10/05/2023     Brief Urine Lab Results  (Last result in the past 365 days)        Color   Clarity   Blood   Leuk Est   Nitrite   Protein   CREAT   Urine HCG        09/30/23 1556 Yellow   Cloudy   Trace   Negative   Negative   Negative                 Blood Culture   Date Value Ref Range Status   09/30/2023 No growth at 5 days  Final     No results found for: BCIDPCR, CXREFLEX, CSFCX, CULTURETIS  No results found for: CULTURES, HSVCX, URCX  No results found for: EYECULTURE, GCCX, HSVCULTURE, LABHSV  No results found for: LEGIONELLA, MRSACX, MUMPSCX, MYCOPLASCX  No results found for: NOCARDIACX, STOOLCX  No results found for: THROATCX, UNSTIMCULT, URINECX, CULTURE, VZVCULTUR  No results found for: VIRALCULTU, WOUNDCX  Pain Management Panel  More data exists         Latest Ref Rng & Units 9/30/2023 6/27/2023   Pain Management Panel   Barbiturates Screen, Urine Negative Negative  Negative    Benzodiazepine Screen, Urine Negative Negative  Negative     Cocaine Screen, Urine Negative Negative  Negative    Fentanyl, Urine Negative Negative  Negative    Methadone Screen , Urine Negative Negative  Negative      RPR nonreactive  Ammonia 23    Review and interpretation of imaging:  CT Head Without Contrast    Result Date: 9/30/2023  No acute intracranial abnormality.    Radiation dose reduction techniques were utilized, including automated exposure control and exposure modulation based on body size.  This report was finalized on 9/30/2023 3:01 PM by Dr. Bienvenido Lipscomb M.D.      MRI Brain Without Contrast    Result Date: 10/4/2023  No infarction or evidence for acute intracranial abnormality.    This report was finalized on 10/4/2023 6:33 PM by Dr. Braulio Solis M.D on Workstation: [id number]      XR Chest 1 View    Result Date: 9/30/2023  No evidence for active disease in the chest.  This report was finalized on 9/30/2023 3:02 PM by Dr. Braulio Solis M.D.       Impression/Assessment:  This is a 68-year-old male with past medical history of COPD, anxiety and depression, hypertension, hyperlipidemia, peripheral neuropathy, alcoholism who presented to the hospital 9/30/2023 with complaints of confusion and disorientation.  Per review of his psychiatry note the patient has had several episodes similar to this in the past where he has actually had inpatient psychiatric hospitalizations.  He was most recently placed on Latuda and had some extraparametal symptoms therefore this was decreased however the patient requested to come off of it.  He was also on BuSpar 15 mg twice daily and Neurontin 800 mg 3 times daily, Remeron 15 mg nightly, Viibryd 20 mg daily, and Vistaril 50 mg every 4 hours.  There are some concern for pseudodementia in the past.  Wife reports he had neuropsych testing done in 2019 and was told that he had a dementing illness however after adjustments to his psychiatric medications the patient returned back to baseline per the wife and he had a repeat  testing that showed that he did not have any dementing illness.  He had an MRI at that time that did not reveal any acute intracranial abnormalities, mild small vessel changes noted.    MRI this admission showing mild small vessel changes but again nothing acute.  He had an EEG that revealed mild diffuse slowing but no epileptic activity.  His white count was initially elevated at 13.51 but is now trending down.  His UDS and ethanol levels were negative.  He has remained afebrile and his BPs have been normotensive.    Diagnosis:  Acute encephalopathy  History of alcohol dependence  Anxiety and depression    Plan:  Had a long discussion with the patient and wife at bedside.    So far his work-up has been unrevealing from a neurology standpoint.  Discussed his MRI findings, there are mild small vessel changes but this does not look different from his prior MRI back in 2019.  Wife is requesting neuropsych testing and already received a call from [facility] yesterday to set up.  He had prior neuropsych testing in 2019, I asked wife to get those results to the neuropsychiatrist who will be performing his testing.  Psychiatry is following and has made some recommendations on possible inpatient psychiatric admission.  He can schedule an appointment outpatient with our service after he has his neuropsychiatric testing if nothing else has been found.  We will sign off, will see again per request    Case discussed with patient, wife at bedside, and Dr. Mendoza, and she agrees with plan above.     ALEXSANDRA Spears

## 2023-10-05 NOTE — PLAN OF CARE
Goal Outcome Evaluation:  Plan of Care Reviewed With: patient      Pt tolerated care well today. Getting up and urinating with no hesitation. VSS. WCTM the remainder of the shift.

## 2023-10-05 NOTE — PROGRESS NOTES
The patient's confusion appears to have resolved.  MRI did show some small vessel disease and the patient is scheduled for neuropsychiatric testing later this month.  Apparently he and wife have reconciled and he will be returning home to stay with her with home health.  He will continue follow-up with Dr. George also.  He is stable for discharge from a psychiatric standpoint.

## 2023-10-05 NOTE — THERAPY TREATMENT NOTE
Patient Name: Stanislav Choi  : 1955    MRN: [id number]                              Today's Date: 10/5/2023       Admit Date: 2023    Visit Dx:     ICD-10-CM ICD-9-CM   1. Altered mental status, unspecified altered mental status type  R41.82 780.97   2. History of alcohol abuse  F10.11 305.03   3. Leukocytosis, unspecified type  D72.829 288.60   4. History of COPD  Z87.09 V12.69   5. 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    Confusion state    Altered mental status, unspecified altered mental status type    Alcohol use disorder    Weight loss    Lymph node enlargement    Severe malnutrition    Alcoholic dementia    Pulmonary nodule    Altered mental status    Elevated WBC count     Past Medical History:   Diagnosis Date    Anxiety     Chronic pain disorder     COPD (chronic obstructive pulmonary disease)     Depression     Elevated cholesterol     HL (hearing loss)     Hyperlipidemia     Hypertension     Memory loss     Peripheral neuropathy     Shingles      Past Surgical History:   Procedure Laterality Date    JOINT REPLACEMENT        General Information       Row Name 10/05/23 1138          Physical Therapy Time and Intention    Document Type therapy note (daily note)  -PH     Mode of Treatment physical therapy  -       Row Name 10/05/23 1138          General Information    Existing Precautions/Restrictions fall  -       Row Name 10/05/23 1138          Safety Issues, Functional Mobility    Impairments Affecting Function (Mobility) balance  -PH     Comment, Safety Issues/Impairments (Mobility) gt belt and non skid socks donned  -PH               User Key  (r) = Recorded By, (t) = Taken By, (c) = Cosigned By      Initials Name  Provider Type    PH Mary Rosenberg PTA Physical Therapist Assistant                   Mobility       Row Name 10/05/23 1139          Bed Mobility    Bed Mobility supine-sit  -PH     Supine-Sit [place] (Bed Mobility) standby assist  -PH     Assistive Device (Bed Mobility) head of bed elevated  -PH     Comment, (Bed Mobility) Pt SBA at EOB  -PH       Row Name 10/05/23 1139          Sit-Stand Transfer    Sit-Stand [place] (Transfers) standby assist  -PH     Assistive Device (Sit-Stand Transfers) other (see comments)  No AD  -PH       Row Name 10/05/23 1139          Gait/Stairs (Locomotion)    [place] Level (Gait) contact guard  -PH     Assistive Device (Gait) other (see comments)  No AD  -PH     Distance in Feet (Gait) 450'  -PH     Deviations/Abnormal Patterns (Gait) base of support, narrow;gait speed decreased;asif decreased;stride length decreased;weight shifting decreased  -PH     [place] Level (Stairs) not tested  -PH     Comment, (Gait/Stairs) slow w/ mild unsteadiness at times. No overt LOB  -PH               User Key  (r) = Recorded By, (t) = Taken By, (c) = Cosigned By      Initials Name Provider Type     Mary Rosenberg PTA Physical Therapist Assistant                   Obj/Interventions       Row Name 10/05/23 1141          Balance    Balance Assessment sitting static balance;standing static balance  -PH     Static Sitting Balance standby assist  -PH     Static Standing Balance standby assist  -PH     Position/Device Used, Standing Balance other (see comments)  no AD  -PH               User Key  (r) = Recorded By, (t) = Taken By, (c) = Cosigned By      Initials Name Provider Type     Mary Rosenberg PTA Physical Therapist Assistant                   Goals/Plan    No documentation.                  Clinical Impression       Row Name 10/05/23 1141          Pain    Pretreatment Pain Rating 0/10 - no pain  -PH     Posttreatment Pain Rating 0/10 - no pain  -PH      Additional Documentation Pain Scale: Numbers Pre/Post-Treatment (Group)  -PH       Row Name 10/05/23 1141          Plan of Care Review    Plan of Care Reviewed With patient;spouse  -PH     Progress improving  -PH     Outcome Evaluation Pt seen by PT this date for tx. Pt was in bed w/ hospitalist and spouse in room. Pt appropriate throughout session and following directions when given. Pt sat up to the EOB w/ SBA. Pt stood w/ SBA and no Ad. Pt amb around nsg station 3x req CGA and no AD. Pt slow w/ mild unsteadiness noted a few times req CGA and no AD. Pt returned to room to sit in chair for conversation w/ psychiatrist, hospitalist and spouse. PT will prog as pt niya.  -PH       Row Name 10/05/23 1141          Vital Signs    O2 Delivery Pre Treatment room air  -PH     O2 Delivery Intra Treatment room air  -PH     O2 Delivery Post Treatment room air  -PH       Row Name 10/05/23 1141          Positioning and Restraints    Pre-Treatment Position in bed  -PH     Post Treatment Position chair  -PH     In Chair reclined;call light within reach;encouraged to call for assist;exit alarm on;with family/caregiver;with other staff;notified nsg  -PH               User Key  (r) = Recorded By, (t) = Taken By, (c) = Cosigned By      Initials Name Provider Type    PH Mary Rosenberg PTA Physical Therapist Assistant                   Outcome Measures       Row Name 10/05/23 1146          How much help from another person do you currently need...    Turning from your back to your side while in flat bed without using bedrails? 4  -PH     Moving from lying on back to sitting on the side of a flat bed without bedrails? 4  -PH     Moving to and from a bed to a chair (including a wheelchair)? 3  -PH     Standing up from a chair using your arms (e.g., wheelchair, bedside chair)? 3  -PH     Climbing 3-5 steps with a railing? 3  -PH     To walk in hospital room? 3  -PH     AM-PAC 6 Clicks Score (PT) 20  -PH     Highest level of  mobility 6 --> Walked 10 steps or more  -       Row Name 10/05/23 1146          Functional Assessment    Outcome Measure Options AM-PAC 6 Clicks Basic Mobility (PT)  -               User Key  (r) = Recorded By, (t) = Taken By, (c) = Cosigned By      Initials Name Provider Type     Mary Rosenberg PTA Physical Therapist Assistant                                 Physical Therapy Education       Title: PT OT SLP Therapies (Done)       Topic: Physical Therapy (Done)       Point: Mobility training (Done)       Learning Progress Summary             Patient Acceptance, E,TB, VU by  at 10/5/2023 1146    Acceptance, E, NR,VU by  at 10/2/2023 1520    Acceptance, E,TB,D, VU,DU,NR by  at 10/1/2023 1244                         Point: Home exercise program (Done)       Learning Progress Summary             Patient Acceptance, E, NR,VU by  at 10/2/2023 1520                         Point: Body mechanics (Done)       Learning Progress Summary             Patient Acceptance, E,TB, VU by  at 10/5/2023 1146    Acceptance, E, NR,VU by  at 10/2/2023 1520                         Point: Precautions (Done)       Learning Progress Summary             Patient Acceptance, E,TB, VU by  at 10/5/2023 1146    Acceptance, E, NR,VU by  at 10/2/2023 1520    Acceptance, E,TB,D, VU,DU,NR by  at 10/1/2023 1244                                         User Key       Initials Effective Dates Name Provider Type Discipline     05/24/22 -  Tammie Montoya, PT Physical Therapist PT     06/16/21 -  Mary Rosenberg PTA Physical Therapist Assistant PT     05/02/22 -  Tamara Berman PT Physical Therapist PT                  PT Recommendation and Plan     Plan of Care Reviewed With: patient, spouse  Progress: improving  Outcome Evaluation: Pt seen by PT this date for tx. Pt was in bed w/ hospitalist and spouse in room. Pt appropriate throughout session and following directions when given. Pt sat up to the EOB w/ SBA.  Pt stood w/ SBA and no Ad. Pt amb around nsg station 3x req CGA and no AD. Pt slow w/ mild unsteadiness noted a few times req CGA and no AD. Pt returned to room to sit in chair for conversation w/ psychiatrist, hospitalist and spouse. PT will prog as pt niya.     Time Calculation:         PT Charges       Row Name 10/05/23 1147             Time Calculation    Start Time 1112  -PH      Stop Time 1132  -PH      Time Calculation (min) 20 min  -PH      PT Received On 10/05/23  -PH      PT - Next Appointment 10/06/23  -PH         Timed Charges    38451 - PT Therapeutic Activity Minutes 20  -PH         Total Minutes    Timed Charges Total Minutes 20  -PH       Total Minutes 20  -PH                User Key  (r) = Recorded By, (t) = Taken By, (c) = Cosigned By      Initials Name Provider Type    PH Mary Rosenberg PTA Physical Therapist Assistant                  Therapy Charges for Today       Code Description Service Date Service Provider Modifiers Qty    47331345855 HC PT THERAPEUTIC ACT EA 15 MIN 10/5/2023 Mary Rosenberg PTA GP 1            PT G-Codes  Outcome Measure Options: AM-PAC 6 Clicks Basic Mobility (PT)  AM-PAC 6 Clicks Score (PT): 20  PT Discharge Summary  Anticipated Discharge Disposition (PT): home with assist    Mary Rosenberg PTA  10/5/2023

## 2023-10-06 ENCOUNTER — READMISSION MANAGEMENT (OUTPATIENT)
Dept: CALL CENTER | Facility: HOSPITAL | Age: 68
End: 2023-10-06
Payer: MEDICARE

## 2023-10-06 VITALS
WEIGHT: 131.61 LBS | DIASTOLIC BLOOD PRESSURE: 76 MMHG | OXYGEN SATURATION: 100 % | HEIGHT: 69 IN | HEART RATE: 79 BPM | RESPIRATION RATE: 20 BRPM | SYSTOLIC BLOOD PRESSURE: 126 MMHG | TEMPERATURE: 97.5 F | BODY MASS INDEX: 19.49 KG/M2

## 2023-10-06 LAB
ALBUMIN SERPL-MCNC: 4 G/DL (ref 3.5–5.2)
ALBUMIN/GLOB SERPL: 1.8 G/DL
ALP SERPL-CCNC: 120 U/L (ref 39–117)
ALT SERPL W P-5'-P-CCNC: 40 U/L (ref 1–41)
ANION GAP SERPL CALCULATED.3IONS-SCNC: 8.2 MMOL/L (ref 5–15)
AST SERPL-CCNC: 24 U/L (ref 1–40)
BILIRUB SERPL-MCNC: 0.5 MG/DL (ref 0–1.2)
BUN SERPL-MCNC: 20 MG/DL (ref 8–23)
BUN/CREAT SERPL: 21.5 (ref 7–25)
CALCIUM SPEC-SCNC: 9 MG/DL (ref 8.6–10.5)
CHLORIDE SERPL-SCNC: 107 MMOL/L (ref 98–107)
CO2 SERPL-SCNC: 24.8 MMOL/L (ref 22–29)
CREAT SERPL-MCNC: 0.93 MG/DL (ref 0.76–1.27)
DEPRECATED RDW RBC AUTO: 40 FL (ref 37–54)
EGFRCR SERPLBLD CKD-EPI 2021: 89.4 ML/MIN/1.73
ERYTHROCYTE [DISTWIDTH] IN BLOOD BY AUTOMATED COUNT: 12.1 % (ref 12.3–15.4)
GLOBULIN UR ELPH-MCNC: 2.2 GM/DL
GLUCOSE SERPL-MCNC: 111 MG/DL (ref 65–99)
HCT VFR BLD AUTO: 40.4 % (ref 37.5–51)
HGB BLD-MCNC: 13.7 G/DL (ref 13–17.7)
MAGNESIUM SERPL-MCNC: 2.2 MG/DL (ref 1.6–2.4)
MCH RBC QN AUTO: 30.8 PG (ref 26.6–33)
MCHC RBC AUTO-ENTMCNC: 33.9 G/DL (ref 31.5–35.7)
MCV RBC AUTO: 90.8 FL (ref 79–97)
PHOSPHATE SERPL-MCNC: 3 MG/DL (ref 2.5–4.5)
PLATELET # BLD AUTO: 328 10*3/MM3 (ref 140–450)
PMV BLD AUTO: 9.3 FL (ref 6–12)
POTASSIUM SERPL-SCNC: 4.1 MMOL/L (ref 3.5–5.2)
PROT SERPL-MCNC: 6.2 G/DL (ref 6–8.5)
RBC # BLD AUTO: 4.45 10*6/MM3 (ref 4.14–5.8)
SODIUM SERPL-SCNC: 140 MMOL/L (ref 136–145)
WBC NRBC COR # BLD: 12.53 10*3/MM3 (ref 3.4–10.8)

## 2023-10-06 PROCEDURE — 94799 UNLISTED PULMONARY SVC/PX: CPT

## 2023-10-06 PROCEDURE — 85027 COMPLETE CBC AUTOMATED: CPT | Performed by: STUDENT IN AN ORGANIZED HEALTH CARE EDUCATION/TRAINING PROGRAM

## 2023-10-06 PROCEDURE — 94761 N-INVAS EAR/PLS OXIMETRY MLT: CPT

## 2023-10-06 PROCEDURE — 25010000002 THIAMINE HCL 200 MG/2ML SOLUTION: Performed by: INTERNAL MEDICINE

## 2023-10-06 PROCEDURE — 84100 ASSAY OF PHOSPHORUS: CPT | Performed by: STUDENT IN AN ORGANIZED HEALTH CARE EDUCATION/TRAINING PROGRAM

## 2023-10-06 PROCEDURE — 83735 ASSAY OF MAGNESIUM: CPT | Performed by: STUDENT IN AN ORGANIZED HEALTH CARE EDUCATION/TRAINING PROGRAM

## 2023-10-06 PROCEDURE — 80053 COMPREHEN METABOLIC PANEL: CPT | Performed by: STUDENT IN AN ORGANIZED HEALTH CARE EDUCATION/TRAINING PROGRAM

## 2023-10-06 PROCEDURE — 94664 DEMO&/EVAL PT USE INHALER: CPT

## 2023-10-06 RX ORDER — ARIPIPRAZOLE 5 MG/1
5 TABLET ORAL NIGHTLY
Qty: 30 TABLET | Refills: 0 | Status: SHIPPED | OUTPATIENT
Start: 2023-10-06

## 2023-10-06 RX ORDER — HYDROXYZINE HYDROCHLORIDE 25 MG/1
25 TABLET, FILM COATED ORAL 3 TIMES DAILY PRN
Qty: 30 TABLET | Refills: 0 | Status: SHIPPED | OUTPATIENT
Start: 2023-10-06

## 2023-10-06 RX ORDER — ARIPIPRAZOLE 5 MG/1
5 TABLET ORAL NIGHTLY
Qty: 30 TABLET | Refills: 0 | Status: SHIPPED | OUTPATIENT
Start: 2023-10-06 | End: 2023-10-06 | Stop reason: SDUPTHER

## 2023-10-06 RX ORDER — HYDROXYZINE HYDROCHLORIDE 25 MG/1
25 TABLET, FILM COATED ORAL 3 TIMES DAILY PRN
Qty: 30 TABLET | Refills: 0 | Status: SHIPPED | OUTPATIENT
Start: 2023-10-06 | End: 2023-10-06 | Stop reason: SDUPTHER

## 2023-10-06 RX ORDER — BUSPIRONE HYDROCHLORIDE 15 MG/1
15 TABLET ORAL 3 TIMES DAILY
Start: 2023-10-06

## 2023-10-06 RX ADMIN — GABAPENTIN 100 MG: 100 CAPSULE ORAL at 09:53

## 2023-10-06 RX ADMIN — BUSPIRONE HYDROCHLORIDE 15 MG: 15 TABLET ORAL at 09:53

## 2023-10-06 RX ADMIN — BUSPIRONE HYDROCHLORIDE 15 MG: 15 TABLET ORAL at 15:10

## 2023-10-06 RX ADMIN — AMLODIPINE BESYLATE 5 MG: 5 TABLET ORAL at 09:53

## 2023-10-06 RX ADMIN — THIAMINE HYDROCHLORIDE 200 MG: 100 INJECTION, SOLUTION INTRAMUSCULAR; INTRAVENOUS at 06:58

## 2023-10-06 RX ADMIN — ATORVASTATIN CALCIUM 10 MG: 20 TABLET, FILM COATED ORAL at 09:53

## 2023-10-06 RX ADMIN — BUDESONIDE AND FORMOTEROL FUMARATE DIHYDRATE 2 PUFF: 160; 4.5 AEROSOL RESPIRATORY (INHALATION) at 07:31

## 2023-10-06 RX ADMIN — GABAPENTIN 100 MG: 100 CAPSULE ORAL at 15:10

## 2023-10-06 RX ADMIN — MULTIPLE VITAMINS W/ MINERALS TAB 1 TABLET: TAB at 09:53

## 2023-10-06 RX ADMIN — TIOTROPIUM BROMIDE INHALATION SPRAY 2 PUFF: 3.12 SPRAY, METERED RESPIRATORY (INHALATION) at 07:30

## 2023-10-06 RX ADMIN — FOLIC ACID 1 MG: 1 TABLET ORAL at 09:53

## 2023-10-06 RX ADMIN — SENNOSIDES AND DOCUSATE SODIUM 2 TABLET: 50; 8.6 TABLET ORAL at 09:53
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
## 2023-10-06 NOTE — CASE MANAGEMENT/SOCIAL WORK
Continued Stay Note  [facility]     Patient Name: Stanislav Choi  MRN: [id number]  Today's Date: 10/6/2023    Admit Date: 9/30/2023    Plan: Home with wife and HH awaiting wifes choice   Discharge Plan       Row Name 10/06/23 8520       Plan    Plan Home with wife and HH awaiting wifes choice    Patient/Family in Agreement with Plan yes    Plan Comments CCP spoke with pt at bedside for HH choice, pt defers decision to his wife Elysia. CCP called Elysia and left vm requesting call back for HH agency she chooses. Rosalina [facility]/CCP                   Discharge Codes    No documentation.                 Expected Discharge Date and Time       Expected Discharge Date Expected Discharge Time    Oct 6, 2023               Kaya Salgado RN

## 2023-10-06 NOTE — DISCHARGE PLACEMENT REQUEST
"Kerline Parker (68 y.o. Male)       Date of Birth   1955    Social Security Number       Address   [address] [place] [address]    Home Phone   [phone]    MRN   [id number]       Gnosticism   Tenriism    Marital Status                               Admission Date   9/30/23    Admission Type   Emergency    Admitting Provider   Leonora Dela Cruz MD    Attending Provider   Chio Horvath MD    Department, Room/Bed   [address], [address]       Discharge Date       Discharge Disposition       Discharge Destination                                 Attending Provider: Chio Horvath MD    Allergies: Iodinated Contrast Media    Isolation: None   Infection: None   Code Status: CPR    Ht: 175.3 cm (69\")   Wt: 59.7 kg (131 lb 9.8 oz)    Admission Cmt: None   Principal Problem: Altered mental status [R41.82]                   Active Insurance as of 9/30/2023       Primary Coverage       Payor Plan Insurance Group Employer/Plan Group    HUMANA MEDICARE REPLACEMENT HUMANA MEDICARE REPLACEMENT [id number]       Payor Plan Address Payor Plan Phone Number Payor Plan Fax Number Effective Dates    [address] [address] [phone]  6/1/2022 - None Entered    [facility] [id number]         Subscriber Name Subscriber Birth Date Member ID       KERLINE PARKER 1955 [id number]                     Emergency Contacts        (Rel.) Home Phone Work Phone Mobile Phone    Ana Parker (Spouse) -- -- [phone]                "

## 2023-10-07 NOTE — OUTREACH NOTE
Prep Survey      Flowsheet Row Responses   [facility] patient discharged from? [place]   Is LACE score < 7 ? No   Eligibility Readm Mgmt   Discharge diagnosis Altered mental status   Does the patient have one of the following disease processes/diagnoses(primary or secondary)? Other   Does the patient have Home health ordered? Yes   What is the Home health agency?  [facility]   Is there a DME ordered? No   Prep survey completed? Yes            OLU RODRIGUEZ - Registered Nurse

## 2023-10-09 ENCOUNTER — HOME HEALTH ADMISSION (OUTPATIENT)
Dept: HOME HEALTH SERVICES | Facility: HOME HEALTHCARE | Age: 68
End: 2023-10-09
Payer: MEDICARE

## 2023-10-09 ENCOUNTER — HOSPITAL ENCOUNTER (OUTPATIENT)
Facility: HOSPITAL | Age: 68
Discharge: HOME | End: 2023-10-09
Admitting: INTERNAL MEDICINE
Payer: MEDICARE

## 2023-10-09 PROCEDURE — 25510000001 IOPAMIDOL 61 % SOLUTION: Performed by: INTERNAL MEDICINE

## 2023-10-09 PROCEDURE — 71260 CT THORAX DX C+: CPT

## 2023-10-09 RX ADMIN — IOPAMIDOL 100 ML: 612 INJECTION, SOLUTION INTRAVENOUS at 14:33

## 2023-10-10 ENCOUNTER — READMISSION MANAGEMENT (OUTPATIENT)
Dept: CALL CENTER | Facility: HOSPITAL | Age: 68
End: 2023-10-10
Payer: MEDICARE

## 2023-10-10 NOTE — OUTREACH NOTE
Medical Week 1 Survey      Flowsheet Row Responses   [facility] patient discharged from? [place]   Does the patient have one of the following disease processes/diagnoses(primary or secondary)? Other   Week 1 attempt successful? No   Unsuccessful attempts Attempt 1            Naty EISENBERG - Licensed Nurse

## 2023-10-13 ENCOUNTER — TELEPHONE (OUTPATIENT)
Dept: NEUROLOGY | Facility: CLINIC | Age: 68
End: 2023-10-13

## 2023-10-13 ENCOUNTER — READMISSION MANAGEMENT (OUTPATIENT)
Dept: CALL CENTER | Facility: HOSPITAL | Age: 68
End: 2023-10-13
Payer: MEDICARE

## 2023-10-13 NOTE — TELEPHONE ENCOUNTER
Caller: RICHARDSON    Relationship to patient: WIFE    Best call back number: [phone]     New or established patient?  [x] New  [] Established    Date of discharge: 10/06/23    Facility discharged from:[facility]    Diagnosis/Symptoms: Altered mental status     Length of stay (If applicable): 6 DAYS    Specialty Only: Did you see a [facility] health provider?    [x] Yes  [] No  If so, who? Coby Miller APRN     PT WIFE CALLED TO SCHEDULE HOSPITAL FOLLOW UP FOR PT. PT IS NEEDING TO BE SEEN AFTER NEUROPHYSIOLOGY APPT WHICH IS ON 10/16/23    PLEASE REVIEW AND ADVISE.  THANK  YOU

## 2023-10-13 NOTE — OUTREACH NOTE
Medical Week 1 Survey      Flowsheet Row Responses   [facility] patient discharged from? [place]   Does the patient have one of the following disease processes/diagnoses(primary or secondary)? Other   Week 1 attempt successful? No   Unsuccessful attempts Attempt 2            Natali FRYE - Registered Nurse

## 2023-10-18 ENCOUNTER — READMISSION MANAGEMENT (OUTPATIENT)
Dept: CALL CENTER | Facility: HOSPITAL | Age: 68
End: 2023-10-18
Payer: MEDICARE

## 2023-10-18 NOTE — OUTREACH NOTE
Medical Week 1 Survey      Flowsheet Row Responses   [facility] patient discharged from? [place]   Does the patient have one of the following disease processes/diagnoses(primary or secondary)? Other   Week 1 attempt successful? No   Unsuccessful attempts Attempt 3  [All numbers listed were attempted-no answer]            Laura H - Registered Nurse

## 2023-10-24 NOTE — TELEPHONE ENCOUNTER
Caller: Ana Choi    Relationship: Emergency Contact    Best call back number: [phone]    What is the best time to reach you: ANY    Who are you requesting to speak with (clinical staff, provider,  specific staff member): ANY    What was the call regarding: HAD NEUROPSYCH TESTING DONE & WILL GET RESULTS 11/6/23. PLEASE CALL TO SCHEDULE, THANK YOU.   Composite Graft Text: The defect edges were debeveled with a #15 scalpel blade.  Given the location of the defect, shape of the defect, the proximity to free margins and the fact the defect was full thickness a composite graft was deemed most appropriate.  The defect was outline and then transferred to the donor site.  A full thickness graft was then excised from the donor site. The graft was then placed in the primary defect, oriented appropriately and then sutured into place.  The secondary defect was then repaired using a primary closure.

## 2023-11-03 ENCOUNTER — OFFICE VISIT (OUTPATIENT)
Dept: NEUROLOGY | Age: 68
End: 2023-11-03

## 2023-11-03 VITALS
BODY MASS INDEX: 36.19 KG/M2 | SYSTOLIC BLOOD PRESSURE: 119 MMHG | WEIGHT: 212 LBS | DIASTOLIC BLOOD PRESSURE: 71 MMHG | HEART RATE: 69 BPM | HEIGHT: 64 IN

## 2023-11-03 DIAGNOSIS — I67.2 CEREBRAL ATHEROSCLEROSIS: Primary | ICD-10-CM

## 2023-11-03 PROCEDURE — 99202 OFFICE O/P NEW SF 15 MIN: CPT | Performed by: STUDENT IN AN ORGANIZED HEALTH CARE EDUCATION/TRAINING PROGRAM

## 2023-11-08 DIAGNOSIS — I10 ESSENTIAL HYPERTENSION: ICD-10-CM

## 2023-11-08 RX ORDER — HYDROCHLOROTHIAZIDE 25 MG/1
25 TABLET ORAL DAILY
Qty: 90 TABLET | Refills: 2 | Status: SHIPPED | OUTPATIENT
Start: 2023-11-08

## 2023-11-16 ENCOUNTER — CLINICAL DOCUMENTATION (OUTPATIENT)
Dept: POST ACUTE CARE | Age: 68
End: 2023-11-16

## 2023-11-16 ENCOUNTER — TELEPHONE (OUTPATIENT)
Dept: OTHER | Age: 68
End: 2023-11-16

## 2023-11-16 PROBLEM — E66.812 CLASS 2 OBESITY DUE TO EXCESS CALORIES WITHOUT SERIOUS COMORBIDITY WITH BODY MASS INDEX (BMI) OF 36.0 TO 36.9 IN ADULT: Status: ACTIVE | Noted: 2023-11-16

## 2023-11-16 PROBLEM — E66.09 CLASS 2 OBESITY DUE TO EXCESS CALORIES WITHOUT SERIOUS COMORBIDITY WITH BODY MASS INDEX (BMI) OF 36.0 TO 36.9 IN ADULT: Status: ACTIVE | Noted: 2023-11-16

## 2023-11-16 PROBLEM — R03.0 ELEVATED BLOOD PRESSURE READING: Status: RESOLVED | Noted: 2017-06-12 | Resolved: 2023-11-16

## 2023-11-16 PROBLEM — E66.01 CLASS 2 SEVERE OBESITY DUE TO EXCESS CALORIES WITH SERIOUS COMORBIDITY AND BODY MASS INDEX (BMI) OF 36.0 TO 36.9 IN ADULT (CMD): Status: ACTIVE | Noted: 2023-11-16

## 2023-11-16 PROBLEM — I65.29 CAROTID STENOSIS: Status: ACTIVE | Noted: 2023-11-16

## 2023-11-16 PROBLEM — I10 ESSENTIAL HYPERTENSION, BENIGN: Status: ACTIVE | Noted: 2023-11-16

## 2023-11-20 ENCOUNTER — HOME/GROUP HOME VISIT (OUTPATIENT)
Dept: POST ACUTE CARE | Age: 68
End: 2023-11-20

## 2023-11-20 VITALS
DIASTOLIC BLOOD PRESSURE: 70 MMHG | HEART RATE: 85 BPM | SYSTOLIC BLOOD PRESSURE: 130 MMHG | RESPIRATION RATE: 18 BRPM | OXYGEN SATURATION: 96 % | TEMPERATURE: 98 F

## 2023-11-20 DIAGNOSIS — E66.01 CLASS 2 SEVERE OBESITY DUE TO EXCESS CALORIES WITH SERIOUS COMORBIDITY AND BODY MASS INDEX (BMI) OF 36.0 TO 36.9 IN ADULT (CMD): ICD-10-CM

## 2023-11-20 DIAGNOSIS — K21.9 GASTROESOPHAGEAL REFLUX DISEASE, UNSPECIFIED WHETHER ESOPHAGITIS PRESENT: ICD-10-CM

## 2023-11-20 DIAGNOSIS — I10 ESSENTIAL HYPERTENSION, BENIGN: ICD-10-CM

## 2023-11-20 DIAGNOSIS — I65.23 BILATERAL CAROTID ARTERY STENOSIS: Primary | ICD-10-CM

## 2023-11-20 DIAGNOSIS — E78.5 HYPERLIPIDEMIA, UNSPECIFIED HYPERLIPIDEMIA TYPE: ICD-10-CM

## 2023-11-20 PROCEDURE — 1170F FXNL STATUS ASSESSED: CPT | Performed by: NURSE PRACTITIONER

## 2023-11-20 PROCEDURE — 1160F RVW MEDS BY RX/DR IN RCRD: CPT | Performed by: NURSE PRACTITIONER

## 2023-11-20 PROCEDURE — 99350 HOME/RES VST EST HIGH MDM 60: CPT | Performed by: NURSE PRACTITIONER

## 2023-11-20 PROCEDURE — 1126F AMNT PAIN NOTED NONE PRSNT: CPT | Performed by: NURSE PRACTITIONER

## 2023-11-20 SDOH — HEALTH STABILITY: MENTAL HEALTH: DEPRESSION SCREENING SCORE: 0

## 2023-11-20 SDOH — ECONOMIC STABILITY: TRANSPORTATION INSECURITY
IN THE PAST 12 MONTHS, HAS LACK OF TRANSPORTATION KEPT YOU FROM MEETINGS, WORK, OR FROM GETTING THINGS NEEDED FOR DAILY LIVING?: NO

## 2023-11-20 SDOH — SOCIAL STABILITY: SOCIAL NETWORK
HOW OFTEN DO YOU SEE OR TALK TO PEOPLE THAT YOU CARE ABOUT AND FEEL CLOSE TO? (FOR EXAMPLE: TALKING TO FRIENDS ON THE PHONE, VISITING FRIENDS OR FAMILY, GOING TO CHURCH OR CLUB MEETINGS): 5 OR MORE TIMES A WEEK

## 2023-11-20 SDOH — HEALTH STABILITY: MENTAL HEALTH: HOW OFTEN DO YOU HAVE 6 OR MORE DRINKS ON ONE OCCASION?: NEVER

## 2023-11-20 SDOH — HEALTH STABILITY: PHYSICAL HEALTH: ON AVERAGE, HOW MANY DAYS PER WEEK DO YOU ENGAGE IN MODERATE TO STRENUOUS EXERCISE (LIKE A BRISK WALK)?: 7 DAYS

## 2023-11-20 SDOH — HEALTH STABILITY: MENTAL HEALTH: AUDIT TOTAL SCORE: 3

## 2023-11-20 SDOH — HEALTH STABILITY: MENTAL HEALTH: HOW MANY STANDARD DRINKS CONTAINING ALCOHOL DO YOU HAVE ON A TYPICAL DAY?: 3 OR 4

## 2023-11-20 SDOH — HEALTH STABILITY: MENTAL HEALTH: HOW OFTEN DO YOU HAVE A DRINK CONTAINING ALCOHOL?: 2 TO 4 TIMES PER MONTH

## 2023-11-20 SDOH — ECONOMIC STABILITY: TRANSPORTATION INSECURITY
IN THE PAST 12 MONTHS, HAS THE LACK OF TRANSPORTATION KEPT YOU FROM MEDICAL APPOINTMENTS OR FROM GETTING MEDICATIONS?: NO

## 2023-11-20 SDOH — ECONOMIC STABILITY: HOUSING INSECURITY: ARE YOU WORRIED ABOUT LOSING YOUR HOUSING?: NO

## 2023-11-20 SDOH — HEALTH STABILITY: MENTAL HEALTH: PHQ2 INTERPRETATION: NO FURTHER SCREENING NEEDED

## 2023-11-20 SDOH — ECONOMIC STABILITY: FOOD INSECURITY: HOW OFTEN IN THE PAST 12 MONTHS WERE YOU WORRIED OR STRESSED ABOUT HAVING ENOUGH MONEY TO BUY NUTRITIOUS MEALS?: NEVER

## 2023-11-20 SDOH — HEALTH STABILITY: PHYSICAL HEALTH: ON AVERAGE, HOW MANY MINUTES DO YOU ENGAGE IN EXERCISE AT THIS LEVEL?: 30 MIN

## 2023-11-20 SDOH — HEALTH STABILITY: MENTAL HEALTH: FEELING DOWN, DEPRESSED OR HOPELESS?: NOT AT ALL

## 2023-11-20 SDOH — HEALTH STABILITY: MENTAL HEALTH: LITTLE INTEREST OR PLEASURE IN ACTIVITY?: NOT AT ALL

## 2023-11-20 SDOH — ECONOMIC STABILITY: GENERAL

## 2023-11-20 ASSESSMENT — ENCOUNTER SYMPTOMS
HEMATOLOGIC/LYMPHATIC NEGATIVE: 1
ALLERGIC/IMMUNOLOGIC NEGATIVE: 1
PSYCHIATRIC NEGATIVE: 1
NEUROLOGICAL NEGATIVE: 1
GASTROINTESTINAL NEGATIVE: 1
ENDOCRINE NEGATIVE: 1
RESPIRATORY NEGATIVE: 1
EYES NEGATIVE: 1

## 2023-11-20 ASSESSMENT — ACTIVITIES OF DAILY LIVING (ADL)
ADL_SHORT_OF_BREATH: NO
ADL_SCORE: 12
RECENT_DECLINE_ADL: NO
NEEDS_ASSIST: NO
ADL_BEFORE_ADMISSION: INDEPENDENT

## 2023-11-20 ASSESSMENT — FRAILTY ASSESSMENTS
HAVE YOU LOST MORE THAN 5 PERCENT OF YOUR WEIGHT IN THE PAST YEAR: NO
HAVE YOU FELT FATIGUED? MOST OR ALL OF THE TIME OVER THE PAST MONTH: NO
DO YOU HAVE DIFFICULTY CLIMBING A FLIGHT OF STAIRS: NO
DO YOU HAVE DIFFICULTY WALKING ONE BLOCK: NO
DO YOU HAVE ANY OF THESE ILLNESSES: HYPERTENSION, DIABETES, CANCER (OTHER THAN A MINOR SKIN CANCER), CHRONIC LUNG DISEASE, HEART ATTACK, CONGESTIVE HEART FAILURE, ANGINA, ASTHMA, ARTHRITIS, STROKE, AND KIDNEY DISEASE: NO
IS THERE EVIDENCE OF AN ACUTE CHANGE IN MENTAL STATUS FROM THE PATIENT'S BASELINE: NO
FRAILTY SCALE TOTAL SCORE: 0

## 2023-11-20 ASSESSMENT — PAIN SCALES - GENERAL: PAINLEVEL: 0

## 2023-11-20 ASSESSMENT — PATIENT HEALTH QUESTIONNAIRE - PHQ9: SUM OF ALL RESPONSES TO PHQ9 QUESTIONS 1 AND 2: 0

## 2023-11-29 ENCOUNTER — APPOINTMENT (OUTPATIENT)
Dept: INTERNAL MEDICINE | Age: 68
End: 2023-11-29
Attending: NURSE PRACTITIONER

## 2023-12-12 ENCOUNTER — OFFICE VISIT (OUTPATIENT)
Dept: INTERNAL MEDICINE | Age: 68
End: 2023-12-12
Attending: INTERNAL MEDICINE

## 2023-12-12 ENCOUNTER — TELEPHONE (OUTPATIENT)
Dept: OPHTHALMOLOGY | Age: 68
End: 2023-12-12

## 2023-12-12 VITALS
SYSTOLIC BLOOD PRESSURE: 116 MMHG | WEIGHT: 206 LBS | RESPIRATION RATE: 14 BRPM | BODY MASS INDEX: 35.17 KG/M2 | HEIGHT: 64 IN | DIASTOLIC BLOOD PRESSURE: 61 MMHG | HEART RATE: 83 BPM | TEMPERATURE: 97 F | OXYGEN SATURATION: 96 %

## 2023-12-12 DIAGNOSIS — N52.9 ERECTILE DYSFUNCTION, UNSPECIFIED ERECTILE DYSFUNCTION TYPE: ICD-10-CM

## 2023-12-12 DIAGNOSIS — Z23 IMMUNIZATION DUE: ICD-10-CM

## 2023-12-12 DIAGNOSIS — E78.5 DYSLIPIDEMIA: ICD-10-CM

## 2023-12-12 DIAGNOSIS — R73.03 BORDERLINE DIABETES: ICD-10-CM

## 2023-12-12 DIAGNOSIS — I10 ESSENTIAL HYPERTENSION: Primary | ICD-10-CM

## 2023-12-12 DIAGNOSIS — I65.23 BILATERAL CAROTID ARTERY STENOSIS: ICD-10-CM

## 2023-12-12 PROCEDURE — 90471 IMMUNIZATION ADMIN: CPT | Performed by: NURSE PRACTITIONER

## 2023-12-12 PROCEDURE — 90662 IIV NO PRSV INCREASED AG IM: CPT | Performed by: NURSE PRACTITIONER

## 2023-12-12 PROCEDURE — 99214 OFFICE O/P EST MOD 30 MIN: CPT | Performed by: NURSE PRACTITIONER

## 2023-12-12 PROCEDURE — 10002800 HB RX 250 W HCPCS: Performed by: NURSE PRACTITIONER

## 2023-12-12 RX ORDER — SILDENAFIL 50 MG/1
50 TABLET, FILM COATED ORAL DAILY PRN
Qty: 10 TABLET | Refills: 2 | Status: SHIPPED | OUTPATIENT
Start: 2023-12-12

## 2023-12-12 RX ADMIN — INFLUENZA A VIRUS A/VICTORIA/4897/2022 IVR-238 (H1N1) ANTIGEN (FORMALDEHYDE INACTIVATED), INFLUENZA A VIRUS A/DARWIN/9/2021 SAN-010 (H3N2) ANTIGEN (FORMALDEHYDE INACTIVATED), INFLUENZA B VIRUS B/PHUKET/3073/2013 ANTIGEN (FORMALDEHYDE INACTIVATED), AND INFLUENZA B VIRUS B/MICHIGAN/01/2021 ANTIGEN (FORMALDEHYDE INACTIVATED) 0.7 ML: 60; 60; 60; 60 INJECTION, SUSPENSION INTRAMUSCULAR at 11:47

## 2023-12-21 ENCOUNTER — OFFICE VISIT (OUTPATIENT)
Dept: NEUROLOGY | Facility: CLINIC | Age: 68
End: 2023-12-21
Payer: MEDICARE

## 2023-12-21 VITALS
OXYGEN SATURATION: 98 % | HEIGHT: 69 IN | BODY MASS INDEX: 18.81 KG/M2 | WEIGHT: 127 LBS | SYSTOLIC BLOOD PRESSURE: 106 MMHG | DIASTOLIC BLOOD PRESSURE: 60 MMHG | HEART RATE: 76 BPM

## 2023-12-21 DIAGNOSIS — R41.3 MEMORY IMPAIRMENT: Primary | ICD-10-CM

## 2023-12-21 RX ORDER — DONEPEZIL HYDROCHLORIDE 5 MG/1
5 TABLET, FILM COATED ORAL
COMMUNITY
Start: 2023-11-24

## 2023-12-21 NOTE — ASSESSMENT & PLAN NOTE
68-year-old man with past medical history of depression, anxiety, alcohol dependence, hyperlipidemia, hypertension, peripheral neuropathy, COPD who presents to neurology clinic with his wife, Elysia Choi, for initial evaluation by myself and follow up of recent hospitalization.  On review of records she has been evaluated in the past by Dr. Reveles for concerns of dementia vs delerium which were ultimately thought to be due to his multiple psychiatric issues and pseudodementia.  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  History of multiple episodes where he presented with confusion which resolves spontaneously.  On multiple psychiatric medications including Remeron, Latuda, flumazenil, Antabuse, BuSpar.  There has been concern for extraparametal side effects in the past as well so on gabapentin.  Of note he had a more recent formal neuropsychology testing on 10/16/2023 with diagnosis of major depressive disorder and vascular dementia but he thinks that this testing maybe incorrect as he has had this testing in the past which was not consistent with dementia and he does not think he did the test appropriately and answered the last multiple questions without reading the question.  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  He had a neuro work-up including brain MRI without acute findings and a normal 8-hour video EEG.  He also had a EMG which showed absent motor responses in the lower extremities consistent with a severe diffuse polyneuropathy.  He presented to the hospital after having been found in a storage facility with bizarre behavior.   Initial work-up without abnormalities, head CT without acute findings, alcohol level less than 10. Seen by psychiatry who notes that this presentation is similar to previous presentations.  Latuda was recently discontinued due to EPS and Abilify was started.  He was also placed on a CIWA protocol and treated with high-dose thiamine 500 mg IV every 8 hours for 3 days, followed by 200 mg every 8 hours for 2 doses.  Is no longer on thiamine IV apparently due to pulling out his IV.  He had a more recent brain MRI scan done in the hospital on 10/4/2023 which I reviewed the images independently on his visit today and does not demonstrate any acute intracranial abnormalities and mild at best nonspecific white matter changes.  He had mentation changes in the hospital and had a EEG performed which demonstrated mild slowing.  He was started on donepezil 5 mg daily by his psychiatrist who they tell me thinks he had pseudodementia contrary to the most recent testing results.  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  His wife takes care of the finances.  He does not do much cooking.  They have functioning smoke detectors at home.  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  Will follow up after this testing is completed. I advised him to exercise and socialize.

## 2023-12-21 NOTE — PROGRESS NOTES
Chief Complaint  Dementia/delerium/pseudodementia    Subjective          Stanislav Choi presents to [facility] NEUROLOGY for   HISTORY OF PRESENT ILLNESS:    Stanislav Choi is a 68-year-old man with past medical history of depression, anxiety, alcohol dependence, hyperlipidemia, hypertension, peripheral neuropathy, COPD who presents to neurology clinic with his wife, Elysia Choi, for initial evaluation by myself and follow up of recent hospitalization.  On review of records she has been evaluated in the past by Dr. Reveles for concerns of dementia vs delerium which were ultimately thought to be due to his multiple psychiatric issues and pseudodementia.  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  History of multiple episodes where he presented with confusion which resolves spontaneously.  On multiple psychiatric medications including Remeron, Latuda, flumazenil, Antabuse, BuSpar.  There has been concern for extraparametal side effects in the past as well so on gabapentin.  Of note he had a more recent formal neuropsychology testing on 10/16/2023 with diagnosis of major depressive disorder and vascular dementia but he thinks that this testing maybe incorrect as he has had this testing in the past which was not consistent with dementia and he does not think he did the test appropriately and answered the last multiple questions without reading the question.  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  He had a neuro work-up including brain MRI without acute findings and a normal 8-hour video EEG.  He also had a EMG which showed absent  motor responses in the lower extremities consistent with a severe diffuse polyneuropathy.  He presented to the hospital after having been found in a storage facility with bizarre behavior.  Initial work-up without abnormalities, head CT without acute findings, alcohol level less than 10. Seen by psychiatry who notes that this presentation is similar to previous presentations.  Latuda was recently discontinued due to EPS and Abilify was started.  He was also placed on a CIWA protocol and treated with high-dose thiamine 500 mg IV every 8 hours for 3 days, followed by 200 mg every 8 hours for 2 doses.  Is no longer on thiamine IV apparently due to pulling out his IV.  He had a more recent brain MRI scan done in the hospital on 10/4/2023 which I reviewed the images independently on his visit today and does not demonstrate any acute intracranial abnormalities and mild at best nonspecific white matter changes.  He had mentation changes in the hospital and had a EEG performed which demonstrated mild slowing.  He was started on donepezil 5 mg daily by his psychiatrist who they tell me thinks he had pseudodementia contrary to the most recent testing results.  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  His wife takes care of the finances.  He does not do much cooking.  They have functioning smoke detectors at home.     Past Medical History:   Diagnosis Date    Anxiety     Chronic pain disorder     COPD (chronic obstructive pulmonary disease)     Depression     Elevated cholesterol     HL (hearing loss)     Hyperlipidemia     Hypertension     Memory loss     Peripheral neuropathy     Shingles         Family History   Problem Relation Age of Onset    Cancer Mother     Hypertension Mother     Cancer Father     Heart disease Father     Cancer Brother     Cancer Maternal Grandmother     Cancer Maternal Grandfather     Heart disease Paternal Grandfather         Social History     Socioeconomic  "History    Marital status:    Tobacco Use    Smoking status: Every Day     Packs/day: 1.00     Years: 40.00     Additional pack years: 0.00     Total pack years: 40.00     Types: Cigarettes    Smokeless tobacco: Never   Vaping Use    Vaping Use: Never used   Substance and Sexual Activity    Alcohol use: Not Currently     Comment: Hx ETOH abuse    Drug use: No    Sexual activity: Defer        I have reviewed and confirmed the accuracy of the ROS as documented by the MA/LPN/RN Chencho Hawthorne MD   Review of Systems   Neurological:  Positive for weakness, numbness, memory problem and confusion. Negative for dizziness, tremors, seizures, syncope, facial asymmetry, speech difficulty, light-headedness and headache.   Psychiatric/Behavioral:  Positive for agitation and behavioral problems. Negative for decreased concentration, dysphoric mood, hallucinations, self-injury, suicidal ideas, negative for hyperactivity, depressed mood and stress. The patient is nervous/anxious.         Objective   Vital Signs:   /60   Pulse 76   Ht 175.3 cm (69.02\")   Wt 57.6 kg (127 lb)   SpO2 98%   BMI 18.75 kg/m²       PHYSICAL EXAM:    General   Mental Status - Alert. General Appearance - Well developed, Well groomed, Oriented and Cooperative. Orientation - Oriented X3.       Head and Neck  Head - normocephalic, atraumatic with no lesions or palpable masses.  Neck    Global Assessment - supple.       Eye   Sclera/Conjunctiva - Bilateral - Normal.    ENMT  Mouth and Throat   Oral Cavity/Oropharynx: Oropharynx - the soft palate,uvula and tongue are normal in appearance.    Chest and Lung Exam   Chest - lung clear to auscultation bilaterally.    Cardiovascular   Cardiovascular examination reveals  - normal heart sounds, regular rate and rhythm.    Neurologic   Mental Status: Speech - Normal. Cognitive function - SLUMS 22/30 with 3/5 object recall today.  Cranial Nerves:   II Optic: Visual acuity - Left - Normal. Right - " Normal. Visual fields - Normal (to confrontation).  III Oculomotor: Pupillary constriction - Left - Normal. Right - Normal.  VII Facial: - Normal Bilaterally.   IX Glossopharyngeal / X Vagus - Normal.  XI Accessory: Trapezius - Bilateral - Normal. Sternocleidomastoid - Bilateral - Normal.  XII Hypoglossal - Bilateral - Normal.  Eye Movements: - Normal Bilaterally.  Sensory:   Light Touch: Intact - Globally.  Motor:   Bulk and Contour: - Normal.  Tone: - Normal.  Tremor: Not present.  Strength: 5/5 normal muscle strength - All Muscles.   General Assessment of Reflexes: - deep tendon reflexes are normal. Coordination - No Impairment of finger-to-nose or Impairment of rapid alternating movements. Gait - Normal.       Result Review :                 Assessment and Plan    Problem List Items Addressed This Visit          Neuro    Memory impairment - Primary    Current Assessment & Plan     68-year-old man with past medical history of depression, anxiety, alcohol dependence, hyperlipidemia, hypertension, peripheral neuropathy, COPD who presents to neurology clinic with his wife, Elysia Choi, for initial evaluation by myself and follow up of recent hospitalization.  On review of records she has been evaluated in the past by Dr. Reveles for concerns of dementia vs delerium which were ultimately thought to be due to his multiple psychiatric issues and pseudodementia.  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  History of multiple episodes where he presented with confusion which resolves spontaneously.  On multiple psychiatric medications including Remeron, Latuda, flumazenil, Antabuse, BuSpar.  There has been concern for extraparametal side effects in the past as well so on gabapentin.  Of note he had a more recent formal neuropsychology testing on 10/16/2023 with diagnosis of major  depressive disorder and vascular dementia but he thinks that this testing maybe incorrect as he has had this testing in the past which was not consistent with dementia and he does not think he did the test appropriately and answered the last multiple questions without reading the question.  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  He had a neuro work-up including brain MRI without acute findings and a normal 8-hour video EEG.  He also had a EMG which showed absent motor responses in the lower extremities consistent with a severe diffuse polyneuropathy.  He presented to the hospital after having been found in a storage facility with bizarre behavior.  Initial work-up without abnormalities, head CT without acute findings, alcohol level less than 10. Seen by psychiatry who notes that this presentation is similar to previous presentations.  Latuda was recently discontinued due to EPS and Abilify was started.  He was also placed on a CIWA protocol and treated with high-dose thiamine 500 mg IV every 8 hours for 3 days, followed by 200 mg every 8 hours for 2 doses.  Is no longer on thiamine IV apparently due to pulling out his IV.  He had a more recent brain MRI scan done in the hospital on 10/4/2023 which I reviewed the images independently on his visit today and does not demonstrate any acute intracranial abnormalities and mild at best nonspecific white matter changes.  He had mentation changes in the hospital and had a EEG performed which demonstrated mild slowing.  He was started on donepezil 5 mg daily by his psychiatrist who they tell me thinks he had pseudodementia contrary to the most recent testing results.  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  His wife takes care of the finances.  He does not do much cooking.  They have functioning smoke detectors at home.  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  Will follow up after this testing is completed. I advised him to exercise and socialize.           Relevant Orders    Ambulatory Referral to Neuropsychology (Completed)       I spent 50 minutes caring for Stanislav on this date of service. This time includes time spent by me in the following activities:preparing for the visit, reviewing tests, obtaining and/or reviewing a separately obtained history, performing a medically appropriate examination and/or evaluation , counseling and educating the patient/family/caregiver, ordering medications, tests, or procedures, documenting information in the medical record, independently interpreting results and communicating that information with the patient/family/caregiver, and care coordination    Follow Up   No follow-ups on file.  Patient was given instructions and counseling regarding his condition or for health maintenance advice. Please see specific information pulled into the AVS if appropriate.

## 2024-01-05 VITALS
HEART RATE: 75 BPM | OXYGEN SATURATION: 99 % | HEART RATE: 74 BPM | RESPIRATION RATE: 13 BRPM | SYSTOLIC BLOOD PRESSURE: 106 MMHG | DIASTOLIC BLOOD PRESSURE: 60 MMHG | SYSTOLIC BLOOD PRESSURE: 114 MMHG | HEART RATE: 69 BPM | TEMPERATURE: 97 F | DIASTOLIC BLOOD PRESSURE: 57 MMHG | TEMPERATURE: 96.8 F | DIASTOLIC BLOOD PRESSURE: 63 MMHG | SYSTOLIC BLOOD PRESSURE: 124 MMHG | SYSTOLIC BLOOD PRESSURE: 108 MMHG | OXYGEN SATURATION: 92 % | DIASTOLIC BLOOD PRESSURE: 68 MMHG | SYSTOLIC BLOOD PRESSURE: 102 MMHG | SYSTOLIC BLOOD PRESSURE: 99 MMHG | HEART RATE: 71 BPM | OXYGEN SATURATION: 97 % | HEART RATE: 68 BPM | WEIGHT: 130 LBS | RESPIRATION RATE: 18 BRPM | RESPIRATION RATE: 17 BRPM | HEART RATE: 70 BPM | HEIGHT: 69 IN | DIASTOLIC BLOOD PRESSURE: 69 MMHG | RESPIRATION RATE: 19 BRPM | SYSTOLIC BLOOD PRESSURE: 117 MMHG | HEART RATE: 67 BPM | RESPIRATION RATE: 6 BRPM | HEART RATE: 66 BPM | DIASTOLIC BLOOD PRESSURE: 67 MMHG | DIASTOLIC BLOOD PRESSURE: 66 MMHG | DIASTOLIC BLOOD PRESSURE: 58 MMHG | OXYGEN SATURATION: 98 % | RESPIRATION RATE: 10 BRPM | SYSTOLIC BLOOD PRESSURE: 118 MMHG | SYSTOLIC BLOOD PRESSURE: 105 MMHG | RESPIRATION RATE: 16 BRPM | DIASTOLIC BLOOD PRESSURE: 62 MMHG | HEART RATE: 60 BPM | OXYGEN SATURATION: 100 % | SYSTOLIC BLOOD PRESSURE: 113 MMHG | DIASTOLIC BLOOD PRESSURE: 70 MMHG | DIASTOLIC BLOOD PRESSURE: 64 MMHG | RESPIRATION RATE: 14 BRPM | SYSTOLIC BLOOD PRESSURE: 100 MMHG | DIASTOLIC BLOOD PRESSURE: 59 MMHG

## 2024-01-08 PROBLEM — Z86.010 PERSONAL HISTORY OF COLONIC POLYPS: Status: ACTIVE | Noted: 2024-01-09

## 2024-01-08 PROBLEM — Z86.010 SURVEILLANCE DUE TO PRIOR COLONIC NEOPLASIA: Status: ACTIVE | Noted: 2024-01-09

## 2024-01-09 ENCOUNTER — OFFICE (AMBULATORY)
Dept: URBAN - METROPOLITAN AREA PATHOLOGY 4 | Facility: PATHOLOGY | Age: 69
End: 2024-01-09
Payer: MEDICARE

## 2024-01-09 ENCOUNTER — AMBULATORY SURGICAL CENTER (AMBULATORY)
Dept: URBAN - METROPOLITAN AREA SURGERY 17 | Facility: SURGERY | Age: 69
End: 2024-01-09
Payer: MEDICARE

## 2024-01-09 DIAGNOSIS — K57.30 DIVERTICULOSIS OF LARGE INTESTINE WITHOUT PERFORATION OR ABS: ICD-10-CM

## 2024-01-09 DIAGNOSIS — K62.1 RECTAL POLYP: ICD-10-CM

## 2024-01-09 DIAGNOSIS — Z09 ENCOUNTER FOR FOLLOW-UP EXAMINATION AFTER COMPLETED TREATMEN: ICD-10-CM

## 2024-01-09 DIAGNOSIS — Z86.010 PERSONAL HISTORY OF COLONIC POLYPS: ICD-10-CM

## 2024-01-09 PROBLEM — K63.5 POLYP OF COLON: Status: ACTIVE | Noted: 2024-01-09

## 2024-01-09 LAB
GI HISTOLOGY: A. UNSPECIFIED: (no result)
GI HISTOLOGY: PDF REPORT: (no result)

## 2024-01-09 PROCEDURE — 88305 TISSUE EXAM BY PATHOLOGIST: CPT | Performed by: INTERNAL MEDICINE

## 2024-01-09 PROCEDURE — 45385 COLONOSCOPY W/LESION REMOVAL: CPT | Mod: PT | Performed by: INTERNAL MEDICINE

## 2024-01-09 NOTE — SERVICEHPINOTES
February 27, 2023      Gabbie Ndiaye  [address] [place] [id number]        Dear Gabbie,       I am a clinic care coordinator who works with Cardiology at [facility]. I wanted to thank you for spending the time to talk with me.  Below is a description of clinic care coordination and how I can further assist you.      The clinic care coordinator is a registered nurse and/or  who understand the health care system. The goal of clinic care coordination is to help you manage your health and improve access to the [facility] in the most efficient manner. The registered nurse can assist you in meeting your health care goals by providing education, coordinating services, and strengthening the communication among your providers.   Please feel free to contact me at [phone] option #8, with any questions or concerns. We at [facility] are focused on providing you with the highest-quality healthcare experience possible and that all starts with you.      Sincerely,      Edenilson MAYNARD RN  Clinic Care Coordination      Enclosed: I have enclosed a copy of a Heart Failure Action Plan. Please keep this in an easy to access place to use as needed. Please review and call me with any questions.    If you need IMMEDIATE assistance, are having acute symptoms, or need something after clinic hours please call the triage line at [phone].     60-year-old gentleman without GI complaints.  I did a colonoscopy on him 7 years ago only had hyperplastic polyps so at that time the plan was a 10 year follow-up however based on current literature he may have had serrated polyp since he had several right-sided hyperplastic lesion so he presents for a follow-up colonoscopy.  Family history is negative for polyps or colon cancer.

## 2024-01-15 ENCOUNTER — TRANSCRIBE ORDERS (OUTPATIENT)
Dept: ADMINISTRATIVE | Facility: HOSPITAL | Age: 69
End: 2024-01-15
Payer: MEDICARE

## 2024-01-15 ENCOUNTER — LAB (OUTPATIENT)
Dept: LAB | Facility: HOSPITAL | Age: 69
End: 2024-01-15
Payer: MEDICARE

## 2024-01-15 ENCOUNTER — HOSPITAL ENCOUNTER (OUTPATIENT)
Dept: GENERAL RADIOLOGY | Facility: HOSPITAL | Age: 69
Discharge: HOME | End: 2024-01-15
Payer: MEDICARE

## 2024-01-15 DIAGNOSIS — R05.9 COUGH, UNSPECIFIED TYPE: ICD-10-CM

## 2024-01-15 DIAGNOSIS — R84.5: ICD-10-CM

## 2024-01-15 DIAGNOSIS — R05.9 COUGH, UNSPECIFIED TYPE: Primary | ICD-10-CM

## 2024-01-15 PROCEDURE — 71046 X-RAY EXAM CHEST 2 VIEWS: CPT

## 2024-01-15 PROCEDURE — 87070 CULTURE OTHR SPECIMN AEROBIC: CPT

## 2024-01-15 PROCEDURE — 87205 SMEAR GRAM STAIN: CPT

## 2024-01-17 LAB
BACTERIA SPEC RESP CULT: NORMAL
GRAM STN SPEC: NORMAL

## 2024-02-11 DIAGNOSIS — E78.5 DYSLIPIDEMIA: ICD-10-CM

## 2024-02-12 RX ORDER — ATORVASTATIN CALCIUM 20 MG/1
TABLET, FILM COATED ORAL
Qty: 90 TABLET | Refills: 1 | Status: SHIPPED | OUTPATIENT
Start: 2024-02-12

## 2024-02-13 ENCOUNTER — HOSPITAL ENCOUNTER (OUTPATIENT)
Dept: GENERAL RADIOLOGY | Facility: HOSPITAL | Age: 69
Discharge: HOME | End: 2024-02-13
Admitting: INTERNAL MEDICINE
Payer: MEDICARE

## 2024-02-13 ENCOUNTER — TRANSCRIBE ORDERS (OUTPATIENT)
Dept: GENERAL RADIOLOGY | Facility: HOSPITAL | Age: 69
End: 2024-02-13
Payer: MEDICARE

## 2024-02-13 DIAGNOSIS — J06.9 URI WITH COUGH AND CONGESTION: Primary | ICD-10-CM

## 2024-02-13 DIAGNOSIS — J06.9 URI WITH COUGH AND CONGESTION: ICD-10-CM

## 2024-02-13 PROCEDURE — 71046 X-RAY EXAM CHEST 2 VIEWS: CPT

## 2024-02-16 ENCOUNTER — TELEPHONE (OUTPATIENT)
Dept: INTERNAL MEDICINE | Age: 69
End: 2024-02-16

## 2024-02-16 DIAGNOSIS — N52.9 ERECTILE DYSFUNCTION, UNSPECIFIED ERECTILE DYSFUNCTION TYPE: ICD-10-CM

## 2024-02-16 RX ORDER — SILDENAFIL 50 MG/1
50 TABLET, FILM COATED ORAL DAILY PRN
Qty: 10 TABLET | Refills: 2 | Status: SHIPPED | OUTPATIENT
Start: 2024-02-16

## 2024-03-18 ENCOUNTER — OFFICE VISIT (OUTPATIENT)
Dept: OPHTHALMOLOGY | Age: 69
End: 2024-03-18

## 2024-03-18 DIAGNOSIS — H40.003 GLAUCOMA SUSPECT OF BOTH EYES: Primary | ICD-10-CM

## 2024-03-18 DIAGNOSIS — H43.391 VITREOUS FLOATERS OF RIGHT EYE: ICD-10-CM

## 2024-03-18 DIAGNOSIS — H52.13 MYOPIA OF BOTH EYES WITH REGULAR ASTIGMATISM AND PRESBYOPIA: ICD-10-CM

## 2024-03-18 DIAGNOSIS — H52.4 MYOPIA OF BOTH EYES WITH REGULAR ASTIGMATISM AND PRESBYOPIA: ICD-10-CM

## 2024-03-18 DIAGNOSIS — H25.13 NUCLEAR SENILE CATARACT OF BOTH EYES: ICD-10-CM

## 2024-03-18 DIAGNOSIS — H52.223 MYOPIA OF BOTH EYES WITH REGULAR ASTIGMATISM AND PRESBYOPIA: ICD-10-CM

## 2024-03-18 PROCEDURE — 92015 DETERMINE REFRACTIVE STATE: CPT | Performed by: OPTOMETRIST

## 2024-03-18 PROCEDURE — 92014 COMPRE OPH EXAM EST PT 1/>: CPT | Performed by: OPTOMETRIST

## 2024-03-18 PROCEDURE — 92133 CPTRZD OPH DX IMG PST SGM ON: CPT | Performed by: OPTOMETRIST

## 2024-03-18 ASSESSMENT — REFRACTION
OD_ADD: +2.25
OD_AXIS: 080
OS_CYLINDER: +1.00
OS_SPHERE: -1.00
OS_AXIS: 095
OD_SPHERE: -1.00
OD_CYLINDER: +1.25
OS_ADD: +2.25

## 2024-03-18 ASSESSMENT — CUP TO DISC RATIO
OS_RATIO: 0.65
OD_RATIO: 0.7

## 2024-03-18 ASSESSMENT — VISUAL ACUITY
METHOD: SNELLEN - LINEAR
OS_SC: JAEGER 7
OS_SC: 20/30
OD_SC: JAEGER 3 -1
OD_PH_SC: 20/30
OD_SC: 20/40
OD_SC+: -1

## 2024-03-18 ASSESSMENT — CONF VISUAL FIELD
OD_INFERIOR_TEMPORAL_RESTRICTION: 0
OD_NORMAL: 1
METHOD: COUNTING FINGERS
OD_INFERIOR_NASAL_RESTRICTION: 0
OD_SUPERIOR_NASAL_RESTRICTION: 0
OS_SUPERIOR_NASAL_RESTRICTION: 0
OS_INFERIOR_TEMPORAL_RESTRICTION: 0
OS_SUPERIOR_TEMPORAL_RESTRICTION: 0
OS_NORMAL: 1
OS_INFERIOR_NASAL_RESTRICTION: 0
OD_SUPERIOR_TEMPORAL_RESTRICTION: 0

## 2024-03-18 ASSESSMENT — REFRACTION_MANIFEST
OS_CYLINDER: +1.00
OS_AXIS: 079
OD_SPHERE: -1.25
METHOD_AUTOREFRACTION: 1
OD_CYLINDER: +1.50
OS_SPHERE: -1.00
OD_AXIS: 085

## 2024-03-18 ASSESSMENT — SLIT LAMP EXAM - LIDS
COMMENTS: NORMAL
COMMENTS: NORMAL

## 2024-03-18 ASSESSMENT — TONOMETRY
OS_IOP_MMHG: 16.8
OD_IOP_MMHG: 19.4

## 2024-03-18 ASSESSMENT — PACHYMETRY
OS_CT(UM): 560(-1)
OD_CT(UM): 560(-1)

## 2024-03-18 ASSESSMENT — EXTERNAL EXAM - RIGHT EYE: OD_EXAM: NORMAL

## 2024-03-18 ASSESSMENT — EXTERNAL EXAM - LEFT EYE: OS_EXAM: NORMAL

## 2024-04-01 ENCOUNTER — APPOINTMENT (OUTPATIENT)
Dept: OPHTHALMOLOGY | Age: 69
End: 2024-04-01

## 2024-04-01 ENCOUNTER — TELEPHONE (OUTPATIENT)
Dept: OPHTHALMOLOGY | Age: 69
End: 2024-04-01

## 2024-04-01 DIAGNOSIS — H40.002 GLAUCOMA SUSPECT, LEFT: ICD-10-CM

## 2024-04-01 DIAGNOSIS — H40.1111 PRIMARY OPEN ANGLE GLAUCOMA (POAG) OF RIGHT EYE, MILD STAGE: Primary | ICD-10-CM

## 2024-04-01 PROCEDURE — 99213 OFFICE O/P EST LOW 20 MIN: CPT | Performed by: OPHTHALMOLOGY

## 2024-04-01 ASSESSMENT — REFRACTION_WEARINGRX
OD_CYLINDER: +1.25
SPECS_TYPE: BIFOCAL
OD_AXIS: 081
OS_AXIS: 092
OS_SPHERE: -1.00
OD_SPHERE: -1.00
OD_ADD: +2.50
OS_ADD: +2.50
OS_CYLINDER: +1.00

## 2024-04-01 ASSESSMENT — PACHYMETRY
OD_CT(UM): 560(-1)
OS_CT(UM): 560(-1)

## 2024-04-01 ASSESSMENT — VISUAL ACUITY
CORRECTION_TYPE: GLASSES
OD_CC: 20/30
OD_CC+: -1
OS_CC: 20/30

## 2024-04-01 ASSESSMENT — TONOMETRY
OD_IOP_MMHG: 13.3
OS_IOP_MMHG: 13.0

## 2024-04-18 DIAGNOSIS — N52.9 ERECTILE DYSFUNCTION, UNSPECIFIED ERECTILE DYSFUNCTION TYPE: ICD-10-CM

## 2024-04-18 RX ORDER — SILDENAFIL 50 MG/1
50 TABLET, FILM COATED ORAL DAILY PRN
Qty: 10 TABLET | Refills: 2 | Status: SHIPPED | OUTPATIENT
Start: 2024-04-18

## 2024-04-24 ENCOUNTER — APPOINTMENT (OUTPATIENT)
Dept: OPHTHALMOLOGY | Age: 69
End: 2024-04-24

## 2024-04-24 DIAGNOSIS — H40.1111 PRIMARY OPEN ANGLE GLAUCOMA (POAG) OF RIGHT EYE, MILD STAGE: Primary | ICD-10-CM

## 2024-04-24 PROCEDURE — 65855 TRABECULOPLASTY LASER SURG: CPT | Performed by: OPHTHALMOLOGY

## 2024-04-24 ASSESSMENT — TONOMETRY
OS_IOP_MMHG: 13.1
OD_IOP_MMHG: 18.7
OD_IOP_MMHG: 17.5

## 2024-04-24 ASSESSMENT — REFRACTION_WEARINGRX
OD_CYLINDER: +1.25
OS_SPHERE: -1.00
OD_AXIS: 080
OS_CYLINDER: +1.00
OS_ADD: +2.25
OD_SPHERE: -1.00
OD_ADD: +2.25
OS_AXIS: 095

## 2024-04-24 ASSESSMENT — PACHYMETRY
OD_CT(UM): 560(-1)
OS_CT(UM): 560(-1)

## 2024-04-24 ASSESSMENT — VISUAL ACUITY
OS_SC: 20/30
OD_SC+: -1
OD_SC: 20/30
METHOD: SNELLEN - LINEAR
OS_SC+: -1

## 2024-04-29 ENCOUNTER — OFFICE VISIT (OUTPATIENT)
Dept: INTERNAL MEDICINE | Age: 69
End: 2024-04-29
Attending: NURSE PRACTITIONER

## 2024-04-29 ENCOUNTER — LAB SERVICES (OUTPATIENT)
Dept: LAB | Age: 69
End: 2024-04-29
Attending: NURSE PRACTITIONER

## 2024-04-29 VITALS
HEART RATE: 67 BPM | SYSTOLIC BLOOD PRESSURE: 137 MMHG | DIASTOLIC BLOOD PRESSURE: 71 MMHG | OXYGEN SATURATION: 98 % | BODY MASS INDEX: 34.31 KG/M2 | RESPIRATION RATE: 16 BRPM | HEIGHT: 64 IN | WEIGHT: 201 LBS | TEMPERATURE: 97 F

## 2024-04-29 DIAGNOSIS — R73.03 BORDERLINE DIABETES: ICD-10-CM

## 2024-04-29 DIAGNOSIS — I10 ESSENTIAL HYPERTENSION: ICD-10-CM

## 2024-04-29 DIAGNOSIS — N52.9 ERECTILE DYSFUNCTION, UNSPECIFIED ERECTILE DYSFUNCTION TYPE: ICD-10-CM

## 2024-04-29 DIAGNOSIS — Z00.00 MEDICARE ANNUAL WELLNESS VISIT, SUBSEQUENT: Primary | ICD-10-CM

## 2024-04-29 DIAGNOSIS — E78.5 DYSLIPIDEMIA: ICD-10-CM

## 2024-04-29 DIAGNOSIS — G47.00 INSOMNIA, UNSPECIFIED TYPE: ICD-10-CM

## 2024-04-29 LAB
ALBUMIN SERPL-MCNC: 4 G/DL (ref 3.6–5.1)
ALBUMIN/GLOB SERPL: 1 {RATIO} (ref 1–2.4)
ALP SERPL-CCNC: 102 UNITS/L (ref 45–117)
ALT SERPL-CCNC: 43 UNITS/L
ANION GAP SERPL CALC-SCNC: 11 MMOL/L (ref 7–19)
AST SERPL-CCNC: 35 UNITS/L
BILIRUB SERPL-MCNC: 0.6 MG/DL (ref 0.2–1)
BUN SERPL-MCNC: 8 MG/DL (ref 6–20)
BUN/CREAT SERPL: 10 (ref 7–25)
CALCIUM SERPL-MCNC: 9.3 MG/DL (ref 8.4–10.2)
CHLORIDE SERPL-SCNC: 108 MMOL/L (ref 97–110)
CHOLEST SERPL-MCNC: 214 MG/DL
CHOLEST/HDLC SERPL: 2.5 {RATIO}
CO2 SERPL-SCNC: 24 MMOL/L (ref 21–32)
CREAT SERPL-MCNC: 0.83 MG/DL (ref 0.67–1.17)
CREAT UR-MCNC: 137 MG/DL
EGFRCR SERPLBLD CKD-EPI 2021: >90 ML/MIN/{1.73_M2}
FASTING DURATION TIME PATIENT: NORMAL H
GLOBULIN SER-MCNC: 3.9 G/DL (ref 2–4)
GLUCOSE SERPL-MCNC: 95 MG/DL (ref 70–99)
HBA1C MFR BLD: 5.8 % (ref 4.5–5.6)
HDLC SERPL-MCNC: 87 MG/DL
LDLC SERPL CALC-MCNC: 95 MG/DL
MICROALBUMIN UR-MCNC: 0.63 MG/DL
MICROALBUMIN/CREAT UR: 4.6 MG/G
NONHDLC SERPL-MCNC: 127 MG/DL
POTASSIUM SERPL-SCNC: 4.3 MMOL/L (ref 3.4–5.1)
PROT SERPL-MCNC: 7.9 G/DL (ref 6.4–8.2)
SODIUM SERPL-SCNC: 139 MMOL/L (ref 135–145)
TRIGL SERPL-MCNC: 161 MG/DL

## 2024-04-29 PROCEDURE — 82570 ASSAY OF URINE CREATININE: CPT | Performed by: NURSE PRACTITIONER

## 2024-04-29 PROCEDURE — G0439 PPPS, SUBSEQ VISIT: HCPCS | Performed by: NURSE PRACTITIONER

## 2024-04-29 PROCEDURE — 83036 HEMOGLOBIN GLYCOSYLATED A1C: CPT

## 2024-04-29 PROCEDURE — 80061 LIPID PANEL: CPT

## 2024-04-29 PROCEDURE — 99214 OFFICE O/P EST MOD 30 MIN: CPT | Performed by: NURSE PRACTITIONER

## 2024-04-29 PROCEDURE — 80053 COMPREHEN METABOLIC PANEL: CPT

## 2024-04-29 RX ORDER — TRAZODONE HYDROCHLORIDE 50 MG/1
50 TABLET ORAL
Qty: 30 TABLET | Refills: 1 | Status: SHIPPED | OUTPATIENT
Start: 2024-04-29

## 2024-04-30 ENCOUNTER — TELEPHONE (OUTPATIENT)
Dept: INTERNAL MEDICINE | Age: 69
End: 2024-04-30

## 2024-04-30 RX ORDER — ATORVASTATIN CALCIUM 40 MG/1
40 TABLET, FILM COATED ORAL DAILY
Qty: 90 TABLET | Refills: 3 | Status: SHIPPED | OUTPATIENT
Start: 2024-04-30

## 2024-05-08 ENCOUNTER — APPOINTMENT (OUTPATIENT)
Dept: OPHTHALMOLOGY | Age: 69
End: 2024-05-08

## 2024-05-22 ENCOUNTER — APPOINTMENT (OUTPATIENT)
Dept: OPHTHALMOLOGY | Age: 69
End: 2024-05-22

## 2024-05-22 DIAGNOSIS — H40.1111 PRIMARY OPEN ANGLE GLAUCOMA (POAG) OF RIGHT EYE, MILD STAGE: Primary | ICD-10-CM

## 2024-05-22 PROCEDURE — 99024 POSTOP FOLLOW-UP VISIT: CPT | Performed by: OPHTHALMOLOGY

## 2024-05-22 ASSESSMENT — REFRACTION_WEARINGRX
OS_AXIS: 095
OS_ADD: +2.25
OD_SPHERE: -1.00
OS_SPHERE: -1.00
OD_CYLINDER: +1.25
OD_ADD: +2.25
OD_AXIS: 080
OS_CYLINDER: +1.00

## 2024-05-22 ASSESSMENT — PACHYMETRY
OD_CT(UM): 560(-1)
OS_CT(UM): 560(-1)

## 2024-05-22 ASSESSMENT — VISUAL ACUITY
OS_CC: 20/30
OD_CC: 20/30
CORRECTION_TYPE: GLASSES

## 2024-05-24 ENCOUNTER — APPOINTMENT (OUTPATIENT)
Dept: GENERAL RADIOLOGY | Facility: HOSPITAL | Age: 69
End: 2024-05-24
Payer: MEDICARE

## 2024-05-24 ENCOUNTER — HOSPITAL ENCOUNTER (EMERGENCY)
Facility: HOSPITAL | Age: 69
Discharge: HOME | End: 2024-05-24
Attending: EMERGENCY MEDICINE
Payer: MEDICARE

## 2024-05-24 VITALS
TEMPERATURE: 97 F | DIASTOLIC BLOOD PRESSURE: 73 MMHG | BODY MASS INDEX: 16.42 KG/M2 | SYSTOLIC BLOOD PRESSURE: 107 MMHG | WEIGHT: 104.6 LBS | HEART RATE: 62 BPM | OXYGEN SATURATION: 98 % | RESPIRATION RATE: 20 BRPM | HEIGHT: 67 IN

## 2024-05-24 DIAGNOSIS — R63.4 WEIGHT LOSS: ICD-10-CM

## 2024-05-24 DIAGNOSIS — R53.1 GENERALIZED WEAKNESS: ICD-10-CM

## 2024-05-24 DIAGNOSIS — J20.4 PARAINFLUENZA VIRUS BRONCHITIS: Primary | ICD-10-CM

## 2024-05-24 DIAGNOSIS — F10.27 DEMENTIA ASSOCIATED WITH ALCOHOLISM, UNSPECIFIED DEMENTIA SEVERITY, UNSPECIFIED WHETHER BEHAVIORAL, PSYCHOTIC, OR MOOD DISTURBANCE OR ANXIETY: ICD-10-CM

## 2024-05-24 DIAGNOSIS — Z86.59 HISTORY OF DEPRESSION: ICD-10-CM

## 2024-05-24 LAB
ALBUMIN SERPL-MCNC: 4.1 G/DL (ref 3.5–5.2)
ALBUMIN/GLOB SERPL: 1.7 G/DL
ALP SERPL-CCNC: 120 U/L (ref 39–117)
ALT SERPL W P-5'-P-CCNC: 25 U/L (ref 1–41)
ANION GAP SERPL CALCULATED.3IONS-SCNC: 11.9 MMOL/L (ref 5–15)
AST SERPL-CCNC: 31 U/L (ref 1–40)
B PARAPERT DNA SPEC QL NAA+PROBE: NOT DETECTED
B PERT DNA SPEC QL NAA+PROBE: NOT DETECTED
BACTERIA UR QL AUTO: NORMAL /HPF
BASOPHILS # BLD AUTO: 0.05 10*3/MM3 (ref 0–0.2)
BASOPHILS NFR BLD AUTO: 0.7 % (ref 0–1.5)
BILIRUB SERPL-MCNC: 0.3 MG/DL (ref 0–1.2)
BILIRUB UR QL STRIP: NEGATIVE
BUN SERPL-MCNC: 21 MG/DL (ref 8–23)
BUN/CREAT SERPL: 17.6 (ref 7–25)
C PNEUM DNA NPH QL NAA+NON-PROBE: NOT DETECTED
CALCIUM SPEC-SCNC: 9.3 MG/DL (ref 8.6–10.5)
CHLORIDE SERPL-SCNC: 98 MMOL/L (ref 98–107)
CLARITY UR: CLEAR
CO2 SERPL-SCNC: 26.1 MMOL/L (ref 22–29)
COLOR UR: YELLOW
CREAT SERPL-MCNC: 1.19 MG/DL (ref 0.76–1.27)
DEPRECATED RDW RBC AUTO: 42.6 FL (ref 37–54)
EGFRCR SERPLBLD CKD-EPI 2021: 66.5 ML/MIN/1.73
EOSINOPHIL # BLD AUTO: 0.01 10*3/MM3 (ref 0–0.4)
EOSINOPHIL NFR BLD AUTO: 0.1 % (ref 0.3–6.2)
ERYTHROCYTE [DISTWIDTH] IN BLOOD BY AUTOMATED COUNT: 13.1 % (ref 12.3–15.4)
FLUAV SUBTYP SPEC NAA+PROBE: NOT DETECTED
FLUBV RNA ISLT QL NAA+PROBE: NOT DETECTED
GLOBULIN UR ELPH-MCNC: 2.4 GM/DL
GLUCOSE SERPL-MCNC: 97 MG/DL (ref 65–99)
GLUCOSE UR STRIP-MCNC: NEGATIVE MG/DL
HADV DNA SPEC NAA+PROBE: NOT DETECTED
HCOV 229E RNA SPEC QL NAA+PROBE: NOT DETECTED
HCOV HKU1 RNA SPEC QL NAA+PROBE: NOT DETECTED
HCOV NL63 RNA SPEC QL NAA+PROBE: NOT DETECTED
HCOV OC43 RNA SPEC QL NAA+PROBE: NOT DETECTED
HCT VFR BLD AUTO: 42.5 % (ref 37.5–51)
HGB BLD-MCNC: 14.1 G/DL (ref 13–17.7)
HGB UR QL STRIP.AUTO: ABNORMAL
HMPV RNA NPH QL NAA+NON-PROBE: NOT DETECTED
HPIV1 RNA ISLT QL NAA+PROBE: NOT DETECTED
HPIV2 RNA SPEC QL NAA+PROBE: NOT DETECTED
HPIV3 RNA NPH QL NAA+PROBE: DETECTED
HPIV4 P GENE NPH QL NAA+PROBE: NOT DETECTED
HYALINE CASTS UR QL AUTO: NORMAL /LPF
IMM GRANULOCYTES # BLD AUTO: 0.02 10*3/MM3 (ref 0–0.05)
IMM GRANULOCYTES NFR BLD AUTO: 0.3 % (ref 0–0.5)
KETONES UR QL STRIP: NEGATIVE
LEUKOCYTE ESTERASE UR QL STRIP.AUTO: NEGATIVE
LYMPHOCYTES # BLD AUTO: 2.28 10*3/MM3 (ref 0.7–3.1)
LYMPHOCYTES NFR BLD AUTO: 34.1 % (ref 19.6–45.3)
M PNEUMO IGG SER IA-ACNC: NOT DETECTED
MAGNESIUM SERPL-MCNC: 2.3 MG/DL (ref 1.6–2.4)
MCH RBC QN AUTO: 29.3 PG (ref 26.6–33)
MCHC RBC AUTO-ENTMCNC: 33.2 G/DL (ref 31.5–35.7)
MCV RBC AUTO: 88.2 FL (ref 79–97)
MONOCYTES # BLD AUTO: 0.77 10*3/MM3 (ref 0.1–0.9)
MONOCYTES NFR BLD AUTO: 11.5 % (ref 5–12)
NEUTROPHILS NFR BLD AUTO: 3.55 10*3/MM3 (ref 1.7–7)
NEUTROPHILS NFR BLD AUTO: 53.3 % (ref 42.7–76)
NITRITE UR QL STRIP: NEGATIVE
NRBC BLD AUTO-RTO: 0 /100 WBC (ref 0–0.2)
PH UR STRIP.AUTO: 5.5 [PH] (ref 5–8)
PLATELET # BLD AUTO: 238 10*3/MM3 (ref 140–450)
PMV BLD AUTO: 8.8 FL (ref 6–12)
POTASSIUM SERPL-SCNC: 4 MMOL/L (ref 3.5–5.2)
PROT SERPL-MCNC: 6.5 G/DL (ref 6–8.5)
PROT UR QL STRIP: NEGATIVE
RBC # BLD AUTO: 4.82 10*6/MM3 (ref 4.14–5.8)
RBC # UR STRIP: NORMAL /HPF
REF LAB TEST METHOD: NORMAL
RHINOVIRUS RNA SPEC NAA+PROBE: NOT DETECTED
RSV RNA NPH QL NAA+NON-PROBE: NOT DETECTED
SARS-COV-2 RNA NPH QL NAA+NON-PROBE: NOT DETECTED
SODIUM SERPL-SCNC: 136 MMOL/L (ref 136–145)
SP GR UR STRIP: 1.01 (ref 1–1.03)
SQUAMOUS #/AREA URNS HPF: NORMAL /HPF
T4 FREE SERPL-MCNC: 1.03 NG/DL (ref 0.93–1.7)
TSH SERPL DL<=0.05 MIU/L-ACNC: 0.27 UIU/ML (ref 0.27–4.2)
UROBILINOGEN UR QL STRIP: ABNORMAL
WBC # UR STRIP: NORMAL /HPF
WBC NRBC COR # BLD AUTO: 6.68 10*3/MM3 (ref 3.4–10.8)

## 2024-05-24 PROCEDURE — 85025 COMPLETE CBC W/AUTO DIFF WBC: CPT | Performed by: EMERGENCY MEDICINE

## 2024-05-24 PROCEDURE — 81001 URINALYSIS AUTO W/SCOPE: CPT | Performed by: EMERGENCY MEDICINE

## 2024-05-24 PROCEDURE — 84443 ASSAY THYROID STIM HORMONE: CPT | Performed by: EMERGENCY MEDICINE

## 2024-05-24 PROCEDURE — 0202U NFCT DS 22 TRGT SARS-COV-2: CPT | Performed by: EMERGENCY MEDICINE

## 2024-05-24 PROCEDURE — 83735 ASSAY OF MAGNESIUM: CPT | Performed by: EMERGENCY MEDICINE

## 2024-05-24 PROCEDURE — 71045 X-RAY EXAM CHEST 1 VIEW: CPT

## 2024-05-24 PROCEDURE — 80053 COMPREHEN METABOLIC PANEL: CPT | Performed by: EMERGENCY MEDICINE

## 2024-05-24 PROCEDURE — 84439 ASSAY OF FREE THYROXINE: CPT | Performed by: EMERGENCY MEDICINE

## 2024-05-24 PROCEDURE — 99283 EMERGENCY DEPT VISIT LOW MDM: CPT

## 2024-05-24 RX ORDER — SODIUM CHLORIDE 0.9 % (FLUSH) 0.9 %
10 SYRINGE (ML) INJECTION AS NEEDED
Status: DISCONTINUED | OUTPATIENT
Start: 2024-05-24 | End: 2024-05-24 | Stop reason: HOSPADM

## 2024-05-24 RX ORDER — GUAIFENESIN AND DEXTROMETHORPHAN HYDROBROMIDE 600; 30 MG/1; MG/1
1 TABLET, EXTENDED RELEASE ORAL 2 TIMES DAILY PRN
Qty: 20 TABLET | Refills: 0 | Status: SHIPPED | OUTPATIENT
Start: 2024-05-24

## 2024-05-24 NOTE — ED PROVIDER NOTES
EMERGENCY DEPARTMENT ENCOUNTER    Room Number:  14/14  PCP: Ten Coon MD  Historian: Patient    I initially evaluated the patient at 1558    HPI:  Chief Complaint: Cough, generalized weakness  A complete HPI/ROS/PMH/PSH/SH/FH are unobtainable due to: Nothing  Context: Stanislav Choi is a 68 y.o. male with a medical history of hypertension, hyperlipidemia, COPD, depression, memory loss who presents to the ED c/o acute cough and generalized weakness.  Patient had pneumonia in January.  He has had a persistent productive cough since then.  Sputum is dark yellow.  He denies shortness of breath.  He is not on home O2.  He also complains of increased fatigue and generalized weakness for the past 3 to 4 weeks.  States he gets tired very easily.  Also complains of muscle aches.  He has not been eating or drinking as much as normal for the past few months.  States he has lost about 20 pounds in the past 3 months.  Denies loss of appetite, fever, chills, sore throat, wheezing, chest pain, abdominal pain, vomiting, diarrhea, or dysuria.  Denies recent medication changes.  He smokes a pack of cigarettes daily.  Denies alcohol use.  He has been able to ambulate.            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    Confusion state 02/19/2019    Altered mental status, unspecified altered mental status type 06/27/2023    Alcohol use disorder 06/28/2023    Weight loss 06/28/2023    Lymph node enlargement 06/28/2023    Severe malnutrition 06/29/2023    Alcoholic dementia 07/05/2023    Pulmonary nodule 07/05/2023    Altered mental status 09/30/2023    Elevated WBC count 10/01/2023    Memory impairment 12/21/2023     Resolved  Ambulatory Problems     Diagnosis Date Noted    Psychosis 03/29/2018    Metabolic encephalopathy 06/28/2023    Alcohol withdrawal 06/28/2023    JON (acute kidney injury) 06/28/2023    Dehydration 06/28/2023    Hypotension 06/28/2023    Chronic diarrhea 06/28/2023     Past Medical History:   Diagnosis Date    Depression     Elevated cholesterol     HL (hearing loss)     Hyperlipidemia     Memory loss     Peripheral neuropathy     Shingles          PAST SURGICAL HISTORY  Past Surgical History:   Procedure Laterality Date    CATARACT EXTRACTION Bilateral     JOINT REPLACEMENT           FAMILY HISTORY  Family History   Problem Relation Age of Onset    Cancer Mother     Hypertension Mother     Cancer Father     Heart disease Father     Cancer Brother     Cancer Maternal Grandmother     Cancer Maternal Grandfather     Heart disease Paternal Grandfather          SOCIAL HISTORY  Social History     Socioeconomic History    Marital status:    Tobacco Use    Smoking status: Every Day     Current packs/day: 1.00     Average packs/day: 1 pack/day for 40.0 years (40.0 ttl pk-yrs)     Types: Cigarettes    Smokeless tobacco: Never   Vaping Use    Vaping status: Never Used   Substance and Sexual Activity    Alcohol use: Not Currently     Comment: Hx ETOH abuse    Drug use: No    Sexual activity: Defer         ALLERGIES  Iodinated contrast media    REVIEW OF SYSTEMS  Review of Systems  Included in HPI  All systems reviewed and negative except for those discussed in HPI.      PHYSICAL EXAM  ED Triage Vitals   Temp Heart Rate Resp BP SpO2   05/24/24 1547 05/24/24 1547 05/24/24 1547 05/24/24 1555 05/24/24 1547   97 °F (36.1 °C) 86 14 109/68 96 %      Temp src Heart Rate Source Patient Position BP Location FiO2 (%)   -- -- -- -- --              Physical Exam      GENERAL: Awake, alert, and oriented x 3.  Thin nontoxic-appearing elderly male.  Resting comfortably in no acute distress  HENT: NCAT, nares patent, moist mucous  membranes, oropharynx is benign  EYES: no scleral icterus  CV: regular rhythm, normal rate  RESPIRATORY: normal effort, clear to auscultation bilaterally  ABDOMEN: soft, nondistended, nontender, no CVA tenderness  MUSCULOSKELETAL: Extremities are nontender with full range of motion.  No calf tenderness.  No pedal edema  NEURO: Speech is normal.  No facial droop.  Follows commands  PSYCH:  calm, cooperative  SKIN: warm, dry    Vital signs and nursing notes reviewed.          LAB RESULTS  Recent Results (from the past 24 hour(s))   Comprehensive Metabolic Panel    Collection Time: 05/24/24  4:19 PM    Specimen: Blood   Result Value Ref Range    Glucose 97 65 - 99 mg/dL    BUN 21 8 - 23 mg/dL    Creatinine 1.19 0.76 - 1.27 mg/dL    Sodium 136 136 - 145 mmol/L    Potassium 4.0 3.5 - 5.2 mmol/L    Chloride 98 98 - 107 mmol/L    CO2 26.1 22.0 - 29.0 mmol/L    Calcium 9.3 8.6 - 10.5 mg/dL    Total Protein 6.5 6.0 - 8.5 g/dL    Albumin 4.1 3.5 - 5.2 g/dL    ALT (SGPT) 25 1 - 41 U/L    AST (SGOT) 31 1 - 40 U/L    Alkaline Phosphatase 120 (H) 39 - 117 U/L    Total Bilirubin 0.3 0.0 - 1.2 mg/dL    Globulin 2.4 gm/dL    A/G Ratio 1.7 g/dL    BUN/Creatinine Ratio 17.6 7.0 - 25.0    Anion Gap 11.9 5.0 - 15.0 mmol/L    eGFR 66.5 >60.0 mL/min/1.73   CBC Auto Differential    Collection Time: 05/24/24  4:19 PM    Specimen: Blood   Result Value Ref Range    WBC 6.68 3.40 - 10.80 10*3/mm3    RBC 4.82 4.14 - 5.80 10*6/mm3    Hemoglobin 14.1 13.0 - 17.7 g/dL    Hematocrit 42.5 37.5 - 51.0 %    MCV 88.2 79.0 - 97.0 fL    MCH 29.3 26.6 - 33.0 pg    MCHC 33.2 31.5 - 35.7 g/dL    RDW 13.1 12.3 - 15.4 %    RDW-SD 42.6 37.0 - 54.0 fl    MPV 8.8 6.0 - 12.0 fL    Platelets 238 140 - 450 10*3/mm3    Neutrophil % 53.3 42.7 - 76.0 %    Lymphocyte % 34.1 19.6 - 45.3 %    Monocyte % 11.5 5.0 - 12.0 %    Eosinophil % 0.1 (L) 0.3 - 6.2 %    Basophil % 0.7 0.0 - 1.5 %    Immature Grans % 0.3 0.0 - 0.5 %    Neutrophils, Absolute 3.55 1.70 - 7.00 10*3/mm3     Lymphocytes, Absolute 2.28 0.70 - 3.10 10*3/mm3    Monocytes, Absolute 0.77 0.10 - 0.90 10*3/mm3    Eosinophils, Absolute 0.01 0.00 - 0.40 10*3/mm3    Basophils, Absolute 0.05 0.00 - 0.20 10*3/mm3    Immature Grans, Absolute 0.02 0.00 - 0.05 10*3/mm3    nRBC 0.0 0.0 - 0.2 /100 WBC   Magnesium    Collection Time: 05/24/24  4:19 PM    Specimen: Blood   Result Value Ref Range    Magnesium 2.3 1.6 - 2.4 mg/dL   TSH    Collection Time: 05/24/24  4:19 PM    Specimen: Blood   Result Value Ref Range    TSH 0.268 (L) 0.270 - 4.200 uIU/mL   T4, Free    Collection Time: 05/24/24  4:19 PM    Specimen: Blood   Result Value Ref Range    Free T4 1.03 0.93 - 1.70 ng/dL   Respiratory Panel PCR w/COVID-19(SARS-CoV-2) ZOE/DESI/GABRIEL/PAD/COR/DE In-House, NP Swab in [facility], 2 HR TAT - Swab, Nasopharynx    Collection Time: 05/24/24  4:19 PM    Specimen: Nasopharynx; Swab   Result Value Ref Range    ADENOVIRUS, PCR Not Detected Not Detected    Coronavirus 229E Not Detected Not Detected    Coronavirus HKU1 Not Detected Not Detected    Coronavirus NL63 Not Detected Not Detected    Coronavirus OC43 Not Detected Not Detected    COVID19 Not Detected Not Detected - Ref. Range    Human Metapneumovirus Not Detected Not Detected    Human Rhinovirus/Enterovirus Not Detected Not Detected    Influenza A PCR Not Detected Not Detected    Influenza B PCR Not Detected Not Detected    Parainfluenza Virus 1 Not Detected Not Detected    Parainfluenza Virus 2 Not Detected Not Detected    Parainfluenza Virus 3 Detected (A) Not Detected    Parainfluenza Virus 4 Not Detected Not Detected    RSV, PCR Not Detected Not Detected    Bordetella pertussis pcr Not Detected Not Detected    Bordetella parapertussis PCR Not Detected Not Detected    Chlamydophila pneumoniae PCR Not Detected Not Detected    Mycoplasma pneumo by PCR Not Detected Not Detected   Urinalysis With Microscopic If Indicated (No Culture) - Urine, Clean Catch    Collection Time: 05/24/24  6:58 PM     Specimen: Urine, Clean Catch   Result Value Ref Range    Color, UA Yellow Yellow, Straw    Appearance, UA Clear Clear    pH, UA 5.5 5.0 - 8.0    Specific Gravity, UA 1.010 1.005 - 1.030    Glucose, UA Negative Negative    Ketones, UA Negative Negative    Bilirubin, UA Negative Negative    Blood, UA Trace (A) Negative    Protein, UA Negative Negative    Leuk Esterase, UA Negative Negative    Nitrite, UA Negative Negative    Urobilinogen, UA 0.2 E.U./dL 0.2 - 1.0 E.U./dL   Urinalysis, Microscopic Only - Urine, Clean Catch    Collection Time: 05/24/24  6:58 PM    Specimen: Urine, Clean Catch   Result Value Ref Range    RBC, UA 0-2 None Seen, 0-2 /HPF    WBC, UA 0-2 None Seen, 0-2 /HPF    Bacteria, UA None Seen None Seen /HPF    Squamous Epithelial Cells, UA 0-2 None Seen, 0-2 /HPF    Hyaline Casts, UA 0-2 None Seen /LPF    Methodology Automated Microscopy        Ordered the above labs and reviewed the results.        RADIOLOGY  XR Chest AP    Result Date: 5/24/2024  XR CHEST AP-   INDICATION: Cough, fever  COMPARISON: Chest radiograph February 13, 2024  TECHNIQUE: 1 view chest  FINDINGS:  Calcified pulmonary nodule in the right upper lobe, consistent with prior granulomatous infection. No focal opacity. No effusions. Normal mediastinal contour. Heart is normal in size. Calcified right hilar lymph node, consistent with prior granulomatous infection.      No acute cardiopulmonary process  This report was finalized on 5/24/2024 4:18 PM by Dr. Brigido Shi M.D on Workstation: [id number]       Ordered the above noted radiological studies. Reviewed by me in PACS.            PROCEDURES  Procedures        OUTPATIENT MEDICATION MANAGEMENT:  Current Facility-Administered Medications Ordered in Epic   Medication Dose Route Frequency Provider Last Rate Last Admin    sodium chloride 0.9 % flush 10 mL  10 mL Intravenous PRN Tino Sosa MD         Current Outpatient Medications Ordered in Epic   Medication Sig  Dispense Refill    amLODIPine (NORVASC) 5 MG tablet Take 1 tablet by mouth Daily.      atorvastatin (LIPITOR) 10 MG tablet Take 1 tablet by mouth Daily. Indications: High Amount of Fats in the Blood      donepezil (ARICEPT) 5 MG tablet Take 1 tablet by mouth every night at bedtime.      folic acid (FOLVITE) 1 MG tablet Take 1 tablet by mouth Daily. 30 tablet 3    gabapentin (NEURONTIN) 800 MG tablet Take 1 tablet by mouth 2 (Two) Times a Day.      guaifenesin-dextromethorphan 600-30 mg (MUCINEX DM)  MG tablet sustained-release 12 hour Take 1 tablet by mouth 2 (Two) Times a Day As Needed (Cough). 20 tablet 0    hydrOXYzine (ATARAX) 25 MG tablet Take 1 tablet by mouth 3 (Three) Times a Day As Needed for Anxiety. (Patient taking differently: Take 2 tablets by mouth 2 (Two) Times a Day.) 30 tablet 0    multivitamin with minerals tablet tablet Take 1 tablet by mouth Daily. 30 tablet 3    thiamine (VITAMIN B1) 100 MG tablet Take 1 tablet by mouth Daily. 30 tablet 3    Trelegy Ellipta 200-62.5-25 MCG/ACT aerosol powder               MEDICATIONS GIVEN IN ER  Medications   sodium chloride 0.9 % flush 10 mL (has no administration in time range)                   MEDICAL DECISION MAKING, PROGRESS, and CONSULTS    All labs have been independently reviewed by me.  All radiology studies have been reviewed by me and I have also reviewed the radiology report.   EKG's independently viewed and interpreted by me.  Discussion below represents my analysis of pertinent findings related to patient's condition, differential diagnosis, treatment plan and final disposition.      Additional sources:    - Discussed/ obtained information from independent historians: Family at bedside    - External (non-ED) record review: Patient was last admitted here in September 2023 for altered mental status and alcohol withdrawal.  He was seen by psychiatry who felt his symptoms were due to pseudodementia from depression.  He was started on Abilify.   He was seen by neurology.  Brain MRI showed chronic small vessel ischemic changes.  EEG showed abnormal mild diffuse slowing but no epileptiform activity.  Home health was arranged.    -Prescription drug monitoring program review:     N/A    - Chronic or social conditions impacting patient care (Social Determinants of Health): None          Orders placed during this visit:  Orders Placed This Encounter   Procedures    Respiratory Panel PCR w/COVID-19(SARS-CoV-2) ZOE/DESI/GABRIEL/PAD/COR/DE In-House, NP Swab in UTM/VTM, 2 HR TAT - Swab, Nasopharynx    XR Chest AP    Comprehensive Metabolic Panel    Urinalysis With Microscopic If Indicated (No Culture) - Urine, Clean Catch    CBC Auto Differential    Magnesium    TSH    T4, Free    Urinalysis, Microscopic Only - Urine, Clean Catch    Insert Peripheral IV    CBC & Differential         Additional orders considered but not ordered:          Differential diagnosis includes, but is not limited to:    Pneumonia, bronchitis, dehydration, electrolyte abnormality, deconditioning, depression, URI, UTI      Independent interpretation of labs, radiology studies, and discussions with consultants:  ED Course as of 05/24/24 2021   Fri May 24, 2024   1600 BP: 109/68 [WH]   1600 Temp: 97 °F (36.1 °C) [WH]   1600 Heart Rate: 86 [WH]   1600 Resp: 14 [WH]   1600 SpO2: 96 % [WH]   1607 Patient presents to the ED complaining of intermittent productive cough for the past 5 months.  Also complains of increased fatigue, generalized weakness, and malaise.  Vital signs are normal.  Physical exam is unremarkable.  Will obtain labs and chest x-ray for further evaluation. [WH]   1636 Chest x-ray personally interpreted by me.  My personal interpretation is: Heart size is normal.  Lungs are clear.  No pleural effusion. [WH]   1650 Magnesium: 2.3 [WH]   1650 WBC: 6.68 [WH]   1650 Hemoglobin: 14.1 [WH]   1719 TSH Baseline(!): 0.268 [WH]   1720 Free T4: 1.03 [WH]   1720 Glucose: 97 [WH]   1720 BUN: 21 [WH]    1720 Creatinine: 1.19 [WH]   1812 Parainfluenza Virus 3(!): Detected [WH]   1949 Urinalysis is normal [WH]   2003 Patient is resting breathing comfortably.  He is not hypoxic.  Test results and diagnoses were discussed with the patient and his family.  He was advised to follow-up with his PCP.  He will be given a prescription for cough medication.  Return precautions were discussed. [WH]   2019 MDM: Patient presented to ED complaining of generalized weakness and persistent cough for the past few months.  He was not hypoxic or tachypneic.  Chest x-ray did not show any evidence of pneumonia.  Respiratory panel was positive for parainfluenza.  Labs were otherwise unremarkable.  He was not dehydrated or anemic.  He did not have a UTI.  He did not appear malnourished and his albumin was normal.  Patient has a history of depression and I suspect this is contributing to his symptoms of generalized weakness, malaise, and weight loss.  He was advised to follow-up with his PCP and psychiatrist. [WH]      ED Course User Index  [WH] Tino Sosa MD         COMPLEXITY OF CARE  Admission was considered but after careful review of the patient's presentation, physical examination, diagnostic results, and response to treatment the patient may be safely discharged with outpatient follow-up.      DIAGNOSIS  Final diagnoses:   Parainfluenza virus bronchitis   Generalized weakness   Weight loss   History of depression   Dementia associated with alcoholism, unspecified dementia severity, unspecified whether behavioral, psychotic, or mood disturbance or anxiety         DISPOSITION  DISCHARGE    Patient discharged in stable condition.    Reviewed implications of results, diagnosis, meds, responsibility to follow up, warning signs and symptoms of possible worsening, potential complications and reasons to return to ER, including worsening/persistent symptoms, chest pain, shortness of breath, fever, vomiting, diarrhea, or other  concern..    Patient/Family voiced understanding of above instructions.    Discussed plan for discharge, as there is no emergent indication for admission. Patient referred to primary care provider for BP management due to today's BP. Pt/family is agreeable and understands need for follow up and repeat testing.  Pt is aware that discharge does not mean that nothing is wrong but it indicates no emergency is present that requires admission and they must continue care with follow-up as given below or physician of their choice.     FOLLOW-UP  Ten Coon MD  [address] [facility]  [facility] [id number] [phone]    Schedule an appointment as soon as possible for a visit            Medication List        New Prescriptions      guaifenesin-dextromethorphan 600-30 mg  MG tablet sustained-release 12 hour  Take 1 tablet by mouth 2 (Two) Times a Day As Needed (Cough).            Changed      hydrOXYzine 25 MG tablet  Commonly known as: ATARAX  Take 1 tablet by mouth 3 (Three) Times a Day As Needed for Anxiety.  What changed:   how much to take  when to take this               Where to Get Your Medications        These medications were sent to [id number] - [place], [place] - [address] AT [facility] - [phone] [facility] [phone]   [address] [facility], [facility] [address]      Phone: [phone]   guaifenesin-dextromethorphan 600-30 mg  MG tablet sustained-release 12 hour                   Latest Documented Vital Signs:  AS OF 20:21 EDT VITALS:    BP - 128/73  HR - 65  TEMP - 97 °F (36.1 °C)  O2 SATS - 97%            --    Please note that portions of this were completed with a voice recognition program.       Note Disclaimer: At [facility], we believe that sharing information builds trust and better relationships. You are receiving this note because you are receiving care at [facility] or recently visited. It is possible you will see  health information before a provider has talked with you about it. This kind of information can be easy to misunderstand. To help you fully understand what it means for your health, we urge you to discuss this note with your provider.             Tino Sosa MD  05/24/24 2021

## 2024-05-25 NOTE — DISCHARGE INSTRUCTIONS
Take medication as prescribed.  Follow-up with your primary care provider soon as possible.  Return to the emergency department for worsening symptoms, fever, chest pain, trouble breathing, or other concern.

## 2024-05-28 ENCOUNTER — TRANSCRIBE ORDERS (OUTPATIENT)
Dept: LAB | Facility: HOSPITAL | Age: 69
End: 2024-05-28
Payer: MEDICARE

## 2024-05-28 ENCOUNTER — TRANSCRIBE ORDERS (OUTPATIENT)
Dept: GENERAL RADIOLOGY | Facility: HOSPITAL | Age: 69
End: 2024-05-28
Payer: MEDICARE

## 2024-05-28 ENCOUNTER — HOSPITAL ENCOUNTER (OUTPATIENT)
Dept: GENERAL RADIOLOGY | Facility: HOSPITAL | Age: 69
Discharge: HOME | End: 2024-05-28
Payer: MEDICARE

## 2024-05-28 ENCOUNTER — LAB (OUTPATIENT)
Dept: LAB | Facility: HOSPITAL | Age: 69
End: 2024-05-28
Payer: MEDICARE

## 2024-05-28 DIAGNOSIS — R53.81 MALAISE AND FATIGUE: ICD-10-CM

## 2024-05-28 DIAGNOSIS — R53.81 MALAISE AND FATIGUE: Primary | ICD-10-CM

## 2024-05-28 DIAGNOSIS — R52 PAIN: ICD-10-CM

## 2024-05-28 DIAGNOSIS — R53.83 MALAISE AND FATIGUE: Primary | ICD-10-CM

## 2024-05-28 DIAGNOSIS — R06.03 RESPIRATORY DISTRESS: ICD-10-CM

## 2024-05-28 DIAGNOSIS — R53.83 MALAISE AND FATIGUE: ICD-10-CM

## 2024-05-28 DIAGNOSIS — R53.83 TIREDNESS: ICD-10-CM

## 2024-05-28 LAB
BASOPHILS # BLD AUTO: 0.04 10*3/MM3 (ref 0–0.2)
BASOPHILS NFR BLD AUTO: 0.2 % (ref 0–1.5)
DEPRECATED RDW RBC AUTO: 38.7 FL (ref 37–54)
EOSINOPHIL # BLD AUTO: 0.03 10*3/MM3 (ref 0–0.4)
EOSINOPHIL NFR BLD AUTO: 0.2 % (ref 0.3–6.2)
ERYTHROCYTE [DISTWIDTH] IN BLOOD BY AUTOMATED COUNT: 12.8 % (ref 12.3–15.4)
HCT VFR BLD AUTO: 39.4 % (ref 37.5–51)
HGB BLD-MCNC: 13.6 G/DL (ref 13–17.7)
IMM GRANULOCYTES # BLD AUTO: 0.1 10*3/MM3 (ref 0–0.05)
IMM GRANULOCYTES NFR BLD AUTO: 0.6 % (ref 0–0.5)
LYMPHOCYTES # BLD AUTO: 2.24 10*3/MM3 (ref 0.7–3.1)
LYMPHOCYTES NFR BLD AUTO: 13.9 % (ref 19.6–45.3)
MCH RBC QN AUTO: 29.2 PG (ref 26.6–33)
MCHC RBC AUTO-ENTMCNC: 34.5 G/DL (ref 31.5–35.7)
MCV RBC AUTO: 84.7 FL (ref 79–97)
MONOCYTES # BLD AUTO: 1.04 10*3/MM3 (ref 0.1–0.9)
MONOCYTES NFR BLD AUTO: 6.5 % (ref 5–12)
NEUTROPHILS NFR BLD AUTO: 12.65 10*3/MM3 (ref 1.7–7)
NEUTROPHILS NFR BLD AUTO: 78.6 % (ref 42.7–76)
NRBC BLD AUTO-RTO: 0 /100 WBC (ref 0–0.2)
PLATELET # BLD AUTO: 313 10*3/MM3 (ref 140–450)
PMV BLD AUTO: 9.7 FL (ref 6–12)
RBC # BLD AUTO: 4.65 10*6/MM3 (ref 4.14–5.8)
TSH SERPL DL<=0.05 MIU/L-ACNC: 0.64 UIU/ML (ref 0.27–4.2)
WBC NRBC COR # BLD AUTO: 16.1 10*3/MM3 (ref 3.4–10.8)

## 2024-05-28 PROCEDURE — 85025 COMPLETE CBC W/AUTO DIFF WBC: CPT

## 2024-05-28 PROCEDURE — 36415 COLL VENOUS BLD VENIPUNCTURE: CPT

## 2024-05-28 PROCEDURE — 71046 X-RAY EXAM CHEST 2 VIEWS: CPT

## 2024-05-28 PROCEDURE — 73502 X-RAY EXAM HIP UNI 2-3 VIEWS: CPT

## 2024-05-28 PROCEDURE — 84443 ASSAY THYROID STIM HORMONE: CPT

## 2024-05-29 DIAGNOSIS — N52.9 ERECTILE DYSFUNCTION, UNSPECIFIED ERECTILE DYSFUNCTION TYPE: ICD-10-CM

## 2024-05-29 RX ORDER — SILDENAFIL 50 MG/1
50 TABLET, FILM COATED ORAL DAILY PRN
Qty: 10 TABLET | Refills: 2 | Status: SHIPPED | OUTPATIENT
Start: 2024-05-29

## 2024-06-03 ENCOUNTER — TELEPHONE (OUTPATIENT)
Dept: NEUROLOGY | Facility: CLINIC | Age: 69
End: 2024-06-03

## 2024-06-03 NOTE — TELEPHONE ENCOUNTER
Lvm saying to call or send [employer] message to set up a FOLLOW UP 40min slot to review his neuropsych testing

## 2024-06-03 NOTE — TELEPHONE ENCOUNTER
Provider: MI    Caller: KERLINE    Phone Number: [phone]    Reason for Call: PT CALLED AND STATES THAT HE HAD A MEMORY TEST COMPLETED AT [facility] AND RECEIVED RESULTS THURSDAY 05/30/24 WAS TOLD TO REACH OUT TO OFFICE TO SEE WERE PROVIDER IS WOULD LIEK TO GO FROM HERE. PT WOULD LIKE A CALL BACK TO DISCUSS TREATMENT PLAN WHEN POSSIBLE.    PLEASE REVIEW AND ADVISE  THANK YOU

## 2024-06-05 NOTE — TELEPHONE ENCOUNTER
PT CALLED I SCHED BUT CAN'T GET IN TILL AUG . IF YOU CAN GET IN SOONER PLEASE CALL    KERLINE [phone]

## 2024-06-05 NOTE — TELEPHONE ENCOUNTER
Name: Stanislav Choi    Relationship: Self    Best Callback Number:   Telephone Information:   Mobile [phone]         HUB PROVIDED THE RELAY MESSAGE FROM THE OFFICE   PATIENT VOICED UNDERSTANDING AND HAS NO FURTHER QUESTIONS AT THIS TIME

## 2024-06-06 ENCOUNTER — TELEPHONE (OUTPATIENT)
Dept: INTERNAL MEDICINE | Age: 69
End: 2024-06-06

## 2024-06-12 ENCOUNTER — TRANSCRIBE ORDERS (OUTPATIENT)
Dept: ADMINISTRATIVE | Facility: HOSPITAL | Age: 69
End: 2024-06-12
Payer: MEDICARE

## 2024-06-12 DIAGNOSIS — J84.9 ILD (INTERSTITIAL LUNG DISEASE): Primary | ICD-10-CM

## 2024-06-19 ENCOUNTER — OFFICE VISIT (OUTPATIENT)
Dept: NEUROLOGY | Facility: CLINIC | Age: 69
End: 2024-06-19
Payer: MEDICARE

## 2024-06-19 VITALS
OXYGEN SATURATION: 98 % | HEIGHT: 67 IN | HEART RATE: 74 BPM | DIASTOLIC BLOOD PRESSURE: 60 MMHG | WEIGHT: 104 LBS | SYSTOLIC BLOOD PRESSURE: 108 MMHG | BODY MASS INDEX: 16.32 KG/M2

## 2024-06-19 DIAGNOSIS — R41.89 PSEUDODEMENTIA: ICD-10-CM

## 2024-06-19 DIAGNOSIS — R41.3 MEMORY IMPAIRMENT: Primary | ICD-10-CM

## 2024-06-19 PROBLEM — F10.27 DEMENTIA ASSOCIATED WITH ALCOHOLISM WITH BEHAVIORAL DISTURBANCE: Status: RESOLVED | Noted: 2019-01-10 | Resolved: 2024-06-19

## 2024-06-19 RX ORDER — MULTIVIT WITH MINERALS/LUTEIN
250 TABLET ORAL DAILY
COMMUNITY

## 2024-06-19 NOTE — ASSESSMENT & PLAN NOTE
68 year old man with past medical history of depression, anxiety, alcohol dependence, hyperlipidemia, hypertension, peripheral neuropathy, COPD with concerns for memory troubles and pseudodementia.  On review of records she has been evaluated in the past by Dr. Reveles for concerns of dementia vs delerium which were ultimately thought to be due to his multiple psychiatric issues and pseudodementia.  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  History of multiple episodes where he presented with confusion which resolves spontaneously.  On multiple psychiatric medications including Remeron, Latuda, flumazenil, Antabuse, BuSpar.  There has been concern for extraparametal side effects in the past as well so on gabapentin.  Of note he had a more recent formal neuropsychology testing on 10/16/2023 with diagnosis of major depressive disorder and vascular dementia but he thinks that this testing maybe incorrect as he has had this testing in the past which was not consistent with dementia and he does not think he did the test appropriately and answered the last multiple questions without reading the question.  He had repeat formal neuropsychology testing performed on 5/13/2024 with diagnosis of normal cognitive and memory exam, he does not have dementia, moderate anxiety and mild depression. 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  He had a neuro work-up including brain MRI without acute findings and a normal 8-hour video EEG.  He also had a EMG which showed absent motor responses in the lower extremities consistent with a severe diffuse polyneuropathy.   He presented to the hospital after having been found in a storage facility with bizarre behavior.  Initial work-up without abnormalities, head CT without acute findings, alcohol level less than 10. Seen by psychiatry who notes that this presentation is similar to previous presentations.  Latuda was recently discontinued due to EPS and Abilify was started which he is not on this medicine currently.  He was also placed on a CIWA protocol and treated with high-dose thiamine 500 mg IV every 8 hours for 3 days, followed by 200 mg every 8 hours for 2 doses.  Is no longer on thiamine IV apparently due to pulling out his IV.  He had a more recent brain MRI scan done in the hospital on 10/4/2023 which does not demonstrate any acute intracranial abnormalities and mild at best nonspecific white matter changes.  He had mentation changes in the hospital and had a EEG performed which demonstrated mild slowing.  He was started on donepezil 5 mg daily by his psychiatrist and this was discontinued following his most recent formal neuropsychology testing results.  He has also had TMS therapy since his last visit.  He tells me he is doing things around the house and not working on boat any longer.  He does not do much cooking.  They have functioning smoke detectors at home.  I spoke with them at length today with regards to the results of his formal neuropsychology testing and the recommendations made and advised him to discuss with his psychiatrist about getting off of the hydroxyzine which has anticholingergic effects which can worsen memory issues and instead trying SSRI's that maybe more helpful.  I will refer him to behavioral health for further assistance and cognitive behavioral therapy and also anger management.  He also needs to work on his nutrition as he has had weight loss and is thin.  I did advise him to exercise and socialize and quit smoking.  I will refer him to have formal driving evaluation through community  mobility assessment.  He denies thoughts of hurting himself or anyone else.  I spoke with them about obtaining marriage counseling for further assistance.

## 2024-06-19 NOTE — PROGRESS NOTES
Chief Complaint  Memory Loss (Here with wife jacqueline Neuropsych f/u ( in media))    Subjective          Stanislav Choi presents to [facility] NEUROLOGY for   HISTORY OF PRESENT ILLNESS:    Stanislav Choi is a 68 year old man with past medical history of depression, anxiety, alcohol dependence, hyperlipidemia, hypertension, peripheral neuropathy, COPD who returns to neurology clinic with his wife, Elysia Choi, for follow up evaluation of memory troubles.  On review of records she has been evaluated in the past by Dr. Reveles for concerns of dementia vs delerium which were ultimately thought to be due to his multiple psychiatric issues and pseudodementia.  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  History of multiple episodes where he presented with confusion which resolves spontaneously.  On multiple psychiatric medications including Remeron, Latuda, flumazenil, Antabuse, BuSpar.  There has been concern for extraparametal side effects in the past as well so on gabapentin.  Of note he had a more recent formal neuropsychology testing on 10/16/2023 with diagnosis of major depressive disorder and vascular dementia but he thinks that this testing maybe incorrect as he has had this testing in the past which was not consistent with dementia and he does not think he did the test appropriately and answered the last multiple questions without reading the question.  He had repeat formal neuropsychology testing performed on 5/13/2024 with diagnosis of normal cognitive and memory exam, he does not have dementia, moderate anxiety and mild depression. 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  He had a neuro work-up including brain MRI without acute findings and a normal 8-hour video EEG.  He also had a EMG which showed absent motor responses in the lower extremities consistent with a severe diffuse polyneuropathy.  He presented to the hospital after having been found in a storage facility with bizarre behavior.  Initial work-up without abnormalities, head CT without acute findings, alcohol level less than 10. Seen by psychiatry who notes that this presentation is similar to previous presentations.  Latuda was recently discontinued due to EPS and Abilify was started which he is not on this medicine currently.  He was also placed on a CIWA protocol and treated with high-dose thiamine 500 mg IV every 8 hours for 3 days, followed by 200 mg every 8 hours for 2 doses.  Is no longer on thiamine IV apparently due to pulling out his IV.  He had a more recent brain MRI scan done in the hospital on 10/4/2023 which does not demonstrate any acute intracranial abnormalities and mild at best nonspecific white matter changes.  He had mentation changes in the hospital and had a EEG performed which demonstrated mild slowing.  He was started on donepezil 5 mg daily by his psychiatrist and this was discontinued following his most recent formal neuropsychology testing results.  He has also had TMS therapy since his last visit.  He tells me he is doing things around the house and not working on boat any longer.  He does not do much cooking.  They have functioning smoke detectors at home.     Past Medical History:   Diagnosis Date    Anxiety     Chronic pain disorder     COPD (chronic obstructive pulmonary disease)     Depression     Elevated cholesterol     HL (hearing loss)     Hyperlipidemia     Hypertension     Memory loss     Peripheral neuropathy     Shingles         Family History   Problem Relation Age of Onset    Cancer Mother     Hypertension Mother     Cancer Father   "   Heart disease Father     Cancer Brother     Cancer Maternal Grandmother     Cancer Maternal Grandfather     Heart disease Paternal Grandfather         Social History     Socioeconomic History    Marital status:    Tobacco Use    Smoking status: Every Day     Current packs/day: 1.00     Average packs/day: 1 pack/day for 40.0 years (40.0 ttl pk-yrs)     Types: Cigarettes    Smokeless tobacco: Never   Vaping Use    Vaping status: Never Used   Substance and Sexual Activity    Alcohol use: Not Currently     Comment: Hx ETOH abuse    Drug use: No    Sexual activity: Defer        I have reviewed and confirmed the accuracy of the ROS as documented by the MA/LPN/RN Chencho Hawthorne MD   Review of Systems   Musculoskeletal:  Positive for gait problem.   Neurological:  Positive for weakness. Negative for dizziness, tremors, seizures, syncope, facial asymmetry, speech difficulty, light-headedness, numbness, headache, memory problem and confusion.   Psychiatric/Behavioral:  Positive for sleep disturbance. Negative for agitation, behavioral problems, decreased concentration, dysphoric mood, hallucinations, self-injury, suicidal ideas, negative for hyperactivity, depressed mood and stress. The patient is nervous/anxious.         Objective   Vital Signs:   /60   Pulse 74   Ht 170.2 cm (67.01\")   Wt 47.2 kg (104 lb)   SpO2 98%   BMI 16.28 kg/m²       PHYSICAL EXAM:    General   Mental Status - Alert. General Appearance - Thin, Well groomed, Oriented and Cooperative. Orientation - Oriented X3.       Head and Neck  Head - normocephalic, atraumatic with no lesions or palpable masses.  Neck    Global Assessment - supple.       Eye   Sclera/Conjunctiva - Bilateral - Normal.    ENMT  Mouth and Throat   Oral Cavity/Oropharynx: Oropharynx - the soft palate,uvula and tongue are normal in appearance.    Chest and Lung Exam   Chest - lung clear to auscultation bilaterally.    Cardiovascular   Cardiovascular examination " reveals  - normal heart sounds, regular rate and rhythm.    Neurologic   Mental Status: Speech - Normal. Cognitive function - Normal per formal neuropsychology testing, not tested on this visit.  Cranial Nerves:   II Optic: Visual acuity - Left - Normal. Right - Normal. Visual fields - Normal (to confrontation).  III Oculomotor: Pupillary constriction - Left - Normal. Right - Normal.  VII Facial: - Normal Bilaterally.   IX Glossopharyngeal / X Vagus - Normal.  XI Accessory: Trapezius - Bilateral - Normal. Sternocleidomastoid - Bilateral - Normal.  XII Hypoglossal - Bilateral - Normal.  Eye Movements: - Normal Bilaterally.  Sensory:   Light Touch: Intact - Globally.  Motor:   Bulk and Contour: - Normal.  Tone: - Normal.  Tremor: Not present.  Strength: 5/5 normal muscle strength - All Muscles.   General Assessment of Reflexes: - deep tendon reflexes are normal. Coordination - No Impairment of finger-to-nose or Impairment of rapid alternating movements. Gait - Normal.       Result Review :                 Assessment and Plan    Problem List Items Addressed This Visit          Neuro    Memory impairment - Primary    Current Assessment & Plan     68 year old man with past medical history of depression, anxiety, alcohol dependence, hyperlipidemia, hypertension, peripheral neuropathy, COPD with concerns for memory troubles and pseudodementia.  On review of records she has been evaluated in the past by Dr. Reveles for concerns of dementia vs delerium which were ultimately thought to be due to his multiple psychiatric issues and pseudodementia.  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  History of multiple episodes where he presented with confusion which resolves spontaneously.  On multiple psychiatric medications including Remeron, Latuda, flumazenil, Antabuse, BuSpar.  There has been  concern for extraparametal side effects in the past as well so on gabapentin.  Of note he had a more recent formal neuropsychology testing on 10/16/2023 with diagnosis of major depressive disorder and vascular dementia but he thinks that this testing maybe incorrect as he has had this testing in the past which was not consistent with dementia and he does not think he did the test appropriately and answered the last multiple questions without reading the question.  He had repeat formal neuropsychology testing performed on 5/13/2024 with diagnosis of normal cognitive and memory exam, he does not have dementia, moderate anxiety and mild depression. 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  He had a neuro work-up including brain MRI without acute findings and a normal 8-hour video EEG.  He also had a EMG which showed absent motor responses in the lower extremities consistent with a severe diffuse polyneuropathy.  He presented to the hospital after having been found in a storage facility with bizarre behavior.  Initial work-up without abnormalities, head CT without acute findings, alcohol level less than 10. Seen by psychiatry who notes that this presentation is similar to previous presentations.  Latuda was recently discontinued due to EPS and Abilify was started which he is not on this medicine currently.  He was also placed on a CIWA protocol and treated with high-dose thiamine 500 mg IV every 8 hours for 3 days, followed by 200 mg every 8 hours for 2 doses.  Is no longer on thiamine IV apparently due to pulling out his IV.  He had a more recent brain MRI scan done in the hospital on 10/4/2023 which does not demonstrate any acute intracranial abnormalities and mild at best  nonspecific white matter changes.  He had mentation changes in the hospital and had a EEG performed which demonstrated mild slowing.  He was started on donepezil 5 mg daily by his psychiatrist and this was discontinued following his most recent formal neuropsychology testing results.  He has also had TMS therapy since his last visit.  He tells me he is doing things around the house and not working on boat any longer.  He does not do much cooking.  They have functioning smoke detectors at home.  I spoke with them at length today with regards to the results of his formal neuropsychology testing and the recommendations made and advised him to discuss with his psychiatrist about getting off of the hydroxyzine which has anticholingergic effects which can worsen memory issues and instead trying SSRI's that maybe more helpful.  I will refer him to behavioral health for further assistance and cognitive behavioral therapy and also anger management.  He also needs to work on his nutrition as he has had weight loss and is thin.  I did advise him to exercise and socialize and quit smoking.  I will refer him to have formal driving evaluation through community mobility assessment.  He denies thoughts of hurting himself or anyone else.  I spoke with them about obtaining marriage counseling for further assistance.          Relevant Orders    Ambulatory Referral to Psychology    Ambulatory Referral to Psychiatry    Ambulatory Referral to Occupational Therapy for Evaluation & Treatment (Completed)    Pseudodementia    Relevant Orders    Ambulatory Referral to Psychology    Ambulatory Referral to Psychiatry    Ambulatory Referral to Occupational Therapy for Evaluation & Treatment (Completed)     I spent 52 minutes caring for Stanislav on this date of service. This time includes time spent by me in the following activities:preparing for the visit, reviewing tests, obtaining and/or reviewing a separately obtained history, performing a  medically appropriate examination and/or evaluation , counseling and educating the patient/family/caregiver, ordering medications, tests, or procedures, documenting information in the medical record, and care coordination    Follow Up   Return if symptoms worsen or fail to improve.  Patient was given instructions and counseling regarding his condition or for health maintenance advice. Please see specific information pulled into the AVS if appropriate.

## 2024-06-28 ENCOUNTER — TELEPHONE (OUTPATIENT)
Age: 69
End: 2024-06-28
Payer: MEDICARE

## 2024-06-28 ENCOUNTER — TELEPHONE (OUTPATIENT)
Dept: NEUROLOGY | Facility: CLINIC | Age: 69
End: 2024-06-28

## 2024-06-28 NOTE — TELEPHONE ENCOUNTER
Recd call from Sakina with Behavioral [facility]- they recd your referral for patient. She stated they called patient and pt reports he already sees psychiatry- unsure what more they could offer the patient unless there was something more specific you wanted them to do. Please advise:    [phone]

## 2024-06-28 NOTE — TELEPHONE ENCOUNTER
Talked with patient about scheduling with our office per referral, he wants to know why since he is already seeing a psychitrist. I am trying to get answers for him. Will return call Monday 7/1/24.

## 2024-07-02 ENCOUNTER — TELEPHONE (OUTPATIENT)
Age: 69
End: 2024-07-02
Payer: MEDICARE

## 2024-07-02 ENCOUNTER — TELEPHONE (OUTPATIENT)
Dept: NEUROLOGY | Facility: CLINIC | Age: 69
End: 2024-07-02

## 2024-07-02 NOTE — TELEPHONE ENCOUNTER
Potential patient called to see if the referring doctor's office had gotten back with this office to clarify why he needs another psychiatrist since he has gone to the same one for years.

## 2024-07-02 NOTE — TELEPHONE ENCOUNTER
Provider: JEAN PAUL STARK MD    Caller: LAURA    Relationship to Patient: PSYCHIATRIST    Phone Number: [phone]    Reason for Call: CALLING REGARDING THE REFERRAL FOR PSYCHIATRY.  STATED PATIENT IS WANTING TO KNOW WHY PROVIDER SENT THE REFERRAL.   STATED HE ALREADY SEES A PSYCHIATRIST.      When was the patient last seen:     PLEASE CALL & ADVISE

## 2024-07-09 DIAGNOSIS — N52.9 ERECTILE DYSFUNCTION, UNSPECIFIED ERECTILE DYSFUNCTION TYPE: ICD-10-CM

## 2024-07-10 RX ORDER — SILDENAFIL 50 MG/1
50 TABLET, FILM COATED ORAL DAILY PRN
Qty: 10 TABLET | Refills: 5 | Status: SHIPPED | OUTPATIENT
Start: 2024-07-10

## 2024-07-22 ENCOUNTER — HOSPITAL ENCOUNTER (OUTPATIENT)
Facility: HOSPITAL | Age: 69
Discharge: HOME | End: 2024-07-22
Admitting: INTERNAL MEDICINE
Payer: MEDICARE

## 2024-07-22 DIAGNOSIS — J84.9 ILD (INTERSTITIAL LUNG DISEASE): ICD-10-CM

## 2024-07-22 PROCEDURE — 71250 CT THORAX DX C-: CPT

## 2024-08-04 DIAGNOSIS — I10 ESSENTIAL HYPERTENSION: ICD-10-CM

## 2024-08-05 RX ORDER — LOSARTAN POTASSIUM 25 MG/1
25 TABLET ORAL DAILY
Qty: 90 TABLET | Refills: 2 | Status: SHIPPED | OUTPATIENT
Start: 2024-08-05

## 2024-08-07 ENCOUNTER — TELEPHONE (OUTPATIENT)
Dept: NEUROLOGY | Facility: CLINIC | Age: 69
End: 2024-08-07
Payer: MEDICARE

## 2024-08-07 NOTE — TELEPHONE ENCOUNTER
Caller: Stanislav Choi    Relationship: Self    Best call back number: [phone]    What is the best time to reach you:     Who are you requesting to speak with (clinical staff, provider,  specific staff member): MI    Do you know the name of the person who called:     What was the call regarding:   REFERRALS  PT WAS CONTACTED BY A PROVIDER IN [place] BUT PT WANTS TO BE SEEN IN [place].    ALSO, PT HAS NOT BEEN CONTACTED FOR OCCUPATIONAL THERAPY OR PSYCHOLOGY.    ALEK PURI HAS PUT PT ON DIVALPROEX 500 MG 2 X DAY. PT SAID THIS PROVIDER IS GOOD WITH HIM GETTING A SECOND OPINION.    PLEASE CALL PT BACK TO DISCUSS THE THREE REFERRALS DR STARK HAS PLACED FOR HIM.

## 2024-08-20 NOTE — TELEPHONE ENCOUNTER
Provider: JEAN PAUL STARK MD    Caller: KERLINE    Relationship to Patient: SELF    Phone Number: [phone]    Reason for Call: CALLING REGARDING THE MESSAGE.  STATED HE HAS NOT HEARD BACK FROM CLINIC TO SEE IF HE CAN BE SEEN IN [place], NOT [place].  STATED HE HAS NOT HEARD FROM ANY OF THE REFERRALS.    When was the patient last seen: 6-19-24    PLEASE CALL & ADVISE

## 2024-08-21 RX ORDER — VILAZODONE HYDROCHLORIDE 20 MG/1
TABLET ORAL
COMMUNITY
Start: 2024-06-25

## 2024-08-21 RX ORDER — DIVALPROEX SODIUM 500 MG/1
500 TABLET, EXTENDED RELEASE ORAL 2 TIMES DAILY
COMMUNITY
Start: 2024-08-05

## 2024-08-21 RX ORDER — FLUTICASONE FUROATE, UMECLIDINIUM BROMIDE AND VILANTEROL TRIFENATATE 200; 62.5; 25 UG/1; UG/1; UG/1
POWDER RESPIRATORY (INHALATION)
COMMUNITY
End: 2024-08-21

## 2024-08-21 RX ORDER — HYDROXYZINE PAMOATE 50 MG/1
CAPSULE ORAL
COMMUNITY
Start: 2024-07-22

## 2024-08-22 ENCOUNTER — HOSPITAL ENCOUNTER (OUTPATIENT)
Facility: HOSPITAL | Age: 69
Setting detail: HOSPITAL OUTPATIENT SURGERY
Discharge: HOME | End: 2024-08-22
Attending: INTERNAL MEDICINE | Admitting: INTERNAL MEDICINE
Payer: MEDICARE

## 2024-08-22 ENCOUNTER — ANESTHESIA EVENT (OUTPATIENT)
Dept: GASTROENTEROLOGY | Facility: HOSPITAL | Age: 69
End: 2024-08-22
Payer: MEDICARE

## 2024-08-22 ENCOUNTER — ANESTHESIA (OUTPATIENT)
Dept: GASTROENTEROLOGY | Facility: HOSPITAL | Age: 69
End: 2024-08-22
Payer: MEDICARE

## 2024-08-22 VITALS
HEIGHT: 68 IN | RESPIRATION RATE: 18 BRPM | OXYGEN SATURATION: 98 % | SYSTOLIC BLOOD PRESSURE: 148 MMHG | BODY MASS INDEX: 19.25 KG/M2 | WEIGHT: 127 LBS | DIASTOLIC BLOOD PRESSURE: 89 MMHG | HEART RATE: 69 BPM

## 2024-08-22 DIAGNOSIS — R93.89 ABNORMAL CT SCAN: ICD-10-CM

## 2024-08-22 LAB
APPEARANCE FLD: ABNORMAL
B PARAPERT DNA SPEC QL NAA+PROBE: NOT DETECTED
B PERT DNA SPEC QL NAA+PROBE: NOT DETECTED
C PNEUM DNA NPH QL NAA+NON-PROBE: NOT DETECTED
COLOR FLD: ABNORMAL
EOSINOPHIL NFR FLD MANUAL: 3 %
FLUAV SUBTYP SPEC NAA+PROBE: NOT DETECTED
FLUBV RNA ISLT QL NAA+PROBE: NOT DETECTED
GIE STN SPEC: NORMAL
HADV DNA SPEC NAA+PROBE: NOT DETECTED
HCOV 229E RNA SPEC QL NAA+PROBE: NOT DETECTED
HCOV HKU1 RNA SPEC QL NAA+PROBE: NOT DETECTED
HCOV NL63 RNA SPEC QL NAA+PROBE: NOT DETECTED
HCOV OC43 RNA SPEC QL NAA+PROBE: NOT DETECTED
HMPV RNA NPH QL NAA+NON-PROBE: NOT DETECTED
HPIV1 RNA ISLT QL NAA+PROBE: NOT DETECTED
HPIV2 RNA SPEC QL NAA+PROBE: NOT DETECTED
HPIV3 RNA NPH QL NAA+PROBE: NOT DETECTED
HPIV4 P GENE NPH QL NAA+PROBE: NOT DETECTED
LYMPHOCYTES NFR FLD MANUAL: 1 %
M PNEUMO IGG SER IA-ACNC: NOT DETECTED
METHOD: ABNORMAL
MONOS+MACROS NFR FLD: 28 %
NEUTROPHILS NFR FLD MANUAL: 68 %
NUC CELL # FLD: 37 /MM3
RBC # FLD AUTO: 985 /MM3
RHINOVIRUS RNA SPEC NAA+PROBE: NOT DETECTED
RSV RNA NPH QL NAA+NON-PROBE: NOT DETECTED
SARS-COV-2 RNA NPH QL NAA+NON-PROBE: NOT DETECTED

## 2024-08-22 PROCEDURE — 0202U NFCT DS 22 TRGT SARS-COV-2: CPT | Performed by: INTERNAL MEDICINE

## 2024-08-22 PROCEDURE — 87116 MYCOBACTERIA CULTURE: CPT | Performed by: INTERNAL MEDICINE

## 2024-08-22 PROCEDURE — 89051 BODY FLUID CELL COUNT: CPT | Performed by: INTERNAL MEDICINE

## 2024-08-22 PROCEDURE — 88305 TISSUE EXAM BY PATHOLOGIST: CPT | Performed by: INTERNAL MEDICINE

## 2024-08-22 PROCEDURE — 87102 FUNGUS ISOLATION CULTURE: CPT | Performed by: INTERNAL MEDICINE

## 2024-08-22 PROCEDURE — 87205 SMEAR GRAM STAIN: CPT | Performed by: INTERNAL MEDICINE

## 2024-08-22 PROCEDURE — 25810000003 LACTATED RINGERS PER 1000 ML: Performed by: INTERNAL MEDICINE

## 2024-08-22 PROCEDURE — 87071 CULTURE AEROBIC QUANT OTHER: CPT | Performed by: INTERNAL MEDICINE

## 2024-08-22 PROCEDURE — 87206 SMEAR FLUORESCENT/ACID STAI: CPT | Performed by: INTERNAL MEDICINE

## 2024-08-22 PROCEDURE — 25010000002 PHENYLEPHRINE 10 MG/ML SOLUTION: Performed by: NURSE ANESTHETIST, CERTIFIED REGISTERED

## 2024-08-22 PROCEDURE — 88112 CYTOPATH CELL ENHANCE TECH: CPT | Performed by: INTERNAL MEDICINE

## 2024-08-22 PROCEDURE — 25010000002 PROPOFOL 10 MG/ML EMULSION: Performed by: NURSE ANESTHETIST, CERTIFIED REGISTERED

## 2024-08-22 RX ORDER — PHENYLEPHRINE HYDROCHLORIDE 10 MG/ML
INJECTION INTRAVENOUS AS NEEDED
Status: DISCONTINUED | OUTPATIENT
Start: 2024-08-22 | End: 2024-08-22 | Stop reason: SURG

## 2024-08-22 RX ORDER — SODIUM CHLORIDE, SODIUM LACTATE, POTASSIUM CHLORIDE, CALCIUM CHLORIDE 600; 310; 30; 20 MG/100ML; MG/100ML; MG/100ML; MG/100ML
30 INJECTION, SOLUTION INTRAVENOUS CONTINUOUS PRN
Status: DISCONTINUED | OUTPATIENT
Start: 2024-08-22 | End: 2024-08-22 | Stop reason: HOSPADM

## 2024-08-22 RX ORDER — IPRATROPIUM BROMIDE AND ALBUTEROL SULFATE 2.5; .5 MG/3ML; MG/3ML
3 SOLUTION RESPIRATORY (INHALATION) ONCE AS NEEDED
Status: DISCONTINUED | OUTPATIENT
Start: 2024-08-22 | End: 2024-08-22 | Stop reason: HOSPADM

## 2024-08-22 RX ORDER — PROPOFOL 10 MG/ML
VIAL (ML) INTRAVENOUS AS NEEDED
Status: DISCONTINUED | OUTPATIENT
Start: 2024-08-22 | End: 2024-08-22 | Stop reason: SURG

## 2024-08-22 RX ORDER — LIDOCAINE HYDROCHLORIDE 10 MG/ML
INJECTION, SOLUTION EPIDURAL; INFILTRATION; INTRACAUDAL; PERINEURAL AS NEEDED
Status: DISCONTINUED | OUTPATIENT
Start: 2024-08-22 | End: 2024-08-22 | Stop reason: HOSPADM

## 2024-08-22 RX ORDER — LIDOCAINE HYDROCHLORIDE 20 MG/ML
INJECTION, SOLUTION INFILTRATION; PERINEURAL AS NEEDED
Status: DISCONTINUED | OUTPATIENT
Start: 2024-08-22 | End: 2024-08-22 | Stop reason: SURG

## 2024-08-22 RX ADMIN — PROPOFOL 160 MG: 10 INJECTION, EMULSION INTRAVENOUS at 08:47

## 2024-08-22 RX ADMIN — SODIUM CHLORIDE, POTASSIUM CHLORIDE, SODIUM LACTATE AND CALCIUM CHLORIDE 30 ML/HR: 600; 310; 30; 20 INJECTION, SOLUTION INTRAVENOUS at 08:21

## 2024-08-22 RX ADMIN — PHENYLEPHRINE HYDROCHLORIDE 100 MCG: 10 INJECTION INTRAVENOUS at 09:02

## 2024-08-22 RX ADMIN — LIDOCAINE HYDROCHLORIDE 60 MG: 20 INJECTION, SOLUTION INFILTRATION; PERINEURAL at 08:47

## 2024-08-22 NOTE — ANESTHESIA PROCEDURE NOTES
Airway  Urgency: elective    Date/Time: 8/22/2024 8:49 AM  End Time:8/22/2024 8:49 AM  Airway not difficult    General Information and Staff    Patient location during procedure: OR  CRNA/CAA: Elpidio Gallardo CRNA    Indications and Patient Condition  Indications for airway management: airway protection    Preoxygenated: yes  MILS maintained throughout  Mask difficulty assessment: 1 - vent by mask    Final Airway Details  Final airway type: supraglottic airway      Successful airway: gastric  Size 4     Number of attempts at approach: 1  Assessment: lips, teeth, and gum same as pre-op

## 2024-08-22 NOTE — ANESTHESIA PREPROCEDURE EVALUATION
Anesthesia Evaluation     Patient summary reviewed and Nursing notes reviewed   NPO Solid Status: > 8 hours             Airway   Mallampati: II  TM distance: >3 FB  Neck ROM: full  no difficulty expected  Dental - normal exam     Pulmonary - normal exam   (+) a smoker Current, COPD,  Cardiovascular - normal exam    (+) hypertension, hyperlipidemia      Neuro/Psych  (+) numbness, psychiatric history Anxiety and Depression, dementia  GI/Hepatic/Renal/Endo      Musculoskeletal     Abdominal  - normal exam   Substance History   (+) alcohol use (Abuse hx in the past)     OB/GYN          Other                    Anesthesia Plan    ASA 3     MAC     intravenous induction     Anesthetic plan, risks, benefits, and alternatives have been provided, discussed and informed consent has been obtained with: patient.    CODE STATUS:

## 2024-08-22 NOTE — DISCHARGE INSTRUCTIONS

## 2024-08-22 NOTE — OP NOTE
Bronchoscopy Procedure Note    Procedure:  Bronchoscopy, Therapeutic    Pre-Operative Diagnosis: Abnormal CT with endobronchial lesion versus mucous plugging and tree-in-bud nodularity bilaterally    Post-Operative Diagnosis: Same with mucous plugs bilaterally    Indication: Abnormal CT chest    Anesthesia: Monitored Anesthesia Care (MAC)    Procedure Details: Patient was consented for the procedure with all risk and benefit of the procedure explained in detail.  Patient was given the opportunity to ask questions and all concerns were answered.  The bronchocope was inserted into the main airway via the LMA. An anatomical survey was done of the main airways and the subsegmental bronchus to at least the first subsegmental level of all five lobes of both lungs.  The findings are reported below.  A bronchoalveolar lavage was performed using aliquots of normal saline instilled into the airways then aspirated back.    Findings:  Bronchoscope passed through LMA to the level of the vocal cords.  Lidocaine used for local anesthetic over vocal cords.  Bronchoscope was passed between the vocal cords into the trachea.  All airways were visualized to at least the first subsegment level of all 5 lobes of both lungs.  Airways were of normal size and caliber.  No endobronchial lesions seen.  Bilateral mucous plugging noted in left lower lobe, right lower lobe and right upper lobe requiring aggressive suctioning for successful removal.  After mucous plugging removed no endobronchial lesions were seen.  Bronchial alveolar lavage performed in the left lower lobe and right lower lobe with 220 cc of saline instilled and 55 cc of hazy with plugging in sample.    Estimated Blood Loss:   None           Specimens:  As listed above                Complications:  None; patient tolerated the procedure well.           Disposition: PACU - hemodynamically stable.      Patient tolerated the procedure well.    Ten Arvizu,  MD  8/22/2024  08:57 EDT

## 2024-08-22 NOTE — ANESTHESIA POSTPROCEDURE EVALUATION
"Patient: Stanislav Choi    Procedure Summary       Date: 08/22/24 Room / Location:  [facility]    Anesthesia Start: 0845 Anesthesia Stop: 0908    Procedure: BRONCHOSCOPY WITH BAL (Bronchus) Diagnosis:     Surgeons: Ten Arvizu MD Provider: Mahad Garcias MD    Anesthesia Type: MAC ASA Status: 3            Anesthesia Type: MAC    Vitals  Vitals Value Taken Time   /89 08/22/24 0927   Temp     Pulse 67 08/22/24 0929   Resp 18 08/22/24 0927   SpO2 98 % 08/22/24 0929   Vitals shown include unfiled device data.        Post Anesthesia Care and Evaluation    Patient location during evaluation: bedside  Patient participation: complete - patient participated  Level of consciousness: awake and alert  Pain management: adequate    Airway patency: patent  Anesthetic complications: No anesthetic complications    Cardiovascular status: acceptable  Respiratory status: acceptable  Hydration status: acceptable    Comments: /89   Pulse 69   Resp 18   Ht 172.7 cm (68\")   Wt 57.6 kg (127 lb)   SpO2 98%   BMI 19.31 kg/m²     "

## 2024-08-23 LAB
CYTO UR: NORMAL
LAB AP CASE REPORT: NORMAL
NIGHT BLUE STAIN TISS: NORMAL
PATH REPORT.FINAL DX SPEC: NORMAL
PATH REPORT.GROSS SPEC: NORMAL

## 2024-08-24 LAB
BACTERIA SPEC AEROBE CULT: NORMAL
GRAM STN SPEC: NORMAL

## 2024-08-29 LAB
FUNGUS WND CULT: NORMAL
MYCOBACTERIUM SPEC CULT: NORMAL
NIGHT BLUE STAIN TISS: NORMAL

## 2024-09-05 DIAGNOSIS — I10 ESSENTIAL HYPERTENSION: ICD-10-CM

## 2024-09-05 LAB
FUNGUS WND CULT: NORMAL
MYCOBACTERIUM SPEC CULT: NORMAL
NIGHT BLUE STAIN TISS: NORMAL

## 2024-09-05 RX ORDER — HYDROCHLOROTHIAZIDE 25 MG/1
25 TABLET ORAL DAILY
Qty: 90 TABLET | Refills: 1 | Status: SHIPPED | OUTPATIENT
Start: 2024-09-05

## 2024-09-12 LAB
FUNGUS WND CULT: NORMAL
MYCOBACTERIUM SPEC CULT: NORMAL
NIGHT BLUE STAIN TISS: NORMAL

## 2024-09-19 LAB
FUNGUS WND CULT: NORMAL
MYCOBACTERIUM SPEC CULT: NORMAL
NIGHT BLUE STAIN TISS: NORMAL

## 2024-09-24 ENCOUNTER — OFFICE VISIT (OUTPATIENT)
Dept: INTERNAL MEDICINE | Age: 69
End: 2024-09-24

## 2024-09-24 ENCOUNTER — APPOINTMENT (OUTPATIENT)
Dept: LAB | Age: 69
End: 2024-09-24

## 2024-09-24 VITALS
WEIGHT: 203.6 LBS | HEIGHT: 64 IN | OXYGEN SATURATION: 96 % | BODY MASS INDEX: 34.76 KG/M2 | TEMPERATURE: 97.6 F | HEART RATE: 72 BPM | SYSTOLIC BLOOD PRESSURE: 124 MMHG | DIASTOLIC BLOOD PRESSURE: 76 MMHG

## 2024-09-24 DIAGNOSIS — E66.9 OBESITY, CLASS I, BMI 30-34.9: ICD-10-CM

## 2024-09-24 DIAGNOSIS — Z12.5 SCREENING FOR PROSTATE CANCER: ICD-10-CM

## 2024-09-24 DIAGNOSIS — R73.03 BORDERLINE DIABETES: ICD-10-CM

## 2024-09-24 DIAGNOSIS — N52.9 ERECTILE DYSFUNCTION, UNSPECIFIED ERECTILE DYSFUNCTION TYPE: ICD-10-CM

## 2024-09-24 DIAGNOSIS — E78.5 DYSLIPIDEMIA: ICD-10-CM

## 2024-09-24 DIAGNOSIS — I10 ESSENTIAL HYPERTENSION: Primary | ICD-10-CM

## 2024-09-24 DIAGNOSIS — D69.6 THROMBOCYTOPENIA (CMD): ICD-10-CM

## 2024-09-24 PROBLEM — E66.811 OBESITY, CLASS I, BMI 30-34.9: Status: ACTIVE | Noted: 2023-11-16

## 2024-09-24 LAB
ALBUMIN SERPL-MCNC: 4 G/DL (ref 3.4–5)
ALBUMIN/GLOB SERPL: 1 {RATIO} (ref 1–2.4)
ALP SERPL-CCNC: 99 UNITS/L (ref 45–117)
ALT SERPL-CCNC: 40 UNITS/L
ANION GAP SERPL CALC-SCNC: 14 MMOL/L (ref 7–19)
AST SERPL-CCNC: 34 UNITS/L
BILIRUB SERPL-MCNC: 1.1 MG/DL (ref 0.2–1)
BUN SERPL-MCNC: 9 MG/DL (ref 6–20)
BUN/CREAT SERPL: 11 (ref 7–25)
CALCIUM SERPL-MCNC: 9 MG/DL (ref 8.4–10.2)
CHLORIDE SERPL-SCNC: 103 MMOL/L (ref 97–110)
CHOLEST SERPL-MCNC: 167 MG/DL
CHOLEST/HDLC SERPL: 2 {RATIO}
CO2 SERPL-SCNC: 22 MMOL/L (ref 21–32)
CREAT SERPL-MCNC: 0.79 MG/DL (ref 0.67–1.17)
DEPRECATED RDW RBC: 38.6 FL (ref 39–50)
EGFRCR SERPLBLD CKD-EPI 2021: >90 ML/MIN/{1.73_M2}
ERYTHROCYTE [DISTWIDTH] IN BLOOD: 11.9 % (ref 11–15)
FASTING DURATION TIME PATIENT: ABNORMAL H
GLOBULIN SER-MCNC: 4.1 G/DL (ref 2–4)
GLUCOSE SERPL-MCNC: 96 MG/DL (ref 70–99)
HBA1C MFR BLD: 5.4 % (ref 4.5–5.6)
HCT VFR BLD CALC: 44.7 % (ref 39–51)
HDLC SERPL-MCNC: 84 MG/DL
HGB BLD-MCNC: 16 G/DL (ref 13–17)
LDLC SERPL CALC-MCNC: 45 MG/DL
MCH RBC QN AUTO: 31.4 PG (ref 26–34)
MCHC RBC AUTO-ENTMCNC: 35.8 G/DL (ref 32–36.5)
MCV RBC AUTO: 87.8 FL (ref 78–100)
NONHDLC SERPL-MCNC: 83 MG/DL
NRBC BLD MANUAL-RTO: 0 /100 WBC
PLATELET # BLD AUTO: 203 K/MCL (ref 140–450)
POTASSIUM SERPL-SCNC: 3.5 MMOL/L (ref 3.4–5.1)
PROT SERPL-MCNC: 8.1 G/DL (ref 6.4–8.2)
PSA SERPL-MCNC: 3.78 NG/ML
RBC # BLD: 5.09 MIL/MCL (ref 4.5–5.9)
SODIUM SERPL-SCNC: 135 MMOL/L (ref 135–145)
TRIGL SERPL-MCNC: 190 MG/DL
WBC # BLD: 5.7 K/MCL (ref 4.2–11)

## 2024-09-24 PROCEDURE — 84153 ASSAY OF PSA TOTAL: CPT | Performed by: NURSE PRACTITIONER

## 2024-09-24 PROCEDURE — 80053 COMPREHEN METABOLIC PANEL: CPT | Performed by: NURSE PRACTITIONER

## 2024-09-24 PROCEDURE — 99214 OFFICE O/P EST MOD 30 MIN: CPT | Performed by: NURSE PRACTITIONER

## 2024-09-24 PROCEDURE — 80061 LIPID PANEL: CPT | Performed by: NURSE PRACTITIONER

## 2024-09-24 PROCEDURE — 83036 HEMOGLOBIN GLYCOSYLATED A1C: CPT | Performed by: NURSE PRACTITIONER

## 2024-09-24 PROCEDURE — 85027 COMPLETE CBC AUTOMATED: CPT | Performed by: NURSE PRACTITIONER

## 2024-09-24 RX ORDER — LOSARTAN POTASSIUM 25 MG/1
25 TABLET ORAL DAILY
Qty: 90 TABLET | Refills: 3 | Status: SHIPPED | OUTPATIENT
Start: 2024-09-24

## 2024-09-24 RX ORDER — ATORVASTATIN CALCIUM 40 MG/1
40 TABLET, FILM COATED ORAL DAILY
Qty: 90 TABLET | Refills: 3 | Status: SHIPPED | OUTPATIENT
Start: 2024-09-24

## 2024-09-24 RX ORDER — SILDENAFIL 25 MG/1
25 TABLET, FILM COATED ORAL DAILY PRN
Qty: 10 TABLET | Refills: 5 | Status: SHIPPED | OUTPATIENT
Start: 2024-09-24

## 2024-09-24 RX ORDER — HYDROCHLOROTHIAZIDE 25 MG/1
25 TABLET ORAL DAILY
Qty: 90 TABLET | Refills: 3 | Status: SHIPPED | OUTPATIENT
Start: 2024-09-24

## 2024-09-24 RX ORDER — SILDENAFIL 50 MG/1
50 TABLET, FILM COATED ORAL DAILY PRN
Qty: 10 TABLET | Refills: 5 | Status: SHIPPED | OUTPATIENT
Start: 2024-09-24

## 2024-09-24 ASSESSMENT — PATIENT HEALTH QUESTIONNAIRE - PHQ9
2. FEELING DOWN, DEPRESSED OR HOPELESS: NOT AT ALL
SUM OF ALL RESPONSES TO PHQ9 QUESTIONS 1 AND 2: 0
SUM OF ALL RESPONSES TO PHQ9 QUESTIONS 1 AND 2: 0
CLINICAL INTERPRETATION OF PHQ2 SCORE: NO FURTHER SCREENING NEEDED
1. LITTLE INTEREST OR PLEASURE IN DOING THINGS: NOT AT ALL

## 2024-09-26 ENCOUNTER — TELEPHONE (OUTPATIENT)
Dept: INTERNAL MEDICINE | Age: 69
End: 2024-09-26

## 2024-09-26 LAB
MYCOBACTERIUM SPEC CULT: NORMAL
NIGHT BLUE STAIN TISS: NORMAL

## 2024-10-03 LAB
MYCOBACTERIUM SPEC CULT: NORMAL
NIGHT BLUE STAIN TISS: NORMAL

## 2024-11-29 ENCOUNTER — HOSPITAL ENCOUNTER (OUTPATIENT)
Age: 69
End: 2024-11-29
Attending: ANESTHESIOLOGY | Admitting: ANESTHESIOLOGY

## 2024-12-18 ENCOUNTER — TRANSCRIBE ORDERS (OUTPATIENT)
Dept: ADMINISTRATIVE | Facility: HOSPITAL | Age: 69
End: 2024-12-18
Payer: MEDICARE

## 2024-12-18 DIAGNOSIS — R91.1 LUNG NODULE: Primary | ICD-10-CM

## 2025-03-11 ENCOUNTER — HOSPITAL ENCOUNTER (OUTPATIENT)
Facility: HOSPITAL | Age: 70
Discharge: HOME | End: 2025-03-11
Payer: MEDICARE

## 2025-03-11 DIAGNOSIS — R91.1 LUNG NODULE: ICD-10-CM

## 2025-03-11 PROCEDURE — 71250 CT THORAX DX C-: CPT

## 2025-03-20 ENCOUNTER — TRANSCRIBE ORDERS (OUTPATIENT)
Dept: ADMINISTRATIVE | Facility: HOSPITAL | Age: 70
End: 2025-03-20
Payer: MEDICARE

## 2025-03-20 DIAGNOSIS — R91.1 PULMONARY NODULE: Primary | ICD-10-CM

## 2025-04-15 ENCOUNTER — HOSPITAL ENCOUNTER (EMERGENCY)
Facility: HOSPITAL | Age: 70
Discharge: HOME | End: 2025-04-15
Attending: EMERGENCY MEDICINE | Admitting: EMERGENCY MEDICINE
Payer: MEDICARE

## 2025-04-15 ENCOUNTER — APPOINTMENT (OUTPATIENT)
Dept: CT IMAGING | Facility: HOSPITAL | Age: 70
End: 2025-04-15
Payer: MEDICARE

## 2025-04-15 VITALS
SYSTOLIC BLOOD PRESSURE: 113 MMHG | DIASTOLIC BLOOD PRESSURE: 83 MMHG | HEART RATE: 70 BPM | OXYGEN SATURATION: 100 % | TEMPERATURE: 96.3 F | RESPIRATION RATE: 20 BRPM

## 2025-04-15 DIAGNOSIS — F10.929 ALCOHOLIC INTOXICATION WITH COMPLICATION: ICD-10-CM

## 2025-04-15 DIAGNOSIS — R26.81 UNSTEADY GAIT: Primary | ICD-10-CM

## 2025-04-15 LAB
ALBUMIN SERPL-MCNC: 4.3 G/DL (ref 3.5–5.2)
ALBUMIN/GLOB SERPL: 1.5 G/DL
ALP SERPL-CCNC: 96 U/L (ref 39–117)
ALT SERPL W P-5'-P-CCNC: 12 U/L (ref 1–41)
AMPHET+METHAMPHET UR QL: NEGATIVE
AMPHETAMINES UR QL: NEGATIVE
ANION GAP SERPL CALCULATED.3IONS-SCNC: 10 MMOL/L (ref 5–15)
AST SERPL-CCNC: 23 U/L (ref 1–40)
BARBITURATES UR QL SCN: NEGATIVE
BASOPHILS # BLD MANUAL: 0 10*3/MM3 (ref 0–0.2)
BASOPHILS NFR BLD MANUAL: 0 % (ref 0–1.5)
BENZODIAZ UR QL SCN: NEGATIVE
BILIRUB SERPL-MCNC: 0.3 MG/DL (ref 0–1.2)
BUN SERPL-MCNC: 11 MG/DL (ref 8–23)
BUN/CREAT SERPL: 11.7 (ref 7–25)
BUPRENORPHINE SERPL-MCNC: NEGATIVE NG/ML
CALCIUM SPEC-SCNC: 9.1 MG/DL (ref 8.6–10.5)
CANNABINOIDS SERPL QL: NEGATIVE
CHLORIDE SERPL-SCNC: 105 MMOL/L (ref 98–107)
CO2 SERPL-SCNC: 27 MMOL/L (ref 22–29)
COCAINE UR QL: NEGATIVE
CREAT SERPL-MCNC: 0.94 MG/DL (ref 0.76–1.27)
DEPRECATED RDW RBC AUTO: 42.1 FL (ref 37–54)
EGFRCR SERPLBLD CKD-EPI 2021: 87.8 ML/MIN/1.73
EOSINOPHIL # BLD MANUAL: 0 10*3/MM3 (ref 0–0.4)
EOSINOPHIL NFR BLD MANUAL: 0 % (ref 0.3–6.2)
ERYTHROCYTE [DISTWIDTH] IN BLOOD BY AUTOMATED COUNT: 12.4 % (ref 12.3–15.4)
ETHANOL BLD-MCNC: 166 MG/DL (ref 0–10)
ETHANOL UR QL: 0.17 %
FENTANYL UR-MCNC: NEGATIVE NG/ML
GEN 5 1HR TROPONIN T REFLEX: 11 NG/L
GLOBULIN UR ELPH-MCNC: 2.8 GM/DL
GLUCOSE SERPL-MCNC: 86 MG/DL (ref 65–99)
HCT VFR BLD AUTO: 43.2 % (ref 37.5–51)
HGB BLD-MCNC: 14.7 G/DL (ref 13–17.7)
INR PPP: 1.05 (ref 0.9–1.1)
LYMPHOCYTES # BLD MANUAL: 5.76 10*3/MM3 (ref 0.7–3.1)
LYMPHOCYTES NFR BLD MANUAL: 4.2 % (ref 5–12)
MAGNESIUM SERPL-MCNC: 2.5 MG/DL (ref 1.6–2.4)
MCH RBC QN AUTO: 31.8 PG (ref 26.6–33)
MCHC RBC AUTO-ENTMCNC: 34 G/DL (ref 31.5–35.7)
MCV RBC AUTO: 93.5 FL (ref 79–97)
METHADONE UR QL SCN: NEGATIVE
MONOCYTES # BLD: 0.48 10*3/MM3 (ref 0.1–0.9)
NEUTROPHILS # BLD AUTO: 5.28 10*3/MM3 (ref 1.7–7)
NEUTROPHILS NFR BLD MANUAL: 45.8 % (ref 42.7–76)
OPIATES UR QL: NEGATIVE
OXYCODONE UR QL SCN: NEGATIVE
PCP UR QL SCN: NEGATIVE
PLAT MORPH BLD: NORMAL
PLATELET # BLD AUTO: 273 10*3/MM3 (ref 140–450)
PMV BLD AUTO: 9.5 FL (ref 6–12)
POTASSIUM SERPL-SCNC: 4.2 MMOL/L (ref 3.5–5.2)
PROT SERPL-MCNC: 7.1 G/DL (ref 6–8.5)
PROTHROMBIN TIME: 13.6 SECONDS (ref 11.7–14.2)
QT INTERVAL: 425 MS
QTC INTERVAL: 444 MS
RBC # BLD AUTO: 4.62 10*6/MM3 (ref 4.14–5.8)
RBC MORPH BLD: NORMAL
SODIUM SERPL-SCNC: 142 MMOL/L (ref 136–145)
TRICYCLICS UR QL SCN: NEGATIVE
TROPONIN T NUMERIC DELTA: -2 NG/L
TROPONIN T SERPL HS-MCNC: 13 NG/L
VALPROATE SERPL-MCNC: 63 MCG/ML (ref 50–125)
VARIANT LYMPHS NFR BLD MANUAL: 4.2 % (ref 0–5)
VARIANT LYMPHS NFR BLD MANUAL: 45.8 % (ref 19.6–45.3)
WBC MORPH BLD: NORMAL
WBC NRBC COR # BLD AUTO: 11.52 10*3/MM3 (ref 3.4–10.8)

## 2025-04-15 PROCEDURE — 36415 COLL VENOUS BLD VENIPUNCTURE: CPT

## 2025-04-15 PROCEDURE — 99284 EMERGENCY DEPT VISIT MOD MDM: CPT

## 2025-04-15 PROCEDURE — 85007 BL SMEAR W/DIFF WBC COUNT: CPT | Performed by: EMERGENCY MEDICINE

## 2025-04-15 PROCEDURE — 80164 ASSAY DIPROPYLACETIC ACD TOT: CPT | Performed by: EMERGENCY MEDICINE

## 2025-04-15 PROCEDURE — 82077 ASSAY SPEC XCP UR&BREATH IA: CPT | Performed by: EMERGENCY MEDICINE

## 2025-04-15 PROCEDURE — 93005 ELECTROCARDIOGRAM TRACING: CPT | Performed by: EMERGENCY MEDICINE

## 2025-04-15 PROCEDURE — 70450 CT HEAD/BRAIN W/O DYE: CPT

## 2025-04-15 PROCEDURE — 85025 COMPLETE CBC W/AUTO DIFF WBC: CPT | Performed by: EMERGENCY MEDICINE

## 2025-04-15 PROCEDURE — 80307 DRUG TEST PRSMV CHEM ANLYZR: CPT | Performed by: EMERGENCY MEDICINE

## 2025-04-15 PROCEDURE — 72125 CT NECK SPINE W/O DYE: CPT

## 2025-04-15 PROCEDURE — 80053 COMPREHEN METABOLIC PANEL: CPT | Performed by: EMERGENCY MEDICINE

## 2025-04-15 PROCEDURE — 84484 ASSAY OF TROPONIN QUANT: CPT | Performed by: EMERGENCY MEDICINE

## 2025-04-15 PROCEDURE — 83735 ASSAY OF MAGNESIUM: CPT | Performed by: EMERGENCY MEDICINE

## 2025-04-15 PROCEDURE — 85610 PROTHROMBIN TIME: CPT | Performed by: EMERGENCY MEDICINE

## 2025-04-15 RX ORDER — SODIUM CHLORIDE 0.9 % (FLUSH) 0.9 %
10 SYRINGE (ML) INJECTION AS NEEDED
Status: DISCONTINUED | OUTPATIENT
Start: 2025-04-15 | End: 2025-04-16 | Stop reason: HOSPADM

## 2025-04-16 NOTE — ED PROVIDER NOTES
EMERGENCY DEPARTMENT ENCOUNTER    Room Number:  23/23  Date of encounter:  4/16/2025  PCP: Remington Saxena MD  Historian: Patient and spouse  Relevant information and history provided by sources other than the patient will be included below and in the ED Course.  Review of pertinent past medical records may also be included in record below and ED Course.    HPI:  Chief Complaint: Falling with increased confusion and argumentative  A complete HPI/ROS/PMH/PSH/SH/FH are unobtainable due to: Not applicable  Context: Stanislav Choi is a 69 y.o. male who presents to the ED c/o patient is aware that he is here because he has been falling more.  Patient states that he does have a history of falling.  His wife is present at bedside and does report that.  But has been falling more today than normal.  Last time he was seen and walked at his baseline was about 10 AM today.  Wife states that he has been more confused he has been more argumentative and she uses the word belligerent.  Patient admits to drinking 1 beer today.  Denies drinking any other more than that.  Does have a long history of alcoholism.  Denies taking any illegal drugs.  Denies any vision change speech change, chest pain, abdominal pain, headache, neck pain, abdominal pain.  He denies hurting himself anywhere from the fall.  Denies any focal weakness to arms or legs.  He does report that he has been compliant with his medicines.        Previous Episodes: History of unsteady gait in the past.  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  History of hypertension, COPD and peripheral neuropathy.  I did review his medicines that he is on.      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    Pulmonary nodule 07/05/2023    Altered mental status 09/30/2023    Elevated WBC count 10/01/2023    Memory impairment 12/21/2023    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     Resolved Ambulatory Problems     Diagnosis Date Noted    Psychosis 03/29/2018    Dementia associated with alcoholism with behavioral disturbance 01/10/2019    Metabolic encephalopathy 06/28/2023    Alcohol withdrawal 06/28/2023    JON (acute kidney injury) 06/28/2023    Dehydration 06/28/2023    Hypotension 06/28/2023    Chronic diarrhea 06/28/2023     Past Medical History:   Diagnosis Date    Chronic cough     Depression     Elevated cholesterol     HL (hearing loss)     Hyperlipidemia     Memory loss     Peripheral neuropathy     Pneumonia     Shingles          PAST SURGICAL HISTORY  Past Surgical History:   Procedure Laterality Date    BRONCHOSCOPY N/A 8/22/2024    Procedure: BRONCHOSCOPY WITH BAL;  Surgeon: Ten Arvizu MD;  [facility]: [facility] ENDOSCOPY;  Service: Pulmonary;  Laterality: N/A;  PRE: ABNORMAL CT  POST: SAME    CATARACT EXTRACTION Bilateral     REPLACEMENT TOTAL KNEE           FAMILY HISTORY  Family History   Problem Relation Age of Onset    Cancer Mother     Hypertension Mother     Cancer Father     Heart disease Father     Cancer Brother     Cancer Maternal Grandmother     Cancer Maternal Grandfather     Heart disease Paternal Grandfather     Malig Hyperthermia Neg Hx          SOCIAL HISTORY  Social History     Socioeconomic History    Marital status:    Tobacco Use    Smoking  status: Every Day     Current packs/day: 1.00     Average packs/day: 1 pack/day for 40.0 years (40.0 ttl pk-yrs)     Types: Cigarettes    Smokeless tobacco: Never   Vaping Use    Vaping status: Never Used   Substance and Sexual Activity    Alcohol use: Not Currently     Comment: Hx ETOH abuse.  NOTHING FOR 2-3 YEARS    Drug use: No    Sexual activity: Defer         ALLERGIES  Iodinated contrast media        REVIEW OF SYSTEMS  Review of Systems     All systems reviewed and negative except for those discussed in HPI.       PHYSICAL EXAM    I have reviewed the triage vital signs and nursing notes.    ED Triage Vitals   Temp Pulse Resp BP SpO2   -- -- -- -- --      Temp src Heart Rate Source Patient Position BP Location FiO2 (%)   -- -- -- -- --       GENERAL: This is an elderly male.  Looks older than stated age.  He does have a smell of alcohol on his breath.  Vital signs on my initial evaluation have been reviewed  HENT: nares patent  Head/neck/ face are symmetric without gross deformity, signs of trauma, or swelling  EYES: no scleral icterus, no conjunctival pallor.  NECK: Supple, no meningismus.  No pain on palpation to the neck or movement of the neck  CV: regular rhythm, regular rate with intact distal pulses.  No murmur or rub  RESPIRATORY: normal effort and no respiratory distress.  Clear to auscultation bilaterally  ABDOMEN: soft and nontender.  MUSCULOSKELETAL: no deformity.  Patient is able to move all extremities to passive and active range of motion with no obvious pain or deformity  NEURO: alert and appropriate, moves all extremities, follows commands.  NIH stroke scale is 1 because he is unaware of the date.  I clinically do not suspect that he has had a stroke.    SKIN: warm, dry    Vital signs and nursing notes reviewed.        LAB RESULTS  Recent Results (from the past 24 hours)   Valproic Acid Level, Total    Collection Time: 04/15/25  8:11 PM    Specimen: Blood   Result Value Ref Range    Valproic  Acid 63.0 50.0 - 125.0 mcg/mL   Comprehensive Metabolic Panel    Collection Time: 04/15/25  8:11 PM    Specimen: Blood   Result Value Ref Range    Glucose 86 65 - 99 mg/dL    BUN 11 8 - 23 mg/dL    Creatinine 0.94 0.76 - 1.27 mg/dL    Sodium 142 136 - 145 mmol/L    Potassium 4.2 3.5 - 5.2 mmol/L    Chloride 105 98 - 107 mmol/L    CO2 27.0 22.0 - 29.0 mmol/L    Calcium 9.1 8.6 - 10.5 mg/dL    Total Protein 7.1 6.0 - 8.5 g/dL    Albumin 4.3 3.5 - 5.2 g/dL    ALT (SGPT) 12 1 - 41 U/L    AST (SGOT) 23 1 - 40 U/L    Alkaline Phosphatase 96 39 - 117 U/L    Total Bilirubin 0.3 0.0 - 1.2 mg/dL    Globulin 2.8 gm/dL    A/G Ratio 1.5 g/dL    BUN/Creatinine Ratio 11.7 7.0 - 25.0    Anion Gap 10.0 5.0 - 15.0 mmol/L    eGFR 87.8 >60.0 mL/min/1.73   High Sensitivity Troponin T    Collection Time: 04/15/25  8:11 PM    Specimen: Blood   Result Value Ref Range    HS Troponin T 13 <22 ng/L   Ethanol    Collection Time: 04/15/25  8:11 PM    Specimen: Blood   Result Value Ref Range    Ethanol 166 (H) 0 - 10 mg/dL    Ethanol % 0.166 %   Magnesium    Collection Time: 04/15/25  8:11 PM    Specimen: Blood   Result Value Ref Range    Magnesium 2.5 (H) 1.6 - 2.4 mg/dL   Protime-INR    Collection Time: 04/15/25  8:33 PM    Specimen: Arm, Right; Blood   Result Value Ref Range    Protime 13.6 11.7 - 14.2 Seconds    INR 1.05 0.90 - 1.10   CBC Auto Differential    Collection Time: 04/15/25  8:33 PM    Specimen: Arm, Right; Blood   Result Value Ref Range    WBC 11.52 (H) 3.40 - 10.80 10*3/mm3    RBC 4.62 4.14 - 5.80 10*6/mm3    Hemoglobin 14.7 13.0 - 17.7 g/dL    Hematocrit 43.2 37.5 - 51.0 %    MCV 93.5 79.0 - 97.0 fL    MCH 31.8 26.6 - 33.0 pg    MCHC 34.0 31.5 - 35.7 g/dL    RDW 12.4 12.3 - 15.4 %    RDW-SD 42.1 37.0 - 54.0 fl    MPV 9.5 6.0 - 12.0 fL    Platelets 273 140 - 450 10*3/mm3   Manual Differential    Collection Time: 04/15/25  8:33 PM    Specimen: Arm, Right; Blood   Result Value Ref Range    Neutrophil % 45.8 42.7 - 76.0 %     Lymphocyte % 45.8 (H) 19.6 - 45.3 %    Monocyte % 4.2 (L) 5.0 - 12.0 %    Eosinophil % 0.0 (L) 0.3 - 6.2 %    Basophil % 0.0 0.0 - 1.5 %    Atypical Lymphocyte % 4.2 0.0 - 5.0 %    Neutrophils Absolute 5.28 1.70 - 7.00 10*3/mm3    Lymphocytes Absolute 5.76 (H) 0.70 - 3.10 10*3/mm3    Monocytes Absolute 0.48 0.10 - 0.90 10*3/mm3    Eosinophils Absolute 0.00 0.00 - 0.40 10*3/mm3    Basophils Absolute 0.00 0.00 - 0.20 10*3/mm3    RBC Morphology Normal Normal    WBC Morphology Normal Normal    Platelet Morphology Normal Normal   ECG 12 Lead Other; Falling off balance history of alcoholism    Collection Time: 04/15/25  8:36 PM   Result Value Ref Range    QT Interval 425 ms    QTC Interval 444 ms   Urine Drug Screen - Urine, Clean Catch    Collection Time: 04/15/25  8:40 PM    Specimen: Urine, Clean Catch   Result Value Ref Range    THC, Screen, Urine Negative Negative    Phencyclidine (PCP), Urine Negative Negative    Cocaine Screen, Urine Negative Negative    Methamphetamine, Ur Negative Negative    Opiate Screen Negative Negative    Amphetamine Screen, Urine Negative Negative    Benzodiazepine Screen, Urine Negative Negative    Tricyclic Antidepressants Screen Negative Negative    Methadone Screen, Urine Negative Negative    Barbiturates Screen, Urine Negative Negative    Oxycodone Screen, Urine Negative Negative    Buprenorphine, Screen, Urine Negative Negative   Fentanyl, Urine - Urine, Clean Catch    Collection Time: 04/15/25  8:40 PM    Specimen: Urine, Clean Catch   Result Value Ref Range    Fentanyl, Urine Negative Negative   High Sensitivity Troponin T 1Hr    Collection Time: 04/15/25  9:08 PM    Specimen: Arm, Left; Blood   Result Value Ref Range    HS Troponin T 11 <22 ng/L    Troponin T Numeric Delta -2 Abnormal if >/=3 ng/L       Ordered the above labs and independently reviewed the results.        RADIOLOGY  CT Cervical Spine Without Contrast  Result Date: 4/15/2025  CT OF THE CERVICAL SPINE  HISTORY: Fall   COMPARISON: None available.  TECHNIQUE: Axial CT imaging was obtained through the cervical spine. Coronal and sagittal reformatted images were obtained.  FINDINGS: No acute fracture or subluxation of the cervical spine is seen. There is anterolisthesis of C4 on C5. There is mild retrolisthesis of C5 on C6 and C6 on C7. Intervertebral disc space narrowing is probably most significant at C5-C6 and C6-C7. There is no prevertebral soft tissue swelling. Images through the lung apices demonstrate background emphysematous changes. Thyroid gland and trachea appear normal. There are carotid calcifications. Canal stenosis is probably most significant at C5-C6 and C6-C7. Significant neural foraminal narrowing is noted at multiple levels, but is probably most significant at C2-C3 and C3-C4 on the left.      No acute fracture or subluxation identified.  Radiation dose reduction techniques were utilized, including automated exposure control and exposure modulation based on body size.   This report was finalized on 4/15/2025 9:34 PM by Dr. Mechelle Bergeron M.D on Workstation: [employer]      CT Head Without Contrast  Result Date: 4/15/2025  CT OF THE HEAD WITHOUT CONTRAST  HISTORY: Confusion  COMPARISON: September 30, 2023  TECHNIQUE: Axial CT imaging was obtained through the brain. No IV contrast was administered.  FINDINGS: No acute intracranial hemorrhage is seen. There is atrophy. There is relatively advanced for the age of 69. There is periventricular and deep white matter microangiopathic change. There is no midline shift or mass effect. No calvarial fracture is seen. Mucosal thickening is noted within the paranasal sinuses.      No acute intracranial findings.  Radiation dose reduction techniques were utilized, including automated exposure control and exposure modulation based on body size.   This report was finalized on 4/15/2025 9:30 PM by Dr. Mechelle Bergeron M.D on Workstation: [id number]        I ordered the  above noted radiological studies. Reviewed by me and discussed with radiologist.  See dictation for official radiology interpretation.      PROCEDURES    Procedures      MEDICATIONS GIVEN IN ER    Medications - No data to display        All labs have been independently reviewed by me.  All radiology studies have been reviewed by me and I discussed with radiologist dictating the report when indicated below.  All EKG's independently viewed and interpreted by me.  Discussion below represents my analysis of pertinent findings related to patient's condition, differential diagnosis, treatment plan and final disposition.        PROGRESS, DATA ANALYSIS, CONSULTS, AND MEDICAL DECISION MAKING    This is a patient that has multiple reasons of why he is falling and unsteady gait.  I suspect that alcohol could be a contributing factor.  He smells of alcohol.  He has a long history of unsteady gait and alcoholism.  He is not a thrombolytic candidate if this is a stroke.  Informed him and his spouse of the test that we will order.  All questions answered at this time.      ED Course as of 04/16/25 0156   Tue Apr 15, 2025   2021 NIH scale was done within 5 minutes of me signing up with this patient.  He is has periods of belligerent confusion.  He is disoriented to the year but I do not believe that is related to any acute stroke.  Regardless he is not a thrombolytic candidate.  Last time he was seen and ambulated normal was at 10:00 this morning.  He reports to drinking alcohol today.  Also has been more argumentative with his wife and is more belligerent according to his wife.  This is not a clear-cut stroke. [MM]   2105 BP: 113/83 [MM]   2105 SpO2: 100 % [MM]   2105 Heart Rate: 70 [MM]   2212 HS Troponin T: 11 [MM]   2213 Fentanyl, Urine: Negative [MM]   2213 CT scan of the head and CT scan of the cervical spine report was reviewed from the radiologist no acute fracture no acute bony abnormality there is some degenerative  changes.  No acute abnormality seen in the CT of the head as well there is atrophy seen in the CT of the head of the brain.  Please see complete dictated report from radiologist [MM]   2219 I have reevaluated this patient.  He is very argumentative.  Wife states that he is very impatient and belligerent.  Patient and spouse was informed of the results of the test.  We Argun have the patient ambulate.  I suspect that his unsteadiness gait was from alcohol intoxication.  Patient and wife think that is a possibility as well.  He denies any complaint at this time.  He currently is hungry and would like something to eat. [MM]   2256 Patient is able to stand and ambulate without any difficulty.  He wants to go home.  I really think the likely etiology of his symptoms were alcohol intoxication.  Patient agrees and same with the spouse. [MM]      ED Course User Index  [MM] Ten Linn MD       AS OF 01:56 EDT VITALS:    BP - 113/83  HR - 70  TEMP - 96.3 °F (35.7 °C) (Oral)  02 SATS - 100%    SOCIAL DETERMINANTS OF HEALTH THAT IMPACT OR LIMIT CARE (For example..Homelessness,safe discharge, inability to obtain care, follow up, or prescriptions):      DIAGNOSIS  Final diagnoses:   Unsteady gait   Alcoholic intoxication with complication         DISPOSITION  DISCHARGE    Patient discharged in stable condition.    Reviewed implications of results, diagnosis, meds, responsibility to follow up, warning signs and symptoms of possible worsening, potential complications and reasons to return to ER, including worsening of symptoms, if you develop any new neurologic symptoms such as any new weakness, numbness, visual change, speech change or any new change in gait.  As we discussed try to prevent consumption of alcohol..    Patient/Family voiced understanding of above instructions.    Discussed plan for discharge, as there is no emergent indication for admission. Pt/family is agreeable and understands need for follow up and repeat  testing.  Pt is aware that discharge does not mean that nothing is wrong but it indicates no emergency is present that requires admission and they must continue care with follow-up as given below or physician of their choice.     FOLLOW-UP  Remington Saxena MD  [facility]  [address]  [place] [id number] [phone]    In 2 days  Return if you have any new pain, any new weakness or numbness or tingling, any new vision change or speech change, or any other concerns.         Medication List        Changed      hydrOXYzine 25 MG tablet  Commonly known as: ATARAX  Take 1 tablet by mouth 3 (Three) Times a Day As Needed for Anxiety.  What changed:   how much to take  when to take this                      DICTATED UTILIZING DRAGON DICTATION    Note Disclaimer: At [facility], we believe that sharing information builds trust and better relationships. You are receiving this note because you recently visited [facility]. It is possible you will see health information before a provider has talked with you about it. This kind of information can be easy to misunderstand. To help you fully understand what it means for your health, we urge you to discuss this note with your provider.       Ten Linn MD  04/16/25 0156

## 2025-05-05 DIAGNOSIS — N52.9 ERECTILE DYSFUNCTION, UNSPECIFIED ERECTILE DYSFUNCTION TYPE: ICD-10-CM

## 2025-05-05 RX ORDER — SILDENAFIL 50 MG/1
50 TABLET, FILM COATED ORAL DAILY PRN
Qty: 10 TABLET | Refills: 5 | Status: SHIPPED | OUTPATIENT
Start: 2025-05-05

## 2025-05-05 RX ORDER — SILDENAFIL 25 MG/1
25 TABLET, FILM COATED ORAL DAILY PRN
Qty: 10 TABLET | Refills: 5 | Status: SHIPPED | OUTPATIENT
Start: 2025-05-05

## 2025-06-04 ENCOUNTER — HOSPITAL ENCOUNTER (OUTPATIENT)
Facility: HOSPITAL | Age: 70
Discharge: HOME | End: 2025-06-04
Payer: MEDICARE

## 2025-06-04 DIAGNOSIS — R91.1 PULMONARY NODULE: ICD-10-CM

## 2025-06-04 PROCEDURE — 71250 CT THORAX DX C-: CPT

## 2025-06-20 ENCOUNTER — LAB SERVICES (OUTPATIENT)
Dept: LAB | Age: 70
End: 2025-06-20

## 2025-06-20 ENCOUNTER — OFFICE VISIT (OUTPATIENT)
Dept: INTERNAL MEDICINE | Age: 70
End: 2025-06-20

## 2025-06-20 ENCOUNTER — RESULTS FOLLOW-UP (OUTPATIENT)
Dept: INTERNAL MEDICINE | Age: 70
End: 2025-06-20

## 2025-06-20 VITALS
OXYGEN SATURATION: 96 % | TEMPERATURE: 97.9 F | DIASTOLIC BLOOD PRESSURE: 68 MMHG | BODY MASS INDEX: 35.82 KG/M2 | SYSTOLIC BLOOD PRESSURE: 119 MMHG | HEART RATE: 62 BPM | HEIGHT: 64 IN | WEIGHT: 209.8 LBS

## 2025-06-20 DIAGNOSIS — I10 ESSENTIAL HYPERTENSION: ICD-10-CM

## 2025-06-20 DIAGNOSIS — E78.5 DYSLIPIDEMIA: ICD-10-CM

## 2025-06-20 DIAGNOSIS — Z00.00 MEDICARE ANNUAL WELLNESS VISIT, SUBSEQUENT: Primary | ICD-10-CM

## 2025-06-20 DIAGNOSIS — N52.9 ERECTILE DYSFUNCTION, UNSPECIFIED ERECTILE DYSFUNCTION TYPE: ICD-10-CM

## 2025-06-20 DIAGNOSIS — K21.9 GASTROESOPHAGEAL REFLUX DISEASE, UNSPECIFIED WHETHER ESOPHAGITIS PRESENT: ICD-10-CM

## 2025-06-20 DIAGNOSIS — E66.812 CLASS 2 SEVERE OBESITY WITH BODY MASS INDEX (BMI) OF 35 TO 39.9 WITH SERIOUS COMORBIDITY (CMD): ICD-10-CM

## 2025-06-20 DIAGNOSIS — E66.01 CLASS 2 SEVERE OBESITY WITH BODY MASS INDEX (BMI) OF 35 TO 39.9 WITH SERIOUS COMORBIDITY (CMD): ICD-10-CM

## 2025-06-20 LAB
ALBUMIN SERPL-MCNC: 4 G/DL (ref 3.4–5)
ALBUMIN/GLOB SERPL: 1.1 {RATIO} (ref 1–2.4)
ALP SERPL-CCNC: 106 UNITS/L (ref 45–117)
ALT SERPL-CCNC: 33 UNITS/L
ANION GAP SERPL CALC-SCNC: 12 MMOL/L (ref 7–19)
AST SERPL-CCNC: 22 UNITS/L
BILIRUB SERPL-MCNC: 0.9 MG/DL (ref 0.2–1)
BUN SERPL-MCNC: 9 MG/DL (ref 6–20)
BUN/CREAT SERPL: 10 (ref 7–25)
CALCIUM SERPL-MCNC: 9.2 MG/DL (ref 8.4–10.2)
CHLORIDE SERPL-SCNC: 106 MMOL/L (ref 97–110)
CO2 SERPL-SCNC: 22 MMOL/L (ref 21–32)
CREAT SERPL-MCNC: 0.86 MG/DL (ref 0.67–1.17)
CREAT UR-MCNC: 169 MG/DL
EGFRCR SERPLBLD CKD-EPI 2021: >90 ML/MIN/{1.73_M2}
FASTING DURATION TIME PATIENT: NORMAL H
GLOBULIN SER-MCNC: 3.7 G/DL (ref 2–4)
GLUCOSE SERPL-MCNC: 88 MG/DL (ref 70–99)
MICROALBUMIN UR-MCNC: 0.99 MG/DL
MICROALBUMIN/CREAT UR: 5.9 MG/G
POTASSIUM SERPL-SCNC: 3.9 MMOL/L (ref 3.4–5.1)
PROT SERPL-MCNC: 7.7 G/DL (ref 6.4–8.2)
SODIUM SERPL-SCNC: 136 MMOL/L (ref 135–145)

## 2025-06-20 PROCEDURE — 82043 UR ALBUMIN QUANTITATIVE: CPT | Performed by: NURSE PRACTITIONER

## 2025-06-20 PROCEDURE — 80053 COMPREHEN METABOLIC PANEL: CPT

## 2025-06-20 RX ORDER — SILDENAFIL 100 MG/1
100 TABLET, FILM COATED ORAL DAILY PRN
Qty: 20 TABLET | Refills: 3 | Status: SHIPPED | OUTPATIENT
Start: 2025-06-20

## 2025-06-20 RX ORDER — SILDENAFIL 50 MG/1
50 TABLET, FILM COATED ORAL DAILY PRN
Qty: 10 TABLET | Refills: 5 | Status: CANCELLED | OUTPATIENT
Start: 2025-06-20

## 2025-06-20 RX ORDER — FAMOTIDINE 20 MG/1
20 TABLET, FILM COATED ORAL 2 TIMES DAILY PRN
Qty: 60 TABLET | Refills: 1 | Status: SHIPPED | OUTPATIENT
Start: 2025-06-20

## 2025-06-20 RX ORDER — HYDROCHLOROTHIAZIDE 12.5 MG/1
12.5 TABLET ORAL DAILY
Qty: 30 TABLET | Refills: 0 | Status: SHIPPED | OUTPATIENT
Start: 2025-06-20

## 2025-06-20 ASSESSMENT — PATIENT HEALTH QUESTIONNAIRE - PHQ9
SUM OF ALL RESPONSES TO PHQ QUESTIONS 1-9: 0
8. MOVING OR SPEAKING SO SLOWLY THAT OTHER PEOPLE COULD HAVE NOTICED. OR THE OPPOSITE, BEING SO FIGETY OR RESTLESS THAT YOU HAVE BEEN MOVING AROUND A LOT MORE THAN USUAL: NOT AT ALL
1. LITTLE INTEREST OR PLEASURE IN DOING THINGS: NOT AT ALL
5. POOR APPETITE, WEIGHT LOSS, OR OVEREATING: NOT AT ALL
7. TROUBLE CONCENTRATING ON THINGS, SUCH AS READING THE NEWSPAPER OR WATCHING TELEVISION: NOT AT ALL
SUM OF ALL RESPONSES TO PHQ9 QUESTIONS 1 AND 2: 0
4. FEELING TIRED OR HAVING LITTLE ENERGY: NOT AT ALL
9. THOUGHTS THAT YOU WOULD BE BETTER OFF DEAD, OR OF HURTING YOURSELF: NOT AT ALL
SUM OF ALL RESPONSES TO PHQ9 QUESTIONS 1 AND 2: 0
2. FEELING DOWN, DEPRESSED OR HOPELESS: NOT AT ALL
3. TROUBLE FALLING OR STAYING ASLEEP OR SLEEPING TOO MUCH: NOT AT ALL
10. IF YOU CHECKED OFF ANY PROBLEMS, HOW DIFFICULT HAVE THESE PROBLEMS MADE IT FOR YOU TO DO YOUR WORK, TAKE CARE OF THINGS AT HOME, OR GET ALONG WITH OTHER PEOPLE: NOT DIFFICULT AT ALL
CLINICAL INTERPRETATION OF PHQ2 SCORE: NO FURTHER SCREENING NEEDED
6. FEELING BAD ABOUT YOURSELF - OR THAT YOU ARE A FAILURE OR HAVE LET YOURSELF OR YOUR FAMILY DOWN: NOT AT ALL

## (undated) DEVICE — FORCEPS ENDO SPK SERRATE 230CM CAPTURA PRO LG

## (undated) DEVICE — CANNULA VIAL 16GA ANTICORE ANTIPARTICULATE NDLS 3ML MNJCT

## (undated) DEVICE — MSK AIRWY LARYNG LMA PILOT SZ4

## (undated) DEVICE — MMIS - FORCEPS BIOPSY NDL 240CM 2.2MM RJ 4 2.8MM STD

## (undated) DEVICE — TRAP,MUCUS SPECIMEN, 80CC: Brand: MEDLINE

## (undated) DEVICE — JELLY LUB 1.25OZ HR SAFEWRAP ONESHOT STRL LF

## (undated) DEVICE — SINGLE USE BIOPSY VALVE MAJ-210: Brand: SINGLE USE BIOPSY VALVE (STERILE)

## (undated) DEVICE — SENSR O2 OXIMAX FNGR A/ 18IN NONSTR

## (undated) DEVICE — KIT ESCP 5 PC DEFENDO OLYMPUS GI ESCP

## (undated) DEVICE — TUBING O2 7FT CRSH RES LUM UCNCT-IT CNCT VINYL AIRLIFE LF

## (undated) DEVICE — SYRINGE 17GA 5ML 2 SDPRT CANNULA CENTERPOINT BLUNT STRL MED

## (undated) DEVICE — SINGLE USE SUCTION VALVE MAJ-209: Brand: SINGLE USE SUCTION VALVE (STERILE)

## (undated) DEVICE — ADAPT CLN BIOGUARD AIR/H2O DISP

## (undated) DEVICE — TUBING SCT CLR 6FT .25IN MDVC NCDTV PLASTIC MAXIGRIP MALE

## (undated) DEVICE — VITAL SIGNS™ JACKSON-REES CIRCUITS: Brand: VITAL SIGNS™

## (undated) DEVICE — ADAPT SWVL FIBROPTIC BRONCH

## (undated) DEVICE — TUBING, SUCTION, 1/4" X 10', STRAIGHT: Brand: MEDLINE